# Patient Record
Sex: FEMALE | Race: WHITE | NOT HISPANIC OR LATINO | Employment: OTHER | URBAN - METROPOLITAN AREA
[De-identification: names, ages, dates, MRNs, and addresses within clinical notes are randomized per-mention and may not be internally consistent; named-entity substitution may affect disease eponyms.]

---

## 2021-05-25 ENCOUNTER — RECORDS - HEALTHEAST (OUTPATIENT)
Dept: ADMINISTRATIVE | Facility: CLINIC | Age: 56
End: 2021-05-25

## 2021-05-31 ENCOUNTER — RECORDS - HEALTHEAST (OUTPATIENT)
Dept: ADMINISTRATIVE | Facility: CLINIC | Age: 56
End: 2021-05-31

## 2023-01-01 ENCOUNTER — OFFICE VISIT (OUTPATIENT)
Dept: VASCULAR SURGERY | Facility: CLINIC | Age: 58
End: 2023-01-01
Payer: COMMERCIAL

## 2023-01-01 ENCOUNTER — OFFICE VISIT (OUTPATIENT)
Dept: VASCULAR SURGERY | Facility: CLINIC | Age: 58
End: 2023-01-01
Attending: STUDENT IN AN ORGANIZED HEALTH CARE EDUCATION/TRAINING PROGRAM
Payer: COMMERCIAL

## 2023-01-01 ENCOUNTER — ANCILLARY PROCEDURE (OUTPATIENT)
Dept: VASCULAR ULTRASOUND | Facility: CLINIC | Age: 58
End: 2023-01-01
Attending: NURSE PRACTITIONER
Payer: COMMERCIAL

## 2023-01-01 ENCOUNTER — DOCUMENTATION ONLY (OUTPATIENT)
Dept: VASCULAR SURGERY | Facility: CLINIC | Age: 58
End: 2023-01-01

## 2023-01-01 ENCOUNTER — OFFICE VISIT (OUTPATIENT)
Dept: VASCULAR SURGERY | Facility: CLINIC | Age: 58
End: 2023-01-01
Attending: NURSE PRACTITIONER
Payer: COMMERCIAL

## 2023-01-01 ENCOUNTER — TELEPHONE (OUTPATIENT)
Dept: VASCULAR SURGERY | Facility: CLINIC | Age: 58
End: 2023-01-01

## 2023-01-01 VITALS
SYSTOLIC BLOOD PRESSURE: 155 MMHG | HEART RATE: 93 BPM | DIASTOLIC BLOOD PRESSURE: 84 MMHG | RESPIRATION RATE: 12 BRPM | OXYGEN SATURATION: 95 % | TEMPERATURE: 88 F

## 2023-01-01 VITALS
TEMPERATURE: 97.6 F | SYSTOLIC BLOOD PRESSURE: 115 MMHG | OXYGEN SATURATION: 95 % | HEART RATE: 95 BPM | DIASTOLIC BLOOD PRESSURE: 75 MMHG | RESPIRATION RATE: 16 BRPM

## 2023-01-01 VITALS
RESPIRATION RATE: 16 BRPM | HEART RATE: 62 BPM | TEMPERATURE: 97.2 F | SYSTOLIC BLOOD PRESSURE: 128 MMHG | DIASTOLIC BLOOD PRESSURE: 80 MMHG

## 2023-01-01 VITALS
HEART RATE: 85 BPM | DIASTOLIC BLOOD PRESSURE: 69 MMHG | TEMPERATURE: 98.1 F | RESPIRATION RATE: 16 BRPM | OXYGEN SATURATION: 90 % | SYSTOLIC BLOOD PRESSURE: 133 MMHG

## 2023-01-01 VITALS
DIASTOLIC BLOOD PRESSURE: 84 MMHG | SYSTOLIC BLOOD PRESSURE: 140 MMHG | RESPIRATION RATE: 18 BRPM | TEMPERATURE: 97.6 F | WEIGHT: 285.7 LBS

## 2023-01-01 VITALS
HEART RATE: 97 BPM | DIASTOLIC BLOOD PRESSURE: 72 MMHG | BODY MASS INDEX: 51.71 KG/M2 | TEMPERATURE: 98 F | OXYGEN SATURATION: 100 % | WEIGHT: 281 LBS | RESPIRATION RATE: 12 BRPM | HEIGHT: 62 IN | SYSTOLIC BLOOD PRESSURE: 121 MMHG

## 2023-01-01 VITALS — HEART RATE: 89 BPM | DIASTOLIC BLOOD PRESSURE: 81 MMHG | SYSTOLIC BLOOD PRESSURE: 141 MMHG | OXYGEN SATURATION: 95 %

## 2023-01-01 DIAGNOSIS — S81.002A OPEN WOUND OF KNEE, LEG, AND ANKLE, LEFT, INITIAL ENCOUNTER: Primary | ICD-10-CM

## 2023-01-01 DIAGNOSIS — R60.0 BILATERAL LOWER EXTREMITY EDEMA: Primary | ICD-10-CM

## 2023-01-01 DIAGNOSIS — S81.802A OPEN WOUND OF KNEE, LEG, AND ANKLE, LEFT, INITIAL ENCOUNTER: ICD-10-CM

## 2023-01-01 DIAGNOSIS — I87.303 VENOUS HYPERTENSION OF BOTH LOWER EXTREMITIES: ICD-10-CM

## 2023-01-01 DIAGNOSIS — S81.002A OPEN WOUND OF KNEE, LEG, AND ANKLE, LEFT, INITIAL ENCOUNTER: ICD-10-CM

## 2023-01-01 DIAGNOSIS — I89.0 SECONDARY LYMPHEDEMA: ICD-10-CM

## 2023-01-01 DIAGNOSIS — S91.002A OPEN WOUND OF KNEE, LEG, AND ANKLE, LEFT, INITIAL ENCOUNTER: ICD-10-CM

## 2023-01-01 DIAGNOSIS — R60.0 BILATERAL LOWER EXTREMITY EDEMA: ICD-10-CM

## 2023-01-01 DIAGNOSIS — S91.002A OPEN WOUND OF KNEE, LEG, AND ANKLE, LEFT, INITIAL ENCOUNTER: Primary | ICD-10-CM

## 2023-01-01 DIAGNOSIS — I87.2 VENOUS STASIS DERMATITIS OF BOTH LOWER EXTREMITIES: ICD-10-CM

## 2023-01-01 DIAGNOSIS — T14.8XXA WOUND INFECTION: ICD-10-CM

## 2023-01-01 DIAGNOSIS — L08.9 WOUND INFECTION: ICD-10-CM

## 2023-01-01 DIAGNOSIS — Z87.2 HISTORY OF CELLULITIS OF SKIN WITH LYMPHANGITIS: ICD-10-CM

## 2023-01-01 DIAGNOSIS — S81.802A OPEN WOUND OF KNEE, LEG, AND ANKLE, LEFT, INITIAL ENCOUNTER: Primary | ICD-10-CM

## 2023-01-01 DIAGNOSIS — I89.0 SECONDARY LYMPHEDEMA: Primary | ICD-10-CM

## 2023-01-01 LAB
BACTERIA WND CULT: ABNORMAL
BACTERIA WND CULT: NORMAL
GRAM STAIN RESULT: ABNORMAL

## 2023-01-01 PROCEDURE — 29581 APPL MULTLAYER CMPRN SYS LEG: CPT

## 2023-01-01 PROCEDURE — 87077 CULTURE AEROBIC IDENTIFY: CPT | Performed by: NURSE PRACTITIONER

## 2023-01-01 PROCEDURE — 97602 WOUND(S) CARE NON-SELECTIVE: CPT

## 2023-01-01 PROCEDURE — 87205 SMEAR GRAM STAIN: CPT | Performed by: NURSE PRACTITIONER

## 2023-01-01 PROCEDURE — 99213 OFFICE O/P EST LOW 20 MIN: CPT | Mod: 25 | Performed by: NURSE PRACTITIONER

## 2023-01-01 PROCEDURE — 99213 OFFICE O/P EST LOW 20 MIN: CPT | Performed by: NURSE PRACTITIONER

## 2023-01-01 PROCEDURE — 99204 OFFICE O/P NEW MOD 45 MIN: CPT | Performed by: NURSE PRACTITIONER

## 2023-01-01 PROCEDURE — 93970 EXTREMITY STUDY: CPT

## 2023-01-01 PROCEDURE — 87075 CULTR BACTERIA EXCEPT BLOOD: CPT | Performed by: NURSE PRACTITIONER

## 2023-01-01 RX ORDER — ACETIC ACID 0.25 G/100ML
IRRIGANT IRRIGATION
Qty: 1000 ML | Refills: 3 | Status: SHIPPED | OUTPATIENT
Start: 2023-01-01 | End: 2024-01-01

## 2023-01-01 RX ORDER — MELOXICAM 15 MG/1
TABLET ORAL
COMMUNITY
Start: 2022-09-30 | End: 2023-01-01

## 2023-01-01 RX ORDER — FUROSEMIDE 40 MG
40 TABLET ORAL 2 TIMES DAILY
Status: ON HOLD | COMMUNITY
End: 2024-01-01

## 2023-01-01 RX ORDER — CHLORTHALIDONE 25 MG/1
TABLET ORAL
COMMUNITY
Start: 2022-11-14 | End: 2023-01-01

## 2023-01-01 RX ORDER — LOSARTAN POTASSIUM 100 MG/1
TABLET ORAL
COMMUNITY
Start: 2022-09-19

## 2023-01-01 RX ORDER — GENTAMICIN SULFATE 1 MG/G
OINTMENT TOPICAL
Qty: 30 G | Refills: 3 | Status: SHIPPED | OUTPATIENT
Start: 2023-01-01 | End: 2024-01-01

## 2023-01-01 RX ORDER — CLINDAMYCIN HCL 300 MG
CAPSULE ORAL
COMMUNITY
Start: 2023-09-14 | End: 2023-01-01

## 2023-01-01 RX ORDER — CEFDINIR 300 MG/1
300 CAPSULE ORAL 2 TIMES DAILY
Qty: 20 CAPSULE | Refills: 0 | Status: SHIPPED | OUTPATIENT
Start: 2023-01-01 | End: 2023-01-01

## 2023-01-01 ASSESSMENT — PAIN SCALES - GENERAL
PAINLEVEL: MILD PAIN (2)
PAINLEVEL: NO PAIN (0)
PAINLEVEL: MILD PAIN (2)
PAINLEVEL: NO PAIN (0)
PAINLEVEL: MILD PAIN (2)
PAINLEVEL: MILD PAIN (3)

## 2023-08-14 ENCOUNTER — HOSPITAL ENCOUNTER (EMERGENCY)
Facility: CLINIC | Age: 58
Discharge: HOME | End: 2023-08-14
Attending: EMERGENCY MEDICINE | Admitting: EMERGENCY MEDICINE
Payer: COMMERCIAL

## 2023-08-14 ENCOUNTER — APPOINTMENT (OUTPATIENT)
Dept: RADIOLOGY | Facility: CLINIC | Age: 58
End: 2023-08-14
Attending: EMERGENCY MEDICINE
Payer: COMMERCIAL

## 2023-08-14 VITALS
OXYGEN SATURATION: 90 % | TEMPERATURE: 97.1 F | RESPIRATION RATE: 20 BRPM | DIASTOLIC BLOOD PRESSURE: 80 MMHG | SYSTOLIC BLOOD PRESSURE: 169 MMHG | HEART RATE: 92 BPM

## 2023-08-14 DIAGNOSIS — I89.0 LYMPHEDEMA: ICD-10-CM

## 2023-08-14 DIAGNOSIS — L03.116 CELLULITIS OF LEFT FOOT: ICD-10-CM

## 2023-08-14 PROBLEM — R60.0 BILATERAL LOWER EXTREMITY EDEMA: Status: ACTIVE | Noted: 2021-07-14

## 2023-08-14 PROBLEM — Z87.891 FORMER SMOKER: Status: ACTIVE | Noted: 2021-06-23

## 2023-08-14 PROBLEM — E55.9 VITAMIN D DEFICIENCY: Status: ACTIVE | Noted: 2021-06-23

## 2023-08-14 PROBLEM — I10 HTN (HYPERTENSION): Status: ACTIVE | Noted: 2021-07-14

## 2023-08-14 PROBLEM — J45.909 ASTHMA: Status: ACTIVE | Noted: 2023-08-14

## 2023-08-14 PROBLEM — J30.2 SEASONAL ALLERGIES: Status: ACTIVE | Noted: 2021-07-14

## 2023-08-14 PROBLEM — R73.03 PREDIABETES: Status: ACTIVE | Noted: 2021-06-24

## 2023-08-14 PROBLEM — I87.2 VENOUS STASIS DERMATITIS OF BOTH LOWER EXTREMITIES: Status: ACTIVE | Noted: 2022-03-16

## 2023-08-14 LAB
ANION GAP SERPL CALCULATED.3IONS-SCNC: 11 MMOL/L (ref 5–18)
BUN SERPL-MCNC: 11 MG/DL (ref 8–22)
C REACTIVE PROTEIN LHE: 2.2 MG/DL (ref 0–?)
CALCIUM SERPL-MCNC: 9.3 MG/DL (ref 8.5–10.5)
CHLORIDE BLD-SCNC: 99 MMOL/L (ref 98–107)
CO2 SERPL-SCNC: 30 MMOL/L (ref 22–31)
CREAT SERPL-MCNC: 0.82 MG/DL (ref 0.6–1.1)
ERYTHROCYTE [DISTWIDTH] IN BLOOD BY AUTOMATED COUNT: 13.7 % (ref 10–15)
ERYTHROCYTE [SEDIMENTATION RATE] IN BLOOD BY WESTERGREN METHOD: 17 MM/HR (ref 0–30)
GFR SERPL CREATININE-BSD FRML MDRD: 82 ML/MIN/1.73M2
GLUCOSE BLD-MCNC: 104 MG/DL (ref 70–125)
HCT VFR BLD AUTO: 48.3 % (ref 35–47)
HGB BLD-MCNC: 16.1 G/DL (ref 11.7–15.7)
MCH RBC QN AUTO: 34.6 PG (ref 26.5–33)
MCHC RBC AUTO-ENTMCNC: 33.3 G/DL (ref 31.5–36.5)
MCV RBC AUTO: 104 FL (ref 78–100)
PLATELET # BLD AUTO: 208 10E3/UL (ref 150–450)
POTASSIUM BLD-SCNC: 4.7 MMOL/L (ref 3.5–5)
RBC # BLD AUTO: 4.65 10E6/UL (ref 3.8–5.2)
SODIUM SERPL-SCNC: 140 MMOL/L (ref 136–145)
WBC # BLD AUTO: 10.6 10E3/UL (ref 4–11)

## 2023-08-14 PROCEDURE — 86140 C-REACTIVE PROTEIN: CPT | Performed by: EMERGENCY MEDICINE

## 2023-08-14 PROCEDURE — 80048 BASIC METABOLIC PNL TOTAL CA: CPT | Performed by: EMERGENCY MEDICINE

## 2023-08-14 PROCEDURE — 73620 X-RAY EXAM OF FOOT: CPT | Mod: LT

## 2023-08-14 PROCEDURE — 36415 COLL VENOUS BLD VENIPUNCTURE: CPT | Performed by: EMERGENCY MEDICINE

## 2023-08-14 PROCEDURE — 99284 EMERGENCY DEPT VISIT MOD MDM: CPT

## 2023-08-14 PROCEDURE — 85652 RBC SED RATE AUTOMATED: CPT | Performed by: EMERGENCY MEDICINE

## 2023-08-14 PROCEDURE — 85027 COMPLETE CBC AUTOMATED: CPT | Performed by: EMERGENCY MEDICINE

## 2023-08-14 PROCEDURE — 87040 BLOOD CULTURE FOR BACTERIA: CPT | Mod: 59 | Performed by: EMERGENCY MEDICINE

## 2023-08-14 RX ORDER — GINSENG 100 MG
CAPSULE ORAL ONCE
Status: DISCONTINUED | OUTPATIENT
Start: 2023-08-14 | End: 2023-08-14 | Stop reason: HOSPADM

## 2023-08-14 RX ORDER — FUROSEMIDE 20 MG
20 TABLET ORAL DAILY
Qty: 14 TABLET | Refills: 0 | Status: SHIPPED | OUTPATIENT
Start: 2023-08-14 | End: 2023-01-01 | Stop reason: DRUGHIGH

## 2023-08-14 RX ORDER — CEPHALEXIN 500 MG/1
1000 CAPSULE ORAL 3 TIMES DAILY
Qty: 42 CAPSULE | Refills: 0 | Status: SHIPPED | OUTPATIENT
Start: 2023-08-14 | End: 2023-08-21

## 2023-08-14 ASSESSMENT — ACTIVITIES OF DAILY LIVING (ADL): ADLS_ACUITY_SCORE: 35

## 2023-08-14 NOTE — ED TRIAGE NOTES
"Pt arrives to ED with c/o ongoing wound to her left foot for over 6 months. Pt was without insurance and was not able to go to the doctor. Pt seen at urgent care today and sent here. Urgent care told her \"you might need your foot cut off\". Left leg is severely swollen compared to the right. CMS intact on left side. Hypertension hx.      Triage Assessment       Row Name 08/14/23 1351       Triage Assessment (Adult)    Airway WDL WDL       Respiratory WDL    Respiratory WDL WDL       Skin Circulation/Temperature WDL    Skin Circulation/Temperature WDL WDL       Cardiac WDL    Cardiac WDL WDL       Peripheral/Neurovascular WDL    Peripheral Neurovascular WDL WDL       Cognitive/Neuro/Behavioral WDL    Cognitive/Neuro/Behavioral WDL WDL                    "

## 2023-08-14 NOTE — DISCHARGE INSTRUCTIONS
Please  [facility] pharmacy, and for the next week apply this twice a day to the wounds that are open on the left foot.    Otherwise keep using compression dressings or Ace wraps on both legs.    You can take Tylenol, and or Aleve for pain.    Make sure that you keep your appointment with your primary care doctor in 2 weeks.     Antibiotics take about 24 hours to fully kick in.  That is when the inflammation and swelling slows down.  If you notice that your symptoms are getting worse after 24 hours, or if at any time you have nausea, vomiting or are unable to keep down antibiotics, I would like you to come back to the emergency department.

## 2023-08-14 NOTE — ED PROVIDER NOTES
EMERGENCY DEPARTMENT ENCOUNTER      NAME: Noelle Gomez  AGE: 58 year old female  YOB: 1965  MRN: [id number]  EVALUATION DATE & TIME: No admission date for patient encounter.    PCP: No Ref-Primary, Physician    ED PROVIDER: Chandra Duong M.D.      Chief Complaint   Patient presents with    Wound Check         FINAL IMPRESSION:  1. Cellulitis of left foot    2. Lymphedema          ED COURSE & MEDICAL DECISION MAKING:    Pertinent Labs & Imaging studies reviewed below.  All EKGs below represent my independent interpretation.   ED Course as of 08/14/23 2327   Mon Aug 14, 2023   1522 59 yo F here with acute on chronic infection to R foot. Recent lapse of insurance, so was not on a number of medications for almost 6 months. Sent here from  today after evaluation of left foot was alarming and they were concerned for serious infection.  On arrival here she is not septic appearing, notes her symptoms have been chronic and slowly progressive.  She has normal temperature, blood pressure.  Her left foot is more swollen and red than her right foot.  The area of inflammation is seems to be more on the dorsum than plantar aspect and there is no deep ulcer that would raise suspicion for a tracking wound down to the bone.  Plan to screen for osteomyelitis with inflammatory lab work, x-ray, I do not think MRI is needed at this time.  Blood cultures ordered.   1641 WBC: 10.6   1701 Basic metabolic panel  WNL   1701 Sed Rate: 17  WNL   1701 CRP(!): 2.2  Mildly elevated   1715 XR Foot Left 2 Views  No radiographic evidence for osteomyelitis. No evidence for fracture. Plantar calcaneal spurring. Severe soft tissue swelling over the foot and lower leg.   1733 I updated the patient.  The wound was covered bacitracin, rewrapped.  We discussed my diagnosis of cellulitis.  With her reassuring lab work here, renal function she was sent home with Keflex for cellulitis, 20 mg of Lasix daily for her lymphedema, and we  discussed wound care.  She has follow-up with primary care doctor in 10 days.  We discussed return precautions.   1734 Although DVT was in the differential, I think the swelling and pain can be completely explained by the inflammatory changes of the skin.       Additional ED Course Timestamps:  3:21 PM BOB Sanchez met with the patient and obtained initial history. Initial exam pending patient room placement.  3:52 PM I met with the patient, obtained history, performed an initial exam, and discussed options and plan for diagnostics and treatment here in the ED.  5:23 PM Patient reevaluated and updated on workup results.     Medical Decision Making    History:  Supplemental history from: Documented in chart, if applicable  External Record(s) reviewed: Documented in chart, if applicable.    Work Up:  Chart documentation includes differential considered and any EKGs or imaging independently interpreted by provider, where specified.  In additional to work up documented, I considered the following work up: Documented in chart, if applicable.    External consultation:  Discussion of management with another provider: Documented in chart, if applicable    Complicating factors:  Care impacted by chronic illness: Hypertension  Care affected by social determinants of health: Access to Affordable Health Care    Disposition considerations: Discharge. I prescribed additional prescription strength medication(s) as charted. N/A.        At the conclusion of the encounter I discussed the results of all of the tests and the disposition. The questions were answered. The patient or family acknowledged understanding and was agreeable with the care plan.       MEDICATIONS GIVEN IN THE EMERGENCY:  Medications - No data to display        NEW PRESCRIPTIONS STARTED AT TODAY'S ER VISIT  Discharge Medication List as of 8/14/2023  6:21 PM        START taking these medications    Details   cephALEXin (KEFLEX) 500 MG capsule Take 2 capsules  (1,000 mg) by mouth 3 times daily for 7 days, Disp-42 capsule, R-0, E-Prescribe      furosemide (LASIX) 20 MG tablet Take 1 tablet (20 mg) by mouth daily for 14 days, Disp-14 tablet, R-0, E-Prescribe                =================================================================    HPI    Patient information was obtained from: Patient    Use of : N/A       Noelle Gomez is a 58 year old female with a pertinent history of  hypertension, bilateral lower extremity edema, and ureteropelvic junction obstruction, and prediabetes who presents to this ED for evaluation of a wound check and leg swelling    Per chart review, patient was seen at [facility] on 3/16/2022 for a follow up on left leg swelling that has been persistent for several months. On exam, she was noted to have 2+ pitting edema and stasis dermatitis left lower extremity with no open sores. She also had 1+ pitting edema with stasis dermatitis in her right lower extremity RLE also had a lateral 3.5 cm round ulcer with some drainage into skin superficially without some surrounding erythema, and a healed inner right ulcer without drainage. She was prescribed lasix 200 mg bid x 7 days and keflex. Patient was advised to follow up closely in 1-2 weeks and obtain labs to monitor kidney functions. She was also advised to continue to use leg compressions.      Patient comes in from urgent care today (8/13) with increased left leg swelling that had initially started about 6 months ago. She states it started after she had stopped taking her diuretic and hypertension medications due to lapses in her insurance. She and her  just recently got new insurance cards and so she had sought evaluation today.She has edema on her right foot but the left leg is notably worse. She was told in urgent care she may have an infection in her left foot. Also endorses blisters on the top of her foot. She has been applying vaseline and wrapping her foot  with bandages twice a day.    She denied any fevers, chest pain, shortness of breath, or abdominal pain. No history of diabetes.No other complaints at this time.               VITALS:  BP (!) 169/80   Pulse 92   Temp 97.1  F (36.2  C) (Temporal)   Resp 20   SpO2 90%     PHYSICAL EXAM    Constitutional: Well developed, well nourished. Comfortable appearing.  HENT: Normocephalic, atraumatic, mucous membranes moist, nose normal. Neck- Supple, gross ROM intact.   Eyes: Pupils mid-range, conjunctiva without injection, no discharge.   Respiratory: Clear to auscultation bilaterally, no respiratory distress, no wheezing, speaks full sentences easily. No cough.  Cardiovascular: Tachycardic. Regular rhythm, no murmurs. Bilateral lymphedema in legs and feet, left>right.  GI: Obese abdomen. Soft, no tenderness to deep palpation in all quadrants, no masses.  Musculoskeletal: Moving all 4 extremities intentionally and without pain. No obvious deformity.  Skin: Warm, dry, no rash. Left leg has more redness and warmth. Some skin breakdown in left dorsal forefoot present.  Neurologic: Alert & oriented x 3, cranial nerves grossly intact.  Psychiatric: Affect normal, cooperative.            I, Genesis Plascencia  am serving as a scribe to document services personally performed by Dr. Chandra Duong based on my observation and the provider's statements to me. IChandra MD attest that Genesis Plascencia  is acting in a scribe capacity, has observed my performance of the services and has documented them in accordance with my direction.    Chandra Duong M.D.  Emergency Medicine  [facility] EMERGENCY ROOM  [address] [address]  [phone]  Dept: [phone]       Chandra Duong MD  08/14/23 1925

## 2023-08-19 LAB
BACTERIA BLD CULT: NO GROWTH
BACTERIA BLD CULT: NO GROWTH

## 2023-09-14 ENCOUNTER — TRANSFERRED RECORDS (OUTPATIENT)
Dept: HEALTH INFORMATION MANAGEMENT | Facility: CLINIC | Age: 58
End: 2023-09-14

## 2023-09-14 ENCOUNTER — MEDICAL CORRESPONDENCE (OUTPATIENT)
Dept: HEALTH INFORMATION MANAGEMENT | Facility: CLINIC | Age: 58
End: 2023-09-14

## 2023-09-14 ENCOUNTER — TRANSCRIBE ORDERS (OUTPATIENT)
Dept: VASCULAR SURGERY | Facility: CLINIC | Age: 58
End: 2023-09-14
Payer: COMMERCIAL

## 2023-09-14 ENCOUNTER — LAB REQUISITION (OUTPATIENT)
Dept: LAB | Facility: CLINIC | Age: 58
End: 2023-09-14
Payer: COMMERCIAL

## 2023-09-14 DIAGNOSIS — S81.802A OPEN WOUND OF KNEE, LEG, AND ANKLE, LEFT, INITIAL ENCOUNTER: Primary | ICD-10-CM

## 2023-09-14 DIAGNOSIS — S91.002A OPEN WOUND OF KNEE, LEG, AND ANKLE, LEFT, INITIAL ENCOUNTER: Primary | ICD-10-CM

## 2023-09-14 DIAGNOSIS — Z79.899 OTHER LONG TERM (CURRENT) DRUG THERAPY: ICD-10-CM

## 2023-09-14 DIAGNOSIS — S81.002A OPEN WOUND OF KNEE, LEG, AND ANKLE, LEFT, INITIAL ENCOUNTER: Primary | ICD-10-CM

## 2023-09-14 PROCEDURE — 80048 BASIC METABOLIC PNL TOTAL CA: CPT | Mod: ORL | Performed by: STUDENT IN AN ORGANIZED HEALTH CARE EDUCATION/TRAINING PROGRAM

## 2023-09-15 LAB
ANION GAP SERPL CALCULATED.3IONS-SCNC: 12 MMOL/L (ref 7–15)
BUN SERPL-MCNC: 18.4 MG/DL (ref 6–20)
CALCIUM SERPL-MCNC: 9.4 MG/DL (ref 8.6–10)
CHLORIDE SERPL-SCNC: 95 MMOL/L (ref 98–107)
CREAT SERPL-MCNC: 0.9 MG/DL (ref 0.51–0.95)
DEPRECATED HCO3 PLAS-SCNC: 32 MMOL/L (ref 22–29)
EGFRCR SERPLBLD CKD-EPI 2021: 74 ML/MIN/1.73M2
GLUCOSE SERPL-MCNC: 97 MG/DL (ref 70–99)
POTASSIUM SERPL-SCNC: 4.6 MMOL/L (ref 3.4–5.3)
SODIUM SERPL-SCNC: 139 MMOL/L (ref 136–145)

## 2023-10-02 NOTE — PROGRESS NOTES
[facility] Consult Note    Date of Service: October 2, 2023     Requesting Provider: Dr. Yasmani Aaron    Chief Complaint: BLE wounds and swelling    History of Present Illness: Noelle Gomez is being seen at [facility] today regarding BLE wounds and swelling They arrive to the clinic today alone; she is able to walk unassisted to the room. The patient reports that the wounds and swelling began around 6 months ago when she stopped taking her diuretics; she was seen in UC and then sent to ER this past August; was started on keflex and restarted on lasix. Left foot xray done also at that time and was negative for osteomyelitis. She reports that she lost her medical insurance and could not get her prescriptions. Was currently/previously using bacitracin; telfa; abd; coban; pt trying to change the dressings 2-3 times a day; this is very hard for her to reach her foot. Reports pain of 0/10; currently using nothing for pain. Has used nothing as compression in the past, is currently using tubular for compression. Denies any fevers, chills, or generalized ill feeling. Denies history of cancer. Sleeps in a bed or on the couch with legs elevated. Pt still has their uterus and ovaries, they deny any abnormal vaginal bleeding. Denies history of DVT, Joint Replacement, and Vein Procedures. Positive history of Cellulitis. I personally reviewed outside imaging, lab work, and progress noted through Care Everywhere and outside records. Quit smoking 4 years ago; she gained significant weight after this. She does have a stationary bike at home and want to start using this to lose the weight when she is feeling better.       Review of Systems:   Constitutional:  Negative   EENTM: negative for glasses;  negative Quechan  GI:  negative for nausea/vomiting;   negative for constipation   negative diarrhea;   negative for fecal incontinence  negative weight loss  :   negative dysuria,  negative  incontinence  MS: patient is ambulatory;  does not use assistive devices  Cardiac: negative   Respiratory:  negative SOB  Endocrine:  positive  pre-diabetes  Psych: negative  depression/anxiety    Past Medical History:   Past Medical History:   Diagnosis Date    Asthma 08/14/2023    Bilateral lower extremity edema 07/14/2021    Former smoker 06/23/2021    HTN (hypertension) 07/14/2021    Lymphedema 02/08/2022    Prediabetes 06/24/2021    Seasonal allergies 07/14/2021    UPJ (ureteropelvic junction) obstruction 03/04/2015    Vitamin D deficiency 06/23/2021        Surgical History: No past surgical history on file.     Medications:   Current Outpatient Medications   Medication Sig    losartan (COZAAR) 100 MG tablet TAKE 1 TABLET(100 MG) BY MOUTH EVERY DAY    furosemide (LASIX) 40 MG tablet Take 40 mg by mouth 2 times daily     No current facility-administered medications for this visit.       Allergies:   Allergies   Allergen Reactions    Sulfa Antibiotics Unknown, Itching and Rash       Family history: No family history on file.     Social History:   Social History     Socioeconomic History    Marital status:      Spouse name: Not on file    Number of children: Not on file    Years of education: Not on file    Highest education level: Not on file   Occupational History    Not on file   Tobacco Use    Smoking status: Not on file    Smokeless tobacco: Not on file   Substance and Sexual Activity    Alcohol use: Not on file    Drug use: Not on file    Sexual activity: Not on file   Other Topics Concern    Not on file   Social History Narrative    Not on file     Social Determinants of Health     Financial Resource Strain: Not on file   Food Insecurity: Not on file   Transportation Needs: Not on file   Physical Activity: Not on file   Stress: Not on file   Social Connections: Not on file   Interpersonal Safety: Not on file   Housing Stability: Not on file        Physical Exam  Vitals: BP (!) 140/84   Temp 97.6  F  (36.4  C)   Resp 18   Wt 285 lb 11.2 oz (129.6 kg)   Weight is 285 lbs 11.2 oz          There is no height or weight on file to calculate BMI.  General: This is a 58 year old female who appears their reported age, not in acute distress  Head: normocephalic, Atraumatic; not wearing glasses; non-icteric sclera; no exudate; mild hearing loss  Respiratory: Clear throughout all lung fields; unlabored breathing; no cough   Cardiac: Regular, Rate and Rhythm, no murmurs appreciated   Skin: Uniformly warm and dry  Psych: Alert and oriented x4; calm and cooperative throughout exam  Extremities: BLE with swelling left worse than right; full thickness ulcers on the left dorsum of the foot and left calf; copious heavy yellow green drainage with odor; maceration surrounding; strength testing revealed 4/4 to BLEs. Nails are elongated and thickened. Debris in the webbing space; hygiene neglect noted on the legs. Chronic swelling skin changes noted with hyperpigmentation, fibrosis; and thickening.    Sensation: Intact to pinprick and light touch throughout lower extremities bilaterally       Peripheral Vascular: normal dorsalis pedis, posterior tibial pulses to bilateral feet , using a handheld doppler these were strong and biphasic in nature.  Good capillary refill. No unusual venous distention. Positive for spider veins, telangiectasias, hemosiderin deposition or hyperpigmentation, and fibrosis or scarring          Circumferential volume measures:            10/2/2023     1:00 PM   Circumferential Measures   Right Ankle 25   Right Widest Calf 32   Right Thigh Up 10cm 45   Left - just above MTP 30.5   Left Ankle 36.5   Left Widest Calf 57       Ulceration(s)/Wound(s):     VASC Wound Left calf (Active)   Pre Size Length 6 10/02/23 1300   Pre Size Width 7.5 10/02/23 1300   Pre Size Depth 0.2 10/02/23 1300   Pre Total Sq cm 45 10/02/23 1300   Description weeping 10/02/23 1300       VASC Wound Left anterior foot (Active)   Pre Size  Length 8.3 10/02/23 1300   Pre Size Width 8 10/02/23 1300   Pre Size Depth 0.1 10/02/23 1300   Pre Total Sq cm 66.4 10/02/23 1300   Description weeping 10/02/23 1300       Laboratory studies:     I personally reviewed the following lab results today and those on care everywhere, if indicated     CRP   Date Value Ref Range Status   08/14/2023 2.2 (H) 0.0 - <0.8 mg/dL Final      Erythrocyte Sedimentation Rate   Date Value Ref Range Status   08/14/2023 17 0 - 30 mm/hr Final      Last Renal Panel:  Sodium   Date Value Ref Range Status   09/14/2023 139 136 - 145 mmol/L Final     Potassium   Date Value Ref Range Status   09/14/2023 4.6 3.4 - 5.3 mmol/L Final   08/14/2023 4.7 3.5 - 5.0 mmol/L Final     Chloride   Date Value Ref Range Status   09/14/2023 95 (L) 98 - 107 mmol/L Final   08/14/2023 99 98 - 107 mmol/L Final     Carbon Dioxide (CO2)   Date Value Ref Range Status   09/14/2023 32 (H) 22 - 29 mmol/L Final   08/14/2023 30 22 - 31 mmol/L Final     Anion Gap   Date Value Ref Range Status   09/14/2023 12 7 - 15 mmol/L Final   08/14/2023 11 5 - 18 mmol/L Final     Glucose   Date Value Ref Range Status   09/14/2023 97 70 - 99 mg/dL Final   08/14/2023 104 70 - 125 mg/dL Final     Urea Nitrogen   Date Value Ref Range Status   09/14/2023 18.4 6.0 - 20.0 mg/dL Final   08/14/2023 11 8 - 22 mg/dL Final     Creatinine   Date Value Ref Range Status   09/14/2023 0.90 0.51 - 0.95 mg/dL Final     GFR Estimate   Date Value Ref Range Status   09/14/2023 74 >60 mL/min/1.73m2 Final     Calcium   Date Value Ref Range Status   09/14/2023 9.4 8.6 - 10.0 mg/dL Final      Lab Results   Component Value Date    WBC 10.6 08/14/2023     Lab Results   Component Value Date    RBC 4.65 08/14/2023     Lab Results   Component Value Date    HGB 16.1 08/14/2023     Lab Results   Component Value Date    HCT 48.3 08/14/2023     No components found for: MCT  Lab Results   Component Value Date     08/14/2023     Lab Results   Component Value Date     MCH 34.6 08/14/2023     Lab Results   Component Value Date    MCHC 33.3 08/14/2023     Lab Results   Component Value Date    RDW 13.7 08/14/2023     Lab Results   Component Value Date     08/14/2023      No results found for: A1C   No results found for: TSH   No results found for: VITDT       Impression:    Encounter Diagnoses   Name Primary?    Secondary lymphedema Yes    Open wound of knee, leg, and ankle, left, initial encounter     Venous hypertension of both lower extremities     History of cellulitis of skin with lymphangitis     Bilateral lower extremity edema     Venous stasis dermatitis of both lower extremities            10/2/2023 BLE         8/14/23 BLE       Assessment/Plan:  1. Debridement: pt did not tolerate    2. Treatment: wound treatment will include irrigation and dressings to promote autolytic debridement which will include: appears to have pseudomonas will empirically treat with dilute hibiclens wash; then acetic acid soak; then gentmaycin ointment; then silvercel; then abd; rolled gauze. I spoke to the patient about the serious nature of her legs and the intense work it will take to get these better; she could not come to clinic twice per week; she could not go to lymphedema therapy; she states her and her  are not able to wrap; she did not want home care due to the state of her home; I was very blunt with her and told her that if she does not get serious about taking care of her legs and health she will end up in the hospital and may require amputation. She was eventually accepting of home care; but was not happy it had to be so frequently; she actually would benefit from daily cares. Explained to her that with the amount of drainage she needs frequent cares. She does not seem to grasp the serious nature of her health problems. I ordered home care. I also obtained a culture and will await sensitivities to treat her systemically if needed. Explained to her that she needs to  stop getting her legs wet in the shower and advised sponge baths only. Culture grew out serratia; klebsiella, and proteus; will treat with Cefdinir 300mg BID for 10 days      If for some reason the patient is not able to get your dressing(s) changed as outlined above (due to illness, lack of supplies, lack of help) please do the following: remove old, soiled dressings; wash the ulcers with saline; pat dry; apply ABD pad or other absorbant pad and secure with rolled gauze; avoid tape directly on your skin; Patient instructed to call the clinic as soon as possible to let us know what the current issues are in receiving ulcer care.    3. Edema. We spoke at length regarding their swelling, potential causes, and treatment options. Answered all questions. Their swelling is likely multifactorial including but not limited to the following: their weight, dependency of the limbs for most hours of the day, reduced activity with reduced calf pump mechanism, venous hypertension, valvular incompetence, and side effect of certain medications. I would like to first reduce their swelling down using 4 layer compression wraps with high density foam on the tops of the feet and then transition them into compression garments; such as compression stockings or velcro wraps. The patient should continue to elevate their legs periodically throughout the day. Continue to take their diuretics per their PMD recommendations. Will obtain venous studies at her next visit.       If a 2 layer or 4 layer compression wrap is being used; these are safe to have on for ONLY 7 days. If for some reason the patient is not able to get the wrap(s) changed (due to illness; lack of supplies, lack of help, lack of transportation) please do the following: unwrap the old 2 or 4 layer compression wrap; avoid using scissors as you could cut your skin and cause ulcers; use tubular compression when available. Call to reschedule your home care or clinic visit  appointment as soon as possible.    4. Offloading: cannot wear shoes at this time; gave her velcro post op shoes; she completed the paperwork; explained these were not covered by insurance    5. Nutrition: without significant weight loss this will be a continued and progressive issue    Patient to return to clinic in 3 week(s) for re-evaluation. They were instructed to call the clinic sooner with any further questions or concerns. Answered all questions.          Demetra Carrion NP   [facility]  [phone]      This note was electronically signed by Demetra Carrion NP

## 2023-10-02 NOTE — PATIENT INSTRUCTIONS
"Wound care supplies were not ordered or needed today.    We will order home care    Go to pharmacy and  gentamicin ointment and acetic acid solution    Culture was taken today today this will take 4 days to get results we will call you with this and if you need additional antibiotics    We will have you complete venous insufficiency ultrasounds at your next visit to evaluate the venous system    Wound Care Instructions    2 TIMES PER WEEK and as needed, Cleanse your bilateral legs/ wound(s) with Dilute hibiclens 30cc in 500cc NS.    Pat Dry with non-sterile gauze    Then pour acetic acid solution onto gauze and wash the wound; set on the wounds for 10 minutes to soak and do not rinse    Apply Lotion to the intact skin surrounding your wound and other dry skin locations. Some good lotions include: Remedy Skin Repair Cream, Sarna, Vanicream or Cetaphil    Primary Dressing: Apply gentamicin ointment onto silvercel into/onto the wounds    Secondary dressing: Cover with ABD    Secure with non-sterile roll gauze (4\" x 75\" roll) and tape (1\" roll tape) as needed; avoid adhesive directly on the skin    Compression: 4 layer compression wraps bilaterally with high density black foam to the tops of the feet    It is not ok to get your wound wet in the bath or shower      If for some reason you are not able to get your dressing(s) changed as outlined above (due to illness, lack of supplies, lack of help) please do the following: remove old, soiled dressings; wash the wounds with saline; pat dry; apply ABD pad or other absorbant pad and secure with rolled gauze; avoid tape directly on your skin; Call the clinic as soon as possible to let us know what the current issues are in receiving wound care [phone].      SEEK MEDICAL CARE IF:  You have an increase in swelling, pain, or redness around the wound.  You have an increase in the amount of pus coming from the wound.  There is a bad smell coming from the wound.  The " wound appears to be worsening/enlarging  You have a fever greater than 101.5 F      It is ok to continue current wound care treatment/products for the next 2-3 days until new wound care supplies are ordered and arrive. If longer than this please contact our office at [phone].    If you have a 2 layer or 4 layer compression wrap on these are safe to have on for ONLY 7 days. If for some reason you are not able to get the wrap(s) changed (due to illness; lack of supplies, lack of help, lack of transportation) please do the following: unwrap the old 2 or 4 layer compression wrap; avoid using scissors as you could cut your skin and cause wounds; use tubular compression when available. Call to reschedule your home care or clinic visit appointment as soon as possible.    Please NOTE: if you are 15 minutes late to your clinic appointment you will have to be rescheduled. Please call our clinic as soon as possible if you know you will not be able to get to your appointment at [phone].    If you fail to show up to 3 scheduled clinic appointments you will be dismissed from our clinic.              We want to hear from you!  In the next few weeks, you should receive a call or email to complete a survey about your visit at [facility]. Please help us improve your appointment experience by letting us know how we did today. We strive to make your experience good and value any ways in which we could do better.      We value your input and suggestions.    Thank you for choosing the [facility]!           It is recommended that you do not get your ulcer wet when showering.  Listed below are several ways of keeping it dry when you shower.     1. Wrap it with Press and Seal plastic wrap.  It can be found in the stores where the plastic wraps or tin foil is kept.               2.  Some people take a bath and hang their leg/foot out of the tub.                        3  Put your leg in a  plastic bag and tape it on.           4. You can purchase a shower cover for casts at some pharmacies and through the Internet.            5. Take a Bed Bath or wash up at the sink        FAMILY HISTORY:  Father  Still living? Yes, Estimated age: Age Unknown  No pertinent family history in first degree relatives, Age at diagnosis: Age Unknown    Mother  Still living? Yes, Estimated age: Age Unknown  No pertinent family history in first degree relatives, Age at diagnosis: Age Unknown

## 2023-10-02 NOTE — TELEPHONE ENCOUNTER
Home care referral sent to:  [facility] [phone]  [facility] [phone]  [facility] [phone] UNABLE TO TAKE THE PATIENT'S INSURANCE. CLARISSE

## 2023-10-02 NOTE — TELEPHONE ENCOUNTER
From: Paola Torres   Sent: 10/2/2023   3:40 PM CDT   To: Demetra Carrion NP; *   Subject: HOME HEALTH REFERRAL DENIAL- [facility]*     We received a home health referral for this patient ; however, we are unable to accept it due to capacity. This referral will need to be sent to another  agency. If you have any further questions, you can contact me at [phone] ext [phone]. Thank you.       Paola Torres Lvn   Clinical    [employer]

## 2023-10-02 NOTE — PROGRESS NOTES
Compression Applied to Bilateral  4-Layer: LAYER 1: ORTHOPAEDIC WOOL The bandage was applied without tension in aloose spiral from base of toes to knee joint with 50% overlap.LAYER 2: LIGHT SUPPORT BANDAGE Then the next layer bandage was applied in spiral toe to knee with 50% overlap with 50% overlap.LAYER 3: LIGHT COMPRESSION BANDAGE Then the elastic conformable. compression bandage was applied at mid stretch (50%) in a figure of eight from toe to knee, with a 50% overlap.  LAYER 4: COHESIVE EXTENSIBLE BANDAGE Finally the lightweight, elastic, cohesive bandage was applied at mid stretch (50%) in a spiral with a 50 % overlap from toe to knee.

## 2023-10-03 NOTE — TELEPHONE ENCOUNTER
Faxed referrals to:  [facility]- at capacity  [facility]- unable to take due to insurance concerns  [facility]-can't take due to insurance MARINA 10/3  [facility]   *called- [facility] for a follow up if they have reviewed pt. - not taking referrals at this time.    [facility] - is not able to take this pt due to capacity JS

## 2023-10-04 NOTE — TELEPHONE ENCOUNTER
Referrals faxed to:    [facility]-  10/5 unable to take due to pt living in [facility]    [facility]- At capacity with [facility]  [facility]   [facility]- Unable to accept d/t staffing

## 2023-10-05 NOTE — TELEPHONE ENCOUNTER
Referral for HC sent to:    Refaxed to [facility] 10/5 unable to take due to staffing    [facility]- out of network 10/5- [facility]    [facility]- SN is no longer offered 10/6/23 -NK        Spoke with pt and updated that we are still working on getting home care and she is scheduled for Monday 10/9/23. Pt is unable to come in on Friday 10/6/23. Pt is supposed to come in twice weekly and says she cannot come in 2 x week and states she knows the importance and knows Demetra would like her to but she does not have a drivers license. She is limited with getting rides. Pt unsure if insurance provides transportation and advised she could try and call them to see if this is an option.

## 2023-10-05 NOTE — TELEPHONE ENCOUNTER
Pt called back and stated that she can come for a NV tomorrow. She was added on 10/6 at 11 am. Visit added on 10/12 at 11am. She had no further questions.

## 2023-10-06 NOTE — PROGRESS NOTES
[facility]  -  Nurse Visit        Date of Service:  October 6, 2023     Requesting Provider: MADAY Carrion    Diagnosis:     ICD-10-CM    1. Open wound of knee, leg, and ankle, left, initial encounter  S81.002A     S81.802A     S91.002A       2. Bilateral lower extremity edema  R60.0           Chief Complaint: Noelle is being seen today at [facility] [place] for her wound(s) and swelling dressing change. Reports pain of 1/10.  Denies any fevers, chills, or generalized ill feeling.     Dressing on Arrival: 4 layer, rolled guaze, black foam, ABD, silvercel, Pt is currently using 4 layer for compression and did tolerate well. Upon removal of dressings copious Serous drainage is noted.    New Wounds noted: No    Vital Signs: /75   Pulse 95   Temp 97.6  F (36.4  C)   Resp 16   SpO2 95%     Assessment:      General:  Patient presents to clinic in no apparent distress.   Psychiatric:  Alert and oriented x3.   Lower extremity:  edema is present.    Integumentary:  Skin is macerated periwound. Dry skin. Otherwise intact    Circumferential volume measures:      10/2/2023     1:00 PM 10/6/2023    11:00 AM   Circumferential Measures   Right just above MTP  25   Right Ankle 25 29   Right Widest Calf 32 44   Right Thigh Up 10cm 45    Left - just above MTP 30.5 27   Left Ankle 36.5 32   Left Widest Calf 57 50       Wound info:  VASC Wound Left calf (Active)   Pre Size Length 11.5 10/06/23 1000   Pre Size Width 10.6 10/06/23 1000   Pre Size Depth 0.1 10/06/23 1000   Pre Total Sq cm 121.9 10/06/23 1000   Description weeping 10/02/23 1300       VASC Wound Left anterior foot (Active)   Pre Size Length 10 10/06/23 1000   Pre Size Width 11.3 10/06/23 1000   Pre Size Depth 0.1 10/06/23 1000   Pre Total Sq cm 113 10/06/23 1000   Description weeping 10/06/23 1000       Undermining is present.    The periwoundskin is maceration      Plan:         1. Patient will return in 3 days for  nurse visit.            2. As listed below, treatment provided irrigation, mechanical cleansing, and dressings to promote autolytic debridement and included application of multi-layer compression therapy.             Cleansed with: Dilute hibiclens 30cc in 500cc NS and water    Protected skin with: Remedy skin repair lotion    Dressings Applied to wound: Silver alginate, ABD, Secured with roll gauze and tape. , and gentamicin applied. Black foam to bilat dorsal foot.     Compression Applied to the right le-Layer Compression  Compression Applied to the left le-Layer Compression    4-Layer: LAYER 1: ORTHOPAEDIC WOOL The bandage was applied without tension in aloose spiral from base of toes to knee joint with 50% overlap.LAYER 2: LIGHT SUPPORT BANDAGE Then the next layer bandage was applied in spiral toe to knee with 50% overlap with 50% overlap.LAYER 3: LIGHT COMPRESSION BANDAGE Then the elastic conformable. compression bandage was applied at mid stretch (50%) in a figure of eight from toe to knee, with a 50% overlap.  LAYER 4: COHESIVE EXTENSIBLE BANDAGE Finally the lightweight, elastic, cohesive bandage was applied at mid stretch (50%) in a spiral with a 50 % overlap from toe to knee.      Offloading used: None    Trial Products: No    Provider notified regarding concerns: No    Treatment Changes: No    Tolerated Dressing Change:  Yes    Taught Regarding: follow up appointment(s), wound cares, compression, layered compression wraps - maximum wear time of 7 days, compliance, and antibiotics    Educational Barriers: No barriers      Mamie Paul RN

## 2023-10-06 NOTE — PATIENT INSTRUCTIONS
"Wound care supplies were not ordered or needed today.    We will order home care    Go to pharmacy and  gentamicin ointment and acetic acid solution    Culture was taken today today this will take 4 days to get results we will call you with this and if you need additional antibiotics    We will have you complete venous insufficiency ultrasounds at your next visit to evaluate the venous system    Wound Care Instructions    2 TIMES PER WEEK and as needed, Cleanse your bilateral legs/ wound(s) with Dilute hibiclens 30cc in 500cc NS.    Pat Dry with non-sterile gauze    Then pour acetic acid solution onto gauze and wash the wound; set on the wounds for 10 minutes to soak and do not rinse    Apply Lotion to the intact skin surrounding your wound and other dry skin locations. Some good lotions include: Remedy Skin Repair Cream, Sarna, Vanicream or Cetaphil    Primary Dressing: Apply gentamicin ointment onto silvercel into/onto the wounds    Secondary dressing: Cover with ABD    Secure with non-sterile roll gauze (4\" x 75\" roll) and tape (1\" roll tape) as needed; avoid adhesive directly on the skin    Compression: 4 layer compression wraps bilaterally with high density black foam to the tops of the feet    It is not ok to get your wound wet in the bath or shower      If for some reason you are not able to get your dressing(s) changed as outlined above (due to illness, lack of supplies, lack of help) please do the following: remove old, soiled dressings; wash the wounds with saline; pat dry; apply ABD pad or other absorbant pad and secure with rolled gauze; avoid tape directly on your skin; Call the clinic as soon as possible to let us know what the current issues are in receiving wound care [phone].      SEEK MEDICAL CARE IF:  You have an increase in swelling, pain, or redness around the wound.  You have an increase in the amount of pus coming from the wound.  There is a bad smell coming from the wound.  The " wound appears to be worsening/enlarging  You have a fever greater than 101.5 F      It is ok to continue current wound care treatment/products for the next 2-3 days until new wound care supplies are ordered and arrive. If longer than this please contact our office at [phone].    If you have a 2 layer or 4 layer compression wrap on these are safe to have on for ONLY 7 days. If for some reason you are not able to get the wrap(s) changed (due to illness; lack of supplies, lack of help, lack of transportation) please do the following: unwrap the old 2 or 4 layer compression wrap; avoid using scissors as you could cut your skin and cause wounds; use tubular compression when available. Call to reschedule your home care or clinic visit appointment as soon as possible.    Please NOTE: if you are 15 minutes late to your clinic appointment you will have to be rescheduled. Please call our clinic as soon as possible if you know you will not be able to get to your appointment at [phone].    If you fail to show up to 3 scheduled clinic appointments you will be dismissed from our clinic.              We want to hear from you!  In the next few weeks, you should receive a call or email to complete a survey about your visit at [facility]. Please help us improve your appointment experience by letting us know how we did today. We strive to make your experience good and value any ways in which we could do better.      We value your input and suggestions.    Thank you for choosing the [facility]!           It is recommended that you do not get your ulcer wet when showering.  Listed below are several ways of keeping it dry when you shower.     1. Wrap it with Press and Seal plastic wrap.  It can be found in the stores where the plastic wraps or tin foil is kept.               2.  Some people take a bath and hang their leg/foot out of the tub.                        3  Put your leg in a  plastic bag and tape it on.           4. You can purchase a shower cover for casts at some pharmacies and through the Internet.            5. Take a Bed Bath or wash up at the sink

## 2023-10-09 NOTE — PROGRESS NOTES
"      [facility]  -  Nurse Visit        Date of Service:  October 9, 2023     Requesting Provider: MADAY Carrion    Diagnosis:     ICD-10-CM    1. Open wound of knee, leg, and ankle, left, initial encounter  S81.002A     S81.802A     S91.002A       2. Bilateral lower extremity edema  R60.0       3. Secondary lymphedema  I89.0       4. Venous hypertension of both lower extremities  I87.303           Chief Complaint: Noelle is being seen today at [facility] [place] for her wound(s) and swelling dressing change. Reports pain of 2/10  Denies any fevers, chills, or generalized ill feeling.     Dressing on Arrival: silvercel, abd,black foam to feet,roll gauze left/bilateral 4 layer intact, Pt is currently using bilateral 4 layer compression wrap for compression and did tolerate well. Upon removal of dressings moderate Serosanguinous drainage is noted.    New Wounds noted: No    Vital Signs: /72   Pulse 97   Temp 98  F (36.7  C)   Resp 12   Ht 5' 2\" (1.575 m)   Wt 281 lb (127.5 kg)   SpO2 100%   BMI 51.40 kg/m      Assessment:      General:  Patient presents to clinic in no apparent distress.   Psychiatric:  Alert and oriented x3.   Lower extremity:  edema is present.    Integumentary:  Skin is hemosiderin/flaking     Circumferential volume measures:      10/2/2023     1:00 PM 10/6/2023    11:00 AM 10/9/2023    11:00 AM   Circumferential Measures   Right just above MTP  25 26   Right Ankle 25 29 23.5   Right Widest Calf 32 44 42.5   Right Thigh Up 10cm 45     Left - just above MTP 30.5 27 26   Left Ankle 36.5 32 30   Left Widest Calf 57 50 42       Wound info:  VASC Wound Left calf (Active)   Pre Size Length 6 10/09/23 1100   Pre Size Width 1.5 10/09/23 1100   Pre Size Depth 0.1 10/09/23 1100   Pre Total Sq cm 9 10/09/23 1100   Description weeping 10/02/23 1300   Product Used Gentmyacin 10/09/23 1100       VASC Wound Left anterior foot (Active)   Pre Size Length 8 " Detail Level: Detailed 10/09/23 1100   Pre Size Width 8 10/09/23 1100   Pre Size Depth 0.1 10/09/23 1100   Pre Total Sq cm 64 10/09/23 1100   Description weeping 10/06/23 1000   Product Used Gentmyacin 10/09/23 1100       Undermining is not present.    The periwoundskin is maceration      Plan:         1. Patient will return in 3 days for nurse visit.            2. As listed below, treatment provided irrigation, mechanical cleansing, and dressings to promote autolytic debridement.             Cleansed with: Dilute hibiclens 30cc in 500cc NS, soap and water, and acetic acid soak for 10 minutes.    Protected skin with: Remedy skin repair lotion    Dressings Applied to wound: Silver alginate, ABD, Secured with roll gauze and tape. , and gent/black foam to feet.    Compression Applied to the right le-Layer Compression  Compression Applied to the left le-Layer Compression    4-Layer: LAYER 1: ORTHOPAEDIC WOOL The bandage was applied without tension in aloose spiral from base of toes to knee joint with 50% overlap.LAYER 2: LIGHT SUPPORT BANDAGE Then the next layer bandage was applied in spiral toe to knee with 50% overlap with 50% overlap.LAYER 3: LIGHT COMPRESSION BANDAGE Then the elastic conformable. compression bandage was applied at mid stretch (50%) in a figure of eight from toe to knee, with a 50% overlap.  LAYER 4: COHESIVE EXTENSIBLE BANDAGE Finally the lightweight, elastic, cohesive bandage was applied at mid stretch (50%) in a spiral with a 50 % overlap from toe to knee.                                                                                  Offloading used: Surgical shoe    Trial Products: No    Provider notified regarding concerns: No    Treatment Changes: No    Tolerated Dressing Change:  Yes    Taught Regarding: follow up appointment(s), wound cares, wound care supplies, compression, layered compression wraps - maximum wear time of 7 days, elevation, compliance, signs of infection, and antibiotics    Educational  Quality 131: Pain Assessment And Follow-Up: Pain assessment using a standardized tool is documented as negative, no follow-up plan required Barriers: No barriers      [facility] RN    Quality 110: Preventive Care And Screening: Influenza Immunization: Influenza Immunization Administered during Influenza season

## 2023-10-09 NOTE — PATIENT INSTRUCTIONS
"Wound care supplies were not ordered or needed today.    We will order home care    Go to pharmacy and  gentamicin ointment and acetic acid solution bring with to each appointment    Culture was taken today today this will take 4 days to get results we will call you with this and if you need additional antibiotics    We will have you complete venous insufficiency ultrasounds at your next visit to evaluate the venous system    Wound Care Instructions    2 TIMES PER WEEK and as needed, Cleanse your bilateral legs/ wound(s) with Dilute hibiclens 30cc in 500cc NS.    Pat Dry with non-sterile gauze    Then pour acetic acid solution onto gauze and wash the wound; set on the wounds for 10 minutes to soak and do not rinse    Apply Lotion to the intact skin surrounding your wound and other dry skin locations. Some good lotions include: Remedy Skin Repair Cream, Sarna, Vanicream or Cetaphil    Primary Dressing: Apply gentamicin ointment onto silvercel into/onto the wounds    Secondary dressing: Cover with ABD    Secure with non-sterile roll gauze (4\" x 75\" roll) and tape (1\" roll tape) as needed; avoid adhesive directly on the skin    Compression: 4 layer compression wraps bilaterally with high density black foam to the tops of the feet    It is not ok to get your wound wet in the bath or shower      If for some reason you are not able to get your dressing(s) changed as outlined above (due to illness, lack of supplies, lack of help) please do the following: remove old, soiled dressings; wash the wounds with saline; pat dry; apply ABD pad or other absorbant pad and secure with rolled gauze; avoid tape directly on your skin; Call the clinic as soon as possible to let us know what the current issues are in receiving wound care [phone].      SEEK MEDICAL CARE IF:  You have an increase in swelling, pain, or redness around the wound.  You have an increase in the amount of pus coming from the wound.  There is a bad smell " coming from the wound.  The wound appears to be worsening/enlarging  You have a fever greater than 101.5 F      It is ok to continue current wound care treatment/products for the next 2-3 days until new wound care supplies are ordered and arrive. If longer than this please contact our office at [phone].    If you have a 2 layer or 4 layer compression wrap on these are safe to have on for ONLY 7 days. If for some reason you are not able to get the wrap(s) changed (due to illness; lack of supplies, lack of help, lack of transportation) please do the following: unwrap the old 2 or 4 layer compression wrap; avoid using scissors as you could cut your skin and cause wounds; use tubular compression when available. Call to reschedule your home care or clinic visit appointment as soon as possible.    Please NOTE: if you are 15 minutes late to your clinic appointment you will have to be rescheduled. Please call our clinic as soon as possible if you know you will not be able to get to your appointment at [phone].    If you fail to show up to 3 scheduled clinic appointments you will be dismissed from our clinic.              We want to hear from you!  In the next few weeks, you should receive a call or email to complete a survey about your visit at [facility]. Please help us improve your appointment experience by letting us know how we did today. We strive to make your experience good and value any ways in which we could do better.      We value your input and suggestions.    Thank you for choosing the [facility]!           It is recommended that you do not get your ulcer wet when showering.  Listed below are several ways of keeping it dry when you shower.     1. Wrap it with Press and Seal plastic wrap.  It can be found in the stores where the plastic wraps or tin foil is kept.               2.  Some people take a bath and hang their leg/foot out of the tub.                         3  Put your leg in a plastic bag and tape it on.           4. You can purchase a shower cover for casts at some pharmacies and through the Internet.            5. Take a Bed Bath or wash up at the sink

## 2023-10-12 NOTE — PROGRESS NOTES
[facility]  -  Nurse Visit        Date of Service:  October 12, 2023     Requesting Provider: MADAY Carrion    Diagnosis:     ICD-10-CM    1. Bilateral lower extremity edema  R60.0       2. Open wound of knee, leg, and ankle, left, initial encounter  S81.002A     S81.802A     S91.002A           Chief Complaint: Noelle is being seen today at [facility] [place] for her left posterior calf and left dorsal foot wound(s) and swelling dressing change. Reports pain of 1.  Denies any fevers, chills, or generalized ill feeling.     Dressing on Arrival: 4-layer bilaterally. Had fallen down from top approximately 3 inches from ideal position. Pt is currently using 4-layer bilaterally for compression and did tolerate well. Upon removal of dressings light to moderate serosanguinous drainage is noted.    New Wounds noted: No    Bilateral legs 10/12    Left foot 10/12    Left posterior calf 10/12    Vital Signs: /80   Pulse 62   Temp 97.2  F (36.2  C)   Resp 16     Assessment:      General:  Patient presents to clinic in no apparent distress.   Psychiatric:  Alert and oriented x3.   Lower extremity:  edema is present.    Integumentary:  Skin is hemosiderin/dry    Circumferential volume measures:      10/2/2023     1:00 PM 10/6/2023    11:00 AM 10/9/2023    11:00 AM 10/12/2023    11:00 AM   Circumferential Measures   Right just above MTP  25 26 24.4   Right Ankle 25 29 23.5 24   Right Widest Calf 32 44 42.5 37.5   Right Thigh Up 10cm 45      Left - just above MTP 30.5 27 26 25   Left Ankle 36.5 32 30 27.6   Left Widest Calf 57 50 42 39.6       Wound info:  VASC Wound Left calf (Active)   Pre Size Length 3 10/12/23 1100   Pre Size Width 1 10/12/23 1100   Pre Size Depth 0.1 10/12/23 1100   Pre Total Sq cm 3 10/12/23 1100   Description dark pink wound bed 10/12/23 1100   Product Used Gentmyacin 10/12/23 1100       VASC Wound Left anterior foot (Active)   Pre Size Length 6 10/12/23  1100   Pre Size Width 6 10/12/23 1100   Pre Size Depth 0.1 10/12/23 1100   Pre Total Sq cm 36 10/12/23 1100   Description weeping 10/06/23 1000   Product Used Gentmyacin 10/09/23 1100       Undermining is not present.    The periwoundskin is pink      Plan:         1. Patient will return in 4 days for nurse visit.            2. As listed below, treatment provided irrigation, mechanical cleansing, and dressings to promote autolytic debridement and included application of multi-layer compression therapy.             Cleansed with: soap and water to intact skin; acetic acid to wound areas    Protected skin with: Remedy skin repair lotion    Dressings Applied to wound: gent/silver alginate/ABD/roll gauze. High density black foam to the top of feet.    Compression Applied to the right le-Layer Compression  Compression Applied to the left le-Layer Compression    4-Layer: LAYER 1: ORTHOPAEDIC WOOL The bandage was applied without tension in aloose spiral from base of toes to knee joint with 50% overlap.LAYER 2: LIGHT SUPPORT BANDAGE Then the next layer bandage was applied in spiral toe to knee with 50% overlap with 50% overlap.LAYER 3: LIGHT COMPRESSION BANDAGE Then the elastic conformable. compression bandage was applied at mid stretch (50%) in a figure of eight from toe to knee, with a 50% overlap.  LAYER 4: COHESIVE EXTENSIBLE BANDAGE Finally the lightweight, elastic, cohesive bandage was applied at mid stretch (50%) in a spiral with a 50 % overlap from toe to knee.    Offloading used: Surgical shoe    Trial Products: No    Provider notified regarding concerns: No    Treatment Changes: No    Tolerated Dressing Change:  Yes    Taught Regarding: follow up appointment(s), wound cares, wound care supplies, compression, layered compression wraps - maximum wear time of 7 days, elevation, offloading, compliance, and signs of infection    Educational Barriers: No barriers      Dina Morales RN, WTA-C, CFCN

## 2023-10-12 NOTE — PATIENT INSTRUCTIONS
- Please call us if your compression wraps fall more than 1-2 inches below the bend of the knee. Call if they are too painful. Call if they get wet. If it is a weekend and the wraps fall down, are too painful, or get wet take the wraps off and put on another form of compression. Compression such as velcro wraps, compression stockings, short stretch bandages, or tubular compression. Apply one of these until you can be seen in clinic. Please call us if you have any questions [phone].    - Treatment:  Layered Compression Bandaging (4-layer)    What is it?  The layered compression bandaging has a layer of absorbent material that will soak up drainage.     Why we do it.   This is done to treat swelling, wounds, or both.  This will in turn help circulation and healing.    How to care for your bandages.  The wraps need to be kept dry. If  the wraps become wet, remove them and call the clinic to have another wrap applied.    What to expect.  It is common for the wraps to be uncomfortable at the beginning. The first two days are usually the hardest; then they will become more comfortable.       Elevating your legs will help the discomfort. Try to elevate your legs as much as possible.    If rest and elevation does not help your discomfort, call your provider.  If your provider is not available you can remove the wrap and leave a message for further instructions.    - Swelling and Compression Therapy    Swelling in the legs can be caused by many reasons. No matter what the reason, treatment usually includes some type of compression. This may be done with a support sock, dressing, ace wrap, or layered wraps.     It is important to treat the swelling for many reasons. If the swelling is not treated you may develop blisters that can lead to ulcerations. This is caused when extra fluid goes into tissue causing damage and blocking blood flow to the tissue.     It is important that you wear your compression every day,  including days that you will be seen in clinic.     Compression is often the most important part of treating leg wounds. Without controlling the swelling it is often not possible to heal wounds.     Going without compression for even brief periods of time can be damaging to your legs and your health.  Your compression should be put on first thing in the morning. Take the compression off at night only when instructed by your care provider to do so. Sometimes wearing compression 24 hours a day will be recommended.       If you are having difficulty wearing your compression it is important to notify your care provider so that other options may be reviewed.    Thank you for choosing  [facility]. Please call us if you have any questions [phone].

## 2023-10-16 NOTE — PROGRESS NOTES
[facility]  -  Nurse Visit        Date of Service:  October 16, 2023     Requesting Provider: MADAY Carrion    Diagnosis:     ICD-10-CM    1. Bilateral lower extremity edema  R60.0       2. Open wound of knee, leg, and ankle, left, initial encounter  S81.002A     S81.802A     S91.002A       3. Secondary lymphedema  I89.0           Chief Complaint: Noelle is being seen today at [facility] [place] for her wound(s) and swelling dressing change. Reports pain of 2.  Denies any fevers, chills, or generalized ill feeling. Left leg 4 layer fell down 4inches day after applied. Explained to call to get rewrapped and remove and use other compression.    Dressing on Arrival: silvercel,abd, 4 layer right intact, left bunched up around ankle, Pt is currently using bilateral 4 layer for compression and did tolerate well. Upon removal of dressings moderate Serosanguinous drainage is noted.    New Wounds noted: No    Vital Signs: BP (!) 155/84   Pulse 93   Temp (!) 88  F (31.1  C) (Oral)   Resp 12   SpO2 95%     Assessment:      General:  Patient presents to clinic in no apparent distress.   Psychiatric:  Alert and oriented x3.   Lower extremity:  edema is present.    Integumentary:  Skin is hemosiderin,dry and flaking    Circumferential volume measures:      10/2/2023     1:00 PM 10/6/2023    11:00 AM 10/9/2023    11:00 AM 10/12/2023    11:00 AM 10/16/2023    11:00 AM   Circumferential Measures   Right just above MTP  25 26 24.4 26   Right Ankle 25 29 23.5 24 24   Right Widest Calf 32 44 42.5 37.5 37   Right Thigh Up 10cm 45       Left - just above MTP 30.5 27 26 25 23.5   Left Ankle 36.5 32 30 27.6 27   Left Widest Calf 57 50 42 39.6 51.5       Wound info:  VASC Wound Left calf (Active)   Pre Size Length 1 10/16/23 1100   Pre Size Width 0.5 10/16/23 1100   Pre Size Depth 0.1 10/16/23 1100   Pre Total Sq cm 0.5 10/16/23 1100   Description dark pink wound bed 10/12/23  1100   Product Used Gentmyacin 10/16/23 1100       VASC Wound Left anterior foot (Active)   Pre Size Length 5.5 10/16/23 1100   Pre Size Width 7 10/16/23 1100   Pre Size Depth 0.1 10/16/23 1100   Pre Total Sq cm 38.5 10/16/23 1100   Description weeping 10/06/23 1000   Product Used Gentmyacin 10/16/23 1100       Undermining is not present.    The periwoundskin is  dry and flaking      Plan:         1. Patient will return in 3 days for nurse visit.            2. As listed below, treatment provided irrigation, mechanical cleansing, and dressings to promote autolytic debridement and included application of multi-layer compression therapy.             Cleansed with: Normal saline, Dilute hibiclens 30cc in 500cc NS, soap and water, and acetic acid    Protected skin with: Remedy skin repair lotion    Dressings Applied to wound: Silver alginate, ABD, Secured with roll gauze and tape. , and 4 layer    Compression Applied to the right le-Layer Compression  Compression Applied to the left le-Layer Compression    4-Layer: LAYER 1: ORTHOPAEDIC WOOL The bandage was applied without tension in aloose spiral from base of toes to knee joint with 50% overlap.LAYER 2: LIGHT SUPPORT BANDAGE Then the next layer bandage was applied in spiral toe to knee with 50% overlap with 50% overlap.LAYER 3: LIGHT COMPRESSION BANDAGE Then the elastic conformable. compression bandage was applied at mid stretch (50%) in a figure of eight from toe to knee, with a 50% overlap.  LAYER 4: COHESIVE EXTENSIBLE BANDAGE Finally the lightweight, elastic, cohesive bandage was applied at mid stretch (50%) in a spiral with a 50 % overlap from toe to knee.                                                                                  Offloading used: None    Trial Products: No    Provider notified regarding concerns: No    Treatment Changes: No    Tolerated Dressing Change:  Yes    Taught Regarding: follow up appointment(s), wound cares, compression,  layered compression wraps - maximum wear time of 7 days, elevation, and compliance    Educational Barriers: No barriers      Odilon Mix RN

## 2023-10-16 NOTE — PATIENT INSTRUCTIONS
- Please call us if your compression wraps fall more than 1-2 inches below the bend of the knee. Call if they are too painful. Call if they get wet. If it is a weekend and the wraps fall down, are too painful, or get wet take the wraps off and put on another form of compression. Compression such as velcro wraps, compression stockings, short stretch bandages, or tubular compression. Apply one of these until you can be seen in clinic. Please call us if you have any questions [phone].    - Treatment:  Layered Compression Bandaging (4-layer)    What is it?  The layered compression bandaging has a layer of absorbent material that will soak up drainage.     Why we do it.   This is done to treat swelling, wounds, or both.  This will in turn help circulation and healing.    How to care for your bandages.  The wraps need to be kept dry. If  the wraps become wet, remove them and call the clinic to have another wrap applied.    What to expect.  It is common for the wraps to be uncomfortable at the beginning. The first two days are usually the hardest; then they will become more comfortable.       Elevating your legs will help the discomfort. Try to elevate your legs as much as possible.    If rest and elevation does not help your discomfort, call your provider.  If your provider is not available you can remove the wrap and leave a message for further instructions.    - Swelling and Compression Therapy    Swelling in the legs can be caused by many reasons. No matter what the reason, treatment usually includes some type of compression. This may be done with a support sock, dressing, ace wrap, or layered wraps.     It is important to treat the swelling for many reasons. If the swelling is not treated you may develop blisters that can lead to ulcerations. This is caused when extra fluid goes into tissue causing damage and blocking blood flow to the tissue.     It is important that you wear your compression every day,  including days that you will be seen in clinic.     Compression is often the most important part of treating leg wounds. Without controlling the swelling it is often not possible to heal wounds.     Going without compression for even brief periods of time can be damaging to your legs and your health.  Your compression should be put on first thing in the morning. Take the compression off at night only when instructed by your care provider to do so. Sometimes wearing compression 24 hours a day will be recommended.       If you are having difficulty wearing your compression it is important to notify your care provider so that other options may be reviewed.    Thank you for choosing  [facility]. Please call us if you have any questions [phone].

## 2023-10-19 NOTE — PROGRESS NOTES
[facility]  -  Nurse Visit        Date of Service:  October 19, 2023     Requesting Provider: MADAY Carrion    Diagnosis: No diagnosis found.    Chief Complaint: Noelle is being seen today at [facility] [place] for her wound(s) and swelling dressing change. Reports pain of 3.  Denies any fevers, chills, or generalized ill feeling.     Dressing on Arrival: silvercel and ABD, Pt is currently using bilateral 4 layer for compression (wraps had fallen down on both legs) and did tolerate well. Upon removal of dressings moderate Serosanguinous drainage is noted.    New Wounds noted: No    Vital Signs: There were no vitals taken for this visit.    Assessment:      General:  Patient presents to clinic in no apparent distress.   Psychiatric:  Alert and oriented x3.   Lower extremity:  edema is present.    Integumentary:  Skin is WNL    Circumferential volume measures:      10/2/2023     1:00 PM 10/6/2023    11:00 AM 10/9/2023    11:00 AM 10/12/2023    11:00 AM 10/16/2023    11:00 AM   Circumferential Measures   Right just above MTP  25 26 24.4 26   Right Ankle 25 29 23.5 24 24   Right Widest Calf 32 44 42.5 37.5 37   Right Thigh Up 10cm 45       Left - just above MTP 30.5 27 26 25 23.5   Left Ankle 36.5 32 30 27.6 27   Left Widest Calf 57 50 42 39.6 51.5       Wound info:  VASC Wound Left calf (Active)   Pre Size Length 1 10/16/23 1100   Pre Size Width 0.5 10/16/23 1100   Pre Size Depth 0.1 10/16/23 1100   Pre Total Sq cm 0.5 10/16/23 1100   Description dark pink wound bed 10/12/23 1100   Product Used Gentmyacin 10/16/23 1100       VASC Wound Left anterior foot (Active)   Pre Size Length 5.5 10/16/23 1100   Pre Size Width 7 10/16/23 1100   Pre Size Depth 0.1 10/16/23 1100   Pre Total Sq cm 38.5 10/16/23 1100   Description weeping 10/06/23 1000   Product Used Gentmyacin 10/16/23 1100       Undermining is not present.    The periwoundskin is WNL      Plan:         1.  Patient will return in 1 weeks for evaluation patient was informed that because she can't make it 2 times next week that she needs to call if the wraps fall down again             2. As listed below, treatment provided included application of multi-layer compression therapy.             Cleansed with: Dilute hibiclens 30cc in 500cc NS    Protected skin with: Remedy skin repair lotion    Dressings Applied to wound: Silver alginate and ABD    Compression Applied to the right le-Layer Compression  Compression Applied to the left le-Layer Compression    4-Layer: LAYER 1: ORTHOPAEDIC WOOL The bandage was applied without tension in aloose spiral from base of toes to knee joint with 50% overlap.LAYER 2: LIGHT SUPPORT BANDAGE Then the next layer bandage was applied in spiral toe to knee with 50% overlap with 50% overlap.LAYER 3: LIGHT COMPRESSION BANDAGE Then the elastic conformable. compression bandage was applied at mid stretch (50%) in a figure of eight from toe to knee, with a 50% overlap.  LAYER 4: COHESIVE EXTENSIBLE BANDAGE Finally the lightweight, elastic, cohesive bandage was applied at mid stretch (50%) in a spiral with a 50 % overlap from toe to knee.                                                                                  Offloading used: Surgical shoe    Trial Products: No    Provider notified regarding concerns: No    Treatment Changes: No    Tolerated Dressing Change:  Yes    Taught Regarding: follow up appointment(s) and layered compression wraps - maximum wear time of 7 days    Educational Barriers: No barriers      Celeste Tarango

## 2023-10-27 NOTE — PROGRESS NOTES
Follow up Vascular Visit       Date of Service:10/27/23      Chief Complaint: left leg ulcers; BLE swelling      Pt returns to [facility] with regards to their left leg ulcers; BLE swelling.  They arrive today alone. They are currently using no dressings to the wounds. These have healed This is being done by staff at the clinic twice per week. They are using 4 layer for compression. Pt removed these 2 days ago because they slouched down and were painful. They are feeling well today. Denies fevers, chills. No shortness of breath. We could not find home care for her; she has been coming to clinic. She had venous insufficiency done today; noted to have superficial incompetence in the right leg would qualify for ablation; left leg mild incompetence.     Allergies:   Allergies   Allergen Reactions    Sulfa Antibiotics Unknown, Itching and Rash       Medications:   Current Outpatient Medications:     acetic acid 0.25 % irrigation, Irrigate with as directed every 72 hours Wash the BLE wounds with acetic acid every 72 hours, Disp: 1000 mL, Rfl: 3    furosemide (LASIX) 40 MG tablet, Take 40 mg by mouth 2 times daily, Disp: , Rfl:     gentamicin (GARAMYCIN) 0.1 % external ointment, Apply topically every 72 hours Apply 3 grams to BLE wounds every 72 hours, Disp: 30 g, Rfl: 3    losartan (COZAAR) 100 MG tablet, TAKE 1 TABLET(100 MG) BY MOUTH EVERY DAY, Disp: , Rfl:     History:   Past Medical History:   Diagnosis Date    Asthma 08/14/2023    Bilateral lower extremity edema 07/14/2021    Former smoker 06/23/2021    HTN (hypertension) 07/14/2021    Lymphedema 02/08/2022    Prediabetes 06/24/2021    Seasonal allergies 07/14/2021    UPJ (ureteropelvic junction) obstruction 03/04/2015    Vitamin D deficiency 06/23/2021       Physical Exam:    /69   Pulse 85   Temp 98.1  F (36.7  C) (Oral)   Resp 16   SpO2 90%     General:  Patient presents to clinic in no apparent distress.  Head: normocephalic  atraumatic  Psychiatric:  Alert and oriented x3.   Respiratory: unlabored breathing; no cough  Integumentary:  Skin is uniformly warm, dry and pink.    Extremities: BLE swelling down from original measures; skin is now intact; no erythema; no drainage; +hyperpigmentation      VASC Wound Left calf (Active)   Pre Size Length 0 10/27/23 1200   Pre Size Width 0 10/27/23 1200   Pre Size Depth 0 10/27/23 1200   Pre Total Sq cm 0 10/27/23 1200   Description dark pink wound bed 10/12/23 1100   Product Used Gentmyacin 10/16/23 1100   Number of days: 25       VASC Wound Left anterior foot (Active)   Pre Size Length 0 10/27/23 1200   Pre Size Width 0 10/27/23 1200   Pre Size Depth 0 10/27/23 1200   Pre Total Sq cm 0 10/27/23 1200   Description weeping 10/06/23 1000   Product Used Gentmyacin 10/16/23 1100   Number of days: 25            Circumferential volume measures:          10/6/2023    11:00 AM 10/9/2023    11:00 AM 10/12/2023    11:00 AM 10/16/2023    11:00 AM 10/27/2023    12:00 PM   Circumferential Measures   Right just above MTP 25 26 24.4 26 24.7   Right Ankle 29 23.5 24 24 28.8   Right Widest Calf 44 42.5 37.5 37 46.2   Left - just above MTP 27 26 25 23.5 24.7   Left Ankle 32 30 27.6 27 27   Left Widest Calf 50 42 39.6 51.5 51       Labs:    I personally reviewed the following lab results today and those on care everywhere    CRP   Date Value Ref Range Status   08/14/2023 2.2 (H) 0.0 - <0.8 mg/dL Final      Erythrocyte Sedimentation Rate   Date Value Ref Range Status   08/14/2023 17 0 - 30 mm/hr Final      Last Renal Panel:  Sodium   Date Value Ref Range Status   09/14/2023 139 136 - 145 mmol/L Final     Potassium   Date Value Ref Range Status   09/14/2023 4.6 3.4 - 5.3 mmol/L Final   08/14/2023 4.7 3.5 - 5.0 mmol/L Final     Chloride   Date Value Ref Range Status   09/14/2023 95 (L) 98 - 107 mmol/L Final   08/14/2023 99 98 - 107 mmol/L Final     Carbon Dioxide (CO2)   Date Value Ref Range Status   09/14/2023 32 (H)  "22 - 29 mmol/L Final   08/14/2023 30 22 - 31 mmol/L Final     Anion Gap   Date Value Ref Range Status   09/14/2023 12 7 - 15 mmol/L Final   08/14/2023 11 5 - 18 mmol/L Final     Glucose   Date Value Ref Range Status   09/14/2023 97 70 - 99 mg/dL Final   08/14/2023 104 70 - 125 mg/dL Final     Urea Nitrogen   Date Value Ref Range Status   09/14/2023 18.4 6.0 - 20.0 mg/dL Final   08/14/2023 11 8 - 22 mg/dL Final     Creatinine   Date Value Ref Range Status   09/14/2023 0.90 0.51 - 0.95 mg/dL Final     GFR Estimate   Date Value Ref Range Status   09/14/2023 74 >60 mL/min/1.73m2 Final     Calcium   Date Value Ref Range Status   09/14/2023 9.4 8.6 - 10.0 mg/dL Final      Lab Results   Component Value Date    WBC 10.6 08/14/2023     Lab Results   Component Value Date    RBC 4.65 08/14/2023     Lab Results   Component Value Date    HGB 16.1 08/14/2023     Lab Results   Component Value Date    HCT 48.3 08/14/2023     No components found for: \"MCT\"  Lab Results   Component Value Date     08/14/2023     Lab Results   Component Value Date    MCH 34.6 08/14/2023     Lab Results   Component Value Date    MCHC 33.3 08/14/2023     Lab Results   Component Value Date    RDW 13.7 08/14/2023     Lab Results   Component Value Date     08/14/2023      No results found for: \"A1C\"   No results found for: \"TSH\"   No results found for: \"VITDT\"                Impression:  Encounter Diagnoses   Name Primary?    Bilateral lower extremity edema Yes    Open wound of knee, leg, and ankle, left, initial encounter     Secondary lymphedema                          10/2/23 BLE      Are any of these ulcers new today: No; Location: na    Assessment/Plan:          1. Debridement: na     2.  Ulcer treatment: ulcer treatment will include irrigation and dressings to promote autolytic debridement which will include:healed; lotion daily. If for some reason the patient is not able to get their dressing(s) changed as outlined above (due to " illness, lack of supplies, lack of help) please do the following: remove old, soiled dressings; wash the ulcers with saline; pat dry; apply ABD pad or other absorbant pad and secure with rolled gauze; avoid tape directly on your skin; patient instructed to call the clinic as soon as possible to let us know what the current issues are in receiving ulcer care. Stable            3. Edema: will transition into velcro wraps; elevation; weight loss. The compression wraps were applied today in clinic.     If a 2 layer or 4 layer compression wrap is being used; these are safe to have on for ONLY 7 days. If for some reason the patient is not able to get the wrap(s) changed (due to illness; lack of supplies, lack of help, lack of transportation) please do the following: unwrap the old 2 or 4 layer compression wrap; avoid using scissors as you could cut your skin and cause ulcers; use tubular compression when available. Call to reschedule your home care or clinic visit appointment as soon as possible.  Stable            4. Nutrition: work on weight loss           5. Offloading: na     Patient will follow up with me PRN weeks for reevaluation. They were instructed to call the clinic sooner with any signs or symptoms of infection or any further questions/concerns. Answered all questions.          Demetra Carrion DNP, RN, CNP, CWOCN, CFCN, CLT  [facility]   [phone]        This note was electronically signed by Demetra Carrion NP

## 2023-10-27 NOTE — PROGRESS NOTES
"Compression Applied to Bilateral  Velcro\", Compression Stockings                                                                             "

## 2023-10-27 NOTE — PATIENT INSTRUCTIONS
Continue to wear your compression stockings or velcro wraps every day; put them on first thing in the morning and remove at bedtime    Replace your compression stockings every 3-4 months; these garments will lose their elasticity and become ineffective    Replace velcro wraps every 1-2 years    Elevate your legs periodically throughout the day, 30-60 minutes 1-3 times per day    Apply lotion to your legs 1-2 times per day; some good name brands are Cetaphil, Sarna, Aveeno, VaniCream, CeraVe    Continue to walk and exercise    If you are taking a diuretic continue to do so at the direction of your primary care provider    Make an appointment at the vascular clinic again if you have worsening swelling, need a prescription for new compression garments; and/or develop new wounds      Velcro Compression Wraps Instructions    1) Slide leg liner or Tubigrip onto the leg before applying the velcro wrap.     2) Apply the velcro wrap over the leg liner. Pull bands to tighten for compression.     3) The top of the velcro warp should start just below the knee crease and the bottom should reach just above the ankle bone.    4) Angle each band individually to achieve a snug and wrinkle-free fit. Do not tuck the bands and the velcro should never touch the skin.    5) Lastly apply the anklet sock over the velcro wrap if instructed to do so. This should be worn over the velcro wrap due to sensitivity from the ribbed edge.    6) To remove the velcro wrap, undo each band and fold it back on itself. If done properly the garment should be ready for easy application.    7) To extend the life of velcro wraps, hand wash and hang dry. You should replace your velcro wraps every 1-2 years.         DO NOT STRAP VELCRO TO LEG LINER OR TUBRIGRIP!!!  1.2.        3.4.    Compression wrap should  be smoothed down well and creasing or bulging avoided.   You should wear compression as much as you can. It is especially important to wear compression  "with long periods of sitting/standing, long car rides or if you will be flying.   Compression socks should get refilled every 4-6 months. Compression Velcro should be replaced every 1-2 years.   We can refill your compression prescription for 1 year otherwise your primary is able to refill them for you.  They do not need to be worn at night while in bed.   If you do a lot of standing it is good to do calf raises to help keep the blood pumping. If you sit a lot at work it is good to get up periodically to walk around. Elevation of the foot of your bed 4-6\" helps the blood return back to where it is needed.  Velcro compression should never hurt. If you experience any pain, immediatly remove and consult your physician. It should feel firm but comfortable. If pressure increases or decreases noticeably during wear, loosen or tighten any bands.           "

## 2024-01-01 ENCOUNTER — HOSPITAL ENCOUNTER (INPATIENT)
Facility: CLINIC | Age: 59
LOS: 9 days | DRG: 270 | End: 2024-10-02
Attending: INTERNAL MEDICINE | Admitting: INTERNAL MEDICINE
Payer: COMMERCIAL

## 2024-01-01 ENCOUNTER — APPOINTMENT (OUTPATIENT)
Dept: GENERAL RADIOLOGY | Facility: CLINIC | Age: 59
DRG: 270 | End: 2024-01-01
Attending: STUDENT IN AN ORGANIZED HEALTH CARE EDUCATION/TRAINING PROGRAM
Payer: COMMERCIAL

## 2024-01-01 ENCOUNTER — ANESTHESIA EVENT (OUTPATIENT)
Dept: SURGERY | Facility: CLINIC | Age: 59
DRG: 270 | End: 2024-01-01
Payer: COMMERCIAL

## 2024-01-01 ENCOUNTER — ANESTHESIA (OUTPATIENT)
Dept: SURGERY | Facility: CLINIC | Age: 59
DRG: 270 | End: 2024-01-01
Payer: COMMERCIAL

## 2024-01-01 ENCOUNTER — HOSPITAL ENCOUNTER (INPATIENT)
Facility: HOSPITAL | Age: 59
LOS: 4 days | Discharge: ANOTHER HOSPITAL | DRG: 163 | End: 2024-09-22
Attending: INTERNAL MEDICINE | Admitting: INTERNAL MEDICINE
Payer: COMMERCIAL

## 2024-01-01 ENCOUNTER — APPOINTMENT (OUTPATIENT)
Dept: INTERVENTIONAL RADIOLOGY/VASCULAR | Facility: HOSPITAL | Age: 59
DRG: 163 | End: 2024-01-01
Attending: RADIOLOGY
Payer: COMMERCIAL

## 2024-01-01 ENCOUNTER — APPOINTMENT (OUTPATIENT)
Dept: CT IMAGING | Facility: CLINIC | Age: 59
DRG: 270 | End: 2024-01-01
Attending: STUDENT IN AN ORGANIZED HEALTH CARE EDUCATION/TRAINING PROGRAM
Payer: COMMERCIAL

## 2024-01-01 ENCOUNTER — APPOINTMENT (OUTPATIENT)
Dept: CARDIOLOGY | Facility: CLINIC | Age: 59
DRG: 871 | End: 2024-01-01
Attending: ANESTHESIOLOGY
Payer: COMMERCIAL

## 2024-01-01 ENCOUNTER — APPOINTMENT (OUTPATIENT)
Dept: CT IMAGING | Facility: HOSPITAL | Age: 59
DRG: 163 | End: 2024-01-01
Attending: INTERNAL MEDICINE
Payer: COMMERCIAL

## 2024-01-01 ENCOUNTER — APPOINTMENT (OUTPATIENT)
Dept: INTERVENTIONAL RADIOLOGY/VASCULAR | Facility: CLINIC | Age: 59
DRG: 270 | End: 2024-01-01
Attending: INTERNAL MEDICINE
Payer: COMMERCIAL

## 2024-01-01 ENCOUNTER — APPOINTMENT (OUTPATIENT)
Dept: GENERAL RADIOLOGY | Facility: CLINIC | Age: 59
DRG: 270 | End: 2024-01-01
Attending: INTERNAL MEDICINE
Payer: COMMERCIAL

## 2024-01-01 ENCOUNTER — LAB REQUISITION (OUTPATIENT)
Dept: LAB | Facility: CLINIC | Age: 59
End: 2024-01-01
Payer: COMMERCIAL

## 2024-01-01 ENCOUNTER — APPOINTMENT (OUTPATIENT)
Dept: OCCUPATIONAL THERAPY | Facility: HOSPITAL | Age: 59
DRG: 163 | End: 2024-01-01
Attending: INTERNAL MEDICINE
Payer: COMMERCIAL

## 2024-01-01 ENCOUNTER — APPOINTMENT (OUTPATIENT)
Dept: ULTRASOUND IMAGING | Facility: CLINIC | Age: 59
DRG: 270 | End: 2024-01-01
Attending: INTERNAL MEDICINE
Payer: COMMERCIAL

## 2024-01-01 ENCOUNTER — APPOINTMENT (OUTPATIENT)
Dept: RADIOLOGY | Facility: CLINIC | Age: 59
DRG: 871 | End: 2024-01-01
Attending: HOSPITALIST
Payer: COMMERCIAL

## 2024-01-01 ENCOUNTER — ANESTHESIA (OUTPATIENT)
Dept: INTENSIVE CARE | Facility: CLINIC | Age: 59
DRG: 871 | End: 2024-01-01
Payer: COMMERCIAL

## 2024-01-01 ENCOUNTER — ANESTHESIA EVENT (OUTPATIENT)
Dept: INTENSIVE CARE | Facility: CLINIC | Age: 59
DRG: 871 | End: 2024-01-01
Payer: COMMERCIAL

## 2024-01-01 ENCOUNTER — APPOINTMENT (OUTPATIENT)
Dept: ULTRASOUND IMAGING | Facility: CLINIC | Age: 59
DRG: 871 | End: 2024-01-01
Attending: HOSPITALIST
Payer: COMMERCIAL

## 2024-01-01 ENCOUNTER — APPOINTMENT (OUTPATIENT)
Dept: RADIOLOGY | Facility: HOSPITAL | Age: 59
DRG: 163 | End: 2024-01-01
Attending: STUDENT IN AN ORGANIZED HEALTH CARE EDUCATION/TRAINING PROGRAM
Payer: COMMERCIAL

## 2024-01-01 ENCOUNTER — APPOINTMENT (OUTPATIENT)
Dept: RADIOLOGY | Facility: HOSPITAL | Age: 59
DRG: 163 | End: 2024-01-01
Attending: INTERNAL MEDICINE
Payer: COMMERCIAL

## 2024-01-01 ENCOUNTER — APPOINTMENT (OUTPATIENT)
Dept: CT IMAGING | Facility: CLINIC | Age: 59
DRG: 270 | End: 2024-01-01
Attending: INTERNAL MEDICINE
Payer: COMMERCIAL

## 2024-01-01 ENCOUNTER — HOSPITAL ENCOUNTER (INPATIENT)
Facility: CLINIC | Age: 59
LOS: 1 days | Discharge: OTHER INSTITUTION | DRG: 871 | End: 2024-09-17
Attending: EMERGENCY MEDICINE | Admitting: HOSPITALIST
Payer: COMMERCIAL

## 2024-01-01 ENCOUNTER — APPOINTMENT (OUTPATIENT)
Dept: NUCLEAR MEDICINE | Facility: CLINIC | Age: 59
DRG: 871 | End: 2024-01-01
Attending: INTERNAL MEDICINE
Payer: COMMERCIAL

## 2024-01-01 ENCOUNTER — APPOINTMENT (OUTPATIENT)
Dept: RADIOLOGY | Facility: CLINIC | Age: 59
DRG: 871 | End: 2024-01-01
Attending: EMERGENCY MEDICINE
Payer: COMMERCIAL

## 2024-01-01 VITALS
HEART RATE: 108 BPM | SYSTOLIC BLOOD PRESSURE: 107 MMHG | BODY MASS INDEX: 47.82 KG/M2 | OXYGEN SATURATION: 54 % | RESPIRATION RATE: 21 BRPM | WEIGHT: 261.47 LBS | DIASTOLIC BLOOD PRESSURE: 53 MMHG | TEMPERATURE: 98.9 F

## 2024-01-01 VITALS
RESPIRATION RATE: 26 BRPM | DIASTOLIC BLOOD PRESSURE: 88 MMHG | BODY MASS INDEX: 50.32 KG/M2 | OXYGEN SATURATION: 95 % | SYSTOLIC BLOOD PRESSURE: 131 MMHG | TEMPERATURE: 97.7 F | HEART RATE: 75 BPM | WEIGHT: 275.1 LBS

## 2024-01-01 VITALS
SYSTOLIC BLOOD PRESSURE: 116 MMHG | HEART RATE: 72 BPM | RESPIRATION RATE: 25 BRPM | HEIGHT: 62 IN | OXYGEN SATURATION: 94 % | BODY MASS INDEX: 53.92 KG/M2 | DIASTOLIC BLOOD PRESSURE: 58 MMHG | WEIGHT: 293 LBS | TEMPERATURE: 98.3 F

## 2024-01-01 DIAGNOSIS — R23.9 IMPAIRED SKIN INTEGRITY: Primary | ICD-10-CM

## 2024-01-01 DIAGNOSIS — I50.9 ACUTE DECOMPENSATED HEART FAILURE (H): ICD-10-CM

## 2024-01-01 DIAGNOSIS — E87.5 HYPERKALEMIA: ICD-10-CM

## 2024-01-01 DIAGNOSIS — J96.01 ACUTE RESPIRATORY FAILURE WITH HYPOXIA AND HYPERCAPNIA (H): ICD-10-CM

## 2024-01-01 DIAGNOSIS — Z13.6 ENCOUNTER FOR SCREENING FOR CARDIOVASCULAR DISORDERS: ICD-10-CM

## 2024-01-01 DIAGNOSIS — I27.24 CTEPH (CHRONIC THROMBOEMBOLIC PULMONARY HYPERTENSION) (H): Primary | ICD-10-CM

## 2024-01-01 DIAGNOSIS — I10 ESSENTIAL (PRIMARY) HYPERTENSION: ICD-10-CM

## 2024-01-01 DIAGNOSIS — J96.02 ACUTE RESPIRATORY FAILURE WITH HYPOXIA AND HYPERCAPNIA (H): ICD-10-CM

## 2024-01-01 DIAGNOSIS — N17.9 ACUTE RENAL FAILURE, UNSPECIFIED ACUTE RENAL FAILURE TYPE (H): ICD-10-CM

## 2024-01-01 DIAGNOSIS — I27.24 CTEPH (CHRONIC THROMBOEMBOLIC PULMONARY HYPERTENSION) (H): ICD-10-CM

## 2024-01-01 LAB
1,3 BETA GLUCAN SER-MCNC: 71 PG/ML
ABO/RH(D): NORMAL
ACID FAST STAIN (ARUP): NORMAL
ACT BLD: 174 SECONDS (ref 74–150)
ALBUMIN SERPL BCG-MCNC: 2.1 G/DL (ref 3.5–5.2)
ALBUMIN SERPL BCG-MCNC: 2.6 G/DL (ref 3.5–5.2)
ALBUMIN SERPL BCG-MCNC: 2.7 G/DL (ref 3.5–5.2)
ALBUMIN SERPL BCG-MCNC: 2.7 G/DL (ref 3.5–5.2)
ALBUMIN SERPL BCG-MCNC: 2.9 G/DL (ref 3.5–5.2)
ALBUMIN SERPL BCG-MCNC: 2.9 G/DL (ref 3.5–5.2)
ALBUMIN SERPL BCG-MCNC: 3 G/DL (ref 3.5–5.2)
ALBUMIN SERPL BCG-MCNC: 3.1 G/DL (ref 3.5–5.2)
ALBUMIN SERPL BCG-MCNC: 3.2 G/DL (ref 3.5–5.2)
ALBUMIN SERPL BCG-MCNC: 3.3 G/DL (ref 3.5–5.2)
ALBUMIN SERPL BCG-MCNC: 3.4 G/DL (ref 3.5–5.2)
ALBUMIN SERPL BCG-MCNC: 3.5 G/DL (ref 3.5–5.2)
ALBUMIN UR-MCNC: 50 MG/DL
ALLEN'S TEST: ABNORMAL
ALP SERPL-CCNC: 102 U/L (ref 40–150)
ALP SERPL-CCNC: 35 U/L (ref 40–150)
ALP SERPL-CCNC: 38 U/L (ref 40–150)
ALP SERPL-CCNC: 39 U/L (ref 40–150)
ALP SERPL-CCNC: 40 U/L (ref 40–150)
ALP SERPL-CCNC: 40 U/L (ref 40–150)
ALP SERPL-CCNC: 43 U/L (ref 40–150)
ALP SERPL-CCNC: 43 U/L (ref 40–150)
ALP SERPL-CCNC: 44 U/L (ref 40–150)
ALP SERPL-CCNC: 45 U/L (ref 40–150)
ALP SERPL-CCNC: 46 U/L (ref 40–150)
ALP SERPL-CCNC: 49 U/L (ref 40–150)
ALP SERPL-CCNC: 50 U/L (ref 40–150)
ALP SERPL-CCNC: 51 U/L (ref 40–150)
ALP SERPL-CCNC: 52 U/L (ref 40–150)
ALP SERPL-CCNC: 53 U/L (ref 40–150)
ALP SERPL-CCNC: 53 U/L (ref 40–150)
ALP SERPL-CCNC: 54 U/L (ref 40–150)
ALP SERPL-CCNC: 55 U/L (ref 40–150)
ALP SERPL-CCNC: 57 U/L (ref 40–150)
ALP SERPL-CCNC: 58 U/L (ref 40–150)
ALP SERPL-CCNC: 61 U/L (ref 40–150)
ALP SERPL-CCNC: 62 U/L (ref 40–150)
ALP SERPL-CCNC: 63 U/L (ref 40–150)
ALP SERPL-CCNC: 63 U/L (ref 40–150)
ALP SERPL-CCNC: 65 U/L (ref 40–150)
ALP SERPL-CCNC: 65 U/L (ref 40–150)
ALP SERPL-CCNC: 66 U/L (ref 40–150)
ALP SERPL-CCNC: 74 U/L (ref 40–150)
ALT SERPL W P-5'-P-CCNC: 11 U/L (ref 0–50)
ALT SERPL W P-5'-P-CCNC: 12 U/L (ref 0–50)
ALT SERPL W P-5'-P-CCNC: 13 U/L (ref 0–50)
ALT SERPL W P-5'-P-CCNC: 14 U/L (ref 0–50)
ALT SERPL W P-5'-P-CCNC: 15 U/L (ref 0–50)
ALT SERPL W P-5'-P-CCNC: 15 U/L (ref 0–50)
ALT SERPL W P-5'-P-CCNC: 16 U/L (ref 0–50)
ALT SERPL W P-5'-P-CCNC: 17 U/L (ref 0–50)
ALT SERPL W P-5'-P-CCNC: 18 U/L (ref 0–50)
ALT SERPL W P-5'-P-CCNC: 18 U/L (ref 0–50)
ALT SERPL W P-5'-P-CCNC: 19 U/L (ref 0–50)
ALT SERPL W P-5'-P-CCNC: 20 U/L (ref 0–50)
ALT SERPL W P-5'-P-CCNC: 21 U/L (ref 0–50)
ALT SERPL W P-5'-P-CCNC: 21 U/L (ref 0–50)
ALT SERPL W P-5'-P-CCNC: 22 U/L (ref 0–50)
ALT SERPL W P-5'-P-CCNC: 23 U/L (ref 0–50)
ALT SERPL W P-5'-P-CCNC: 23 U/L (ref 0–50)
ALT SERPL W P-5'-P-CCNC: 25 U/L (ref 0–50)
ALT SERPL W P-5'-P-CCNC: 28 U/L (ref 0–50)
ANA SER QL IF: NEGATIVE
ANCA AB PATTERN SER IF-IMP: NORMAL
ANCA AB PATTERN SER IF-IMP: NORMAL
ANION GAP SERPL CALCULATED.3IONS-SCNC: 10 MMOL/L (ref 7–15)
ANION GAP SERPL CALCULATED.3IONS-SCNC: 11 MMOL/L (ref 7–15)
ANION GAP SERPL CALCULATED.3IONS-SCNC: 12 MMOL/L (ref 7–15)
ANION GAP SERPL CALCULATED.3IONS-SCNC: 13 MMOL/L (ref 7–15)
ANION GAP SERPL CALCULATED.3IONS-SCNC: 14 MMOL/L (ref 7–15)
ANION GAP SERPL CALCULATED.3IONS-SCNC: 15 MMOL/L (ref 7–15)
ANION GAP SERPL CALCULATED.3IONS-SCNC: 17 MMOL/L (ref 7–15)
ANION GAP SERPL CALCULATED.3IONS-SCNC: 17 MMOL/L (ref 7–15)
ANION GAP SERPL CALCULATED.3IONS-SCNC: 7 MMOL/L (ref 7–15)
ANION GAP SERPL CALCULATED.3IONS-SCNC: 8 MMOL/L (ref 7–15)
ANION GAP SERPL CALCULATED.3IONS-SCNC: 9 MMOL/L (ref 7–15)
ANNOTATION COMMENT IMP: NOT DETECTED
ANTIBODY SCREEN: NEGATIVE
APPEARANCE FLD: ABNORMAL
APPEARANCE UR: ABNORMAL
APTT PPP: 24 SECONDS (ref 22–38)
APTT PPP: 25 SECONDS (ref 22–38)
APTT PPP: 26 SECONDS (ref 22–38)
ASPERGILLUS AB TITR SER CF: NORMAL {TITER}
AST SERPL W P-5'-P-CCNC: 20 U/L (ref 0–45)
AST SERPL W P-5'-P-CCNC: 21 U/L (ref 0–45)
AST SERPL W P-5'-P-CCNC: 24 U/L (ref 0–45)
AST SERPL W P-5'-P-CCNC: 25 U/L (ref 0–45)
AST SERPL W P-5'-P-CCNC: 26 U/L (ref 0–45)
AST SERPL W P-5'-P-CCNC: 26 U/L (ref 0–45)
AST SERPL W P-5'-P-CCNC: 27 U/L (ref 0–45)
AST SERPL W P-5'-P-CCNC: 28 U/L (ref 0–45)
AST SERPL W P-5'-P-CCNC: 29 U/L (ref 0–45)
AST SERPL W P-5'-P-CCNC: 30 U/L (ref 0–45)
AST SERPL W P-5'-P-CCNC: 31 U/L (ref 0–45)
AST SERPL W P-5'-P-CCNC: 31 U/L (ref 0–45)
AST SERPL W P-5'-P-CCNC: 32 U/L (ref 0–45)
AST SERPL W P-5'-P-CCNC: 32 U/L (ref 0–45)
AST SERPL W P-5'-P-CCNC: 33 U/L (ref 0–45)
AST SERPL W P-5'-P-CCNC: 33 U/L (ref 0–45)
AST SERPL W P-5'-P-CCNC: 34 U/L (ref 0–45)
AST SERPL W P-5'-P-CCNC: 37 U/L (ref 0–45)
AST SERPL W P-5'-P-CCNC: 38 U/L (ref 0–45)
AST SERPL W P-5'-P-CCNC: 39 U/L (ref 0–45)
AST SERPL W P-5'-P-CCNC: 40 U/L (ref 0–45)
AST SERPL W P-5'-P-CCNC: 44 U/L (ref 0–45)
ATRIAL RATE - MUSE: 102 BPM
ATRIAL RATE - MUSE: 116 BPM
ATRIAL RATE - MUSE: 117 BPM
B DERMAT AB SER-ACNC: 0.9 IV
B2 GLYCOPROT1 IGG SERPL IA-ACNC: 1 U/ML
B2 GLYCOPROT1 IGM SERPL IA-ACNC: <2.4 U/ML
BACTERIA #/AREA URNS HPF: ABNORMAL /HPF
BACTERIA BLD CULT: NO GROWTH
BACTERIA BLD CULT: NO GROWTH
BACTERIA BRONCH: NO GROWTH
BACTERIA BRONCH: NORMAL
BACTERIA SPEC CULT: ABNORMAL
BACTERIA SPEC CULT: NORMAL
BACTERIA SPT CULT: NO GROWTH
BASE EXCESS BLDA CALC-SCNC: -0.5 MMOL/L (ref -3–3)
BASE EXCESS BLDA CALC-SCNC: -2.5 MMOL/L (ref -3–3)
BASE EXCESS BLDA CALC-SCNC: 1.3 MMOL/L (ref -3–3)
BASE EXCESS BLDA CALC-SCNC: 10.6 MMOL/L (ref -3–3)
BASE EXCESS BLDA CALC-SCNC: 11 MMOL/L (ref -3–3)
BASE EXCESS BLDA CALC-SCNC: 11.2 MMOL/L (ref -3–3)
BASE EXCESS BLDA CALC-SCNC: 11.4 MMOL/L (ref -3–3)
BASE EXCESS BLDA CALC-SCNC: 11.6 MMOL/L (ref -3–3)
BASE EXCESS BLDA CALC-SCNC: 12.6 MMOL/L (ref -3–3)
BASE EXCESS BLDA CALC-SCNC: 13.2 MMOL/L (ref -3–3)
BASE EXCESS BLDA CALC-SCNC: 14.6 MMOL/L (ref -3–3)
BASE EXCESS BLDA CALC-SCNC: 15.8 MMOL/L (ref -3–3)
BASE EXCESS BLDA CALC-SCNC: 16.7 MMOL/L (ref -3–3)
BASE EXCESS BLDA CALC-SCNC: 16.7 MMOL/L (ref -3–3)
BASE EXCESS BLDA CALC-SCNC: 17.3 MMOL/L (ref -3–3)
BASE EXCESS BLDA CALC-SCNC: 18.5 MMOL/L (ref -3–3)
BASE EXCESS BLDA CALC-SCNC: 19.4 MMOL/L (ref -3–3)
BASE EXCESS BLDA CALC-SCNC: 19.9 MMOL/L (ref -3–3)
BASE EXCESS BLDA CALC-SCNC: 2.1 MMOL/L (ref -3–3)
BASE EXCESS BLDA CALC-SCNC: 20.3 MMOL/L (ref -3–3)
BASE EXCESS BLDA CALC-SCNC: 20.6 MMOL/L (ref -3–3)
BASE EXCESS BLDA CALC-SCNC: 21.4 MMOL/L (ref -3–3)
BASE EXCESS BLDA CALC-SCNC: 21.7 MMOL/L (ref -3–3)
BASE EXCESS BLDA CALC-SCNC: 22.4 MMOL/L (ref -3–3)
BASE EXCESS BLDA CALC-SCNC: 23.5 MMOL/L (ref -3–3)
BASE EXCESS BLDA CALC-SCNC: 23.6 MMOL/L (ref -3–3)
BASE EXCESS BLDA CALC-SCNC: 24 MMOL/L (ref -3–3)
BASE EXCESS BLDA CALC-SCNC: 24.5 MMOL/L (ref -3–3)
BASE EXCESS BLDA CALC-SCNC: 3 MMOL/L (ref -3–3)
BASE EXCESS BLDA CALC-SCNC: 3.9 MMOL/L (ref -3–3)
BASE EXCESS BLDA CALC-SCNC: 4.4 MMOL/L (ref -3–3)
BASE EXCESS BLDA CALC-SCNC: 4.5 MMOL/L (ref -3–3)
BASE EXCESS BLDA CALC-SCNC: 4.9 MMOL/L (ref -3–3)
BASE EXCESS BLDA CALC-SCNC: 5.1 MMOL/L (ref -3–3)
BASE EXCESS BLDA CALC-SCNC: 5.3 MMOL/L (ref -3–3)
BASE EXCESS BLDA CALC-SCNC: 5.5 MMOL/L (ref -3–3)
BASE EXCESS BLDA CALC-SCNC: 5.8 MMOL/L (ref -3–3)
BASE EXCESS BLDA CALC-SCNC: 6.4 MMOL/L (ref -3–3)
BASE EXCESS BLDA CALC-SCNC: 6.6 MMOL/L (ref -3–3)
BASE EXCESS BLDA CALC-SCNC: 7.2 MMOL/L (ref -3–3)
BASE EXCESS BLDA CALC-SCNC: 7.5 MMOL/L (ref -3–3)
BASE EXCESS BLDA CALC-SCNC: 7.9 MMOL/L (ref -3–3)
BASE EXCESS BLDA CALC-SCNC: 8.3 MMOL/L (ref -3–3)
BASE EXCESS BLDA CALC-SCNC: 8.3 MMOL/L (ref -3–3)
BASE EXCESS BLDA CALC-SCNC: 8.6 MMOL/L (ref -3–3)
BASE EXCESS BLDA CALC-SCNC: 8.7 MMOL/L (ref -3–3)
BASE EXCESS BLDA CALC-SCNC: 8.8 MMOL/L (ref -3–3)
BASE EXCESS BLDA CALC-SCNC: 8.8 MMOL/L (ref -3–3)
BASE EXCESS BLDA CALC-SCNC: 9 MMOL/L (ref -3–3)
BASE EXCESS BLDV CALC-SCNC: -0.3 MMOL/L (ref -3–3)
BASE EXCESS BLDV CALC-SCNC: -0.9 MMOL/L (ref -3–3)
BASE EXCESS BLDV CALC-SCNC: 10.3 MMOL/L (ref -3–3)
BASE EXCESS BLDV CALC-SCNC: 10.6 MMOL/L (ref -3–3)
BASE EXCESS BLDV CALC-SCNC: 11.5 MMOL/L (ref -3–3)
BASE EXCESS BLDV CALC-SCNC: 12.5 MMOL/L (ref -3–3)
BASE EXCESS BLDV CALC-SCNC: 12.6 MMOL/L (ref -3–3)
BASE EXCESS BLDV CALC-SCNC: 13 MMOL/L (ref -3–3)
BASE EXCESS BLDV CALC-SCNC: 15.6 MMOL/L (ref -3–3)
BASE EXCESS BLDV CALC-SCNC: 16.8 MMOL/L (ref -3–3)
BASE EXCESS BLDV CALC-SCNC: 17.4 MMOL/L (ref -3–3)
BASE EXCESS BLDV CALC-SCNC: 18.7 MMOL/L (ref -3–3)
BASE EXCESS BLDV CALC-SCNC: 18.7 MMOL/L (ref -3–3)
BASE EXCESS BLDV CALC-SCNC: 19.1 MMOL/L (ref -3–3)
BASE EXCESS BLDV CALC-SCNC: 19.4 MMOL/L (ref -3–3)
BASE EXCESS BLDV CALC-SCNC: 19.9 MMOL/L (ref -3–3)
BASE EXCESS BLDV CALC-SCNC: 2.6 MMOL/L (ref -3–3)
BASE EXCESS BLDV CALC-SCNC: 20.4 MMOL/L (ref -3–3)
BASE EXCESS BLDV CALC-SCNC: 20.4 MMOL/L (ref -3–3)
BASE EXCESS BLDV CALC-SCNC: 20.8 MMOL/L (ref -3–3)
BASE EXCESS BLDV CALC-SCNC: 21 MMOL/L (ref -3–3)
BASE EXCESS BLDV CALC-SCNC: 21 MMOL/L (ref -3–3)
BASE EXCESS BLDV CALC-SCNC: 22.7 MMOL/L (ref -3–3)
BASE EXCESS BLDV CALC-SCNC: 23.9 MMOL/L (ref -3–3)
BASE EXCESS BLDV CALC-SCNC: 24.3 MMOL/L (ref -3–3)
BASE EXCESS BLDV CALC-SCNC: 24.5 MMOL/L (ref -3–3)
BASE EXCESS BLDV CALC-SCNC: 25.2 MMOL/L (ref -3–3)
BASE EXCESS BLDV CALC-SCNC: 4.9 MMOL/L (ref -3–3)
BASE EXCESS BLDV CALC-SCNC: 5.4 MMOL/L (ref -3–3)
BASE EXCESS BLDV CALC-SCNC: 5.6 MMOL/L (ref -3–3)
BASE EXCESS BLDV CALC-SCNC: 6.2 MMOL/L (ref -3–3)
BASE EXCESS BLDV CALC-SCNC: 6.9 MMOL/L (ref -3–3)
BASE EXCESS BLDV CALC-SCNC: 7.7 MMOL/L (ref -3–3)
BASE EXCESS BLDV CALC-SCNC: 7.9 MMOL/L (ref -3–3)
BASE EXCESS BLDV CALC-SCNC: 8.1 MMOL/L (ref -3–3)
BASE EXCESS BLDV CALC-SCNC: 8.3 MMOL/L (ref -3–3)
BASE EXCESS BLDV CALC-SCNC: 9.1 MMOL/L (ref -3–3)
BASE EXCESS BLDV CALC-SCNC: 9.2 MMOL/L (ref -3–3)
BASE EXCESS BLDV CALC-SCNC: 9.7 MMOL/L (ref -3–3)
BASOPHILS # BLD AUTO: 0 10E3/UL (ref 0–0.2)
BASOPHILS # BLD AUTO: 0.1 10E3/UL (ref 0–0.2)
BASOPHILS NFR BLD AUTO: 0 %
BASOPHILS NFR BLD AUTO: 1 %
BILIRUB SERPL-MCNC: 0.7 MG/DL
BILIRUB SERPL-MCNC: 0.8 MG/DL
BILIRUB SERPL-MCNC: 0.9 MG/DL
BILIRUB SERPL-MCNC: 1 MG/DL
BILIRUB SERPL-MCNC: 1.1 MG/DL
BILIRUB SERPL-MCNC: 1.2 MG/DL
BILIRUB SERPL-MCNC: 1.4 MG/DL
BILIRUB SERPL-MCNC: 1.5 MG/DL
BILIRUB SERPL-MCNC: 1.6 MG/DL
BILIRUB SERPL-MCNC: 1.9 MG/DL
BILIRUB SERPL-MCNC: 1.9 MG/DL
BILIRUB SERPL-MCNC: 2 MG/DL
BILIRUB UR QL STRIP: NEGATIVE
BLD PROD TYP BPU: NORMAL
BLOOD COMPONENT TYPE: NORMAL
BUN SERPL-MCNC: 105 MG/DL (ref 8–23)
BUN SERPL-MCNC: 12.7 MG/DL (ref 8–23)
BUN SERPL-MCNC: 45.7 MG/DL (ref 8–23)
BUN SERPL-MCNC: 45.9 MG/DL (ref 8–23)
BUN SERPL-MCNC: 46.6 MG/DL (ref 8–23)
BUN SERPL-MCNC: 48.3 MG/DL (ref 8–23)
BUN SERPL-MCNC: 51.9 MG/DL (ref 8–23)
BUN SERPL-MCNC: 53 MG/DL (ref 8–23)
BUN SERPL-MCNC: 57.5 MG/DL (ref 8–23)
BUN SERPL-MCNC: 59.2 MG/DL (ref 8–23)
BUN SERPL-MCNC: 61.6 MG/DL (ref 8–23)
BUN SERPL-MCNC: 63.4 MG/DL (ref 8–23)
BUN SERPL-MCNC: 65.2 MG/DL (ref 8–23)
BUN SERPL-MCNC: 66.7 MG/DL (ref 8–23)
BUN SERPL-MCNC: 67.8 MG/DL (ref 8–23)
BUN SERPL-MCNC: 68 MG/DL (ref 8–23)
BUN SERPL-MCNC: 68.5 MG/DL (ref 8–23)
BUN SERPL-MCNC: 68.7 MG/DL (ref 8–23)
BUN SERPL-MCNC: 69.5 MG/DL (ref 8–23)
BUN SERPL-MCNC: 70 MG/DL (ref 8–23)
BUN SERPL-MCNC: 70.6 MG/DL (ref 8–23)
BUN SERPL-MCNC: 70.7 MG/DL (ref 8–23)
BUN SERPL-MCNC: 70.9 MG/DL (ref 8–23)
BUN SERPL-MCNC: 70.9 MG/DL (ref 8–23)
BUN SERPL-MCNC: 72.6 MG/DL (ref 8–23)
BUN SERPL-MCNC: 73.1 MG/DL (ref 8–23)
BUN SERPL-MCNC: 74.1 MG/DL (ref 8–23)
BUN SERPL-MCNC: 74.4 MG/DL (ref 8–23)
BUN SERPL-MCNC: 74.5 MG/DL (ref 8–23)
BUN SERPL-MCNC: 74.8 MG/DL (ref 8–23)
BUN SERPL-MCNC: 75.1 MG/DL (ref 8–23)
BUN SERPL-MCNC: 75.2 MG/DL (ref 8–23)
BUN SERPL-MCNC: 75.2 MG/DL (ref 8–23)
BUN SERPL-MCNC: 76.3 MG/DL (ref 8–23)
BUN SERPL-MCNC: 76.3 MG/DL (ref 8–23)
BUN SERPL-MCNC: 76.4 MG/DL (ref 8–23)
BUN SERPL-MCNC: 76.6 MG/DL (ref 8–23)
BUN SERPL-MCNC: 77.3 MG/DL (ref 8–23)
BUN SERPL-MCNC: 77.6 MG/DL (ref 8–23)
BUN SERPL-MCNC: 78.7 MG/DL (ref 8–23)
BUN SERPL-MCNC: 78.8 MG/DL (ref 8–23)
BUN SERPL-MCNC: 79.9 MG/DL (ref 8–23)
BUN SERPL-MCNC: 79.9 MG/DL (ref 8–23)
BUN SERPL-MCNC: 81.3 MG/DL (ref 8–23)
BUN SERPL-MCNC: 81.6 MG/DL (ref 8–23)
BUN SERPL-MCNC: 81.9 MG/DL (ref 8–23)
BUN SERPL-MCNC: 82.5 MG/DL (ref 8–23)
BUN SERPL-MCNC: 82.8 MG/DL (ref 8–23)
BUN SERPL-MCNC: 84 MG/DL (ref 8–23)
BUN SERPL-MCNC: 84.8 MG/DL (ref 8–23)
BUN SERPL-MCNC: 85.2 MG/DL (ref 8–23)
BUN SERPL-MCNC: 85.3 MG/DL (ref 8–23)
BUN SERPL-MCNC: 85.6 MG/DL (ref 8–23)
BUN SERPL-MCNC: 87.6 MG/DL (ref 8–23)
BUN SERPL-MCNC: 88.9 MG/DL (ref 8–23)
BUN SERPL-MCNC: 94.8 MG/DL (ref 8–23)
BUN SERPL-MCNC: 95.9 MG/DL (ref 8–23)
BUN SERPL-MCNC: 98.1 MG/DL (ref 8–23)
BUN SERPL-MCNC: 99.2 MG/DL (ref 8–23)
C PNEUM DNA SPEC QL NAA+PROBE: NOT DETECTED
C-ANCA TITR SER IF: NORMAL {TITER}
C-ANCA TITR SER IF: NORMAL {TITER}
CA-I BLD-MCNC: 4.4 MG/DL (ref 4.4–5.2)
CA-I BLD-MCNC: 4.5 MG/DL (ref 4.4–5.2)
CA-I BLD-MCNC: 4.5 MG/DL (ref 4.4–5.2)
CA-I BLD-MCNC: 4.6 MG/DL (ref 4.4–5.2)
CA-I BLD-MCNC: 4.7 MG/DL (ref 4.4–5.2)
CA-I BLD-MCNC: 4.8 MG/DL (ref 4.4–5.2)
CALCIUM SERPL-MCNC: 6.9 MG/DL (ref 8.8–10.4)
CALCIUM SERPL-MCNC: 7.6 MG/DL (ref 8.8–10.4)
CALCIUM SERPL-MCNC: 7.6 MG/DL (ref 8.8–10.4)
CALCIUM SERPL-MCNC: 7.9 MG/DL (ref 8.8–10.4)
CALCIUM SERPL-MCNC: 8 MG/DL (ref 8.8–10.4)
CALCIUM SERPL-MCNC: 8 MG/DL (ref 8.8–10.4)
CALCIUM SERPL-MCNC: 8.2 MG/DL (ref 8.8–10.4)
CALCIUM SERPL-MCNC: 8.2 MG/DL (ref 8.8–10.4)
CALCIUM SERPL-MCNC: 8.3 MG/DL (ref 8.8–10.4)
CALCIUM SERPL-MCNC: 8.4 MG/DL (ref 8.8–10.4)
CALCIUM SERPL-MCNC: 8.5 MG/DL (ref 8.8–10.4)
CALCIUM SERPL-MCNC: 8.5 MG/DL (ref 8.8–10.4)
CALCIUM SERPL-MCNC: 8.6 MG/DL (ref 8.8–10.4)
CALCIUM SERPL-MCNC: 8.7 MG/DL (ref 8.8–10.4)
CALCIUM SERPL-MCNC: 8.8 MG/DL (ref 8.8–10.4)
CALCIUM SERPL-MCNC: 8.9 MG/DL (ref 8.8–10.4)
CALCIUM SERPL-MCNC: 8.9 MG/DL (ref 8.8–10.4)
CALCIUM SERPL-MCNC: 9 MG/DL (ref 8.8–10.4)
CALCIUM SERPL-MCNC: 9.1 MG/DL (ref 8.8–10.4)
CALCIUM SERPL-MCNC: 9.2 MG/DL (ref 8.8–10.4)
CALCIUM SERPL-MCNC: 9.3 MG/DL (ref 8.6–10)
CALCIUM SERPL-MCNC: 9.3 MG/DL (ref 8.8–10.4)
CALCIUM SERPL-MCNC: 9.5 MG/DL (ref 8.8–10.4)
CALCIUM SERPL-MCNC: 9.5 MG/DL (ref 8.8–10.4)
CALCIUM SERPL-MCNC: 9.6 MG/DL (ref 8.8–10.4)
CALCIUM SERPL-MCNC: 9.6 MG/DL (ref 8.8–10.4)
CALCIUM SERPL-MCNC: 9.7 MG/DL (ref 8.8–10.4)
CARDIOLIPIN IGG SER IA-ACNC: <2 GPL-U/ML
CARDIOLIPIN IGG SER IA-ACNC: NEGATIVE
CARDIOLIPIN IGM SER IA-ACNC: <2 MPL-U/ML
CARDIOLIPIN IGM SER IA-ACNC: NEGATIVE
CELL COUNT BODY FLUID SOURCE: ABNORMAL
CHLORIDE SERPL-SCNC: 100 MMOL/L (ref 98–107)
CHLORIDE SERPL-SCNC: 100 MMOL/L (ref 98–107)
CHLORIDE SERPL-SCNC: 101 MMOL/L (ref 98–107)
CHLORIDE SERPL-SCNC: 102 MMOL/L (ref 98–107)
CHLORIDE SERPL-SCNC: 103 MMOL/L (ref 98–107)
CHLORIDE SERPL-SCNC: 104 MMOL/L (ref 98–107)
CHLORIDE SERPL-SCNC: 105 MMOL/L (ref 98–107)
CHLORIDE SERPL-SCNC: 105 MMOL/L (ref 98–107)
CHLORIDE SERPL-SCNC: 107 MMOL/L (ref 98–107)
CHLORIDE SERPL-SCNC: 107 MMOL/L (ref 98–107)
CHLORIDE SERPL-SCNC: 109 MMOL/L (ref 98–107)
CHLORIDE SERPL-SCNC: 111 MMOL/L (ref 98–107)
CHLORIDE SERPL-SCNC: 112 MMOL/L (ref 98–107)
CHLORIDE SERPL-SCNC: 114 MMOL/L (ref 98–107)
CHLORIDE SERPL-SCNC: 95 MMOL/L (ref 98–107)
CHLORIDE SERPL-SCNC: 96 MMOL/L (ref 98–107)
CHLORIDE SERPL-SCNC: 97 MMOL/L (ref 98–107)
CHLORIDE SERPL-SCNC: 98 MMOL/L (ref 98–107)
CHLORIDE SERPL-SCNC: 99 MMOL/L (ref 98–107)
CHOLEST SERPL-MCNC: 141 MG/DL
CHOLEST SERPL-MCNC: 81 MG/DL
COCCIDIOIDES AB TITR SER CF: NORMAL {TITER}
CODING SYSTEM: NORMAL
COHGB MFR BLD: 100 % (ref 96–97)
COHGB MFR BLD: 85.2 % (ref 96–97)
COHGB MFR BLD: 89.4 % (ref 96–97)
COHGB MFR BLD: 89.5 % (ref 96–97)
COHGB MFR BLD: 90.2 % (ref 96–97)
COHGB MFR BLD: 91.4 % (ref 96–97)
COHGB MFR BLD: 91.8 % (ref 96–97)
COHGB MFR BLD: 92.6 % (ref 96–97)
COHGB MFR BLD: 92.9 % (ref 96–97)
COHGB MFR BLD: 92.9 % (ref 96–97)
COHGB MFR BLD: 93.4 % (ref 96–97)
COHGB MFR BLD: 93.5 % (ref 96–97)
COHGB MFR BLD: 93.7 % (ref 96–97)
COHGB MFR BLD: 93.7 % (ref 96–97)
COHGB MFR BLD: 93.8 % (ref 96–97)
COHGB MFR BLD: 93.9 % (ref 96–97)
COHGB MFR BLD: 94 % (ref 96–97)
COHGB MFR BLD: 94.3 % (ref 96–97)
COHGB MFR BLD: 94.7 % (ref 96–97)
COHGB MFR BLD: 95.2 % (ref 96–97)
COHGB MFR BLD: 95.2 % (ref 96–97)
COHGB MFR BLD: 95.3 % (ref 96–97)
COHGB MFR BLD: 95.8 % (ref 96–97)
COHGB MFR BLD: 95.9 % (ref 96–97)
COHGB MFR BLD: 96.1 % (ref 96–97)
COHGB MFR BLD: 96.3 % (ref 96–97)
COHGB MFR BLD: 96.4 % (ref 96–97)
COHGB MFR BLD: 96.8 % (ref 96–97)
COHGB MFR BLD: 97 % (ref 96–97)
COHGB MFR BLD: 97.2 % (ref 96–97)
COHGB MFR BLD: 97.3 % (ref 96–97)
COHGB MFR BLD: 97.3 % (ref 96–97)
COHGB MFR BLD: 97.4 % (ref 96–97)
COHGB MFR BLD: 97.5 % (ref 96–97)
COHGB MFR BLD: 98 % (ref 96–97)
COHGB MFR BLD: 98.2 % (ref 96–97)
COHGB MFR BLD: 98.4 % (ref 96–97)
COHGB MFR BLD: 98.8 % (ref 96–97)
COHGB MFR BLD: 98.8 % (ref 96–97)
COHGB MFR BLD: 99 % (ref 96–97)
COHGB MFR BLD: 99.1 % (ref 96–97)
COHGB MFR BLD: 99.2 % (ref 96–97)
COHGB MFR BLD: 99.6 % (ref 96–97)
COHGB MFR BLD: 99.6 % (ref 96–97)
COHGB MFR BLD: 99.8 % (ref 96–97)
COHGB MFR BLD: >100 % (ref 96–97)
COHGB MFR BLD: >100 % (ref 96–97)
COLOR FLD: ABNORMAL
COLOR UR AUTO: YELLOW
CREAT SERPL-MCNC: 0.91 MG/DL (ref 0.51–0.95)
CREAT SERPL-MCNC: 1 MG/DL (ref 0.51–0.95)
CREAT SERPL-MCNC: 1.04 MG/DL (ref 0.51–0.95)
CREAT SERPL-MCNC: 1.05 MG/DL (ref 0.51–0.95)
CREAT SERPL-MCNC: 1.07 MG/DL (ref 0.51–0.95)
CREAT SERPL-MCNC: 1.11 MG/DL (ref 0.51–0.95)
CREAT SERPL-MCNC: 1.18 MG/DL (ref 0.51–0.95)
CREAT SERPL-MCNC: 1.18 MG/DL (ref 0.51–0.95)
CREAT SERPL-MCNC: 1.26 MG/DL (ref 0.51–0.95)
CREAT SERPL-MCNC: 1.29 MG/DL (ref 0.51–0.95)
CREAT SERPL-MCNC: 1.3 MG/DL (ref 0.51–0.95)
CREAT SERPL-MCNC: 1.33 MG/DL (ref 0.51–0.95)
CREAT SERPL-MCNC: 1.33 MG/DL (ref 0.51–0.95)
CREAT SERPL-MCNC: 1.34 MG/DL (ref 0.51–0.95)
CREAT SERPL-MCNC: 1.36 MG/DL (ref 0.51–0.95)
CREAT SERPL-MCNC: 1.37 MG/DL (ref 0.51–0.95)
CREAT SERPL-MCNC: 1.38 MG/DL (ref 0.51–0.95)
CREAT SERPL-MCNC: 1.45 MG/DL (ref 0.51–0.95)
CREAT SERPL-MCNC: 1.49 MG/DL (ref 0.51–0.95)
CREAT SERPL-MCNC: 1.5 MG/DL (ref 0.51–0.95)
CREAT SERPL-MCNC: 1.52 MG/DL (ref 0.51–0.95)
CREAT SERPL-MCNC: 1.53 MG/DL (ref 0.51–0.95)
CREAT SERPL-MCNC: 1.56 MG/DL (ref 0.51–0.95)
CREAT SERPL-MCNC: 1.57 MG/DL (ref 0.51–0.95)
CREAT SERPL-MCNC: 1.58 MG/DL (ref 0.51–0.95)
CREAT SERPL-MCNC: 1.6 MG/DL (ref 0.51–0.95)
CREAT SERPL-MCNC: 1.6 MG/DL (ref 0.51–0.95)
CREAT SERPL-MCNC: 1.65 MG/DL (ref 0.51–0.95)
CREAT SERPL-MCNC: 1.66 MG/DL (ref 0.51–0.95)
CREAT SERPL-MCNC: 1.67 MG/DL (ref 0.51–0.95)
CREAT SERPL-MCNC: 1.67 MG/DL (ref 0.51–0.95)
CREAT SERPL-MCNC: 1.68 MG/DL (ref 0.51–0.95)
CREAT SERPL-MCNC: 1.69 MG/DL (ref 0.51–0.95)
CREAT SERPL-MCNC: 1.7 MG/DL (ref 0.51–0.95)
CREAT SERPL-MCNC: 1.75 MG/DL (ref 0.51–0.95)
CREAT SERPL-MCNC: 1.76 MG/DL (ref 0.51–0.95)
CREAT SERPL-MCNC: 1.8 MG/DL (ref 0.51–0.95)
CREAT SERPL-MCNC: 1.8 MG/DL (ref 0.51–0.95)
CREAT SERPL-MCNC: 1.83 MG/DL (ref 0.51–0.95)
CREAT SERPL-MCNC: 1.83 MG/DL (ref 0.51–0.95)
CREAT SERPL-MCNC: 1.85 MG/DL (ref 0.51–0.95)
CREAT SERPL-MCNC: 1.88 MG/DL (ref 0.51–0.95)
CREAT SERPL-MCNC: 1.89 MG/DL (ref 0.51–0.95)
CREAT SERPL-MCNC: 1.9 MG/DL (ref 0.51–0.95)
CREAT SERPL-MCNC: 1.97 MG/DL (ref 0.51–0.95)
CREAT SERPL-MCNC: 2.02 MG/DL (ref 0.51–0.95)
CREAT SERPL-MCNC: 2.06 MG/DL (ref 0.51–0.95)
CREAT SERPL-MCNC: 2.15 MG/DL (ref 0.51–0.95)
CREAT SERPL-MCNC: 2.46 MG/DL (ref 0.51–0.95)
CREAT SERPL-MCNC: 2.54 MG/DL (ref 0.51–0.95)
CREAT SERPL-MCNC: 2.6 MG/DL (ref 0.51–0.95)
CREAT SERPL-MCNC: 2.68 MG/DL (ref 0.51–0.95)
CREAT SERPL-MCNC: 2.75 MG/DL (ref 0.51–0.95)
CROSSMATCH: NORMAL
CRP SERPL HS-MCNC: 237 MG/L
CRP SERPL-MCNC: 122 MG/L
CRP SERPL-MCNC: 154 MG/L
CRP SERPL-MCNC: 169 MG/L
CRP SERPL-MCNC: 177 MG/L
CRP SERPL-MCNC: 225 MG/L
CRP SERPL-MCNC: 24.3 MG/L
CRP SERPL-MCNC: 255 MG/L
CRP SERPL-MCNC: 296 MG/L
CRP SERPL-MCNC: 31.4 MG/L
CRP SERPL-MCNC: 38.7 MG/L
CRP SERPL-MCNC: 39 MG/L
CRP SERPL-MCNC: 43.6 MG/L
CRP SERPL-MCNC: 43.9 MG/L
CRP SERPL-MCNC: 48.8 MG/L
CRP SERPL-MCNC: 49.8 MG/L
CRP SERPL-MCNC: 50.5 MG/L
CRP SERPL-MCNC: 80.3 MG/L
CRP SERPL-MCNC: 93.7 MG/L
D DIMER PPP FEU-MCNC: 15.08 UG/ML FEU (ref 0–0.5)
D DIMER PPP FEU-MCNC: 5.06 UG/ML FEU (ref 0–0.5)
DAT POLY: NEGATIVE
DEPRECATED HCO3 PLAS-SCNC: 31 MMOL/L (ref 22–29)
DEPRECATED M TB RPOB XXX QL NAA+PROBE: NORMAL
DIASTOLIC BLOOD PRESSURE - MUSE: 88 MMHG
DIASTOLIC BLOOD PRESSURE - MUSE: NORMAL MMHG
DIASTOLIC BLOOD PRESSURE - MUSE: NORMAL MMHG
DIGOXIN SERPL-MCNC: <0.4 NG/ML (ref 0.6–2)
DRVVT CONFIRM NORMALIZED RATIO: 1.28
DRVVT SCREEN RATIO: 1.46
EGFRCR SERPLBLD CKD-EPI 2021: 19 ML/MIN/1.73M2
EGFRCR SERPLBLD CKD-EPI 2021: 20 ML/MIN/1.73M2
EGFRCR SERPLBLD CKD-EPI 2021: 21 ML/MIN/1.73M2
EGFRCR SERPLBLD CKD-EPI 2021: 21 ML/MIN/1.73M2
EGFRCR SERPLBLD CKD-EPI 2021: 22 ML/MIN/1.73M2
EGFRCR SERPLBLD CKD-EPI 2021: 26 ML/MIN/1.73M2
EGFRCR SERPLBLD CKD-EPI 2021: 27 ML/MIN/1.73M2
EGFRCR SERPLBLD CKD-EPI 2021: 28 ML/MIN/1.73M2
EGFRCR SERPLBLD CKD-EPI 2021: 29 ML/MIN/1.73M2
EGFRCR SERPLBLD CKD-EPI 2021: 30 ML/MIN/1.73M2
EGFRCR SERPLBLD CKD-EPI 2021: 31 ML/MIN/1.73M2
EGFRCR SERPLBLD CKD-EPI 2021: 32 ML/MIN/1.73M2
EGFRCR SERPLBLD CKD-EPI 2021: 32 ML/MIN/1.73M2
EGFRCR SERPLBLD CKD-EPI 2021: 33 ML/MIN/1.73M2
EGFRCR SERPLBLD CKD-EPI 2021: 33 ML/MIN/1.73M2
EGFRCR SERPLBLD CKD-EPI 2021: 34 ML/MIN/1.73M2
EGFRCR SERPLBLD CKD-EPI 2021: 35 ML/MIN/1.73M2
EGFRCR SERPLBLD CKD-EPI 2021: 37 ML/MIN/1.73M2
EGFRCR SERPLBLD CKD-EPI 2021: 38 ML/MIN/1.73M2
EGFRCR SERPLBLD CKD-EPI 2021: 39 ML/MIN/1.73M2
EGFRCR SERPLBLD CKD-EPI 2021: 39 ML/MIN/1.73M2
EGFRCR SERPLBLD CKD-EPI 2021: 40 ML/MIN/1.73M2
EGFRCR SERPLBLD CKD-EPI 2021: 41 ML/MIN/1.73M2
EGFRCR SERPLBLD CKD-EPI 2021: 44 ML/MIN/1.73M2
EGFRCR SERPLBLD CKD-EPI 2021: 44 ML/MIN/1.73M2
EGFRCR SERPLBLD CKD-EPI 2021: 45 ML/MIN/1.73M2
EGFRCR SERPLBLD CKD-EPI 2021: 45 ML/MIN/1.73M2
EGFRCR SERPLBLD CKD-EPI 2021: 46 ML/MIN/1.73M2
EGFRCR SERPLBLD CKD-EPI 2021: 46 ML/MIN/1.73M2
EGFRCR SERPLBLD CKD-EPI 2021: 47 ML/MIN/1.73M2
EGFRCR SERPLBLD CKD-EPI 2021: 48 ML/MIN/1.73M2
EGFRCR SERPLBLD CKD-EPI 2021: 49 ML/MIN/1.73M2
EGFRCR SERPLBLD CKD-EPI 2021: 53 ML/MIN/1.73M2
EGFRCR SERPLBLD CKD-EPI 2021: 53 ML/MIN/1.73M2
EGFRCR SERPLBLD CKD-EPI 2021: 57 ML/MIN/1.73M2
EGFRCR SERPLBLD CKD-EPI 2021: 60 ML/MIN/1.73M2
EGFRCR SERPLBLD CKD-EPI 2021: 61 ML/MIN/1.73M2
EGFRCR SERPLBLD CKD-EPI 2021: 62 ML/MIN/1.73M2
EGFRCR SERPLBLD CKD-EPI 2021: 65 ML/MIN/1.73M2
EGFRCR SERPLBLD CKD-EPI 2021: 72 ML/MIN/1.73M2
EOSINOPHIL # BLD AUTO: 0 10E3/UL (ref 0–0.7)
EOSINOPHIL # BLD AUTO: 0.1 10E3/UL (ref 0–0.7)
EOSINOPHIL # BLD AUTO: 0.2 10E3/UL (ref 0–0.7)
EOSINOPHIL # BLD AUTO: 0.2 10E3/UL (ref 0–0.7)
EOSINOPHIL # BLD AUTO: 0.3 10E3/UL (ref 0–0.7)
EOSINOPHIL # BLD AUTO: 0.4 10E3/UL (ref 0–0.7)
EOSINOPHIL # BLD AUTO: 0.5 10E3/UL (ref 0–0.7)
EOSINOPHIL # BLD AUTO: 0.6 10E3/UL (ref 0–0.7)
EOSINOPHIL # BLD AUTO: 0.7 10E3/UL (ref 0–0.7)
EOSINOPHIL # BLD AUTO: 0.7 10E3/UL (ref 0–0.7)
EOSINOPHIL # BLD AUTO: 0.8 10E3/UL (ref 0–0.7)
EOSINOPHIL NFR BLD AUTO: 0 %
EOSINOPHIL NFR BLD AUTO: 1 %
EOSINOPHIL NFR BLD AUTO: 2 %
EOSINOPHIL NFR BLD AUTO: 3 %
EOSINOPHIL NFR BLD AUTO: 4 %
EOSINOPHIL NFR BLD AUTO: 4 %
EOSINOPHIL NFR BLD AUTO: 5 %
EOSINOPHIL NFR BLD AUTO: 6 %
ERYTHROCYTE [DISTWIDTH] IN BLOOD BY AUTOMATED COUNT: 16 % (ref 10–15)
ERYTHROCYTE [DISTWIDTH] IN BLOOD BY AUTOMATED COUNT: 16.1 % (ref 10–15)
ERYTHROCYTE [DISTWIDTH] IN BLOOD BY AUTOMATED COUNT: 16.5 % (ref 10–15)
ERYTHROCYTE [DISTWIDTH] IN BLOOD BY AUTOMATED COUNT: 16.6 % (ref 10–15)
ERYTHROCYTE [DISTWIDTH] IN BLOOD BY AUTOMATED COUNT: 16.8 % (ref 10–15)
ERYTHROCYTE [DISTWIDTH] IN BLOOD BY AUTOMATED COUNT: 16.9 % (ref 10–15)
ERYTHROCYTE [DISTWIDTH] IN BLOOD BY AUTOMATED COUNT: 17.2 % (ref 10–15)
ERYTHROCYTE [DISTWIDTH] IN BLOOD BY AUTOMATED COUNT: 18.2 % (ref 10–15)
ERYTHROCYTE [DISTWIDTH] IN BLOOD BY AUTOMATED COUNT: 18.9 % (ref 10–15)
ERYTHROCYTE [DISTWIDTH] IN BLOOD BY AUTOMATED COUNT: 19.2 % (ref 10–15)
ERYTHROCYTE [DISTWIDTH] IN BLOOD BY AUTOMATED COUNT: 19.3 % (ref 10–15)
ERYTHROCYTE [DISTWIDTH] IN BLOOD BY AUTOMATED COUNT: 19.3 % (ref 10–15)
ERYTHROCYTE [DISTWIDTH] IN BLOOD BY AUTOMATED COUNT: 19.6 % (ref 10–15)
ERYTHROCYTE [DISTWIDTH] IN BLOOD BY AUTOMATED COUNT: 19.8 % (ref 10–15)
ERYTHROCYTE [DISTWIDTH] IN BLOOD BY AUTOMATED COUNT: 19.9 % (ref 10–15)
ERYTHROCYTE [DISTWIDTH] IN BLOOD BY AUTOMATED COUNT: 20 % (ref 10–15)
ERYTHROCYTE [DISTWIDTH] IN BLOOD BY AUTOMATED COUNT: 20 % (ref 10–15)
ERYTHROCYTE [DISTWIDTH] IN BLOOD BY AUTOMATED COUNT: 20.1 % (ref 10–15)
ERYTHROCYTE [DISTWIDTH] IN BLOOD BY AUTOMATED COUNT: 20.3 % (ref 10–15)
ERYTHROCYTE [DISTWIDTH] IN BLOOD BY AUTOMATED COUNT: 20.4 % (ref 10–15)
ERYTHROCYTE [DISTWIDTH] IN BLOOD BY AUTOMATED COUNT: 20.6 % (ref 10–15)
ERYTHROCYTE [DISTWIDTH] IN BLOOD BY AUTOMATED COUNT: 20.8 % (ref 10–15)
ERYTHROCYTE [DISTWIDTH] IN BLOOD BY AUTOMATED COUNT: 21.2 % (ref 10–15)
ERYTHROCYTE [DISTWIDTH] IN BLOOD BY AUTOMATED COUNT: 21.7 % (ref 10–15)
ERYTHROCYTE [DISTWIDTH] IN BLOOD BY AUTOMATED COUNT: 21.8 % (ref 10–15)
ERYTHROCYTE [DISTWIDTH] IN BLOOD BY AUTOMATED COUNT: 21.9 % (ref 10–15)
ERYTHROCYTE [DISTWIDTH] IN BLOOD BY AUTOMATED COUNT: 21.9 % (ref 10–15)
ERYTHROCYTE [DISTWIDTH] IN BLOOD BY AUTOMATED COUNT: 22 % (ref 10–15)
ERYTHROCYTE [DISTWIDTH] IN BLOOD BY AUTOMATED COUNT: 22.3 % (ref 10–15)
ERYTHROCYTE [DISTWIDTH] IN BLOOD BY AUTOMATED COUNT: 26.7 % (ref 10–15)
ERYTHROCYTE [DISTWIDTH] IN BLOOD BY AUTOMATED COUNT: 26.9 % (ref 10–15)
ERYTHROCYTE [SEDIMENTATION RATE] IN BLOOD BY WESTERGREN METHOD: 116 MM/HR (ref 0–30)
FACT V ACT/NOR PPP: 66 % (ref 60–140)
FASTING STATUS PATIENT QL REPORTED: ABNORMAL
FASTING STATUS PATIENT QL REPORTED: NORMAL
FERRITIN SERPL-MCNC: 427 NG/ML (ref 11–328)
FIBRINOGEN PPP-MCNC: 218 MG/DL (ref 170–510)
FIBRINOGEN PPP-MCNC: 238 MG/DL (ref 170–510)
FIBRINOGEN PPP-MCNC: 269 MG/DL (ref 170–510)
FIBRINOGEN PPP-MCNC: 336 MG/DL (ref 170–510)
FIBRINOGEN PPP-MCNC: 395 MG/DL (ref 170–510)
FLUAV RNA SPEC QL NAA+PROBE: NEGATIVE
FLUAV RNA SPEC QL NAA+PROBE: NEGATIVE
FLUBV RNA RESP QL NAA+PROBE: NEGATIVE
FLUBV RNA RESP QL NAA+PROBE: NEGATIVE
FOLATE SERPL-MCNC: 9.3 NG/ML (ref 4.6–34.8)
GALACTOMANNAN AG BAL QL: POSITIVE
GALACTOMANNAN AG SERPL QL IA: NEGATIVE
GALACTOMANNAN AG SPEC IA-ACNC: 0.05
GALACTOMANNAN AG SPEC IA-ACNC: 2.2
GAMMA INTERFERON BACKGROUND BLD IA-ACNC: 0.03 IU/ML
GLUCOSE BLD-MCNC: 146 MG/DL (ref 70–99)
GLUCOSE BLD-MCNC: 190 MG/DL (ref 70–99)
GLUCOSE BLD-MCNC: 190 MG/DL (ref 70–99)
GLUCOSE BLDC GLUCOMTR-MCNC: 102 MG/DL (ref 70–99)
GLUCOSE BLDC GLUCOMTR-MCNC: 102 MG/DL (ref 70–99)
GLUCOSE BLDC GLUCOMTR-MCNC: 104 MG/DL (ref 70–99)
GLUCOSE BLDC GLUCOMTR-MCNC: 106 MG/DL (ref 70–99)
GLUCOSE BLDC GLUCOMTR-MCNC: 107 MG/DL (ref 70–99)
GLUCOSE BLDC GLUCOMTR-MCNC: 113 MG/DL (ref 70–99)
GLUCOSE BLDC GLUCOMTR-MCNC: 114 MG/DL (ref 70–99)
GLUCOSE BLDC GLUCOMTR-MCNC: 114 MG/DL (ref 70–99)
GLUCOSE BLDC GLUCOMTR-MCNC: 119 MG/DL (ref 70–99)
GLUCOSE BLDC GLUCOMTR-MCNC: 120 MG/DL (ref 70–99)
GLUCOSE BLDC GLUCOMTR-MCNC: 122 MG/DL (ref 70–99)
GLUCOSE BLDC GLUCOMTR-MCNC: 123 MG/DL (ref 70–99)
GLUCOSE BLDC GLUCOMTR-MCNC: 124 MG/DL (ref 70–99)
GLUCOSE BLDC GLUCOMTR-MCNC: 124 MG/DL (ref 70–99)
GLUCOSE BLDC GLUCOMTR-MCNC: 125 MG/DL (ref 70–99)
GLUCOSE BLDC GLUCOMTR-MCNC: 125 MG/DL (ref 70–99)
GLUCOSE BLDC GLUCOMTR-MCNC: 127 MG/DL (ref 70–99)
GLUCOSE BLDC GLUCOMTR-MCNC: 127 MG/DL (ref 70–99)
GLUCOSE BLDC GLUCOMTR-MCNC: 128 MG/DL (ref 70–99)
GLUCOSE BLDC GLUCOMTR-MCNC: 128 MG/DL (ref 70–99)
GLUCOSE BLDC GLUCOMTR-MCNC: 129 MG/DL (ref 70–99)
GLUCOSE BLDC GLUCOMTR-MCNC: 130 MG/DL (ref 70–99)
GLUCOSE BLDC GLUCOMTR-MCNC: 131 MG/DL (ref 70–99)
GLUCOSE BLDC GLUCOMTR-MCNC: 131 MG/DL (ref 70–99)
GLUCOSE BLDC GLUCOMTR-MCNC: 132 MG/DL (ref 70–99)
GLUCOSE BLDC GLUCOMTR-MCNC: 133 MG/DL (ref 70–99)
GLUCOSE BLDC GLUCOMTR-MCNC: 134 MG/DL (ref 70–99)
GLUCOSE BLDC GLUCOMTR-MCNC: 135 MG/DL (ref 70–99)
GLUCOSE BLDC GLUCOMTR-MCNC: 136 MG/DL (ref 70–99)
GLUCOSE BLDC GLUCOMTR-MCNC: 138 MG/DL (ref 70–99)
GLUCOSE BLDC GLUCOMTR-MCNC: 139 MG/DL (ref 70–99)
GLUCOSE BLDC GLUCOMTR-MCNC: 139 MG/DL (ref 70–99)
GLUCOSE BLDC GLUCOMTR-MCNC: 140 MG/DL (ref 70–99)
GLUCOSE BLDC GLUCOMTR-MCNC: 141 MG/DL (ref 70–99)
GLUCOSE BLDC GLUCOMTR-MCNC: 142 MG/DL (ref 70–99)
GLUCOSE BLDC GLUCOMTR-MCNC: 142 MG/DL (ref 70–99)
GLUCOSE BLDC GLUCOMTR-MCNC: 143 MG/DL (ref 70–99)
GLUCOSE BLDC GLUCOMTR-MCNC: 143 MG/DL (ref 70–99)
GLUCOSE BLDC GLUCOMTR-MCNC: 144 MG/DL (ref 70–99)
GLUCOSE BLDC GLUCOMTR-MCNC: 145 MG/DL (ref 70–99)
GLUCOSE BLDC GLUCOMTR-MCNC: 145 MG/DL (ref 70–99)
GLUCOSE BLDC GLUCOMTR-MCNC: 146 MG/DL (ref 70–99)
GLUCOSE BLDC GLUCOMTR-MCNC: 146 MG/DL (ref 70–99)
GLUCOSE BLDC GLUCOMTR-MCNC: 147 MG/DL (ref 70–99)
GLUCOSE BLDC GLUCOMTR-MCNC: 149 MG/DL (ref 70–99)
GLUCOSE BLDC GLUCOMTR-MCNC: 149 MG/DL (ref 70–99)
GLUCOSE BLDC GLUCOMTR-MCNC: 151 MG/DL (ref 70–99)
GLUCOSE BLDC GLUCOMTR-MCNC: 153 MG/DL (ref 70–99)
GLUCOSE BLDC GLUCOMTR-MCNC: 154 MG/DL (ref 70–99)
GLUCOSE BLDC GLUCOMTR-MCNC: 154 MG/DL (ref 70–99)
GLUCOSE BLDC GLUCOMTR-MCNC: 155 MG/DL (ref 70–99)
GLUCOSE BLDC GLUCOMTR-MCNC: 156 MG/DL (ref 70–99)
GLUCOSE BLDC GLUCOMTR-MCNC: 156 MG/DL (ref 70–99)
GLUCOSE BLDC GLUCOMTR-MCNC: 157 MG/DL (ref 70–99)
GLUCOSE BLDC GLUCOMTR-MCNC: 159 MG/DL (ref 70–99)
GLUCOSE BLDC GLUCOMTR-MCNC: 159 MG/DL (ref 70–99)
GLUCOSE BLDC GLUCOMTR-MCNC: 160 MG/DL (ref 70–99)
GLUCOSE BLDC GLUCOMTR-MCNC: 161 MG/DL (ref 70–99)
GLUCOSE BLDC GLUCOMTR-MCNC: 161 MG/DL (ref 70–99)
GLUCOSE BLDC GLUCOMTR-MCNC: 162 MG/DL (ref 70–99)
GLUCOSE BLDC GLUCOMTR-MCNC: 165 MG/DL (ref 70–99)
GLUCOSE BLDC GLUCOMTR-MCNC: 166 MG/DL (ref 70–99)
GLUCOSE BLDC GLUCOMTR-MCNC: 166 MG/DL (ref 70–99)
GLUCOSE BLDC GLUCOMTR-MCNC: 167 MG/DL (ref 70–99)
GLUCOSE BLDC GLUCOMTR-MCNC: 168 MG/DL (ref 70–99)
GLUCOSE BLDC GLUCOMTR-MCNC: 168 MG/DL (ref 70–99)
GLUCOSE BLDC GLUCOMTR-MCNC: 169 MG/DL (ref 70–99)
GLUCOSE BLDC GLUCOMTR-MCNC: 171 MG/DL (ref 70–99)
GLUCOSE BLDC GLUCOMTR-MCNC: 172 MG/DL (ref 70–99)
GLUCOSE BLDC GLUCOMTR-MCNC: 174 MG/DL (ref 70–99)
GLUCOSE BLDC GLUCOMTR-MCNC: 177 MG/DL (ref 70–99)
GLUCOSE BLDC GLUCOMTR-MCNC: 181 MG/DL (ref 70–99)
GLUCOSE BLDC GLUCOMTR-MCNC: 184 MG/DL (ref 70–99)
GLUCOSE BLDC GLUCOMTR-MCNC: 184 MG/DL (ref 70–99)
GLUCOSE BLDC GLUCOMTR-MCNC: 188 MG/DL (ref 70–99)
GLUCOSE BLDC GLUCOMTR-MCNC: 189 MG/DL (ref 70–99)
GLUCOSE BLDC GLUCOMTR-MCNC: 192 MG/DL (ref 70–99)
GLUCOSE BLDC GLUCOMTR-MCNC: 210 MG/DL (ref 70–99)
GLUCOSE BLDC GLUCOMTR-MCNC: 214 MG/DL (ref 70–99)
GLUCOSE BLDC GLUCOMTR-MCNC: 94 MG/DL (ref 70–99)
GLUCOSE BLDC GLUCOMTR-MCNC: 95 MG/DL (ref 70–99)
GLUCOSE SERPL-MCNC: 102 MG/DL (ref 70–99)
GLUCOSE SERPL-MCNC: 117 MG/DL (ref 70–99)
GLUCOSE SERPL-MCNC: 117 MG/DL (ref 70–99)
GLUCOSE SERPL-MCNC: 118 MG/DL (ref 70–99)
GLUCOSE SERPL-MCNC: 119 MG/DL (ref 70–99)
GLUCOSE SERPL-MCNC: 120 MG/DL (ref 70–99)
GLUCOSE SERPL-MCNC: 121 MG/DL (ref 70–99)
GLUCOSE SERPL-MCNC: 124 MG/DL (ref 70–99)
GLUCOSE SERPL-MCNC: 129 MG/DL (ref 70–99)
GLUCOSE SERPL-MCNC: 130 MG/DL (ref 70–99)
GLUCOSE SERPL-MCNC: 132 MG/DL (ref 70–99)
GLUCOSE SERPL-MCNC: 133 MG/DL (ref 70–99)
GLUCOSE SERPL-MCNC: 136 MG/DL (ref 70–99)
GLUCOSE SERPL-MCNC: 136 MG/DL (ref 70–99)
GLUCOSE SERPL-MCNC: 138 MG/DL (ref 70–99)
GLUCOSE SERPL-MCNC: 140 MG/DL (ref 70–99)
GLUCOSE SERPL-MCNC: 141 MG/DL (ref 70–99)
GLUCOSE SERPL-MCNC: 141 MG/DL (ref 70–99)
GLUCOSE SERPL-MCNC: 143 MG/DL (ref 70–99)
GLUCOSE SERPL-MCNC: 145 MG/DL (ref 70–99)
GLUCOSE SERPL-MCNC: 147 MG/DL (ref 70–99)
GLUCOSE SERPL-MCNC: 147 MG/DL (ref 70–99)
GLUCOSE SERPL-MCNC: 148 MG/DL (ref 70–99)
GLUCOSE SERPL-MCNC: 148 MG/DL (ref 70–99)
GLUCOSE SERPL-MCNC: 149 MG/DL (ref 70–99)
GLUCOSE SERPL-MCNC: 149 MG/DL (ref 70–99)
GLUCOSE SERPL-MCNC: 150 MG/DL (ref 70–99)
GLUCOSE SERPL-MCNC: 152 MG/DL (ref 70–99)
GLUCOSE SERPL-MCNC: 154 MG/DL (ref 70–99)
GLUCOSE SERPL-MCNC: 155 MG/DL (ref 70–99)
GLUCOSE SERPL-MCNC: 159 MG/DL (ref 70–99)
GLUCOSE SERPL-MCNC: 160 MG/DL (ref 70–99)
GLUCOSE SERPL-MCNC: 163 MG/DL (ref 70–99)
GLUCOSE SERPL-MCNC: 164 MG/DL (ref 70–99)
GLUCOSE SERPL-MCNC: 165 MG/DL (ref 70–99)
GLUCOSE SERPL-MCNC: 167 MG/DL (ref 70–99)
GLUCOSE SERPL-MCNC: 169 MG/DL (ref 70–99)
GLUCOSE SERPL-MCNC: 173 MG/DL (ref 70–99)
GLUCOSE SERPL-MCNC: 175 MG/DL (ref 70–99)
GLUCOSE SERPL-MCNC: 175 MG/DL (ref 70–99)
GLUCOSE SERPL-MCNC: 179 MG/DL (ref 70–99)
GLUCOSE SERPL-MCNC: 180 MG/DL (ref 70–99)
GLUCOSE SERPL-MCNC: 180 MG/DL (ref 70–99)
GLUCOSE SERPL-MCNC: 183 MG/DL (ref 70–99)
GLUCOSE SERPL-MCNC: 193 MG/DL (ref 70–99)
GLUCOSE SERPL-MCNC: 198 MG/DL (ref 70–99)
GLUCOSE SERPL-MCNC: 200 MG/DL (ref 70–99)
GLUCOSE SERPL-MCNC: 203 MG/DL (ref 70–99)
GLUCOSE SERPL-MCNC: 237 MG/DL (ref 70–99)
GLUCOSE UR STRIP-MCNC: NEGATIVE MG/DL
GRAM STAIN RESULT: ABNORMAL
GRAM STAIN RESULT: ABNORMAL
GRAM STAIN RESULT: NORMAL
H CAPSUL AG UR QL IA: NOT DETECTED
H CAPSUL AG UR-MCNC: NOT DETECTED NG/ML
H CAPSUL MYC AB TITR SER CF: NORMAL {TITER}
H CAPSUL YST AB TITR SER CF: NORMAL {TITER}
HAPTOGLOB SERPL-MCNC: 117 MG/DL (ref 30–200)
HAPTOGLOB SERPL-MCNC: 92 MG/DL (ref 30–200)
HBA1C MFR BLD: 5.8 %
HBV CORE AB SERPL QL IA: NONREACTIVE
HBV E AG SERPL QL IA: NEGATIVE
HBV SURFACE AB SERPL IA-ACNC: <3.5 M[IU]/ML
HBV SURFACE AB SERPL IA-ACNC: NONREACTIVE M[IU]/ML
HCO3 BLD-SCNC: 28 MMOL/L (ref 21–28)
HCO3 BLD-SCNC: 29 MMOL/L (ref 21–28)
HCO3 BLD-SCNC: 29 MMOL/L (ref 21–28)
HCO3 BLD-SCNC: 30 MMOL/L (ref 21–28)
HCO3 BLD-SCNC: 31 MMOL/L (ref 21–28)
HCO3 BLD-SCNC: 32 MMOL/L (ref 21–28)
HCO3 BLD-SCNC: 33 MMOL/L (ref 21–28)
HCO3 BLD-SCNC: 34 MMOL/L (ref 21–28)
HCO3 BLD-SCNC: 34 MMOL/L (ref 21–28)
HCO3 BLD-SCNC: 35 MMOL/L (ref 21–28)
HCO3 BLD-SCNC: 38 MMOL/L (ref 21–28)
HCO3 BLD-SCNC: 38 MMOL/L (ref 21–28)
HCO3 BLD-SCNC: 39 MMOL/L (ref 21–28)
HCO3 BLD-SCNC: 40 MMOL/L (ref 21–28)
HCO3 BLD-SCNC: 41 MMOL/L (ref 21–28)
HCO3 BLD-SCNC: 43 MMOL/L (ref 21–28)
HCO3 BLD-SCNC: 44 MMOL/L (ref 21–28)
HCO3 BLD-SCNC: 45 MMOL/L (ref 21–28)
HCO3 BLD-SCNC: 46 MMOL/L (ref 21–28)
HCO3 BLD-SCNC: 46 MMOL/L (ref 21–28)
HCO3 BLD-SCNC: 47 MMOL/L (ref 21–28)
HCO3 BLD-SCNC: 47 MMOL/L (ref 21–28)
HCO3 BLD-SCNC: 48 MMOL/L (ref 21–28)
HCO3 BLD-SCNC: 49 MMOL/L (ref 21–28)
HCO3 BLD-SCNC: 50 MMOL/L (ref 21–28)
HCO3 BLD-SCNC: 51 MMOL/L (ref 21–28)
HCO3 BLDA-SCNC: 31 MMOL/L (ref 21–28)
HCO3 BLDA-SCNC: 31 MMOL/L (ref 21–28)
HCO3 BLDV-SCNC: 30 MMOL/L (ref 21–28)
HCO3 BLDV-SCNC: 31 MMOL/L (ref 21–28)
HCO3 BLDV-SCNC: 32 MMOL/L (ref 21–28)
HCO3 BLDV-SCNC: 32 MMOL/L (ref 21–28)
HCO3 BLDV-SCNC: 33 MMOL/L (ref 21–28)
HCO3 BLDV-SCNC: 33 MMOL/L (ref 21–28)
HCO3 BLDV-SCNC: 34 MMOL/L (ref 21–28)
HCO3 BLDV-SCNC: 35 MMOL/L (ref 21–28)
HCO3 BLDV-SCNC: 36 MMOL/L (ref 21–28)
HCO3 BLDV-SCNC: 37 MMOL/L (ref 21–28)
HCO3 BLDV-SCNC: 40 MMOL/L (ref 21–28)
HCO3 BLDV-SCNC: 40 MMOL/L (ref 21–28)
HCO3 BLDV-SCNC: 41 MMOL/L (ref 21–28)
HCO3 BLDV-SCNC: 42 MMOL/L (ref 21–28)
HCO3 BLDV-SCNC: 43 MMOL/L (ref 21–28)
HCO3 BLDV-SCNC: 43 MMOL/L (ref 21–28)
HCO3 BLDV-SCNC: 45 MMOL/L (ref 21–28)
HCO3 BLDV-SCNC: 46 MMOL/L (ref 21–28)
HCO3 BLDV-SCNC: 46 MMOL/L (ref 21–28)
HCO3 BLDV-SCNC: 47 MMOL/L (ref 21–28)
HCO3 BLDV-SCNC: 49 MMOL/L (ref 21–28)
HCO3 BLDV-SCNC: 49 MMOL/L (ref 21–28)
HCO3 BLDV-SCNC: 50 MMOL/L (ref 21–28)
HCO3 BLDV-SCNC: 51 MMOL/L (ref 21–28)
HCO3 BLDV-SCNC: 52 MMOL/L (ref 21–28)
HCO3 BLDV-SCNC: 52 MMOL/L (ref 21–28)
HCO3 BLDV-SCNC: 53 MMOL/L (ref 21–28)
HCO3 SERPL-SCNC: 24 MMOL/L (ref 22–29)
HCO3 SERPL-SCNC: 28 MMOL/L (ref 22–29)
HCO3 SERPL-SCNC: 29 MMOL/L (ref 22–29)
HCO3 SERPL-SCNC: 30 MMOL/L (ref 22–29)
HCO3 SERPL-SCNC: 31 MMOL/L (ref 22–29)
HCO3 SERPL-SCNC: 32 MMOL/L (ref 22–29)
HCO3 SERPL-SCNC: 33 MMOL/L (ref 22–29)
HCO3 SERPL-SCNC: 34 MMOL/L (ref 22–29)
HCO3 SERPL-SCNC: 34 MMOL/L (ref 22–29)
HCO3 SERPL-SCNC: 35 MMOL/L (ref 22–29)
HCO3 SERPL-SCNC: 38 MMOL/L (ref 22–29)
HCO3 SERPL-SCNC: 39 MMOL/L (ref 22–29)
HCO3 SERPL-SCNC: 42 MMOL/L (ref 22–29)
HCO3 SERPL-SCNC: 43 MMOL/L (ref 22–29)
HCO3 SERPL-SCNC: 44 MMOL/L (ref 22–29)
HCO3 SERPL-SCNC: 46 MMOL/L (ref 22–29)
HCO3 SERPL-SCNC: 47 MMOL/L (ref 22–29)
HCO3 SERPL-SCNC: 47 MMOL/L (ref 22–29)
HCT VFR BLD AUTO: 18.9 % (ref 35–47)
HCT VFR BLD AUTO: 22.2 % (ref 35–47)
HCT VFR BLD AUTO: 23.1 % (ref 35–47)
HCT VFR BLD AUTO: 23.3 % (ref 35–47)
HCT VFR BLD AUTO: 23.6 % (ref 35–47)
HCT VFR BLD AUTO: 23.8 % (ref 35–47)
HCT VFR BLD AUTO: 24.3 % (ref 35–47)
HCT VFR BLD AUTO: 24.5 % (ref 35–47)
HCT VFR BLD AUTO: 25 % (ref 35–47)
HCT VFR BLD AUTO: 25.1 % (ref 35–47)
HCT VFR BLD AUTO: 25.3 % (ref 35–47)
HCT VFR BLD AUTO: 25.4 % (ref 35–47)
HCT VFR BLD AUTO: 25.4 % (ref 35–47)
HCT VFR BLD AUTO: 25.5 % (ref 35–47)
HCT VFR BLD AUTO: 25.6 % (ref 35–47)
HCT VFR BLD AUTO: 25.8 % (ref 35–47)
HCT VFR BLD AUTO: 25.8 % (ref 35–47)
HCT VFR BLD AUTO: 25.9 % (ref 35–47)
HCT VFR BLD AUTO: 26 % (ref 35–47)
HCT VFR BLD AUTO: 26.2 % (ref 35–47)
HCT VFR BLD AUTO: 26.6 % (ref 35–47)
HCT VFR BLD AUTO: 26.7 % (ref 35–47)
HCT VFR BLD AUTO: 26.7 % (ref 35–47)
HCT VFR BLD AUTO: 27.3 % (ref 35–47)
HCT VFR BLD AUTO: 27.3 % (ref 35–47)
HCT VFR BLD AUTO: 28.5 % (ref 35–47)
HCT VFR BLD AUTO: 29.3 % (ref 35–47)
HCT VFR BLD AUTO: 34.8 % (ref 35–47)
HCT VFR BLD AUTO: 38.1 % (ref 35–47)
HCT VFR BLD AUTO: 40.4 % (ref 35–47)
HCT VFR BLD AUTO: 42.9 % (ref 35–47)
HCT VFR BLD AUTO: 45.1 % (ref 35–47)
HCT VFR BLD AUTO: 48.9 % (ref 35–47)
HCV AB SERPL QL IA: NONREACTIVE
HDLC SERPL-MCNC: 34 MG/DL
HDLC SERPL-MCNC: 61 MG/DL
HEPZYMED PTT RATIO: 1.29
HEPZYMED PTT-LA: 45 SECONDS (ref 31–45)
HEPZYMED THROMBIN TIME: 19.6 SECONDS (ref 15.7–21.7)
HGB BLD-MCNC: 10.3 G/DL (ref 11.7–15.7)
HGB BLD-MCNC: 11 G/DL (ref 11.7–15.7)
HGB BLD-MCNC: 12.1 G/DL (ref 11.7–15.7)
HGB BLD-MCNC: 12.9 G/DL (ref 11.7–15.7)
HGB BLD-MCNC: 13.7 G/DL (ref 11.7–15.7)
HGB BLD-MCNC: 14.1 G/DL (ref 11.7–15.7)
HGB BLD-MCNC: 15.4 G/DL (ref 11.7–15.7)
HGB BLD-MCNC: 5.6 G/DL (ref 11.7–15.7)
HGB BLD-MCNC: 6.6 G/DL (ref 11.7–15.7)
HGB BLD-MCNC: 6.7 G/DL (ref 11.7–15.7)
HGB BLD-MCNC: 6.8 G/DL (ref 11.7–15.7)
HGB BLD-MCNC: 7 G/DL (ref 11.7–15.7)
HGB BLD-MCNC: 7.1 G/DL (ref 11.7–15.7)
HGB BLD-MCNC: 7.2 G/DL (ref 11.7–15.7)
HGB BLD-MCNC: 7.3 G/DL (ref 11.7–15.7)
HGB BLD-MCNC: 7.3 G/DL (ref 11.7–15.7)
HGB BLD-MCNC: 7.5 G/DL (ref 11.7–15.7)
HGB BLD-MCNC: 7.6 G/DL (ref 11.7–15.7)
HGB BLD-MCNC: 7.7 G/DL (ref 11.7–15.7)
HGB BLD-MCNC: 7.8 G/DL (ref 11.7–15.7)
HGB BLD-MCNC: 7.9 G/DL (ref 11.7–15.7)
HGB BLD-MCNC: 8.1 G/DL (ref 11.7–15.7)
HGB BLD-MCNC: 8.2 G/DL (ref 11.7–15.7)
HGB BLD-MCNC: 8.3 G/DL (ref 11.7–15.7)
HGB BLD-MCNC: 8.4 G/DL (ref 11.7–15.7)
HGB BLD-MCNC: 8.5 G/DL (ref 11.7–15.7)
HGB BLD-MCNC: 8.6 G/DL (ref 11.7–15.7)
HGB BLD-MCNC: 8.8 G/DL (ref 11.7–15.7)
HGB BLD-MCNC: 8.8 G/DL (ref 11.7–15.7)
HGB BLD-MCNC: 8.9 G/DL (ref 11.7–15.7)
HGB BLD-MCNC: 8.9 G/DL (ref 11.7–15.7)
HGB BLD-MCNC: 9 G/DL (ref 11.7–15.7)
HGB BLD-MCNC: 9 G/DL (ref 11.7–15.7)
HGB BLD-MCNC: 9.1 G/DL (ref 11.7–15.7)
HGB BLD-MCNC: 9.6 G/DL (ref 11.7–15.7)
HGB BLD-MCNC: 9.6 G/DL (ref 11.7–15.7)
HGB BLD-MCNC: 9.7 G/DL (ref 11.7–15.7)
HGB UR QL STRIP: ABNORMAL
HIV 1+2 AB+HIV1 P24 AG SERPL QL IA: NONREACTIVE
HOLD SPECIMEN: NORMAL
HYALINE CASTS: 13 /LPF
IMM GRANULOCYTES # BLD: 0 10E3/UL
IMM GRANULOCYTES # BLD: 0.1 10E3/UL
IMM GRANULOCYTES # BLD: 0.2 10E3/UL
IMM GRANULOCYTES # BLD: 0.3 10E3/UL
IMM GRANULOCYTES # BLD: 0.4 10E3/UL
IMM GRANULOCYTES # BLD: 0.4 10E3/UL
IMM GRANULOCYTES # BLD: 0.5 10E3/UL
IMM GRANULOCYTES # BLD: 0.6 10E3/UL
IMM GRANULOCYTES # BLD: 0.7 10E3/UL
IMM GRANULOCYTES # BLD: 0.8 10E3/UL
IMM GRANULOCYTES NFR BLD: 1 %
IMM GRANULOCYTES NFR BLD: 2 %
IMM GRANULOCYTES NFR BLD: 3 %
IMM GRANULOCYTES NFR BLD: 4 %
IMM GRANULOCYTES NFR BLD: 4 %
IMM GRANULOCYTES NFR BLD: 5 %
IMM GRANULOCYTES NFR BLD: 5 %
INR PPP: 1.17 (ref 0.85–1.15)
INR PPP: 1.23 (ref 0.85–1.15)
INR PPP: 1.35 (ref 0.85–1.15)
INR PPP: 1.4 (ref 0.85–1.15)
INR PPP: 1.42 (ref 0.85–1.15)
INR PPP: 1.46 (ref 0.85–1.15)
INTERPRETATION ECG - MUSE: NORMAL
IRON BINDING CAPACITY (ROCHE): 202 UG/DL (ref 240–430)
IRON SATN MFR SERPL: 45 % (ref 15–46)
IRON SERPL-MCNC: 91 UG/DL (ref 37–145)
ISSUE DATE AND TIME: NORMAL
KETONES UR STRIP-MCNC: NEGATIVE MG/DL
KOH PREPARATION: NORMAL
LA PPP-IMP: POSITIVE
LACTATE BLD-SCNC: 1 MMOL/L (ref 0.7–2)
LACTATE BLD-SCNC: 1.4 MMOL/L (ref 0.7–2)
LACTATE SERPL-SCNC: 0.6 MMOL/L (ref 0.7–2)
LACTATE SERPL-SCNC: 0.8 MMOL/L (ref 0.7–2)
LACTATE SERPL-SCNC: 0.9 MMOL/L (ref 0.7–2)
LACTATE SERPL-SCNC: 0.9 MMOL/L (ref 0.7–2)
LACTATE SERPL-SCNC: 1 MMOL/L (ref 0.7–2)
LACTATE SERPL-SCNC: 1.1 MMOL/L (ref 0.7–2)
LACTATE SERPL-SCNC: 1.1 MMOL/L (ref 0.7–2)
LACTATE SERPL-SCNC: 1.2 MMOL/L (ref 0.7–2)
LACTATE SERPL-SCNC: 1.3 MMOL/L (ref 0.7–2)
LACTATE SERPL-SCNC: 1.4 MMOL/L (ref 0.7–2)
LACTATE SERPL-SCNC: 1.4 MMOL/L (ref 0.7–2)
LACTATE SERPL-SCNC: 1.5 MMOL/L (ref 0.7–2)
LACTATE SERPL-SCNC: 1.9 MMOL/L (ref 0.7–2)
LACTATE SERPL-SCNC: 2.7 MMOL/L (ref 0.7–2)
LDH SERPL L TO P-CCNC: 277 U/L (ref 0–250)
LDH SERPL L TO P-CCNC: 329 U/L (ref 0–250)
LDLC SERPL CALC-MCNC: 26 MG/DL
LDLC SERPL CALC-MCNC: 56 MG/DL
LEUKOCYTE ESTERASE UR QL STRIP: ABNORMAL
LUPUS INTERPRETATION: ABNORMAL
LVEF ECHO: NORMAL
LYMPHOCYTES # BLD AUTO: 0.6 10E3/UL (ref 0.8–5.3)
LYMPHOCYTES # BLD AUTO: 0.6 10E3/UL (ref 0.8–5.3)
LYMPHOCYTES # BLD AUTO: 0.7 10E3/UL (ref 0.8–5.3)
LYMPHOCYTES # BLD AUTO: 0.7 10E3/UL (ref 0.8–5.3)
LYMPHOCYTES # BLD AUTO: 0.9 10E3/UL (ref 0.8–5.3)
LYMPHOCYTES # BLD AUTO: 1.1 10E3/UL (ref 0.8–5.3)
LYMPHOCYTES # BLD AUTO: 1.3 10E3/UL (ref 0.8–5.3)
LYMPHOCYTES # BLD AUTO: 1.3 10E3/UL (ref 0.8–5.3)
LYMPHOCYTES # BLD AUTO: 1.4 10E3/UL (ref 0.8–5.3)
LYMPHOCYTES # BLD AUTO: 1.4 10E3/UL (ref 0.8–5.3)
LYMPHOCYTES # BLD AUTO: 1.5 10E3/UL (ref 0.8–5.3)
LYMPHOCYTES # BLD AUTO: 1.6 10E3/UL (ref 0.8–5.3)
LYMPHOCYTES # BLD AUTO: 1.6 10E3/UL (ref 0.8–5.3)
LYMPHOCYTES # BLD AUTO: 1.7 10E3/UL (ref 0.8–5.3)
LYMPHOCYTES # BLD AUTO: 1.8 10E3/UL (ref 0.8–5.3)
LYMPHOCYTES # BLD AUTO: 1.8 10E3/UL (ref 0.8–5.3)
LYMPHOCYTES # BLD AUTO: 1.9 10E3/UL (ref 0.8–5.3)
LYMPHOCYTES # BLD AUTO: 1.9 10E3/UL (ref 0.8–5.3)
LYMPHOCYTES # BLD AUTO: 2 10E3/UL (ref 0.8–5.3)
LYMPHOCYTES NFR BLD AUTO: 10 %
LYMPHOCYTES NFR BLD AUTO: 10 %
LYMPHOCYTES NFR BLD AUTO: 11 %
LYMPHOCYTES NFR BLD AUTO: 12 %
LYMPHOCYTES NFR BLD AUTO: 13 %
LYMPHOCYTES NFR BLD AUTO: 13 %
LYMPHOCYTES NFR BLD AUTO: 14 %
LYMPHOCYTES NFR BLD AUTO: 15 %
LYMPHOCYTES NFR BLD AUTO: 16 %
LYMPHOCYTES NFR BLD AUTO: 17 %
LYMPHOCYTES NFR BLD AUTO: 6 %
LYMPHOCYTES NFR BLD AUTO: 7 %
LYMPHOCYTES NFR BLD AUTO: 8 %
LYMPHOCYTES NFR BLD AUTO: 8 %
LYMPHOCYTES NFR BLD AUTO: 9 %
LYMPHOCYTES NFR FLD MANUAL: 2 %
M PNEUMO DNA SPEC QL NAA+PROBE: NOT DETECTED
M TB DNA SPEC QL NAA+PROBE: NOT DETECTED
M TB IFN-G BLD-IMP: ABNORMAL
M TB IFN-G CD4+ BCKGRND COR BLD-ACNC: 0.45 IU/ML
MAGNESIUM SERPL-MCNC: 1.6 MG/DL (ref 1.7–2.3)
MAGNESIUM SERPL-MCNC: 1.8 MG/DL (ref 1.7–2.3)
MAGNESIUM SERPL-MCNC: 1.8 MG/DL (ref 1.7–2.3)
MAGNESIUM SERPL-MCNC: 1.9 MG/DL (ref 1.7–2.3)
MAGNESIUM SERPL-MCNC: 2 MG/DL (ref 1.7–2.3)
MAGNESIUM SERPL-MCNC: 2.1 MG/DL (ref 1.7–2.3)
MAGNESIUM SERPL-MCNC: 2.2 MG/DL (ref 1.7–2.3)
MAGNESIUM SERPL-MCNC: 2.3 MG/DL (ref 1.7–2.3)
MAGNESIUM SERPL-MCNC: 2.4 MG/DL (ref 1.7–2.3)
MAGNESIUM SERPL-MCNC: 2.5 MG/DL (ref 1.7–2.3)
MAGNESIUM SERPL-MCNC: 2.6 MG/DL (ref 1.7–2.3)
MAGNESIUM SERPL-MCNC: 2.7 MG/DL (ref 1.7–2.3)
MAGNESIUM SERPL-MCNC: 2.7 MG/DL (ref 1.7–2.3)
MAGNESIUM SERPL-MCNC: 2.9 MG/DL (ref 1.7–2.3)
MCH RBC QN AUTO: 30.4 PG (ref 26.5–33)
MCH RBC QN AUTO: 30.8 PG (ref 26.5–33)
MCH RBC QN AUTO: 31 PG (ref 26.5–33)
MCH RBC QN AUTO: 31.3 PG (ref 26.5–33)
MCH RBC QN AUTO: 31.5 PG (ref 26.5–33)
MCH RBC QN AUTO: 31.7 PG (ref 26.5–33)
MCH RBC QN AUTO: 31.7 PG (ref 26.5–33)
MCH RBC QN AUTO: 31.9 PG (ref 26.5–33)
MCH RBC QN AUTO: 31.9 PG (ref 26.5–33)
MCH RBC QN AUTO: 32.1 PG (ref 26.5–33)
MCH RBC QN AUTO: 32.5 PG (ref 26.5–33)
MCH RBC QN AUTO: 33.6 PG (ref 26.5–33)
MCH RBC QN AUTO: 33.8 PG (ref 26.5–33)
MCH RBC QN AUTO: 34.1 PG (ref 26.5–33)
MCH RBC QN AUTO: 34.2 PG (ref 26.5–33)
MCH RBC QN AUTO: 34.3 PG (ref 26.5–33)
MCH RBC QN AUTO: 34.4 PG (ref 26.5–33)
MCH RBC QN AUTO: 34.5 PG (ref 26.5–33)
MCH RBC QN AUTO: 34.6 PG (ref 26.5–33)
MCH RBC QN AUTO: 34.6 PG (ref 26.5–33)
MCH RBC QN AUTO: 34.7 PG (ref 26.5–33)
MCH RBC QN AUTO: 36.2 PG (ref 26.5–33)
MCH RBC QN AUTO: 36.8 PG (ref 26.5–33)
MCH RBC QN AUTO: 37 PG (ref 26.5–33)
MCH RBC QN AUTO: 37.4 PG (ref 26.5–33)
MCH RBC QN AUTO: 37.4 PG (ref 26.5–33)
MCH RBC QN AUTO: 37.5 PG (ref 26.5–33)
MCH RBC QN AUTO: 37.8 PG (ref 26.5–33)
MCH RBC QN AUTO: 37.9 PG (ref 26.5–33)
MCHC RBC AUTO-ENTMCNC: 27.9 G/DL (ref 31.5–36.5)
MCHC RBC AUTO-ENTMCNC: 28.1 G/DL (ref 31.5–36.5)
MCHC RBC AUTO-ENTMCNC: 28.6 G/DL (ref 31.5–36.5)
MCHC RBC AUTO-ENTMCNC: 28.6 G/DL (ref 31.5–36.5)
MCHC RBC AUTO-ENTMCNC: 28.7 G/DL (ref 31.5–36.5)
MCHC RBC AUTO-ENTMCNC: 29.1 G/DL (ref 31.5–36.5)
MCHC RBC AUTO-ENTMCNC: 29.6 G/DL (ref 31.5–36.5)
MCHC RBC AUTO-ENTMCNC: 29.6 G/DL (ref 31.5–36.5)
MCHC RBC AUTO-ENTMCNC: 29.7 G/DL (ref 31.5–36.5)
MCHC RBC AUTO-ENTMCNC: 29.9 G/DL (ref 31.5–36.5)
MCHC RBC AUTO-ENTMCNC: 30 G/DL (ref 31.5–36.5)
MCHC RBC AUTO-ENTMCNC: 30.1 G/DL (ref 31.5–36.5)
MCHC RBC AUTO-ENTMCNC: 30.2 G/DL (ref 31.5–36.5)
MCHC RBC AUTO-ENTMCNC: 30.2 G/DL (ref 31.5–36.5)
MCHC RBC AUTO-ENTMCNC: 30.6 G/DL (ref 31.5–36.5)
MCHC RBC AUTO-ENTMCNC: 30.8 G/DL (ref 31.5–36.5)
MCHC RBC AUTO-ENTMCNC: 31.1 G/DL (ref 31.5–36.5)
MCHC RBC AUTO-ENTMCNC: 31.1 G/DL (ref 31.5–36.5)
MCHC RBC AUTO-ENTMCNC: 31.2 G/DL (ref 31.5–36.5)
MCHC RBC AUTO-ENTMCNC: 31.3 G/DL (ref 31.5–36.5)
MCHC RBC AUTO-ENTMCNC: 31.5 G/DL (ref 31.5–36.5)
MCHC RBC AUTO-ENTMCNC: 31.5 G/DL (ref 31.5–36.5)
MCHC RBC AUTO-ENTMCNC: 31.6 G/DL (ref 31.5–36.5)
MCHC RBC AUTO-ENTMCNC: 31.8 G/DL (ref 31.5–36.5)
MCHC RBC AUTO-ENTMCNC: 31.9 G/DL (ref 31.5–36.5)
MCHC RBC AUTO-ENTMCNC: 32 G/DL (ref 31.5–36.5)
MCHC RBC AUTO-ENTMCNC: 32.2 G/DL (ref 31.5–36.5)
MCHC RBC AUTO-ENTMCNC: 32.7 G/DL (ref 31.5–36.5)
MCHC RBC AUTO-ENTMCNC: 32.8 G/DL (ref 31.5–36.5)
MCHC RBC AUTO-ENTMCNC: 33.3 G/DL (ref 31.5–36.5)
MCHC RBC AUTO-ENTMCNC: 34 G/DL (ref 31.5–36.5)
MCV RBC AUTO: 100 FL (ref 78–100)
MCV RBC AUTO: 101 FL (ref 78–100)
MCV RBC AUTO: 102 FL (ref 78–100)
MCV RBC AUTO: 106 FL (ref 78–100)
MCV RBC AUTO: 108 FL (ref 78–100)
MCV RBC AUTO: 112 FL (ref 78–100)
MCV RBC AUTO: 115 FL (ref 78–100)
MCV RBC AUTO: 115 FL (ref 78–100)
MCV RBC AUTO: 116 FL (ref 78–100)
MCV RBC AUTO: 117 FL (ref 78–100)
MCV RBC AUTO: 119 FL (ref 78–100)
MCV RBC AUTO: 120 FL (ref 78–100)
MCV RBC AUTO: 121 FL (ref 78–100)
MCV RBC AUTO: 95 FL (ref 78–100)
MCV RBC AUTO: 97 FL (ref 78–100)
MCV RBC AUTO: 97 FL (ref 78–100)
MCV RBC AUTO: 98 FL (ref 78–100)
MCV RBC AUTO: 99 FL (ref 78–100)
MCV RBC AUTO: 99 FL (ref 78–100)
MITOGEN IGNF BCKGRD COR BLD-ACNC: 0 IU/ML
MITOGEN IGNF BCKGRD COR BLD-ACNC: 0 IU/ML
MONOCYTES # BLD AUTO: 0.4 10E3/UL (ref 0–1.3)
MONOCYTES # BLD AUTO: 0.5 10E3/UL (ref 0–1.3)
MONOCYTES # BLD AUTO: 0.6 10E3/UL (ref 0–1.3)
MONOCYTES # BLD AUTO: 0.7 10E3/UL (ref 0–1.3)
MONOCYTES # BLD AUTO: 0.8 10E3/UL (ref 0–1.3)
MONOCYTES # BLD AUTO: 0.9 10E3/UL (ref 0–1.3)
MONOCYTES # BLD AUTO: 1 10E3/UL (ref 0–1.3)
MONOCYTES # BLD AUTO: 1.2 10E3/UL (ref 0–1.3)
MONOCYTES NFR BLD AUTO: 5 %
MONOCYTES NFR BLD AUTO: 6 %
MONOCYTES NFR BLD AUTO: 7 %
MONOS+MACROS NFR FLD MANUAL: 1 %
MRSA DNA SPEC QL NAA+PROBE: NEGATIVE
MRSA DNA SPEC QL NAA+PROBE: NEGATIVE
MTP TRACKING ORDER: NORMAL
MUCOUS THREADS #/AREA URNS LPF: PRESENT /LPF
MYELOPEROXIDASE AB SER IA-ACNC: <0.3 U/ML
MYELOPEROXIDASE AB SER IA-ACNC: NEGATIVE
NEUTROPHILS # BLD AUTO: 10.1 10E3/UL (ref 1.6–8.3)
NEUTROPHILS # BLD AUTO: 10.6 10E3/UL (ref 1.6–8.3)
NEUTROPHILS # BLD AUTO: 10.8 10E3/UL (ref 1.6–8.3)
NEUTROPHILS # BLD AUTO: 10.9 10E3/UL (ref 1.6–8.3)
NEUTROPHILS # BLD AUTO: 11.9 10E3/UL (ref 1.6–8.3)
NEUTROPHILS # BLD AUTO: 12.4 10E3/UL (ref 1.6–8.3)
NEUTROPHILS # BLD AUTO: 12.4 10E3/UL (ref 1.6–8.3)
NEUTROPHILS # BLD AUTO: 14.5 10E3/UL (ref 1.6–8.3)
NEUTROPHILS # BLD AUTO: 6.4 10E3/UL (ref 1.6–8.3)
NEUTROPHILS # BLD AUTO: 6.4 10E3/UL (ref 1.6–8.3)
NEUTROPHILS # BLD AUTO: 6.5 10E3/UL (ref 1.6–8.3)
NEUTROPHILS # BLD AUTO: 6.9 10E3/UL (ref 1.6–8.3)
NEUTROPHILS # BLD AUTO: 7.1 10E3/UL (ref 1.6–8.3)
NEUTROPHILS # BLD AUTO: 7.6 10E3/UL (ref 1.6–8.3)
NEUTROPHILS # BLD AUTO: 7.6 10E3/UL (ref 1.6–8.3)
NEUTROPHILS # BLD AUTO: 7.7 10E3/UL (ref 1.6–8.3)
NEUTROPHILS # BLD AUTO: 7.9 10E3/UL (ref 1.6–8.3)
NEUTROPHILS # BLD AUTO: 7.9 10E3/UL (ref 1.6–8.3)
NEUTROPHILS # BLD AUTO: 8.2 10E3/UL (ref 1.6–8.3)
NEUTROPHILS # BLD AUTO: 8.2 10E3/UL (ref 1.6–8.3)
NEUTROPHILS # BLD AUTO: 8.3 10E3/UL (ref 1.6–8.3)
NEUTROPHILS # BLD AUTO: 8.4 10E3/UL (ref 1.6–8.3)
NEUTROPHILS # BLD AUTO: 9.6 10E3/UL (ref 1.6–8.3)
NEUTROPHILS NFR BLD AUTO: 69 %
NEUTROPHILS NFR BLD AUTO: 70 %
NEUTROPHILS NFR BLD AUTO: 71 %
NEUTROPHILS NFR BLD AUTO: 71 %
NEUTROPHILS NFR BLD AUTO: 72 %
NEUTROPHILS NFR BLD AUTO: 74 %
NEUTROPHILS NFR BLD AUTO: 75 %
NEUTROPHILS NFR BLD AUTO: 75 %
NEUTROPHILS NFR BLD AUTO: 76 %
NEUTROPHILS NFR BLD AUTO: 77 %
NEUTROPHILS NFR BLD AUTO: 77 %
NEUTROPHILS NFR BLD AUTO: 78 %
NEUTROPHILS NFR BLD AUTO: 79 %
NEUTROPHILS NFR BLD AUTO: 80 %
NEUTROPHILS NFR BLD AUTO: 81 %
NEUTROPHILS NFR BLD AUTO: 82 %
NEUTROPHILS NFR BLD AUTO: 84 %
NEUTROPHILS NFR BLD AUTO: 86 %
NEUTS BAND NFR FLD MANUAL: 97 %
NITRATE UR QL: NEGATIVE
NONHDLC SERPL-MCNC: 47 MG/DL
NONHDLC SERPL-MCNC: 80 MG/DL
NRBC # BLD AUTO: 0 10E3/UL
NRBC # BLD AUTO: 0.1 10E3/UL
NRBC # BLD AUTO: 0.2 10E3/UL
NRBC # BLD AUTO: 0.2 10E3/UL
NRBC # BLD AUTO: 0.3 10E3/UL
NRBC # BLD AUTO: 0.3 10E3/UL
NRBC # BLD AUTO: 0.4 10E3/UL
NRBC # BLD AUTO: 0.5 10E3/UL
NRBC BLD AUTO-RTO: 0 /100
NRBC BLD AUTO-RTO: 1 /100
NRBC BLD AUTO-RTO: 2 /100
NRBC BLD AUTO-RTO: 2 /100
NRBC BLD AUTO-RTO: 3 /100
NRBC BLD AUTO-RTO: 4 /100
NT-PROBNP SERPL-MCNC: 5664 PG/ML (ref 0–900)
NT-PROBNP SERPL-MCNC: ABNORMAL PG/ML (ref 0–900)
O2/TOTAL GAS SETTING VFR VENT: 100 %
O2/TOTAL GAS SETTING VFR VENT: 30 %
O2/TOTAL GAS SETTING VFR VENT: 35 %
O2/TOTAL GAS SETTING VFR VENT: 40 %
O2/TOTAL GAS SETTING VFR VENT: 45 %
O2/TOTAL GAS SETTING VFR VENT: 50 %
O2/TOTAL GAS SETTING VFR VENT: 55 %
O2/TOTAL GAS SETTING VFR VENT: 60 %
O2/TOTAL GAS SETTING VFR VENT: 65 %
O2/TOTAL GAS SETTING VFR VENT: 70 %
O2/TOTAL GAS SETTING VFR VENT: 80 %
OBSERVATION IMP: ABNORMAL
OXYHGB MFR BLDA: 94 % (ref 92–100)
OXYHGB MFR BLDA: 97 % (ref 92–100)
OXYHGB MFR BLDV: 55 % (ref 70–75)
OXYHGB MFR BLDV: 56 % (ref 70–75)
OXYHGB MFR BLDV: 57 % (ref 70–75)
OXYHGB MFR BLDV: 58 % (ref 70–75)
OXYHGB MFR BLDV: 59 % (ref 70–75)
OXYHGB MFR BLDV: 60 % (ref 70–75)
OXYHGB MFR BLDV: 61 % (ref 70–75)
OXYHGB MFR BLDV: 62 % (ref 70–75)
OXYHGB MFR BLDV: 63 % (ref 70–75)
OXYHGB MFR BLDV: 63 % (ref 70–75)
OXYHGB MFR BLDV: 64 % (ref 70–75)
OXYHGB MFR BLDV: 65 % (ref 70–75)
OXYHGB MFR BLDV: 65 % (ref 70–75)
OXYHGB MFR BLDV: 66 % (ref 70–75)
OXYHGB MFR BLDV: 67 % (ref 70–75)
OXYHGB MFR BLDV: 67 % (ref 70–75)
OXYHGB MFR BLDV: 68 % (ref 70–75)
OXYHGB MFR BLDV: 68 % (ref 70–75)
OXYHGB MFR BLDV: 69 % (ref 70–75)
OXYHGB MFR BLDV: 71 % (ref 70–75)
OXYHGB MFR BLDV: 73 % (ref 70–75)
OXYHGB MFR BLDV: 76 % (ref 70–75)
OXYHGB MFR BLDV: 76 % (ref 70–75)
OXYHGB MFR BLDV: 97 % (ref 70–75)
P AXIS - MUSE: 76 DEGREES
P AXIS - MUSE: 77 DEGREES
P AXIS - MUSE: NORMAL DEGREES
PATH REPORT.COMMENTS IMP SPEC: NORMAL
PATH REPORT.COMMENTS IMP SPEC: NORMAL
PATH REPORT.FINAL DX SPEC: NORMAL
PATH REPORT.MICROSCOPIC SPEC OTHER STN: NORMAL
PATH REPORT.RELEVANT HX SPEC: NORMAL
PATH REV: 1165
PATIENT PTT-LA: 67 SECONDS (ref 31–45)
PCO2 BLD: 42 MM HG (ref 35–45)
PCO2 BLD: 44 MM HG (ref 35–45)
PCO2 BLD: 46 MM HG (ref 35–45)
PCO2 BLD: 47 MM HG (ref 35–45)
PCO2 BLD: 49 MM HG (ref 35–45)
PCO2 BLD: 51 MM HG (ref 35–45)
PCO2 BLD: 53 MM HG (ref 35–45)
PCO2 BLD: 54 MM HG (ref 35–45)
PCO2 BLD: 54 MM HG (ref 35–45)
PCO2 BLD: 57 MM HG (ref 35–45)
PCO2 BLD: 58 MM HG (ref 35–45)
PCO2 BLD: 59 MM HG (ref 35–45)
PCO2 BLD: 60 MM HG (ref 35–45)
PCO2 BLD: 61 MM HG (ref 35–45)
PCO2 BLD: 61 MM HG (ref 35–45)
PCO2 BLD: 62 MM HG (ref 35–45)
PCO2 BLD: 62 MM HG (ref 35–45)
PCO2 BLD: 63 MM HG (ref 35–45)
PCO2 BLD: 64 MM HG (ref 35–45)
PCO2 BLD: 65 MM HG (ref 35–45)
PCO2 BLD: 66 MM HG (ref 35–45)
PCO2 BLD: 66 MM HG (ref 35–45)
PCO2 BLD: 70 MM HG (ref 35–45)
PCO2 BLD: 71 MM HG (ref 35–45)
PCO2 BLD: 73 MM HG (ref 35–45)
PCO2 BLD: 75 MM HG (ref 35–45)
PCO2 BLD: 76 MM HG (ref 35–45)
PCO2 BLD: 76 MM HG (ref 35–45)
PCO2 BLD: 77 MM HG (ref 35–45)
PCO2 BLD: 78 MM HG (ref 35–45)
PCO2 BLD: 79 MM HG (ref 35–45)
PCO2 BLD: 80 MM HG (ref 35–45)
PCO2 BLD: 81 MM HG (ref 35–45)
PCO2 BLD: 83 MM HG (ref 35–45)
PCO2 BLDA: 56 MM HG (ref 35–45)
PCO2 BLDA: 59 MM HG (ref 35–45)
PCO2 BLDV: 52 MM HG (ref 40–50)
PCO2 BLDV: 52 MM HG (ref 40–50)
PCO2 BLDV: 56 MM HG (ref 40–50)
PCO2 BLDV: 57 MM HG (ref 40–50)
PCO2 BLDV: 58 MM HG (ref 40–50)
PCO2 BLDV: 58 MM HG (ref 40–50)
PCO2 BLDV: 60 MM HG (ref 40–50)
PCO2 BLDV: 60 MM HG (ref 40–50)
PCO2 BLDV: 61 MM HG (ref 40–50)
PCO2 BLDV: 61 MM HG (ref 40–50)
PCO2 BLDV: 62 MM HG (ref 40–50)
PCO2 BLDV: 63 MM HG (ref 40–50)
PCO2 BLDV: 63 MM HG (ref 40–50)
PCO2 BLDV: 64 MM HG (ref 40–50)
PCO2 BLDV: 65 MM HG (ref 40–50)
PCO2 BLDV: 65 MM HG (ref 40–50)
PCO2 BLDV: 66 MM HG (ref 40–50)
PCO2 BLDV: 66 MM HG (ref 40–50)
PCO2 BLDV: 67 MM HG (ref 40–50)
PCO2 BLDV: 67 MM HG (ref 40–50)
PCO2 BLDV: 68 MM HG (ref 40–50)
PCO2 BLDV: 69 MM HG (ref 40–50)
PCO2 BLDV: 70 MM HG (ref 40–50)
PCO2 BLDV: 72 MM HG (ref 40–50)
PCO2 BLDV: 72 MM HG (ref 40–50)
PCO2 BLDV: 77 MM HG (ref 40–50)
PCO2 BLDV: 77 MM HG (ref 40–50)
PCO2 BLDV: 80 MM HG (ref 40–50)
PCO2 BLDV: 81 MM HG (ref 40–50)
PCO2 BLDV: 84 MM HG (ref 40–50)
PCO2 BLDV: 85 MM HG (ref 40–50)
PCO2 BLDV: 85 MM HG (ref 40–50)
PCO2 BLDV: 86 MM HG (ref 40–50)
PCO2 BLDV: 86 MM HG (ref 40–50)
PCO2 BLDV: 88 MM HG (ref 40–50)
PEEP: 10 CM H2O
PEEP: 10 CM H2O
PEEP: 5 CM H2O
PEEP: 7 CM H2O
PEEP: 8 CM H2O
PH BLD: 7.16 [PH] (ref 7.35–7.45)
PH BLD: 7.26 [PH] (ref 7.35–7.45)
PH BLD: 7.29 [PH] (ref 7.35–7.45)
PH BLD: 7.3 [PH] (ref 7.35–7.45)
PH BLD: 7.31 [PH] (ref 7.35–7.45)
PH BLD: 7.32 [PH] (ref 7.35–7.45)
PH BLD: 7.32 [PH] (ref 7.35–7.45)
PH BLD: 7.33 [PH] (ref 7.35–7.45)
PH BLD: 7.33 [PH] (ref 7.35–7.45)
PH BLD: 7.35 [PH] (ref 7.35–7.45)
PH BLD: 7.36 [PH] (ref 7.35–7.45)
PH BLD: 7.37 [PH] (ref 7.35–7.45)
PH BLD: 7.38 [PH] (ref 7.35–7.45)
PH BLD: 7.39 [PH] (ref 7.35–7.45)
PH BLD: 7.4 [PH] (ref 7.35–7.45)
PH BLD: 7.42 [PH] (ref 7.35–7.45)
PH BLD: 7.42 [PH] (ref 7.35–7.45)
PH BLD: 7.43 [PH] (ref 7.35–7.45)
PH BLD: 7.44 [PH] (ref 7.35–7.45)
PH BLD: 7.46 [PH] (ref 7.35–7.45)
PH BLD: 7.46 [PH] (ref 7.35–7.45)
PH BLD: 7.47 [PH] (ref 7.35–7.45)
PH BLD: 7.47 [PH] (ref 7.35–7.45)
PH BLD: 7.48 [PH] (ref 7.35–7.45)
PH BLD: 7.49 [PH] (ref 7.35–7.45)
PH BLD: 7.51 [PH] (ref 7.35–7.45)
PH BLDA: 7.34 [PH] (ref 7.35–7.45)
PH BLDA: 7.36 [PH] (ref 7.35–7.45)
PH BLDV: 7.15 [PH] (ref 7.32–7.43)
PH BLDV: 7.19 [PH] (ref 7.32–7.43)
PH BLDV: 7.29 [PH] (ref 7.32–7.43)
PH BLDV: 7.3 [PH] (ref 7.32–7.43)
PH BLDV: 7.31 [PH] (ref 7.32–7.43)
PH BLDV: 7.31 [PH] (ref 7.32–7.43)
PH BLDV: 7.32 [PH] (ref 7.32–7.43)
PH BLDV: 7.33 [PH] (ref 7.32–7.43)
PH BLDV: 7.33 [PH] (ref 7.32–7.43)
PH BLDV: 7.34 [PH] (ref 7.32–7.43)
PH BLDV: 7.35 [PH] (ref 7.32–7.43)
PH BLDV: 7.36 [PH] (ref 7.32–7.43)
PH BLDV: 7.37 [PH] (ref 7.32–7.43)
PH BLDV: 7.38 [PH] (ref 7.32–7.43)
PH BLDV: 7.39 [PH] (ref 7.32–7.43)
PH BLDV: 7.4 [PH] (ref 7.32–7.43)
PH BLDV: 7.42 [PH] (ref 7.32–7.43)
PH BLDV: 7.43 [PH] (ref 7.32–7.43)
PH BLDV: 7.44 [PH] (ref 7.32–7.43)
PH BLDV: 7.45 [PH] (ref 7.32–7.43)
PH BLDV: 7.46 [PH] (ref 7.32–7.43)
PH BLDV: 7.48 [PH] (ref 7.32–7.43)
PH UR STRIP: 5 [PH] (ref 5–7)
PHOSPHATE SERPL-MCNC: 2.5 MG/DL (ref 2.5–4.5)
PHOSPHATE SERPL-MCNC: 2.6 MG/DL (ref 2.5–4.5)
PHOSPHATE SERPL-MCNC: 2.9 MG/DL (ref 2.5–4.5)
PHOSPHATE SERPL-MCNC: 3 MG/DL (ref 2.5–4.5)
PHOSPHATE SERPL-MCNC: 3 MG/DL (ref 2.5–4.5)
PHOSPHATE SERPL-MCNC: 3.1 MG/DL (ref 2.5–4.5)
PHOSPHATE SERPL-MCNC: 3.2 MG/DL (ref 2.5–4.5)
PHOSPHATE SERPL-MCNC: 3.2 MG/DL (ref 2.5–4.5)
PHOSPHATE SERPL-MCNC: 3.3 MG/DL (ref 2.5–4.5)
PHOSPHATE SERPL-MCNC: 3.3 MG/DL (ref 2.5–4.5)
PHOSPHATE SERPL-MCNC: 3.4 MG/DL (ref 2.5–4.5)
PHOSPHATE SERPL-MCNC: 3.5 MG/DL (ref 2.5–4.5)
PHOSPHATE SERPL-MCNC: 3.6 MG/DL (ref 2.5–4.5)
PHOSPHATE SERPL-MCNC: 3.7 MG/DL (ref 2.5–4.5)
PHOSPHATE SERPL-MCNC: 3.7 MG/DL (ref 2.5–4.5)
PHOSPHATE SERPL-MCNC: 3.8 MG/DL (ref 2.5–4.5)
PHOSPHATE SERPL-MCNC: 3.8 MG/DL (ref 2.5–4.5)
PHOSPHATE SERPL-MCNC: 3.9 MG/DL (ref 2.5–4.5)
PHOSPHATE SERPL-MCNC: 4 MG/DL (ref 2.5–4.5)
PHOSPHATE SERPL-MCNC: 4.2 MG/DL (ref 2.5–4.5)
PHOSPHATE SERPL-MCNC: 4.5 MG/DL (ref 2.5–4.5)
PHOSPHATE SERPL-MCNC: 4.9 MG/DL (ref 2.5–4.5)
PLATELET # BLD AUTO: 103 10E3/UL (ref 150–450)
PLATELET # BLD AUTO: 104 10E3/UL (ref 150–450)
PLATELET # BLD AUTO: 105 10E3/UL (ref 150–450)
PLATELET # BLD AUTO: 107 10E3/UL (ref 150–450)
PLATELET # BLD AUTO: 108 10E3/UL (ref 150–450)
PLATELET # BLD AUTO: 110 10E3/UL (ref 150–450)
PLATELET # BLD AUTO: 110 10E3/UL (ref 150–450)
PLATELET # BLD AUTO: 111 10E3/UL (ref 150–450)
PLATELET # BLD AUTO: 114 10E3/UL (ref 150–450)
PLATELET # BLD AUTO: 121 10E3/UL (ref 150–450)
PLATELET # BLD AUTO: 123 10E3/UL (ref 150–450)
PLATELET # BLD AUTO: 124 10E3/UL (ref 150–450)
PLATELET # BLD AUTO: 133 10E3/UL (ref 150–450)
PLATELET # BLD AUTO: 138 10E3/UL (ref 150–450)
PLATELET # BLD AUTO: 139 10E3/UL (ref 150–450)
PLATELET # BLD AUTO: 147 10E3/UL (ref 150–450)
PLATELET # BLD AUTO: 150 10E3/UL (ref 150–450)
PLATELET # BLD AUTO: 160 10E3/UL (ref 150–450)
PLATELET # BLD AUTO: 170 10E3/UL (ref 150–450)
PLATELET # BLD AUTO: 175 10E3/UL (ref 150–450)
PLATELET # BLD AUTO: 183 10E3/UL (ref 150–450)
PLATELET # BLD AUTO: 191 10E3/UL (ref 150–450)
PLATELET # BLD AUTO: 194 10E3/UL (ref 150–450)
PLATELET # BLD AUTO: 195 10E3/UL (ref 150–450)
PLATELET # BLD AUTO: 204 10E3/UL (ref 150–450)
PLATELET # BLD AUTO: 79 10E3/UL (ref 150–450)
PLATELET # BLD AUTO: 86 10E3/UL (ref 150–450)
PLATELET # BLD AUTO: 89 10E3/UL (ref 150–450)
PLATELET # BLD AUTO: 90 10E3/UL (ref 150–450)
PLATELET # BLD AUTO: 95 10E3/UL (ref 150–450)
PLATELET # BLD AUTO: 95 10E3/UL (ref 150–450)
PO2 BLD: 104 MM HG (ref 80–105)
PO2 BLD: 113 MM HG (ref 80–105)
PO2 BLD: 117 MM HG (ref 80–105)
PO2 BLD: 121 MM HG (ref 80–105)
PO2 BLD: 139 MM HG (ref 80–105)
PO2 BLD: 147 MM HG (ref 80–105)
PO2 BLD: 162 MM HG (ref 80–105)
PO2 BLD: 214 MM HG (ref 80–105)
PO2 BLD: 56 MM HG (ref 80–105)
PO2 BLD: 56 MM HG (ref 80–105)
PO2 BLD: 57 MM HG (ref 80–105)
PO2 BLD: 59 MM HG (ref 80–105)
PO2 BLD: 59 MM HG (ref 80–105)
PO2 BLD: 60 MM HG (ref 80–105)
PO2 BLD: 61 MM HG (ref 80–105)
PO2 BLD: 62 MM HG (ref 80–105)
PO2 BLD: 63 MM HG (ref 80–105)
PO2 BLD: 64 MM HG (ref 80–105)
PO2 BLD: 66 MM HG (ref 80–105)
PO2 BLD: 66 MM HG (ref 80–105)
PO2 BLD: 67 MM HG (ref 80–105)
PO2 BLD: 67 MM HG (ref 80–105)
PO2 BLD: 70 MM HG (ref 80–105)
PO2 BLD: 72 MM HG (ref 80–105)
PO2 BLD: 73 MM HG (ref 80–105)
PO2 BLD: 73 MM HG (ref 80–105)
PO2 BLD: 75 MM HG (ref 80–105)
PO2 BLD: 76 MM HG (ref 80–105)
PO2 BLD: 77 MM HG (ref 80–105)
PO2 BLD: 77 MM HG (ref 80–105)
PO2 BLD: 79 MM HG (ref 80–105)
PO2 BLD: 81 MM HG (ref 80–105)
PO2 BLD: 81 MM HG (ref 80–105)
PO2 BLD: 85 MM HG (ref 80–105)
PO2 BLD: 87 MM HG (ref 80–105)
PO2 BLD: 89 MM HG (ref 80–105)
PO2 BLD: 91 MM HG (ref 80–105)
PO2 BLD: 92 MM HG (ref 80–105)
PO2 BLD: 94 MM HG (ref 80–105)
PO2 BLD: 96 MM HG (ref 80–105)
PO2 BLDA: 100 MM HG (ref 80–105)
PO2 BLDA: 76 MM HG (ref 80–105)
PO2 BLDV: 153 MM HG (ref 25–47)
PO2 BLDV: 30 MM HG (ref 25–47)
PO2 BLDV: 31 MM HG (ref 25–47)
PO2 BLDV: 33 MM HG (ref 25–47)
PO2 BLDV: 33 MM HG (ref 25–47)
PO2 BLDV: 34 MM HG (ref 25–47)
PO2 BLDV: 35 MM HG (ref 25–47)
PO2 BLDV: 36 MM HG (ref 25–47)
PO2 BLDV: 37 MM HG (ref 25–47)
PO2 BLDV: 38 MM HG (ref 25–47)
PO2 BLDV: 38 MM HG (ref 25–47)
PO2 BLDV: 39 MM HG (ref 25–47)
PO2 BLDV: 39 MM HG (ref 25–47)
PO2 BLDV: 40 MM HG (ref 25–47)
PO2 BLDV: 41 MM HG (ref 25–47)
PO2 BLDV: 44 MM HG (ref 25–47)
PO2 BLDV: 44 MM HG (ref 25–47)
PO2 BLDV: 52 MM HG (ref 25–47)
POTASSIUM BLD-SCNC: 2 MMOL/L (ref 3.4–5.3)
POTASSIUM BLD-SCNC: 2.3 MMOL/L (ref 3.4–5.3)
POTASSIUM BLD-SCNC: 3.8 MMOL/L (ref 3.4–5.3)
POTASSIUM BLD-SCNC: 4 MMOL/L (ref 3.4–5.3)
POTASSIUM BLD-SCNC: 6.5 MMOL/L (ref 3.4–5.3)
POTASSIUM SERPL-SCNC: 2.9 MMOL/L (ref 3.4–5.3)
POTASSIUM SERPL-SCNC: 3 MMOL/L (ref 3.4–5.3)
POTASSIUM SERPL-SCNC: 3.1 MMOL/L (ref 3.4–5.3)
POTASSIUM SERPL-SCNC: 3.1 MMOL/L (ref 3.4–5.3)
POTASSIUM SERPL-SCNC: 3.2 MMOL/L (ref 3.4–5.3)
POTASSIUM SERPL-SCNC: 3.3 MMOL/L (ref 3.4–5.3)
POTASSIUM SERPL-SCNC: 3.4 MMOL/L (ref 3.4–5.3)
POTASSIUM SERPL-SCNC: 3.5 MMOL/L (ref 3.4–5.3)
POTASSIUM SERPL-SCNC: 3.6 MMOL/L (ref 3.4–5.3)
POTASSIUM SERPL-SCNC: 3.7 MMOL/L (ref 3.4–5.3)
POTASSIUM SERPL-SCNC: 3.8 MMOL/L (ref 3.4–5.3)
POTASSIUM SERPL-SCNC: 3.9 MMOL/L (ref 3.4–5.3)
POTASSIUM SERPL-SCNC: 4 MMOL/L (ref 3.4–5.3)
POTASSIUM SERPL-SCNC: 4.1 MMOL/L (ref 3.4–5.3)
POTASSIUM SERPL-SCNC: 4.1 MMOL/L (ref 3.4–5.3)
POTASSIUM SERPL-SCNC: 5 MMOL/L (ref 3.4–5.3)
POTASSIUM SERPL-SCNC: 5.3 MMOL/L (ref 3.4–5.3)
POTASSIUM SERPL-SCNC: 5.4 MMOL/L (ref 3.4–5.3)
POTASSIUM SERPL-SCNC: 5.4 MMOL/L (ref 3.4–5.3)
POTASSIUM SERPL-SCNC: 5.5 MMOL/L (ref 3.4–5.3)
POTASSIUM SERPL-SCNC: 5.6 MMOL/L (ref 3.4–5.3)
POTASSIUM SERPL-SCNC: 5.7 MMOL/L (ref 3.4–5.3)
POTASSIUM SERPL-SCNC: 6 MMOL/L (ref 3.4–5.3)
POTASSIUM SERPL-SCNC: 6 MMOL/L (ref 3.4–5.3)
POTASSIUM SERPL-SCNC: 6.2 MMOL/L (ref 3.4–5.3)
POTASSIUM SERPL-SCNC: 6.2 MMOL/L (ref 3.4–5.3)
POTASSIUM SERPL-SCNC: 6.3 MMOL/L (ref 3.4–5.3)
POTASSIUM SERPL-SCNC: 6.9 MMOL/L (ref 3.4–5.3)
POTASSIUM SERPL-SCNC: 6.9 MMOL/L (ref 3.4–5.3)
POTASSIUM SERPL-SCNC: 7.2 MMOL/L (ref 3.4–5.3)
POTASSIUM SERPL-SCNC: 7.3 MMOL/L (ref 3.4–5.3)
POTASSIUM SERPL-SCNC: 7.3 MMOL/L (ref 3.4–5.3)
PR INTERVAL - MUSE: 120 MS
PR INTERVAL - MUSE: 152 MS
PR INTERVAL - MUSE: 180 MS
PREALB SERPL-MCNC: 6.8 MG/DL (ref 20–40)
PROCALCITONIN SERPL IA-MCNC: 0.5 NG/ML
PROT SERPL-MCNC: 4.3 G/DL (ref 6.4–8.3)
PROT SERPL-MCNC: 4.6 G/DL (ref 6.4–8.3)
PROT SERPL-MCNC: 4.8 G/DL (ref 6.4–8.3)
PROT SERPL-MCNC: 5 G/DL (ref 6.4–8.3)
PROT SERPL-MCNC: 5.2 G/DL (ref 6.4–8.3)
PROT SERPL-MCNC: 5.2 G/DL (ref 6.4–8.3)
PROT SERPL-MCNC: 5.5 G/DL (ref 6.4–8.3)
PROT SERPL-MCNC: 5.7 G/DL (ref 6.4–8.3)
PROT SERPL-MCNC: 5.9 G/DL (ref 6.4–8.3)
PROT SERPL-MCNC: 5.9 G/DL (ref 6.4–8.3)
PROT SERPL-MCNC: 6 G/DL (ref 6.4–8.3)
PROT SERPL-MCNC: 6.1 G/DL (ref 6.4–8.3)
PROT SERPL-MCNC: 6.2 G/DL (ref 6.4–8.3)
PROT SERPL-MCNC: 6.3 G/DL (ref 6.4–8.3)
PROT SERPL-MCNC: 6.3 G/DL (ref 6.4–8.3)
PROT SERPL-MCNC: 6.4 G/DL (ref 6.4–8.3)
PROT SERPL-MCNC: 6.5 G/DL (ref 6.4–8.3)
PROT SERPL-MCNC: 6.5 G/DL (ref 6.4–8.3)
PROT SERPL-MCNC: 6.7 G/DL (ref 6.4–8.3)
PROT SERPL-MCNC: 6.8 G/DL (ref 6.4–8.3)
PROT SERPL-MCNC: 6.9 G/DL (ref 6.4–8.3)
PROT SERPL-MCNC: 7 G/DL (ref 6.4–8.3)
PROT SERPL-MCNC: 7.2 G/DL (ref 6.4–8.3)
PROT SERPL-MCNC: 7.6 G/DL (ref 6.4–8.3)
PROTEINASE3 AB SER IA-ACNC: <1 U/ML
PROTEINASE3 AB SER IA-ACNC: NEGATIVE
QRS DURATION - MUSE: 78 MS
QRS DURATION - MUSE: 82 MS
QRS DURATION - MUSE: 88 MS
QT - MUSE: 318 MS
QT - MUSE: 340 MS
QT - MUSE: 436 MS
QTC - MUSE: 442 MS
QTC - MUSE: 443 MS
QTC - MUSE: 608 MS
QUANTIFERON MITOGEN: 0.48 IU/ML
QUANTIFERON NIL TUBE: 0.03 IU/ML
QUANTIFERON TB1 TUBE: 0.03 IU/ML
QUANTIFERON TB2 TUBE: 0.03
R AXIS - MUSE: 77 DEGREES
R AXIS - MUSE: 79 DEGREES
R AXIS - MUSE: 96 DEGREES
RADIOLOGIST FLAGS: ABNORMAL
RBC # BLD AUTO: 1.78 10E6/UL (ref 3.8–5.2)
RBC # BLD AUTO: 1.85 10E6/UL (ref 3.8–5.2)
RBC # BLD AUTO: 1.96 10E6/UL (ref 3.8–5.2)
RBC # BLD AUTO: 2.03 10E6/UL (ref 3.8–5.2)
RBC # BLD AUTO: 2.05 10E6/UL (ref 3.8–5.2)
RBC # BLD AUTO: 2.13 10E6/UL (ref 3.8–5.2)
RBC # BLD AUTO: 2.13 10E6/UL (ref 3.8–5.2)
RBC # BLD AUTO: 2.16 10E6/UL (ref 3.8–5.2)
RBC # BLD AUTO: 2.17 10E6/UL (ref 3.8–5.2)
RBC # BLD AUTO: 2.19 10E6/UL (ref 3.8–5.2)
RBC # BLD AUTO: 2.19 10E6/UL (ref 3.8–5.2)
RBC # BLD AUTO: 2.2 10E6/UL (ref 3.8–5.2)
RBC # BLD AUTO: 2.23 10E6/UL (ref 3.8–5.2)
RBC # BLD AUTO: 2.27 10E6/UL (ref 3.8–5.2)
RBC # BLD AUTO: 2.27 10E6/UL (ref 3.8–5.2)
RBC # BLD AUTO: 2.28 10E6/UL (ref 3.8–5.2)
RBC # BLD AUTO: 2.35 10E6/UL (ref 3.8–5.2)
RBC # BLD AUTO: 2.49 10E6/UL (ref 3.8–5.2)
RBC # BLD AUTO: 2.54 10E6/UL (ref 3.8–5.2)
RBC # BLD AUTO: 2.59 10E6/UL (ref 3.8–5.2)
RBC # BLD AUTO: 2.65 10E6/UL (ref 3.8–5.2)
RBC # BLD AUTO: 2.7 10E6/UL (ref 3.8–5.2)
RBC # BLD AUTO: 2.71 10E6/UL (ref 3.8–5.2)
RBC # BLD AUTO: 2.74 10E6/UL (ref 3.8–5.2)
RBC # BLD AUTO: 2.74 10E6/UL (ref 3.8–5.2)
RBC # BLD AUTO: 2.93 10E6/UL (ref 3.8–5.2)
RBC # BLD AUTO: 2.95 10E6/UL (ref 3.8–5.2)
RBC # BLD AUTO: 3.1 10E6/UL (ref 3.8–5.2)
RBC # BLD AUTO: 3.27 10E6/UL (ref 3.8–5.2)
RBC # BLD AUTO: 3.45 10E6/UL (ref 3.8–5.2)
RBC # BLD AUTO: 3.66 10E6/UL (ref 3.8–5.2)
RBC # BLD AUTO: 3.72 10E6/UL (ref 3.8–5.2)
RBC # BLD AUTO: 4.07 10E6/UL (ref 3.8–5.2)
RBC URINE: 8 /HPF
RETICS # AUTO: 0.1 10E6/UL (ref 0.03–0.1)
RETICS # AUTO: 0.21 10E6/UL (ref 0.03–0.1)
RETICS/RBC NFR AUTO: 4.9 % (ref 0.5–2)
RETICS/RBC NFR AUTO: 6 % (ref 0.5–2)
RHEUMATOID FACT SERPL-ACNC: 13 IU/ML
RSV RNA SPEC NAA+PROBE: NEGATIVE
RSV RNA SPEC NAA+PROBE: NEGATIVE
SA TARGET DNA: NEGATIVE
SA TARGET DNA: NEGATIVE
SAO2 % BLDA: 83 % (ref 92–100)
SAO2 % BLDA: 87 % (ref 92–100)
SAO2 % BLDA: 87 % (ref 92–100)
SAO2 % BLDA: 88 % (ref 92–100)
SAO2 % BLDA: 89 % (ref 92–100)
SAO2 % BLDA: 89 % (ref 92–100)
SAO2 % BLDA: 90 % (ref 92–100)
SAO2 % BLDA: 90 % (ref 92–100)
SAO2 % BLDA: 91 % (ref 92–100)
SAO2 % BLDA: 92 % (ref 92–100)
SAO2 % BLDA: 93 % (ref 92–100)
SAO2 % BLDA: 94 % (ref 92–100)
SAO2 % BLDA: 95 % (ref 92–100)
SAO2 % BLDA: 96 % (ref 92–100)
SAO2 % BLDA: 97 % (ref 92–100)
SAO2 % BLDA: 97 % (ref 96–97)
SAO2 % BLDA: 98 % (ref 92–100)
SAO2 % BLDA: 98 % (ref 92–100)
SAO2 % BLDA: 98 % (ref 96–97)
SAO2 % BLDA: 99 % (ref 92–100)
SAO2 % BLDV: 100 % (ref 70–75)
SAO2 % BLDV: 56.9 % (ref 70–75)
SAO2 % BLDV: 57.7 % (ref 70–75)
SAO2 % BLDV: 58.6 % (ref 70–75)
SAO2 % BLDV: 59.4 % (ref 70–75)
SAO2 % BLDV: 60.5 % (ref 70–75)
SAO2 % BLDV: 61.6 % (ref 70–75)
SAO2 % BLDV: 61.7 % (ref 70–75)
SAO2 % BLDV: 61.9 % (ref 70–75)
SAO2 % BLDV: 62.1 % (ref 70–75)
SAO2 % BLDV: 62.8 % (ref 70–75)
SAO2 % BLDV: 63.3 % (ref 70–75)
SAO2 % BLDV: 63.8 % (ref 70–75)
SAO2 % BLDV: 63.9 % (ref 70–75)
SAO2 % BLDV: 63.9 % (ref 70–75)
SAO2 % BLDV: 64.1 % (ref 70–75)
SAO2 % BLDV: 64.4 % (ref 70–75)
SAO2 % BLDV: 64.5 % (ref 70–75)
SAO2 % BLDV: 65.2 % (ref 70–75)
SAO2 % BLDV: 65.7 % (ref 70–75)
SAO2 % BLDV: 65.9 % (ref 70–75)
SAO2 % BLDV: 66 % (ref 70–75)
SAO2 % BLDV: 67.2 % (ref 70–75)
SAO2 % BLDV: 67.7 % (ref 70–75)
SAO2 % BLDV: 67.7 % (ref 70–75)
SAO2 % BLDV: 68 % (ref 70–75)
SAO2 % BLDV: 68.6 % (ref 70–75)
SAO2 % BLDV: 68.7 % (ref 70–75)
SAO2 % BLDV: 69.8 % (ref 70–75)
SAO2 % BLDV: 69.9 % (ref 70–75)
SAO2 % BLDV: 70.8 % (ref 70–75)
SAO2 % BLDV: 71 % (ref 70–75)
SAO2 % BLDV: 71.2 % (ref 70–75)
SAO2 % BLDV: 71.3 % (ref 70–75)
SAO2 % BLDV: 71.5 % (ref 70–75)
SAO2 % BLDV: 72.5 % (ref 70–75)
SAO2 % BLDV: 75.3 % (ref 70–75)
SAO2 % BLDV: 77.5 % (ref 70–75)
SAO2 % BLDV: 78.2 % (ref 70–75)
SARS-COV-2 RNA RESP QL NAA+PROBE: NEGATIVE
SARS-COV-2 RNA RESP QL NAA+PROBE: NEGATIVE
SCANNED LAB RESULT: NORMAL
SODIUM BLD-SCNC: 151 MMOL/L (ref 135–145)
SODIUM BLD-SCNC: 151 MMOL/L (ref 135–145)
SODIUM SERPL-SCNC: 135 MMOL/L (ref 135–145)
SODIUM SERPL-SCNC: 137 MMOL/L (ref 135–145)
SODIUM SERPL-SCNC: 138 MMOL/L (ref 135–145)
SODIUM SERPL-SCNC: 139 MMOL/L (ref 135–145)
SODIUM SERPL-SCNC: 140 MMOL/L (ref 135–145)
SODIUM SERPL-SCNC: 141 MMOL/L (ref 135–145)
SODIUM SERPL-SCNC: 141 MMOL/L (ref 135–145)
SODIUM SERPL-SCNC: 143 MMOL/L (ref 135–145)
SODIUM SERPL-SCNC: 144 MMOL/L (ref 135–145)
SODIUM SERPL-SCNC: 145 MMOL/L (ref 135–145)
SODIUM SERPL-SCNC: 145 MMOL/L (ref 135–145)
SODIUM SERPL-SCNC: 148 MMOL/L (ref 135–145)
SODIUM SERPL-SCNC: 148 MMOL/L (ref 135–145)
SODIUM SERPL-SCNC: 149 MMOL/L (ref 135–145)
SODIUM SERPL-SCNC: 150 MMOL/L (ref 135–145)
SODIUM SERPL-SCNC: 151 MMOL/L (ref 135–145)
SODIUM SERPL-SCNC: 151 MMOL/L (ref 135–145)
SODIUM SERPL-SCNC: 152 MMOL/L (ref 135–145)
SODIUM SERPL-SCNC: 153 MMOL/L (ref 135–145)
SODIUM SERPL-SCNC: 154 MMOL/L (ref 135–145)
SODIUM SERPL-SCNC: 155 MMOL/L (ref 135–145)
SODIUM SERPL-SCNC: 156 MMOL/L (ref 135–145)
SODIUM UR-SCNC: <20 MMOL/L
SP GR UR STRIP: 1.01 (ref 1–1.03)
SPECIMEN EXPIRATION DATE: NORMAL
SQUAMOUS EPITHELIAL: 8 /HPF
SYSTOLIC BLOOD PRESSURE - MUSE: 119 MMHG
SYSTOLIC BLOOD PRESSURE - MUSE: NORMAL MMHG
SYSTOLIC BLOOD PRESSURE - MUSE: NORMAL MMHG
T AXIS - MUSE: 110 DEGREES
T AXIS - MUSE: 53 DEGREES
T AXIS - MUSE: 75 DEGREES
THROMBIN TIME: 29.3 SECONDS (ref 13–19)
TRANSFERRIN SERPL-MCNC: 168 MG/DL (ref 200–360)
TRIGL SERPL-MCNC: 104 MG/DL
TRIGL SERPL-MCNC: 120 MG/DL
TRIGL SERPL-MCNC: 191 MG/DL
TROPONIN T SERPL HS-MCNC: 51 NG/L
TROPONIN T SERPL HS-MCNC: 52 NG/L
TROPONIN T SERPL HS-MCNC: 76 NG/L
TROPONIN T SERPL HS-MCNC: 83 NG/L
UFH PPP CHRO-ACNC: 0.14 IU/ML
UFH PPP CHRO-ACNC: 0.19 IU/ML
UFH PPP CHRO-ACNC: 0.28 IU/ML
UFH PPP CHRO-ACNC: 0.29 IU/ML
UFH PPP CHRO-ACNC: 0.32 IU/ML
UFH PPP CHRO-ACNC: 0.38 IU/ML
UFH PPP CHRO-ACNC: 0.39 IU/ML
UFH PPP CHRO-ACNC: 0.43 IU/ML
UFH PPP CHRO-ACNC: 0.46 IU/ML
UFH PPP CHRO-ACNC: 0.49 IU/ML
UFH PPP CHRO-ACNC: 0.58 IU/ML
UFH PPP CHRO-ACNC: 0.61 IU/ML
UFH PPP CHRO-ACNC: 0.66 IU/ML
UFH PPP CHRO-ACNC: 0.67 IU/ML
UFH PPP CHRO-ACNC: 0.7 IU/ML
UFH PPP CHRO-ACNC: 0.76 IU/ML
UFH PPP CHRO-ACNC: 0.77 IU/ML
UFH PPP CHRO-ACNC: <0.1 IU/ML
UNIT ABO/RH: NORMAL
UNIT NUMBER: NORMAL
UNIT STATUS: NORMAL
UNIT TYPE ISBT: 5100
UNIT TYPE ISBT: 5100
UNIT TYPE ISBT: 600
UNIT TYPE ISBT: 600
UNIT TYPE ISBT: 6200
UPPER GI ENDOSCOPY: NORMAL
UPPER GI ENDOSCOPY: NORMAL
UROBILINOGEN UR STRIP-MCNC: <2 MG/DL
VENTRICULAR RATE- MUSE: 102 BPM
VENTRICULAR RATE- MUSE: 116 BPM
VENTRICULAR RATE- MUSE: 117 BPM
VIT B12 SERPL-MCNC: 332 PG/ML (ref 232–1245)
WBC # BLD AUTO: 10.5 10E3/UL (ref 4–11)
WBC # BLD AUTO: 10.7 10E3/UL (ref 4–11)
WBC # BLD AUTO: 10.9 10E3/UL (ref 4–11)
WBC # BLD AUTO: 10.9 10E3/UL (ref 4–11)
WBC # BLD AUTO: 11 10E3/UL (ref 4–11)
WBC # BLD AUTO: 11.1 10E3/UL (ref 4–11)
WBC # BLD AUTO: 11.1 10E3/UL (ref 4–11)
WBC # BLD AUTO: 11.5 10E3/UL (ref 4–11)
WBC # BLD AUTO: 11.7 10E3/UL (ref 4–11)
WBC # BLD AUTO: 12.1 10E3/UL (ref 4–11)
WBC # BLD AUTO: 12.7 10E3/UL (ref 4–11)
WBC # BLD AUTO: 12.7 10E3/UL (ref 4–11)
WBC # BLD AUTO: 12.9 10E3/UL (ref 4–11)
WBC # BLD AUTO: 13.3 10E3/UL (ref 4–11)
WBC # BLD AUTO: 14 10E3/UL (ref 4–11)
WBC # BLD AUTO: 14 10E3/UL (ref 4–11)
WBC # BLD AUTO: 14.2 10E3/UL (ref 4–11)
WBC # BLD AUTO: 14.6 10E3/UL (ref 4–11)
WBC # BLD AUTO: 14.7 10E3/UL (ref 4–11)
WBC # BLD AUTO: 16 10E3/UL (ref 4–11)
WBC # BLD AUTO: 16.3 10E3/UL (ref 4–11)
WBC # BLD AUTO: 19.4 10E3/UL (ref 4–11)
WBC # BLD AUTO: 6.6 10E3/UL (ref 4–11)
WBC # BLD AUTO: 7.1 10E3/UL (ref 4–11)
WBC # BLD AUTO: 7.9 10E3/UL (ref 4–11)
WBC # BLD AUTO: 8 10E3/UL (ref 4–11)
WBC # BLD AUTO: 8.1 10E3/UL (ref 4–11)
WBC # BLD AUTO: 8.4 10E3/UL (ref 4–11)
WBC # BLD AUTO: 9 10E3/UL (ref 4–11)
WBC # BLD AUTO: 9 10E3/UL (ref 4–11)
WBC # BLD AUTO: 9.2 10E3/UL (ref 4–11)
WBC # BLD AUTO: 9.6 10E3/UL (ref 4–11)
WBC # BLD AUTO: 9.8 10E3/UL (ref 4–11)
WBC # FLD AUTO: 1165 /UL
WBC URINE: 7 /HPF

## 2024-01-01 PROCEDURE — C1769 GUIDE WIRE: HCPCS

## 2024-01-01 PROCEDURE — 250N000009 HC RX 250: Performed by: INTERNAL MEDICINE

## 2024-01-01 PROCEDURE — P9016 RBC LEUKOCYTES REDUCED: HCPCS | Performed by: INTERNAL MEDICINE

## 2024-01-01 PROCEDURE — 250N000011 HC RX IP 250 OP 636: Performed by: INTERNAL MEDICINE

## 2024-01-01 PROCEDURE — C1887 CATHETER, GUIDING: HCPCS

## 2024-01-01 PROCEDURE — 85025 COMPLETE CBC W/AUTO DIFF WBC: CPT | Performed by: STUDENT IN AN ORGANIZED HEALTH CARE EDUCATION/TRAINING PROGRAM

## 2024-01-01 PROCEDURE — 36247 INS CATH ABD/L-EXT ART 3RD: CPT | Mod: GC | Performed by: STUDENT IN AN ORGANIZED HEALTH CARE EDUCATION/TRAINING PROGRAM

## 2024-01-01 PROCEDURE — 250N000012 HC RX MED GY IP 250 OP 636 PS 637: Performed by: INTERNAL MEDICINE

## 2024-01-01 PROCEDURE — 85520 HEPARIN ASSAY: CPT | Performed by: INTERNAL MEDICINE

## 2024-01-01 PROCEDURE — 82805 BLOOD GASES W/O2 SATURATION: CPT | Performed by: INTERNAL MEDICINE

## 2024-01-01 PROCEDURE — 999N000009 HC STATISTIC AIRWAY CARE

## 2024-01-01 PROCEDURE — 86038 ANTINUCLEAR ANTIBODIES: CPT | Performed by: INTERNAL MEDICINE

## 2024-01-01 PROCEDURE — 84100 ASSAY OF PHOSPHORUS: CPT | Performed by: STUDENT IN AN ORGANIZED HEALTH CARE EDUCATION/TRAINING PROGRAM

## 2024-01-01 PROCEDURE — 87205 SMEAR GRAM STAIN: CPT | Performed by: INTERNAL MEDICINE

## 2024-01-01 PROCEDURE — 250N000013 HC RX MED GY IP 250 OP 250 PS 637: Performed by: INTERNAL MEDICINE

## 2024-01-01 PROCEDURE — 99291 CRITICAL CARE FIRST HOUR: CPT | Mod: GC | Performed by: INTERNAL MEDICINE

## 2024-01-01 PROCEDURE — 85520 HEPARIN ASSAY: CPT | Performed by: HOSPITALIST

## 2024-01-01 PROCEDURE — 36600 WITHDRAWAL OF ARTERIAL BLOOD: CPT

## 2024-01-01 PROCEDURE — 76705 ECHO EXAM OF ABDOMEN: CPT | Mod: 26 | Performed by: STUDENT IN AN ORGANIZED HEALTH CARE EDUCATION/TRAINING PROGRAM

## 2024-01-01 PROCEDURE — 82040 ASSAY OF SERUM ALBUMIN: CPT | Performed by: STUDENT IN AN ORGANIZED HEALTH CARE EDUCATION/TRAINING PROGRAM

## 2024-01-01 PROCEDURE — 83605 ASSAY OF LACTIC ACID: CPT | Performed by: STUDENT IN AN ORGANIZED HEALTH CARE EDUCATION/TRAINING PROGRAM

## 2024-01-01 PROCEDURE — 36415 COLL VENOUS BLD VENIPUNCTURE: CPT | Performed by: INTERNAL MEDICINE

## 2024-01-01 PROCEDURE — 84100 ASSAY OF PHOSPHORUS: CPT | Performed by: INTERNAL MEDICINE

## 2024-01-01 PROCEDURE — 200N000002 HC R&B ICU UMMC

## 2024-01-01 PROCEDURE — 99291 CRITICAL CARE FIRST HOUR: CPT | Performed by: INTERNAL MEDICINE

## 2024-01-01 PROCEDURE — 999N000287 HC ICU ADULT ROUNDING, EACH 10 MINS

## 2024-01-01 PROCEDURE — 82805 BLOOD GASES W/O2 SATURATION: CPT | Performed by: STUDENT IN AN ORGANIZED HEALTH CARE EDUCATION/TRAINING PROGRAM

## 2024-01-01 PROCEDURE — 71045 X-RAY EXAM CHEST 1 VIEW: CPT | Mod: 26 | Performed by: RADIOLOGY

## 2024-01-01 PROCEDURE — 94003 VENT MGMT INPAT SUBQ DAY: CPT

## 2024-01-01 PROCEDURE — C8929 TTE W OR WO FOL WCON,DOPPLER: HCPCS

## 2024-01-01 PROCEDURE — 85384 FIBRINOGEN ACTIVITY: CPT | Performed by: INTERNAL MEDICINE

## 2024-01-01 PROCEDURE — 71045 X-RAY EXAM CHEST 1 VIEW: CPT | Mod: 26 | Performed by: STUDENT IN AN ORGANIZED HEALTH CARE EDUCATION/TRAINING PROGRAM

## 2024-01-01 PROCEDURE — 82040 ASSAY OF SERUM ALBUMIN: CPT | Performed by: INTERNAL MEDICINE

## 2024-01-01 PROCEDURE — 272N000506 HC NEEDLE CR6

## 2024-01-01 PROCEDURE — 71045 X-RAY EXAM CHEST 1 VIEW: CPT | Mod: 77

## 2024-01-01 PROCEDURE — 87637 SARSCOV2&INF A&B&RSV AMP PRB: CPT | Performed by: EMERGENCY MEDICINE

## 2024-01-01 PROCEDURE — 87081 CULTURE SCREEN ONLY: CPT | Performed by: INTERNAL MEDICINE

## 2024-01-01 PROCEDURE — 94660 CPAP INITIATION&MGMT: CPT

## 2024-01-01 PROCEDURE — 97535 SELF CARE MNGMENT TRAINING: CPT | Mod: GO

## 2024-01-01 PROCEDURE — 250N000011 HC RX IP 250 OP 636: Performed by: STUDENT IN AN ORGANIZED HEALTH CARE EDUCATION/TRAINING PROGRAM

## 2024-01-01 PROCEDURE — 74018 RADEX ABDOMEN 1 VIEW: CPT | Mod: 26 | Performed by: RADIOLOGY

## 2024-01-01 PROCEDURE — 99233 SBSQ HOSP IP/OBS HIGH 50: CPT | Performed by: INTERNAL MEDICINE

## 2024-01-01 PROCEDURE — 85027 COMPLETE CBC AUTOMATED: CPT | Performed by: STUDENT IN AN ORGANIZED HEALTH CARE EDUCATION/TRAINING PROGRAM

## 2024-01-01 PROCEDURE — 71045 X-RAY EXAM CHEST 1 VIEW: CPT

## 2024-01-01 PROCEDURE — G0463 HOSPITAL OUTPT CLINIC VISIT: HCPCS | Mod: 25

## 2024-01-01 PROCEDURE — 255N000002 HC RX 255 OP 636: Performed by: RADIOLOGY

## 2024-01-01 PROCEDURE — 83735 ASSAY OF MAGNESIUM: CPT | Performed by: STUDENT IN AN ORGANIZED HEALTH CARE EDUCATION/TRAINING PROGRAM

## 2024-01-01 PROCEDURE — A9540 TC99M MAA: HCPCS | Performed by: INTERNAL MEDICINE

## 2024-01-01 PROCEDURE — 258N000003 HC RX IP 258 OP 636: Performed by: INTERNAL MEDICINE

## 2024-01-01 PROCEDURE — 258N000003 HC RX IP 258 OP 636: Performed by: EMERGENCY MEDICINE

## 2024-01-01 PROCEDURE — 93005 ELECTROCARDIOGRAM TRACING: CPT

## 2024-01-01 PROCEDURE — 250N000011 HC RX IP 250 OP 636: Performed by: ANESTHESIOLOGY

## 2024-01-01 PROCEDURE — 83735 ASSAY OF MAGNESIUM: CPT | Performed by: INTERNAL MEDICINE

## 2024-01-01 PROCEDURE — 86140 C-REACTIVE PROTEIN: CPT | Performed by: HOSPITALIST

## 2024-01-01 PROCEDURE — 96374 THER/PROPH/DIAG INJ IV PUSH: CPT | Mod: 59

## 2024-01-01 PROCEDURE — 85730 THROMBOPLASTIN TIME PARTIAL: CPT | Performed by: EMERGENCY MEDICINE

## 2024-01-01 PROCEDURE — 250N000009 HC RX 250: Performed by: STUDENT IN AN ORGANIZED HEALTH CARE EDUCATION/TRAINING PROGRAM

## 2024-01-01 PROCEDURE — 94640 AIRWAY INHALATION TREATMENT: CPT

## 2024-01-01 PROCEDURE — 86140 C-REACTIVE PROTEIN: CPT | Performed by: INTERNAL MEDICINE

## 2024-01-01 PROCEDURE — 999N000157 HC STATISTIC RCP TIME EA 10 MIN

## 2024-01-01 PROCEDURE — 84132 ASSAY OF SERUM POTASSIUM: CPT | Performed by: INTERNAL MEDICINE

## 2024-01-01 PROCEDURE — 99231 SBSQ HOSP IP/OBS SF/LOW 25: CPT | Mod: GC | Performed by: STUDENT IN AN ORGANIZED HEALTH CARE EDUCATION/TRAINING PROGRAM

## 2024-01-01 PROCEDURE — 85018 HEMOGLOBIN: CPT | Performed by: INTERNAL MEDICINE

## 2024-01-01 PROCEDURE — 94640 AIRWAY INHALATION TREATMENT: CPT | Mod: 76

## 2024-01-01 PROCEDURE — 84134 ASSAY OF PREALBUMIN: CPT | Performed by: INTERNAL MEDICINE

## 2024-01-01 PROCEDURE — 82310 ASSAY OF CALCIUM: CPT | Performed by: PHYSICIAN ASSISTANT

## 2024-01-01 PROCEDURE — 250N000012 HC RX MED GY IP 250 OP 636 PS 637: Performed by: ANESTHESIOLOGY

## 2024-01-01 PROCEDURE — C1757 CATH, THROMBECTOMY/EMBOLECT: HCPCS

## 2024-01-01 PROCEDURE — 84484 ASSAY OF TROPONIN QUANT: CPT | Performed by: EMERGENCY MEDICINE

## 2024-01-01 PROCEDURE — 80048 BASIC METABOLIC PNL TOTAL CA: CPT | Performed by: HOSPITALIST

## 2024-01-01 PROCEDURE — 94799 UNLISTED PULMONARY SVC/PX: CPT

## 2024-01-01 PROCEDURE — 3E043XZ INTRODUCTION OF VASOPRESSOR INTO CENTRAL VEIN, PERCUTANEOUS APPROACH: ICD-10-PCS | Performed by: INTERNAL MEDICINE

## 2024-01-01 PROCEDURE — 85730 THROMBOPLASTIN TIME PARTIAL: CPT | Performed by: INTERNAL MEDICINE

## 2024-01-01 PROCEDURE — 85025 COMPLETE CBC W/AUTO DIFF WBC: CPT | Performed by: HOSPITALIST

## 2024-01-01 PROCEDURE — 999N000185 HC STATISTIC TRANSPORT TIME EA 15 MIN

## 2024-01-01 PROCEDURE — 99292 CRITICAL CARE ADDL 30 MIN: CPT | Mod: 25 | Performed by: INTERNAL MEDICINE

## 2024-01-01 PROCEDURE — 200N000001 HC R&B ICU

## 2024-01-01 PROCEDURE — 99232 SBSQ HOSP IP/OBS MODERATE 35: CPT | Mod: GC | Performed by: INTERNAL MEDICINE

## 2024-01-01 PROCEDURE — 99291 CRITICAL CARE FIRST HOUR: CPT | Mod: 25

## 2024-01-01 PROCEDURE — 36014 PLACE CATHETER IN ARTERY: CPT | Mod: 50

## 2024-01-01 PROCEDURE — 82330 ASSAY OF CALCIUM: CPT | Performed by: INTERNAL MEDICINE

## 2024-01-01 PROCEDURE — 87040 BLOOD CULTURE FOR BACTERIA: CPT | Performed by: INTERNAL MEDICINE

## 2024-01-01 PROCEDURE — 5A1955Z RESPIRATORY VENTILATION, GREATER THAN 96 CONSECUTIVE HOURS: ICD-10-PCS | Performed by: INTERNAL MEDICINE

## 2024-01-01 PROCEDURE — 84478 ASSAY OF TRIGLYCERIDES: CPT | Performed by: INTERNAL MEDICINE

## 2024-01-01 PROCEDURE — 93975 VASCULAR STUDY: CPT | Mod: 26 | Performed by: STUDENT IN AN ORGANIZED HEALTH CARE EDUCATION/TRAINING PROGRAM

## 2024-01-01 PROCEDURE — 99222 1ST HOSP IP/OBS MODERATE 55: CPT | Mod: 25 | Performed by: INTERNAL MEDICINE

## 2024-01-01 PROCEDURE — C1889 IMPLANT/INSERT DEVICE, NOC: HCPCS

## 2024-01-01 PROCEDURE — 82565 ASSAY OF CREATININE: CPT | Performed by: INTERNAL MEDICINE

## 2024-01-01 PROCEDURE — 87206 SMEAR FLUORESCENT/ACID STAI: CPT | Performed by: INTERNAL MEDICINE

## 2024-01-01 PROCEDURE — P9047 ALBUMIN (HUMAN), 25%, 50ML: HCPCS | Performed by: INTERNAL MEDICINE

## 2024-01-01 PROCEDURE — 87385 HISTOPLASMA CAPSUL AG IA: CPT | Performed by: INTERNAL MEDICINE

## 2024-01-01 PROCEDURE — 80162 ASSAY OF DIGOXIN TOTAL: CPT | Performed by: INTERNAL MEDICINE

## 2024-01-01 PROCEDURE — 93451 RIGHT HEART CATH: CPT | Performed by: INTERNAL MEDICINE

## 2024-01-01 PROCEDURE — 36569 INSJ PICC 5 YR+ W/O IMAGING: CPT

## 2024-01-01 PROCEDURE — 99223 1ST HOSP IP/OBS HIGH 75: CPT | Mod: 25 | Performed by: INTERNAL MEDICINE

## 2024-01-01 PROCEDURE — 87210 SMEAR WET MOUNT SALINE/INK: CPT | Performed by: INTERNAL MEDICINE

## 2024-01-01 PROCEDURE — 87449 NOS EACH ORGANISM AG IA: CPT | Performed by: INTERNAL MEDICINE

## 2024-01-01 PROCEDURE — 83550 IRON BINDING TEST: CPT | Performed by: INTERNAL MEDICINE

## 2024-01-01 PROCEDURE — 87637 SARSCOV2&INF A&B&RSV AMP PRB: CPT | Performed by: STUDENT IN AN ORGANIZED HEALTH CARE EDUCATION/TRAINING PROGRAM

## 2024-01-01 PROCEDURE — 84155 ASSAY OF PROTEIN SERUM: CPT | Performed by: INTERNAL MEDICINE

## 2024-01-01 PROCEDURE — 82805 BLOOD GASES W/O2 SATURATION: CPT | Performed by: NURSE PRACTITIONER

## 2024-01-01 PROCEDURE — 86147 CARDIOLIPIN ANTIBODY EA IG: CPT | Performed by: INTERNAL MEDICINE

## 2024-01-01 PROCEDURE — 87389 HIV-1 AG W/HIV-1&-2 AB AG IA: CPT | Performed by: INTERNAL MEDICINE

## 2024-01-01 PROCEDURE — 5A1935Z RESPIRATORY VENTILATION, LESS THAN 24 CONSECUTIVE HOURS: ICD-10-PCS | Performed by: INTERNAL MEDICINE

## 2024-01-01 PROCEDURE — 94645 CONT INHLJ TX EACH ADDL HOUR: CPT

## 2024-01-01 PROCEDURE — 37191 INS ENDOVAS VENA CAVA FILTR: CPT

## 2024-01-01 PROCEDURE — 89051 BODY FLUID CELL COUNT: CPT | Performed by: INTERNAL MEDICINE

## 2024-01-01 PROCEDURE — 36415 COLL VENOUS BLD VENIPUNCTURE: CPT | Performed by: EMERGENCY MEDICINE

## 2024-01-01 PROCEDURE — 78580 LUNG PERFUSION IMAGING: CPT

## 2024-01-01 PROCEDURE — P9047 ALBUMIN (HUMAN), 25%, 50ML: HCPCS | Mod: JZ | Performed by: INTERNAL MEDICINE

## 2024-01-01 PROCEDURE — 86900 BLOOD TYPING SEROLOGIC ABO: CPT | Performed by: STUDENT IN AN ORGANIZED HEALTH CARE EDUCATION/TRAINING PROGRAM

## 2024-01-01 PROCEDURE — 76705 ECHO EXAM OF ABDOMEN: CPT

## 2024-01-01 PROCEDURE — 250N000011 HC RX IP 250 OP 636: Performed by: HOSPITALIST

## 2024-01-01 PROCEDURE — 272N000727 HC KIT, CATH 5FR  DUAL LUMEN POWERMIDLINE

## 2024-01-01 PROCEDURE — 99291 CRITICAL CARE FIRST HOUR: CPT | Mod: 25 | Performed by: INTERNAL MEDICINE

## 2024-01-01 PROCEDURE — 76770 US EXAM ABDO BACK WALL COMP: CPT

## 2024-01-01 PROCEDURE — 87305 ASPERGILLUS AG IA: CPT | Performed by: INTERNAL MEDICINE

## 2024-01-01 PROCEDURE — 250N000011 HC RX IP 250 OP 636: Performed by: NURSE ANESTHETIST, CERTIFIED REGISTERED

## 2024-01-01 PROCEDURE — 84300 ASSAY OF URINE SODIUM: CPT | Performed by: HOSPITALIST

## 2024-01-01 PROCEDURE — G0463 HOSPITAL OUTPT CLINIC VISIT: HCPCS

## 2024-01-01 PROCEDURE — 84145 PROCALCITONIN (PCT): CPT | Performed by: EMERGENCY MEDICINE

## 2024-01-01 PROCEDURE — 999N000159 HC STATISTIC RCP TIME PACU VENT EA 10 MIN

## 2024-01-01 PROCEDURE — 80048 BASIC METABOLIC PNL TOTAL CA: CPT | Mod: ORL | Performed by: NURSE PRACTITIONER

## 2024-01-01 PROCEDURE — 86880 COOMBS TEST DIRECT: CPT | Performed by: INTERNAL MEDICINE

## 2024-01-01 PROCEDURE — 82746 ASSAY OF FOLIC ACID SERUM: CPT | Performed by: INTERNAL MEDICINE

## 2024-01-01 PROCEDURE — 258N000001 HC RX 258: Performed by: EMERGENCY MEDICINE

## 2024-01-01 PROCEDURE — 80048 BASIC METABOLIC PNL TOTAL CA: CPT | Performed by: EMERGENCY MEDICINE

## 2024-01-01 PROCEDURE — 250N000011 HC RX IP 250 OP 636: Performed by: EMERGENCY MEDICINE

## 2024-01-01 PROCEDURE — B4121ZZ FLUOROSCOPY OF HEPATIC ARTERY USING LOW OSMOLAR CONTRAST: ICD-10-PCS | Performed by: STUDENT IN AN ORGANIZED HEALTH CARE EDUCATION/TRAINING PROGRAM

## 2024-01-01 PROCEDURE — 84460 ALANINE AMINO (ALT) (SGPT): CPT | Performed by: INTERNAL MEDICINE

## 2024-01-01 PROCEDURE — 87040 BLOOD CULTURE FOR BACTERIA: CPT | Performed by: HOSPITALIST

## 2024-01-01 PROCEDURE — 99223 1ST HOSP IP/OBS HIGH 75: CPT | Performed by: INTERNAL MEDICINE

## 2024-01-01 PROCEDURE — 85610 PROTHROMBIN TIME: CPT | Performed by: INTERNAL MEDICINE

## 2024-01-01 PROCEDURE — 999N000055 HC STATISTIC END TITIAL CO2 MONITORING

## 2024-01-01 PROCEDURE — 85018 HEMOGLOBIN: CPT | Performed by: STUDENT IN AN ORGANIZED HEALTH CARE EDUCATION/TRAINING PROGRAM

## 2024-01-01 PROCEDURE — 999N000065 XR ABDOMEN PORT 1 VIEW

## 2024-01-01 PROCEDURE — 74174 CTA ABD&PLVS W/CONTRAST: CPT | Mod: 26 | Performed by: STUDENT IN AN ORGANIZED HEALTH CARE EDUCATION/TRAINING PROGRAM

## 2024-01-01 PROCEDURE — 86146 BETA-2 GLYCOPROTEIN ANTIBODY: CPT | Performed by: INTERNAL MEDICINE

## 2024-01-01 PROCEDURE — 81001 URINALYSIS AUTO W/SCOPE: CPT | Performed by: EMERGENCY MEDICINE

## 2024-01-01 PROCEDURE — 93306 TTE W/DOPPLER COMPLETE: CPT | Mod: 26 | Performed by: INTERNAL MEDICINE

## 2024-01-01 PROCEDURE — 250N000009 HC RX 250: Performed by: ANESTHESIOLOGY

## 2024-01-01 PROCEDURE — 250N000011 HC RX IP 250 OP 636: Mod: JZ | Performed by: INTERNAL MEDICINE

## 2024-01-01 PROCEDURE — 250N000011 HC RX IP 250 OP 636

## 2024-01-01 PROCEDURE — 85027 COMPLETE CBC AUTOMATED: CPT | Performed by: INTERNAL MEDICINE

## 2024-01-01 PROCEDURE — 99152 MOD SED SAME PHYS/QHP 5/>YRS: CPT | Performed by: INTERNAL MEDICINE

## 2024-01-01 PROCEDURE — 83876 ASSAY MYELOPEROXIDASE: CPT | Performed by: INTERNAL MEDICINE

## 2024-01-01 PROCEDURE — 272N000272 HC CONTINUOUS NEBULIZER MICRO PUMP

## 2024-01-01 PROCEDURE — C1894 INTRO/SHEATH, NON-LASER: HCPCS | Performed by: INTERNAL MEDICINE

## 2024-01-01 PROCEDURE — 258N000003 HC RX IP 258 OP 636: Performed by: STUDENT IN AN ORGANIZED HEALTH CARE EDUCATION/TRAINING PROGRAM

## 2024-01-01 PROCEDURE — 83605 ASSAY OF LACTIC ACID: CPT | Performed by: HOSPITALIST

## 2024-01-01 PROCEDURE — 86036 ANCA SCREEN EACH ANTIBODY: CPT | Performed by: INTERNAL MEDICINE

## 2024-01-01 PROCEDURE — 85060 BLOOD SMEAR INTERPRETATION: CPT | Performed by: PATHOLOGY

## 2024-01-01 PROCEDURE — 370N000017 HC ANESTHESIA TECHNICAL FEE, PER MIN

## 2024-01-01 PROCEDURE — 250N000009 HC RX 250: Performed by: NURSE ANESTHETIST, CERTIFIED REGISTERED

## 2024-01-01 PROCEDURE — 02CQ3ZZ EXTIRPATION OF MATTER FROM RIGHT PULMONARY ARTERY, PERCUTANEOUS APPROACH: ICD-10-PCS | Performed by: RADIOLOGY

## 2024-01-01 PROCEDURE — 99291 CRITICAL CARE FIRST HOUR: CPT | Performed by: ANESTHESIOLOGY

## 2024-01-01 PROCEDURE — 04L33DZ OCCLUSION OF HEPATIC ARTERY WITH INTRALUMINAL DEVICE, PERCUTANEOUS APPROACH: ICD-10-PCS | Performed by: STUDENT IN AN ORGANIZED HEALTH CARE EDUCATION/TRAINING PROGRAM

## 2024-01-01 PROCEDURE — 999N000248 HC STATISTIC IV INSERT WITH US BY RN

## 2024-01-01 PROCEDURE — 80048 BASIC METABOLIC PNL TOTAL CA: CPT | Performed by: ANESTHESIOLOGY

## 2024-01-01 PROCEDURE — 87581 M.PNEUMON DNA AMP PROBE: CPT | Performed by: INTERNAL MEDICINE

## 2024-01-01 PROCEDURE — 258N000003 HC RX IP 258 OP 636: Performed by: PHYSICIAN ASSISTANT

## 2024-01-01 PROCEDURE — 0B9G8ZX DRAINAGE OF LEFT UPPER LUNG LOBE, VIA NATURAL OR ARTIFICIAL OPENING ENDOSCOPIC, DIAGNOSTIC: ICD-10-PCS | Performed by: INTERNAL MEDICINE

## 2024-01-01 PROCEDURE — 250N000013 HC RX MED GY IP 250 OP 250 PS 637: Performed by: STUDENT IN AN ORGANIZED HEALTH CARE EDUCATION/TRAINING PROGRAM

## 2024-01-01 PROCEDURE — 94644 CONT INHLJ TX 1ST HOUR: CPT

## 2024-01-01 PROCEDURE — 999N000065 XR CHEST PORT 1 VIEW

## 2024-01-01 PROCEDURE — 36620 INSERTION CATHETER ARTERY: CPT | Performed by: INTERNAL MEDICINE

## 2024-01-01 PROCEDURE — 83615 LACTATE (LD) (LDH) ENZYME: CPT | Performed by: INTERNAL MEDICINE

## 2024-01-01 PROCEDURE — 85520 HEPARIN ASSAY: CPT | Performed by: STUDENT IN AN ORGANIZED HEALTH CARE EDUCATION/TRAINING PROGRAM

## 2024-01-01 PROCEDURE — 99292 CRITICAL CARE ADDL 30 MIN: CPT | Performed by: INTERNAL MEDICINE

## 2024-01-01 PROCEDURE — 343N000001 HC RX 343: Performed by: INTERNAL MEDICINE

## 2024-01-01 PROCEDURE — 86704 HEP B CORE ANTIBODY TOTAL: CPT | Performed by: INTERNAL MEDICINE

## 2024-01-01 PROCEDURE — 37184 PRIM ART M-THRMBC 1ST VSL: CPT | Mod: 50

## 2024-01-01 PROCEDURE — 36415 COLL VENOUS BLD VENIPUNCTURE: CPT | Performed by: HOSPITALIST

## 2024-01-01 PROCEDURE — 86706 HEP B SURFACE ANTIBODY: CPT | Performed by: INTERNAL MEDICINE

## 2024-01-01 PROCEDURE — 250N000013 HC RX MED GY IP 250 OP 250 PS 637: Performed by: HOSPITALIST

## 2024-01-01 PROCEDURE — 84484 ASSAY OF TROPONIN QUANT: CPT | Performed by: INTERNAL MEDICINE

## 2024-01-01 PROCEDURE — P9016 RBC LEUKOCYTES REDUCED: HCPCS | Performed by: STUDENT IN AN ORGANIZED HEALTH CARE EDUCATION/TRAINING PROGRAM

## 2024-01-01 PROCEDURE — 82330 ASSAY OF CALCIUM: CPT

## 2024-01-01 PROCEDURE — 99152 MOD SED SAME PHYS/QHP 5/>YRS: CPT

## 2024-01-01 PROCEDURE — 86923 COMPATIBILITY TEST ELECTRIC: CPT | Performed by: STUDENT IN AN ORGANIZED HEALTH CARE EDUCATION/TRAINING PROGRAM

## 2024-01-01 PROCEDURE — 250N000011 HC RX IP 250 OP 636: Performed by: PHYSICIAN ASSISTANT

## 2024-01-01 PROCEDURE — 71250 CT THORAX DX C-: CPT

## 2024-01-01 PROCEDURE — 999N000253 HC STATISTIC WEANING TRIALS

## 2024-01-01 PROCEDURE — 272N000143 HC KIT CR3

## 2024-01-01 PROCEDURE — 82962 GLUCOSE BLOOD TEST: CPT

## 2024-01-01 PROCEDURE — 86923 COMPATIBILITY TEST ELECTRIC: CPT | Performed by: INTERNAL MEDICINE

## 2024-01-01 PROCEDURE — 82040 ASSAY OF SERUM ALBUMIN: CPT | Performed by: EMERGENCY MEDICINE

## 2024-01-01 PROCEDURE — 93451 RIGHT HEART CATH: CPT | Mod: 26 | Performed by: INTERNAL MEDICINE

## 2024-01-01 PROCEDURE — 82728 ASSAY OF FERRITIN: CPT | Performed by: INTERNAL MEDICINE

## 2024-01-01 PROCEDURE — 93005 ELECTROCARDIOGRAM TRACING: CPT | Performed by: EMERGENCY MEDICINE

## 2024-01-01 PROCEDURE — 85347 COAGULATION TIME ACTIVATED: CPT

## 2024-01-01 PROCEDURE — 85045 AUTOMATED RETICULOCYTE COUNT: CPT | Performed by: INTERNAL MEDICINE

## 2024-01-01 PROCEDURE — 86901 BLOOD TYPING SEROLOGIC RH(D): CPT | Performed by: STUDENT IN AN ORGANIZED HEALTH CARE EDUCATION/TRAINING PROGRAM

## 2024-01-01 PROCEDURE — 93970 EXTREMITY STUDY: CPT

## 2024-01-01 PROCEDURE — 258N000003 HC RX IP 258 OP 636: Performed by: ANESTHESIOLOGY

## 2024-01-01 PROCEDURE — 43255 EGD CONTROL BLEEDING ANY: CPT | Performed by: INTERNAL MEDICINE

## 2024-01-01 PROCEDURE — 87798 DETECT AGENT NOS DNA AMP: CPT | Performed by: INTERNAL MEDICINE

## 2024-01-01 PROCEDURE — 80048 BASIC METABOLIC PNL TOTAL CA: CPT | Performed by: INTERNAL MEDICINE

## 2024-01-01 PROCEDURE — C1880 VENA CAVA FILTER: HCPCS

## 2024-01-01 PROCEDURE — 999N000259 HC STATISTIC EXTUBATION

## 2024-01-01 PROCEDURE — 99207 PR NO BILLABLE SERVICE THIS VISIT: CPT | Performed by: STUDENT IN AN ORGANIZED HEALTH CARE EDUCATION/TRAINING PROGRAM

## 2024-01-01 PROCEDURE — 86923 COMPATIBILITY TEST ELECTRIC: CPT

## 2024-01-01 PROCEDURE — 85004 AUTOMATED DIFF WBC COUNT: CPT | Performed by: INTERNAL MEDICINE

## 2024-01-01 PROCEDURE — 85390 FIBRINOLYSINS SCREEN I&R: CPT | Mod: 26 | Performed by: PATHOLOGY

## 2024-01-01 PROCEDURE — 258N000003 HC RX IP 258 OP 636: Performed by: NURSE ANESTHETIST, CERTIFIED REGISTERED

## 2024-01-01 PROCEDURE — 87070 CULTURE OTHR SPECIMN AEROBIC: CPT | Performed by: INTERNAL MEDICINE

## 2024-01-01 PROCEDURE — 85610 PROTHROMBIN TIME: CPT | Performed by: EMERGENCY MEDICINE

## 2024-01-01 PROCEDURE — 99232 SBSQ HOSP IP/OBS MODERATE 35: CPT | Performed by: INTERNAL MEDICINE

## 2024-01-01 PROCEDURE — 85025 COMPLETE CBC W/AUTO DIFF WBC: CPT | Performed by: EMERGENCY MEDICINE

## 2024-01-01 PROCEDURE — P9012 CRYOPRECIPITATE EACH UNIT: HCPCS

## 2024-01-01 PROCEDURE — 37244 VASC EMBOLIZE/OCCLUDE BLEED: CPT | Mod: GC | Performed by: STUDENT IN AN ORGANIZED HEALTH CARE EDUCATION/TRAINING PROGRAM

## 2024-01-01 PROCEDURE — 87101 SKIN FUNGI CULTURE: CPT | Performed by: INTERNAL MEDICINE

## 2024-01-01 PROCEDURE — 99239 HOSP IP/OBS DSCHRG MGMT >30: CPT | Performed by: INTERNAL MEDICINE

## 2024-01-01 PROCEDURE — 99222 1ST HOSP IP/OBS MODERATE 55: CPT | Mod: GC | Performed by: INTERNAL MEDICINE

## 2024-01-01 PROCEDURE — 255N000002 HC RX 255 OP 636: Performed by: STUDENT IN AN ORGANIZED HEALTH CARE EDUCATION/TRAINING PROGRAM

## 2024-01-01 PROCEDURE — 87102 FUNGUS ISOLATION CULTURE: CPT | Performed by: INTERNAL MEDICINE

## 2024-01-01 PROCEDURE — 80061 LIPID PANEL: CPT | Performed by: HOSPITALIST

## 2024-01-01 PROCEDURE — 86141 C-REACTIVE PROTEIN HS: CPT | Performed by: INTERNAL MEDICINE

## 2024-01-01 PROCEDURE — 82374 ASSAY BLOOD CARBON DIOXIDE: CPT | Performed by: INTERNAL MEDICINE

## 2024-01-01 PROCEDURE — 84132 ASSAY OF SERUM POTASSIUM: CPT | Performed by: ANESTHESIOLOGY

## 2024-01-01 PROCEDURE — 82310 ASSAY OF CALCIUM: CPT | Performed by: HOSPITALIST

## 2024-01-01 PROCEDURE — P9011 BLOOD SPLIT UNIT: HCPCS

## 2024-01-01 PROCEDURE — 86431 RHEUMATOID FACTOR QUANT: CPT | Performed by: INTERNAL MEDICINE

## 2024-01-01 PROCEDURE — 71275 CT ANGIOGRAPHY CHEST: CPT

## 2024-01-01 PROCEDURE — 83880 ASSAY OF NATRIURETIC PEPTIDE: CPT | Performed by: EMERGENCY MEDICINE

## 2024-01-01 PROCEDURE — 87486 CHLMYD PNEUM DNA AMP PROBE: CPT | Performed by: INTERNAL MEDICINE

## 2024-01-01 PROCEDURE — 85379 FIBRIN DEGRADATION QUANT: CPT | Performed by: INTERNAL MEDICINE

## 2024-01-01 PROCEDURE — 71275 CT ANGIOGRAPHY CHEST: CPT | Mod: 26 | Performed by: RADIOLOGY

## 2024-01-01 PROCEDURE — 84155 ASSAY OF PROTEIN SERUM: CPT | Performed by: STUDENT IN AN ORGANIZED HEALTH CARE EDUCATION/TRAINING PROGRAM

## 2024-01-01 PROCEDURE — 31645 BRNCHSC W/THER ASPIR 1ST: CPT | Performed by: INTERNAL MEDICINE

## 2024-01-01 PROCEDURE — 250N000012 HC RX MED GY IP 250 OP 636 PS 637: Performed by: STUDENT IN AN ORGANIZED HEALTH CARE EDUCATION/TRAINING PROGRAM

## 2024-01-01 PROCEDURE — 74018 RADEX ABDOMEN 1 VIEW: CPT

## 2024-01-01 PROCEDURE — 258N000001 HC RX 258: Performed by: INTERNAL MEDICINE

## 2024-01-01 PROCEDURE — 31624 DX BRONCHOSCOPE/LAVAGE: CPT

## 2024-01-01 PROCEDURE — 99233 SBSQ HOSP IP/OBS HIGH 50: CPT | Mod: GC | Performed by: INTERNAL MEDICINE

## 2024-01-01 PROCEDURE — 85004 AUTOMATED DIFF WBC COUNT: CPT | Performed by: STUDENT IN AN ORGANIZED HEALTH CARE EDUCATION/TRAINING PROGRAM

## 2024-01-01 PROCEDURE — 06H03DZ INSERTION OF INTRALUMINAL DEVICE INTO INFERIOR VENA CAVA, PERCUTANEOUS APPROACH: ICD-10-PCS | Performed by: RADIOLOGY

## 2024-01-01 PROCEDURE — 86803 HEPATITIS C AB TEST: CPT | Performed by: INTERNAL MEDICINE

## 2024-01-01 PROCEDURE — 82805 BLOOD GASES W/O2 SATURATION: CPT | Performed by: EMERGENCY MEDICINE

## 2024-01-01 PROCEDURE — 272N000188 HC ACCESSORY CR12

## 2024-01-01 PROCEDURE — 83735 ASSAY OF MAGNESIUM: CPT | Performed by: EMERGENCY MEDICINE

## 2024-01-01 PROCEDURE — 272N000452 HC KIT SHRLOCK 5FR POWER PICC TRIPLE LUMEN

## 2024-01-01 PROCEDURE — 272N000500 HC NEEDLE CR2

## 2024-01-01 PROCEDURE — 94002 VENT MGMT INPAT INIT DAY: CPT

## 2024-01-01 PROCEDURE — 74174 CTA ABD&PLVS W/CONTRAST: CPT

## 2024-01-01 PROCEDURE — 250N000024 HC ISOFLURANE, PER MIN

## 2024-01-01 PROCEDURE — 83516 IMMUNOASSAY NONANTIBODY: CPT | Performed by: INTERNAL MEDICINE

## 2024-01-01 PROCEDURE — 272N000001 HC OR GENERAL SUPPLY STERILE: Performed by: INTERNAL MEDICINE

## 2024-01-01 PROCEDURE — 82947 ASSAY GLUCOSE BLOOD QUANT: CPT | Performed by: INTERNAL MEDICINE

## 2024-01-01 PROCEDURE — 93010 ELECTROCARDIOGRAM REPORT: CPT | Performed by: INTERNAL MEDICINE

## 2024-01-01 PROCEDURE — P9059 PLASMA, FRZ BETWEEN 8-24HOUR: HCPCS | Performed by: STUDENT IN AN ORGANIZED HEALTH CARE EDUCATION/TRAINING PROGRAM

## 2024-01-01 PROCEDURE — 87077 CULTURE AEROBIC IDENTIFY: CPT | Performed by: INTERNAL MEDICINE

## 2024-01-01 PROCEDURE — 83605 ASSAY OF LACTIC ACID: CPT | Performed by: ANESTHESIOLOGY

## 2024-01-01 PROCEDURE — 76000 FLUOROSCOPY <1 HR PHYS/QHP: CPT | Mod: TC

## 2024-01-01 PROCEDURE — 75774 ARTERY X-RAY EACH VESSEL: CPT

## 2024-01-01 PROCEDURE — 255N000002 HC RX 255 OP 636: Performed by: ANESTHESIOLOGY

## 2024-01-01 PROCEDURE — 86635 COCCIDIOIDES ANTIBODY: CPT | Performed by: INTERNAL MEDICINE

## 2024-01-01 PROCEDURE — 258N000001 HC RX 258: Performed by: ANESTHESIOLOGY

## 2024-01-01 PROCEDURE — 84132 ASSAY OF SERUM POTASSIUM: CPT | Performed by: HOSPITALIST

## 2024-01-01 PROCEDURE — 85220 BLOOC CLOT FACTOR V TEST: CPT | Performed by: INTERNAL MEDICINE

## 2024-01-01 PROCEDURE — 272N000566 HC SHEATH CR3

## 2024-01-01 PROCEDURE — 85014 HEMATOCRIT: CPT | Performed by: STUDENT IN AN ORGANIZED HEALTH CARE EDUCATION/TRAINING PROGRAM

## 2024-01-01 PROCEDURE — 99239 HOSP IP/OBS DSCHRG MGMT >30: CPT | Mod: GC | Performed by: INTERNAL MEDICINE

## 2024-01-01 PROCEDURE — 02CR3ZZ EXTIRPATION OF MATTER FROM LEFT PULMONARY ARTERY, PERCUTANEOUS APPROACH: ICD-10-PCS | Performed by: RADIOLOGY

## 2024-01-01 PROCEDURE — 99232 SBSQ HOSP IP/OBS MODERATE 35: CPT | Mod: GC | Performed by: STUDENT IN AN ORGANIZED HEALTH CARE EDUCATION/TRAINING PROGRAM

## 2024-01-01 PROCEDURE — 76937 US GUIDE VASCULAR ACCESS: CPT | Mod: 26 | Performed by: STUDENT IN AN ORGANIZED HEALTH CARE EDUCATION/TRAINING PROGRAM

## 2024-01-01 PROCEDURE — 37185 PRIM ART M-THRMBC SBSQ VSL: CPT

## 2024-01-01 PROCEDURE — 999N000254 HC STATISTIC VENTILATOR TRANSFER

## 2024-01-01 PROCEDURE — 250N000009 HC RX 250: Performed by: HOSPITALIST

## 2024-01-01 PROCEDURE — 86900 BLOOD TYPING SEROLOGIC ABO: CPT | Performed by: INTERNAL MEDICINE

## 2024-01-01 PROCEDURE — C1760 CLOSURE DEV, VASC: HCPCS

## 2024-01-01 PROCEDURE — 83880 ASSAY OF NATRIURETIC PEPTIDE: CPT | Performed by: INTERNAL MEDICINE

## 2024-01-01 PROCEDURE — 4A023N6 MEASUREMENT OF CARDIAC SAMPLING AND PRESSURE, RIGHT HEART, PERCUTANEOUS APPROACH: ICD-10-PCS | Performed by: INTERNAL MEDICINE

## 2024-01-01 PROCEDURE — 75726 ARTERY X-RAYS ABDOMEN: CPT

## 2024-01-01 PROCEDURE — 99223 1ST HOSP IP/OBS HIGH 75: CPT | Performed by: PHYSICIAN ASSISTANT

## 2024-01-01 PROCEDURE — 82607 VITAMIN B-12: CPT | Performed by: INTERNAL MEDICINE

## 2024-01-01 PROCEDURE — 87641 MR-STAPH DNA AMP PROBE: CPT | Performed by: HOSPITALIST

## 2024-01-01 PROCEDURE — 99222 1ST HOSP IP/OBS MODERATE 55: CPT | Performed by: PHYSICIAN ASSISTANT

## 2024-01-01 PROCEDURE — 76937 US GUIDE VASCULAR ACCESS: CPT

## 2024-01-01 PROCEDURE — 83036 HEMOGLOBIN GLYCOSYLATED A1C: CPT | Performed by: HOSPITALIST

## 2024-01-01 PROCEDURE — 82805 BLOOD GASES W/O2 SATURATION: CPT | Performed by: HOSPITALIST

## 2024-01-01 PROCEDURE — 83605 ASSAY OF LACTIC ACID: CPT | Performed by: INTERNAL MEDICINE

## 2024-01-01 PROCEDURE — 370N000003 HC ANESTHESIA WARD SERVICE

## 2024-01-01 PROCEDURE — P9059 PLASMA, FRZ BETWEEN 8-24HOUR: HCPCS

## 2024-01-01 PROCEDURE — 85652 RBC SED RATE AUTOMATED: CPT | Performed by: INTERNAL MEDICINE

## 2024-01-01 PROCEDURE — 36247 INS CATH ABD/L-EXT ART 3RD: CPT

## 2024-01-01 PROCEDURE — 272N000278 HC DEVICE 5FR SECURACATH

## 2024-01-01 PROCEDURE — 99221 1ST HOSP IP/OBS SF/LOW 40: CPT | Performed by: STUDENT IN AN ORGANIZED HEALTH CARE EDUCATION/TRAINING PROGRAM

## 2024-01-01 PROCEDURE — 87350 HEPATITIS BE AG IA: CPT | Performed by: INTERNAL MEDICINE

## 2024-01-01 PROCEDURE — 250N000009 HC RX 250: Performed by: EMERGENCY MEDICINE

## 2024-01-01 PROCEDURE — 0W3P8ZZ CONTROL BLEEDING IN GASTROINTESTINAL TRACT, VIA NATURAL OR ARTIFICIAL OPENING ENDOSCOPIC: ICD-10-PCS | Performed by: INTERNAL MEDICINE

## 2024-01-01 PROCEDURE — 82805 BLOOD GASES W/O2 SATURATION: CPT | Performed by: ANESTHESIOLOGY

## 2024-01-01 PROCEDURE — 99223 1ST HOSP IP/OBS HIGH 75: CPT | Mod: GC | Performed by: INTERNAL MEDICINE

## 2024-01-01 PROCEDURE — 250N000011 HC RX IP 250 OP 636: Performed by: RADIOLOGY

## 2024-01-01 PROCEDURE — 97166 OT EVAL MOD COMPLEX 45 MIN: CPT | Mod: GO

## 2024-01-01 PROCEDURE — 74174 CTA ABD&PLVS W/CONTRAST: CPT | Mod: 26 | Performed by: RADIOLOGY

## 2024-01-01 PROCEDURE — 250N000013 HC RX MED GY IP 250 OP 250 PS 637: Performed by: ANESTHESIOLOGY

## 2024-01-01 PROCEDURE — 84466 ASSAY OF TRANSFERRIN: CPT | Performed by: INTERNAL MEDICINE

## 2024-01-01 PROCEDURE — 99292 CRITICAL CARE ADDL 30 MIN: CPT | Mod: GC | Performed by: INTERNAL MEDICINE

## 2024-01-01 PROCEDURE — 99223 1ST HOSP IP/OBS HIGH 75: CPT | Performed by: HOSPITALIST

## 2024-01-01 PROCEDURE — C1751 CATH, INF, PER/CENT/MIDLINE: HCPCS | Performed by: INTERNAL MEDICINE

## 2024-01-01 PROCEDURE — 87641 MR-STAPH DNA AMP PROBE: CPT | Performed by: INTERNAL MEDICINE

## 2024-01-01 PROCEDURE — 85027 COMPLETE CBC AUTOMATED: CPT | Performed by: HOSPITALIST

## 2024-01-01 PROCEDURE — 99207 PR NO BILLABLE SERVICE THIS VISIT: CPT | Performed by: INTERNAL MEDICINE

## 2024-01-01 PROCEDURE — P9037 PLATE PHERES LEUKOREDU IRRAD: HCPCS

## 2024-01-01 PROCEDURE — 250N000012 HC RX MED GY IP 250 OP 636 PS 637: Performed by: EMERGENCY MEDICINE

## 2024-01-01 PROCEDURE — 83010 ASSAY OF HAPTOGLOBIN QUANT: CPT | Performed by: INTERNAL MEDICINE

## 2024-01-01 PROCEDURE — 37244 VASC EMBOLIZE/OCCLUDE BLEED: CPT

## 2024-01-01 PROCEDURE — C1769 GUIDE WIRE: HCPCS | Performed by: INTERNAL MEDICINE

## 2024-01-01 PROCEDURE — 86481 TB AG RESPONSE T-CELL SUSP: CPT | Performed by: INTERNAL MEDICINE

## 2024-01-01 PROCEDURE — 343N000001 HC RX 343 MED OP 636: Performed by: INTERNAL MEDICINE

## 2024-01-01 PROCEDURE — P9016 RBC LEUKOCYTES REDUCED: HCPCS

## 2024-01-01 PROCEDURE — 06HY33Z INSERTION OF INFUSION DEVICE INTO LOWER VEIN, PERCUTANEOUS APPROACH: ICD-10-PCS | Performed by: INTERNAL MEDICINE

## 2024-01-01 PROCEDURE — 80061 LIPID PANEL: CPT | Mod: ORL | Performed by: NURSE PRACTITIONER

## 2024-01-01 RX ORDER — POTASSIUM CHLORIDE 29.8 MG/ML
20 INJECTION INTRAVENOUS ONCE
Status: COMPLETED | OUTPATIENT
Start: 2024-01-01 | End: 2024-01-01

## 2024-01-01 RX ORDER — NOREPINEPHRINE BITARTRATE 0.02 MG/ML
.01-.6 INJECTION, SOLUTION INTRAVENOUS CONTINUOUS
Status: DISCONTINUED | OUTPATIENT
Start: 2024-01-01 | End: 2024-01-01 | Stop reason: HOSPADM

## 2024-01-01 RX ORDER — DEXTROSE MONOHYDRATE 25 G/50ML
25-50 INJECTION, SOLUTION INTRAVENOUS
Status: DISCONTINUED | OUTPATIENT
Start: 2024-01-01 | End: 2024-01-01

## 2024-01-01 RX ORDER — DEXTROSE MONOHYDRATE 25 G/50ML
25 INJECTION, SOLUTION INTRAVENOUS ONCE
Status: COMPLETED | OUTPATIENT
Start: 2024-01-01 | End: 2024-01-01

## 2024-01-01 RX ORDER — AMOXICILLIN 250 MG
1 CAPSULE ORAL 2 TIMES DAILY
Status: DISCONTINUED | OUTPATIENT
Start: 2024-01-01 | End: 2024-01-01 | Stop reason: HOSPADM

## 2024-01-01 RX ORDER — NALOXONE HYDROCHLORIDE 0.4 MG/ML
0.2 INJECTION, SOLUTION INTRAMUSCULAR; INTRAVENOUS; SUBCUTANEOUS
Status: DISCONTINUED | OUTPATIENT
Start: 2024-01-01 | End: 2024-01-01 | Stop reason: HOSPADM

## 2024-01-01 RX ORDER — NALOXONE HYDROCHLORIDE 0.4 MG/ML
0.4 INJECTION, SOLUTION INTRAMUSCULAR; INTRAVENOUS; SUBCUTANEOUS
Status: DISCONTINUED | OUTPATIENT
Start: 2024-01-01 | End: 2024-01-01 | Stop reason: HOSPADM

## 2024-01-01 RX ORDER — ACETAZOLAMIDE 500 MG/5ML
500 INJECTION, POWDER, LYOPHILIZED, FOR SOLUTION INTRAVENOUS EVERY 8 HOURS
Status: DISCONTINUED | OUTPATIENT
Start: 2024-01-01 | End: 2024-01-01

## 2024-01-01 RX ORDER — ALBUTEROL SULFATE 0.83 MG/ML
SOLUTION RESPIRATORY (INHALATION)
Status: COMPLETED
Start: 2024-01-01 | End: 2024-01-01

## 2024-01-01 RX ORDER — GUAIFENESIN 200 MG/10ML
200 LIQUID ORAL EVERY 4 HOURS PRN
OUTPATIENT
Start: 2024-01-01

## 2024-01-01 RX ORDER — IODIXANOL 320 MG/ML
100 INJECTION, SOLUTION INTRAVASCULAR ONCE
Status: COMPLETED | OUTPATIENT
Start: 2024-01-01 | End: 2024-01-01

## 2024-01-01 RX ORDER — SILDENAFIL 10 MG/ML
10 POWDER, FOR SUSPENSION ORAL EVERY 8 HOURS
Status: DISCONTINUED | OUTPATIENT
Start: 2024-01-01 | End: 2024-01-01

## 2024-01-01 RX ORDER — GLYCOPYRROLATE 0.2 MG/ML
0.2 INJECTION, SOLUTION INTRAMUSCULAR; INTRAVENOUS ONCE
Status: DISCONTINUED | OUTPATIENT
Start: 2024-01-01 | End: 2024-01-01 | Stop reason: HOSPADM

## 2024-01-01 RX ORDER — SODIUM CHLORIDE 9 MG/ML
INJECTION, SOLUTION INTRAVENOUS CONTINUOUS
OUTPATIENT
Start: 2024-01-01

## 2024-01-01 RX ORDER — CEFTRIAXONE 2 G/1
2 INJECTION, POWDER, FOR SOLUTION INTRAMUSCULAR; INTRAVENOUS EVERY 24 HOURS
Status: CANCELLED | OUTPATIENT
Start: 2024-01-01

## 2024-01-01 RX ORDER — IPRATROPIUM BROMIDE AND ALBUTEROL SULFATE 2.5; .5 MG/3ML; MG/3ML
3 SOLUTION RESPIRATORY (INHALATION)
OUTPATIENT
Start: 2024-01-01

## 2024-01-01 RX ORDER — CALCIUM CARBONATE 500 MG/1
1000 TABLET, CHEWABLE ORAL 4 TIMES DAILY PRN
Status: DISCONTINUED | OUTPATIENT
Start: 2024-01-01 | End: 2024-01-01 | Stop reason: HOSPADM

## 2024-01-01 RX ORDER — POTASSIUM CHLORIDE 1.5 G/1.58G
20 POWDER, FOR SOLUTION ORAL ONCE
Status: COMPLETED | OUTPATIENT
Start: 2024-01-01 | End: 2024-01-01

## 2024-01-01 RX ORDER — AMOXICILLIN 250 MG
2 CAPSULE ORAL 2 TIMES DAILY
Status: DISCONTINUED | OUTPATIENT
Start: 2024-01-01 | End: 2024-01-01

## 2024-01-01 RX ORDER — MAGNESIUM SULFATE HEPTAHYDRATE 40 MG/ML
2 INJECTION, SOLUTION INTRAVENOUS ONCE
Status: COMPLETED | OUTPATIENT
Start: 2024-01-01 | End: 2024-01-01

## 2024-01-01 RX ORDER — NICOTINE POLACRILEX 4 MG
15-30 LOZENGE BUCCAL
Status: DISCONTINUED | OUTPATIENT
Start: 2024-01-01 | End: 2024-01-01 | Stop reason: HOSPADM

## 2024-01-01 RX ORDER — NICOTINE POLACRILEX 4 MG
15-30 LOZENGE BUCCAL
OUTPATIENT
Start: 2024-01-01

## 2024-01-01 RX ORDER — HEPARIN SODIUM 1000 [USP'U]/ML
2500 INJECTION, SOLUTION INTRAVENOUS; SUBCUTANEOUS ONCE
Status: COMPLETED | OUTPATIENT
Start: 2024-01-01 | End: 2024-01-01

## 2024-01-01 RX ORDER — ALBUTEROL SULFATE 5 MG/ML
10 SOLUTION RESPIRATORY (INHALATION) ONCE
Status: COMPLETED | OUTPATIENT
Start: 2024-01-01 | End: 2024-01-01

## 2024-01-01 RX ORDER — ONDANSETRON 2 MG/ML
4 INJECTION INTRAMUSCULAR; INTRAVENOUS EVERY 6 HOURS PRN
Status: CANCELLED | OUTPATIENT
Start: 2024-01-01

## 2024-01-01 RX ORDER — CALCIUM GLUCONATE 20 MG/ML
1 INJECTION, SOLUTION INTRAVENOUS ONCE
Status: COMPLETED | OUTPATIENT
Start: 2024-01-01 | End: 2024-01-01

## 2024-01-01 RX ORDER — AMOXICILLIN 250 MG
1 CAPSULE ORAL 2 TIMES DAILY
Status: DISCONTINUED | OUTPATIENT
Start: 2024-01-01 | End: 2024-01-01

## 2024-01-01 RX ORDER — AMINO ACIDS/PROTEIN HYDROLYS 11G-40/45
1 LIQUID IN PACKET (ML) ORAL 3 TIMES DAILY
Status: CANCELLED | OUTPATIENT
Start: 2024-01-01

## 2024-01-01 RX ORDER — DOXYCYCLINE 100 MG/10ML
100 INJECTION, POWDER, LYOPHILIZED, FOR SOLUTION INTRAVENOUS EVERY 12 HOURS
Status: CANCELLED | OUTPATIENT
Start: 2024-01-01

## 2024-01-01 RX ORDER — HEPARIN SODIUM 200 [USP'U]/100ML
1 INJECTION, SOLUTION INTRAVENOUS EVERY 5 MIN PRN
Status: DISCONTINUED | OUTPATIENT
Start: 2024-01-01 | End: 2024-01-01

## 2024-01-01 RX ORDER — PROCHLORPERAZINE MALEATE 10 MG
10 TABLET ORAL EVERY 6 HOURS PRN
Status: DISCONTINUED | OUTPATIENT
Start: 2024-01-01 | End: 2024-01-01 | Stop reason: HOSPADM

## 2024-01-01 RX ORDER — CHLOROTHIAZIDE SODIUM 500 MG/1
1000 INJECTION INTRAVENOUS ONCE
Status: COMPLETED | OUTPATIENT
Start: 2024-01-01 | End: 2024-01-01

## 2024-01-01 RX ORDER — ONDANSETRON 4 MG/1
4 TABLET, ORALLY DISINTEGRATING ORAL EVERY 6 HOURS PRN
Status: CANCELLED | OUTPATIENT
Start: 2024-01-01

## 2024-01-01 RX ORDER — AMOXICILLIN 250 MG
2 CAPSULE ORAL 2 TIMES DAILY PRN
Status: DISCONTINUED | OUTPATIENT
Start: 2024-01-01 | End: 2024-01-01 | Stop reason: HOSPADM

## 2024-01-01 RX ORDER — PROCHLORPERAZINE 25 MG
25 SUPPOSITORY, RECTAL RECTAL EVERY 12 HOURS PRN
Status: DISCONTINUED | OUTPATIENT
Start: 2024-01-01 | End: 2024-01-01 | Stop reason: HOSPADM

## 2024-01-01 RX ORDER — DEXTROSE MONOHYDRATE 25 G/50ML
25-50 INJECTION, SOLUTION INTRAVENOUS
OUTPATIENT
Start: 2024-01-01

## 2024-01-01 RX ORDER — ONDANSETRON 4 MG/1
4 TABLET, ORALLY DISINTEGRATING ORAL EVERY 6 HOURS PRN
Status: DISCONTINUED | OUTPATIENT
Start: 2024-01-01 | End: 2024-01-01

## 2024-01-01 RX ORDER — POTASSIUM CHLORIDE 7.45 MG/ML
10 INJECTION INTRAVENOUS
Status: COMPLETED | OUTPATIENT
Start: 2024-01-01 | End: 2024-01-01

## 2024-01-01 RX ORDER — HEPARIN SODIUM 10000 [USP'U]/100ML
0-5000 INJECTION, SOLUTION INTRAVENOUS CONTINUOUS
Status: CANCELLED | OUTPATIENT
Start: 2024-01-01

## 2024-01-01 RX ORDER — HEPARIN SODIUM 200 [USP'U]/100ML
1 INJECTION, SOLUTION INTRAVENOUS CONTINUOUS PRN
Status: DISCONTINUED | OUTPATIENT
Start: 2024-01-01 | End: 2024-01-01 | Stop reason: HOSPADM

## 2024-01-01 RX ORDER — DEXTROSE MONOHYDRATE 25 G/50ML
25-50 INJECTION, SOLUTION INTRAVENOUS
Status: CANCELLED | OUTPATIENT
Start: 2024-01-01

## 2024-01-01 RX ORDER — NALOXONE HYDROCHLORIDE 0.4 MG/ML
0.2 INJECTION, SOLUTION INTRAMUSCULAR; INTRAVENOUS; SUBCUTANEOUS
Status: CANCELLED | OUTPATIENT
Start: 2024-01-01

## 2024-01-01 RX ORDER — LIDOCAINE 40 MG/G
CREAM TOPICAL
Status: DISCONTINUED | OUTPATIENT
Start: 2024-01-01 | End: 2024-01-01 | Stop reason: HOSPADM

## 2024-01-01 RX ORDER — NICOTINE POLACRILEX 4 MG
15-30 LOZENGE BUCCAL
Status: DISCONTINUED | OUTPATIENT
Start: 2024-01-01 | End: 2024-01-01

## 2024-01-01 RX ORDER — DEXTROSE MONOHYDRATE 25 G/50ML
25-50 INJECTION, SOLUTION INTRAVENOUS
Status: DISCONTINUED | OUTPATIENT
Start: 2024-01-01 | End: 2024-01-01 | Stop reason: HOSPADM

## 2024-01-01 RX ORDER — POTASSIUM CHLORIDE 1.5 G/1.58G
40 POWDER, FOR SOLUTION ORAL ONCE
Status: COMPLETED | OUTPATIENT
Start: 2024-01-01 | End: 2024-01-01

## 2024-01-01 RX ORDER — ONDANSETRON 4 MG/1
4 TABLET, ORALLY DISINTEGRATING ORAL EVERY 6 HOURS PRN
Status: DISCONTINUED | OUTPATIENT
Start: 2024-01-01 | End: 2024-01-01 | Stop reason: HOSPADM

## 2024-01-01 RX ORDER — AMINO ACIDS/PROTEIN HYDROLYS 11G-40/45
1 LIQUID IN PACKET (ML) ORAL 3 TIMES DAILY
Status: DISCONTINUED | OUTPATIENT
Start: 2024-01-01 | End: 2024-01-01 | Stop reason: HOSPADM

## 2024-01-01 RX ORDER — ALBUMIN (HUMAN) 12.5 G/50ML
12.5 SOLUTION INTRAVENOUS EVERY 12 HOURS
OUTPATIENT
Start: 2024-01-01

## 2024-01-01 RX ORDER — CHLOROTHIAZIDE SODIUM 500 MG/1
1000 INJECTION INTRAVENOUS
Status: DISCONTINUED | OUTPATIENT
Start: 2024-01-01 | End: 2024-01-01

## 2024-01-01 RX ORDER — NALOXONE HYDROCHLORIDE 0.4 MG/ML
0.2 INJECTION, SOLUTION INTRAMUSCULAR; INTRAVENOUS; SUBCUTANEOUS
OUTPATIENT
Start: 2024-01-01

## 2024-01-01 RX ORDER — ONDANSETRON 2 MG/ML
4 INJECTION INTRAMUSCULAR; INTRAVENOUS EVERY 6 HOURS PRN
Status: DISCONTINUED | OUTPATIENT
Start: 2024-01-01 | End: 2024-01-01 | Stop reason: HOSPADM

## 2024-01-01 RX ORDER — VANCOMYCIN HYDROCHLORIDE 1 G/200ML
1000 INJECTION, SOLUTION INTRAVENOUS EVERY 24 HOURS
Status: DISCONTINUED | OUTPATIENT
Start: 2024-01-01 | End: 2024-01-01

## 2024-01-01 RX ORDER — POTASSIUM CHLORIDE 29.8 MG/ML
20 INJECTION INTRAVENOUS
Status: COMPLETED | OUTPATIENT
Start: 2024-01-01 | End: 2024-01-01

## 2024-01-01 RX ORDER — ACETAZOLAMIDE 500 MG/5ML
500 INJECTION, POWDER, LYOPHILIZED, FOR SOLUTION INTRAVENOUS 2 TIMES DAILY
Status: DISCONTINUED | OUTPATIENT
Start: 2024-01-01 | End: 2024-01-01

## 2024-01-01 RX ORDER — FUROSEMIDE 10 MG/ML
40 INJECTION INTRAMUSCULAR; INTRAVENOUS EVERY 8 HOURS
Status: DISCONTINUED | OUTPATIENT
Start: 2024-01-01 | End: 2024-01-01

## 2024-01-01 RX ORDER — IOPAMIDOL 755 MG/ML
77 INJECTION, SOLUTION INTRAVASCULAR ONCE
Status: COMPLETED | OUTPATIENT
Start: 2024-01-01 | End: 2024-01-01

## 2024-01-01 RX ORDER — DEXTROSE MONOHYDRATE 100 MG/ML
INJECTION, SOLUTION INTRAVENOUS CONTINUOUS PRN
OUTPATIENT
Start: 2024-01-01

## 2024-01-01 RX ORDER — AMOXICILLIN 250 MG
1 CAPSULE ORAL 2 TIMES DAILY PRN
OUTPATIENT
Start: 2024-01-01

## 2024-01-01 RX ORDER — LIDOCAINE 40 MG/G
CREAM TOPICAL
Status: CANCELLED | OUTPATIENT
Start: 2024-01-01 | End: 2024-01-01

## 2024-01-01 RX ORDER — POLYETHYLENE GLYCOL 3350 17 G/17G
17 POWDER, FOR SOLUTION ORAL DAILY PRN
Status: DISCONTINUED | OUTPATIENT
Start: 2024-01-01 | End: 2024-01-01 | Stop reason: HOSPADM

## 2024-01-01 RX ORDER — IPRATROPIUM BROMIDE AND ALBUTEROL SULFATE 2.5; .5 MG/3ML; MG/3ML
3 SOLUTION RESPIRATORY (INHALATION)
Status: DISCONTINUED | OUTPATIENT
Start: 2024-01-01 | End: 2024-01-01

## 2024-01-01 RX ORDER — AMOXICILLIN 250 MG
2 CAPSULE ORAL 2 TIMES DAILY
OUTPATIENT
Start: 2024-01-01

## 2024-01-01 RX ORDER — DEXTROSE MONOHYDRATE 100 MG/ML
INJECTION, SOLUTION INTRAVENOUS CONTINUOUS PRN
Status: DISCONTINUED | OUTPATIENT
Start: 2024-01-01 | End: 2024-01-01 | Stop reason: HOSPADM

## 2024-01-01 RX ORDER — ACETAZOLAMIDE 500 MG/5ML
500 INJECTION, POWDER, LYOPHILIZED, FOR SOLUTION INTRAVENOUS EVERY 12 HOURS
Status: DISCONTINUED | OUTPATIENT
Start: 2024-01-01 | End: 2024-01-01

## 2024-01-01 RX ORDER — NALOXONE HYDROCHLORIDE 0.4 MG/ML
0.4 INJECTION, SOLUTION INTRAMUSCULAR; INTRAVENOUS; SUBCUTANEOUS
Status: CANCELLED | OUTPATIENT
Start: 2024-01-01

## 2024-01-01 RX ORDER — MAGNESIUM HYDROXIDE/ALUMINUM HYDROXICE/SIMETHICONE 120; 1200; 1200 MG/30ML; MG/30ML; MG/30ML
30 SUSPENSION ORAL EVERY 4 HOURS PRN
Status: DISCONTINUED | OUTPATIENT
Start: 2024-01-01 | End: 2024-01-01

## 2024-01-01 RX ORDER — IOPAMIDOL 755 MG/ML
135 INJECTION, SOLUTION INTRAVASCULAR ONCE
Status: COMPLETED | OUTPATIENT
Start: 2024-01-01 | End: 2024-01-01

## 2024-01-01 RX ORDER — DEXTROSE MONOHYDRATE 100 MG/ML
INJECTION, SOLUTION INTRAVENOUS CONTINUOUS PRN
Status: CANCELLED | OUTPATIENT
Start: 2024-01-01

## 2024-01-01 RX ORDER — EPINEPHRINE 0.1 MG/ML
INJECTION INTRAVENOUS PRN
Status: DISCONTINUED | OUTPATIENT
Start: 2024-01-01 | End: 2024-01-01

## 2024-01-01 RX ORDER — FENTANYL CITRATE 50 UG/ML
100 INJECTION, SOLUTION INTRAMUSCULAR; INTRAVENOUS ONCE
Status: DISCONTINUED | OUTPATIENT
Start: 2024-01-01 | End: 2024-01-01 | Stop reason: HOSPADM

## 2024-01-01 RX ORDER — FENTANYL CITRATE 50 UG/ML
25-50 INJECTION, SOLUTION INTRAMUSCULAR; INTRAVENOUS EVERY 5 MIN PRN
Status: DISCONTINUED | OUTPATIENT
Start: 2024-01-01 | End: 2024-01-01 | Stop reason: HOSPADM

## 2024-01-01 RX ORDER — IPRATROPIUM BROMIDE AND ALBUTEROL SULFATE 2.5; .5 MG/3ML; MG/3ML
3 SOLUTION RESPIRATORY (INHALATION)
Status: DISCONTINUED | OUTPATIENT
Start: 2024-01-01 | End: 2024-01-01 | Stop reason: HOSPADM

## 2024-01-01 RX ORDER — AMOXICILLIN 250 MG
2 CAPSULE ORAL 2 TIMES DAILY
Status: CANCELLED | OUTPATIENT
Start: 2024-01-01

## 2024-01-01 RX ORDER — PROPOFOL 10 MG/ML
INJECTION, EMULSION INTRAVENOUS
Status: DISCONTINUED | OUTPATIENT
Start: 2024-01-01 | End: 2024-01-01

## 2024-01-01 RX ORDER — PROTAMINE SULFATE 10 MG/ML
25 INJECTION, SOLUTION INTRAVENOUS ONCE
Status: COMPLETED | OUTPATIENT
Start: 2024-01-01 | End: 2024-01-01

## 2024-01-01 RX ORDER — POLYETHYLENE GLYCOL 3350 17 G/17G
17 POWDER, FOR SOLUTION ORAL 2 TIMES DAILY PRN
Status: DISCONTINUED | OUTPATIENT
Start: 2024-01-01 | End: 2024-01-01 | Stop reason: HOSPADM

## 2024-01-01 RX ORDER — ACETAZOLAMIDE 500 MG/5ML
500 INJECTION, POWDER, LYOPHILIZED, FOR SOLUTION INTRAVENOUS ONCE
Status: COMPLETED | OUTPATIENT
Start: 2024-01-01 | End: 2024-01-01

## 2024-01-01 RX ORDER — CHLORHEXIDINE GLUCONATE ORAL RINSE 1.2 MG/ML
15 SOLUTION DENTAL 2 TIMES DAILY
Status: DISCONTINUED | OUTPATIENT
Start: 2024-01-01 | End: 2024-01-01 | Stop reason: HOSPADM

## 2024-01-01 RX ORDER — LIDOCAINE 40 MG/G
CREAM TOPICAL
Status: DISCONTINUED | OUTPATIENT
Start: 2024-01-01 | End: 2024-01-01 | Stop reason: ALTCHOICE

## 2024-01-01 RX ORDER — CHLORHEXIDINE GLUCONATE ORAL RINSE 1.2 MG/ML
15 SOLUTION DENTAL EVERY 12 HOURS
Status: DISCONTINUED | OUTPATIENT
Start: 2024-01-01 | End: 2024-01-01 | Stop reason: HOSPADM

## 2024-01-01 RX ORDER — VANCOMYCIN HYDROCHLORIDE
1500 EVERY 24 HOURS
Status: DISCONTINUED | OUTPATIENT
Start: 2024-01-01 | End: 2024-01-01

## 2024-01-01 RX ORDER — POLYETHYLENE GLYCOL 3350 17 G/17G
17 POWDER, FOR SOLUTION ORAL 2 TIMES DAILY PRN
OUTPATIENT
Start: 2024-01-01

## 2024-01-01 RX ORDER — CEFAZOLIN SODIUM 1 G/50ML
750 SOLUTION INTRAVENOUS EVERY 24 HOURS
Status: DISCONTINUED | OUTPATIENT
Start: 2024-01-01 | End: 2024-01-01

## 2024-01-01 RX ORDER — FUROSEMIDE 10 MG/ML
60 INJECTION INTRAMUSCULAR; INTRAVENOUS EVERY 12 HOURS
Status: DISCONTINUED | OUTPATIENT
Start: 2024-01-01 | End: 2024-01-01

## 2024-01-01 RX ORDER — MIDAZOLAM HCL IN 0.9 % NACL/PF 1 MG/ML
1-8 PLASTIC BAG, INJECTION (ML) INTRAVENOUS CONTINUOUS
Status: DISCONTINUED | OUTPATIENT
Start: 2024-01-01 | End: 2024-01-01

## 2024-01-01 RX ORDER — FUROSEMIDE 10 MG/ML
60 INJECTION INTRAMUSCULAR; INTRAVENOUS ONCE
Status: COMPLETED | OUTPATIENT
Start: 2024-01-01 | End: 2024-01-01

## 2024-01-01 RX ORDER — DOXYCYCLINE 100 MG/10ML
100 INJECTION, POWDER, LYOPHILIZED, FOR SOLUTION INTRAVENOUS EVERY 12 HOURS
Status: DISCONTINUED | OUTPATIENT
Start: 2024-01-01 | End: 2024-01-01 | Stop reason: HOSPADM

## 2024-01-01 RX ORDER — SODIUM CHLORIDE, SODIUM GLUCONATE, SODIUM ACETATE, POTASSIUM CHLORIDE AND MAGNESIUM CHLORIDE 526; 502; 368; 37; 30 MG/100ML; MG/100ML; MG/100ML; MG/100ML; MG/100ML
INJECTION, SOLUTION INTRAVENOUS CONTINUOUS PRN
Status: DISCONTINUED | OUTPATIENT
Start: 2024-01-01 | End: 2024-01-01

## 2024-01-01 RX ORDER — ACETAMINOPHEN 325 MG/10.15ML
650 LIQUID ORAL EVERY 4 HOURS PRN
OUTPATIENT
Start: 2024-01-01

## 2024-01-01 RX ORDER — AMOXICILLIN 250 MG
2 CAPSULE ORAL 2 TIMES DAILY PRN
OUTPATIENT
Start: 2024-01-01

## 2024-01-01 RX ORDER — DEXMEDETOMIDINE HYDROCHLORIDE 4 UG/ML
.1-1.2 INJECTION, SOLUTION INTRAVENOUS CONTINUOUS
Status: DISCONTINUED | OUTPATIENT
Start: 2024-01-01 | End: 2024-01-01

## 2024-01-01 RX ORDER — GUAIFENESIN 200 MG/10ML
200 LIQUID ORAL EVERY 4 HOURS PRN
Status: DISCONTINUED | OUTPATIENT
Start: 2024-01-01 | End: 2024-01-01 | Stop reason: HOSPADM

## 2024-01-01 RX ORDER — ALBUMIN (HUMAN) 12.5 G/50ML
12.5 SOLUTION INTRAVENOUS EVERY 12 HOURS
Status: DISCONTINUED | OUTPATIENT
Start: 2024-01-01 | End: 2024-01-01 | Stop reason: HOSPADM

## 2024-01-01 RX ORDER — SIMETHICONE 40MG/0.6ML
20 SUSPENSION, DROPS(FINAL DOSAGE FORM)(ML) ORAL ONCE
Status: COMPLETED | OUTPATIENT
Start: 2024-01-01 | End: 2024-01-01

## 2024-01-01 RX ORDER — DEXTROSE MONOHYDRATE 100 MG/ML
INJECTION, SOLUTION INTRAVENOUS CONTINUOUS PRN
Status: DISCONTINUED | OUTPATIENT
Start: 2024-01-01 | End: 2024-01-01

## 2024-01-01 RX ORDER — ACETAMINOPHEN 650 MG/1
650 SUPPOSITORY RECTAL EVERY 4 HOURS PRN
Status: DISCONTINUED | OUTPATIENT
Start: 2024-01-01 | End: 2024-01-01 | Stop reason: HOSPADM

## 2024-01-01 RX ORDER — PIPERACILLIN SODIUM, TAZOBACTAM SODIUM 3; .375 G/15ML; G/15ML
3.38 INJECTION, POWDER, LYOPHILIZED, FOR SOLUTION INTRAVENOUS EVERY 6 HOURS
Status: DISCONTINUED | OUTPATIENT
Start: 2024-01-01 | End: 2024-01-01

## 2024-01-01 RX ORDER — GUAIFENESIN 200 MG/10ML
200 LIQUID ORAL EVERY 4 HOURS PRN
Status: CANCELLED | OUTPATIENT
Start: 2024-01-01

## 2024-01-01 RX ORDER — ACETAZOLAMIDE 500 MG/5ML
1000 INJECTION, POWDER, LYOPHILIZED, FOR SOLUTION INTRAVENOUS 3 TIMES DAILY
Status: DISCONTINUED | OUTPATIENT
Start: 2024-01-01 | End: 2024-01-01

## 2024-01-01 RX ORDER — AMOXICILLIN 250 MG
1 CAPSULE ORAL 2 TIMES DAILY PRN
Status: DISCONTINUED | OUTPATIENT
Start: 2024-01-01 | End: 2024-01-01 | Stop reason: HOSPADM

## 2024-01-01 RX ORDER — ACETAZOLAMIDE 500 MG/5ML
1000 INJECTION, POWDER, LYOPHILIZED, FOR SOLUTION INTRAVENOUS EVERY 8 HOURS
Status: DISCONTINUED | OUTPATIENT
Start: 2024-01-01 | End: 2024-01-01

## 2024-01-01 RX ORDER — CALCIUM CARBONATE 500 MG/1
1000 TABLET, CHEWABLE ORAL 4 TIMES DAILY PRN
Status: CANCELLED | OUTPATIENT
Start: 2024-01-01

## 2024-01-01 RX ORDER — FENTANYL CITRATE-0.9 % NACL/PF 10 MCG/ML
PLASTIC BAG, INJECTION (ML) INTRAVENOUS
Status: COMPLETED
Start: 2024-01-01 | End: 2024-01-01

## 2024-01-01 RX ORDER — GUAIFENESIN 600 MG/1
15 TABLET, EXTENDED RELEASE ORAL DAILY
Status: DISCONTINUED | OUTPATIENT
Start: 2024-01-01 | End: 2024-01-01

## 2024-01-01 RX ORDER — SODIUM CHLORIDE 9 MG/ML
INJECTION, SOLUTION INTRAVENOUS CONTINUOUS PRN
Status: DISCONTINUED | OUTPATIENT
Start: 2024-01-01 | End: 2024-01-01

## 2024-01-01 RX ORDER — FUROSEMIDE 10 MG/ML
40 INJECTION INTRAMUSCULAR; INTRAVENOUS EVERY 12 HOURS
Status: DISCONTINUED | OUTPATIENT
Start: 2024-01-01 | End: 2024-01-01

## 2024-01-01 RX ORDER — AMOXICILLIN 250 MG
2 CAPSULE ORAL 2 TIMES DAILY
Status: DISCONTINUED | OUTPATIENT
Start: 2024-01-01 | End: 2024-01-01 | Stop reason: HOSPADM

## 2024-01-01 RX ORDER — QUETIAPINE FUMARATE 25 MG/1
25 TABLET, FILM COATED ORAL 2 TIMES DAILY
Status: DISCONTINUED | OUTPATIENT
Start: 2024-01-01 | End: 2024-01-01

## 2024-01-01 RX ORDER — CEFTRIAXONE 2 G/1
2 INJECTION, POWDER, FOR SOLUTION INTRAMUSCULAR; INTRAVENOUS EVERY 24 HOURS
Status: DISCONTINUED | OUTPATIENT
Start: 2024-01-01 | End: 2024-01-01 | Stop reason: HOSPADM

## 2024-01-01 RX ORDER — HEPARIN SODIUM 10000 [USP'U]/100ML
0-5000 INJECTION, SOLUTION INTRAVENOUS CONTINUOUS
Status: DISCONTINUED | OUTPATIENT
Start: 2024-01-01 | End: 2024-01-01 | Stop reason: ALTCHOICE

## 2024-01-01 RX ORDER — PIPERACILLIN SODIUM, TAZOBACTAM SODIUM 4; .5 G/20ML; G/20ML
4.5 INJECTION, POWDER, LYOPHILIZED, FOR SOLUTION INTRAVENOUS EVERY 6 HOURS
Status: DISCONTINUED | OUTPATIENT
Start: 2024-01-01 | End: 2024-01-01

## 2024-01-01 RX ORDER — MIDAZOLAM HCL IN 0.9 % NACL/PF 1 MG/ML
1-6 PLASTIC BAG, INJECTION (ML) INTRAVENOUS CONTINUOUS
Status: DISCONTINUED | OUTPATIENT
Start: 2024-01-01 | End: 2024-01-01

## 2024-01-01 RX ORDER — LIDOCAINE 40 MG/G
CREAM TOPICAL
Status: CANCELLED | OUTPATIENT
Start: 2024-01-01

## 2024-01-01 RX ORDER — CALCIUM GLUCONATE 94 MG/ML
1 INJECTION, SOLUTION INTRAVENOUS ONCE
Status: DISCONTINUED | OUTPATIENT
Start: 2024-01-01 | End: 2024-01-01

## 2024-01-01 RX ORDER — SILDENAFIL 10 MG/ML
10 POWDER, FOR SUSPENSION ORAL ONCE
Status: COMPLETED | OUTPATIENT
Start: 2024-01-01 | End: 2024-01-01

## 2024-01-01 RX ORDER — HEPARIN SODIUM 10000 [USP'U]/100ML
0-5000 INJECTION, SOLUTION INTRAVENOUS CONTINUOUS
Status: DISCONTINUED | OUTPATIENT
Start: 2024-01-01 | End: 2024-01-01

## 2024-01-01 RX ORDER — CALCIUM GLUCONATE 94 MG/ML
1 INJECTION, SOLUTION INTRAVENOUS ONCE
Status: COMPLETED | OUTPATIENT
Start: 2024-01-01 | End: 2024-01-01

## 2024-01-01 RX ORDER — POTASSIUM CHLORIDE 20MEQ/15ML
40 LIQUID (ML) ORAL ONCE
Status: COMPLETED | OUTPATIENT
Start: 2024-01-01 | End: 2024-01-01

## 2024-01-01 RX ORDER — ACETAMINOPHEN 325 MG/10.15ML
650 LIQUID ORAL EVERY 4 HOURS PRN
Status: DISCONTINUED | OUTPATIENT
Start: 2024-01-01 | End: 2024-01-01 | Stop reason: HOSPADM

## 2024-01-01 RX ORDER — NALOXONE HYDROCHLORIDE 0.4 MG/ML
0.4 INJECTION, SOLUTION INTRAMUSCULAR; INTRAVENOUS; SUBCUTANEOUS
OUTPATIENT
Start: 2024-01-01

## 2024-01-01 RX ORDER — NOREPINEPHRINE BITARTRATE 0.02 MG/ML
INJECTION, SOLUTION INTRAVENOUS
Status: COMPLETED
Start: 2024-01-01 | End: 2024-01-01

## 2024-01-01 RX ORDER — NICOTINE POLACRILEX 4 MG
15-30 LOZENGE BUCCAL
Status: CANCELLED | OUTPATIENT
Start: 2024-01-01

## 2024-01-01 RX ORDER — CALCIUM CARBONATE 500 MG/1
500 TABLET, CHEWABLE ORAL DAILY PRN
Status: DISCONTINUED | OUTPATIENT
Start: 2024-01-01 | End: 2024-01-01

## 2024-01-01 RX ORDER — VANCOMYCIN 2 GRAM/500 ML IN 0.9 % SODIUM CHLORIDE INTRAVENOUS
2000 ONCE
Status: COMPLETED | OUTPATIENT
Start: 2024-01-01 | End: 2024-01-01

## 2024-01-01 RX ORDER — PROPOFOL 10 MG/ML
5-75 INJECTION, EMULSION INTRAVENOUS CONTINUOUS
Status: DISCONTINUED | OUTPATIENT
Start: 2024-01-01 | End: 2024-01-01 | Stop reason: HOSPADM

## 2024-01-01 RX ORDER — NOREPINEPHRINE BITARTRATE 0.06 MG/ML
.01-.6 INJECTION, SOLUTION INTRAVENOUS CONTINUOUS
Status: DISCONTINUED | OUTPATIENT
Start: 2024-01-01 | End: 2024-01-01

## 2024-01-01 RX ORDER — CHLORHEXIDINE GLUCONATE ORAL RINSE 1.2 MG/ML
15 SOLUTION DENTAL EVERY 12 HOURS
OUTPATIENT
Start: 2024-01-01

## 2024-01-01 RX ORDER — GLYCOPYRROLATE 0.2 MG/ML
0.1 INJECTION, SOLUTION INTRAMUSCULAR; INTRAVENOUS EVERY 4 HOURS PRN
Status: DISCONTINUED | OUTPATIENT
Start: 2024-01-01 | End: 2024-01-01 | Stop reason: HOSPADM

## 2024-01-01 RX ORDER — FUROSEMIDE 10 MG/ML
40 INJECTION INTRAMUSCULAR; INTRAVENOUS ONCE
Status: COMPLETED | OUTPATIENT
Start: 2024-01-01 | End: 2024-01-01

## 2024-01-01 RX ORDER — CALCIUM CARBONATE 500 MG/1
1000 TABLET, CHEWABLE ORAL 4 TIMES DAILY PRN
OUTPATIENT
Start: 2024-01-01

## 2024-01-01 RX ORDER — LIDOCAINE 40 MG/G
CREAM TOPICAL
OUTPATIENT
Start: 2024-01-01

## 2024-01-01 RX ORDER — HEPARIN SODIUM 10000 [USP'U]/100ML
0-5000 INJECTION, SOLUTION INTRAVENOUS CONTINUOUS
Status: DISCONTINUED | OUTPATIENT
Start: 2024-01-01 | End: 2024-01-01 | Stop reason: HOSPADM

## 2024-01-01 RX ORDER — PROTAMINE SULFATE 10 MG/ML
15 INJECTION, SOLUTION INTRAVENOUS ONCE
Status: COMPLETED | OUTPATIENT
Start: 2024-01-01 | End: 2024-01-01

## 2024-01-01 RX ORDER — AMINO ACIDS/PROTEIN HYDROLYS 11G-40/45
1 LIQUID IN PACKET (ML) ORAL 3 TIMES DAILY
Status: DISCONTINUED | OUTPATIENT
Start: 2024-01-01 | End: 2024-01-01

## 2024-01-01 RX ORDER — NOREPINEPHRINE BITARTRATE 0.02 MG/ML
.01-.6 INJECTION, SOLUTION INTRAVENOUS CONTINUOUS
OUTPATIENT
Start: 2024-01-01

## 2024-01-01 RX ORDER — ACETAMINOPHEN 325 MG/1
650 TABLET ORAL EVERY 4 HOURS PRN
Status: DISCONTINUED | OUTPATIENT
Start: 2024-01-01 | End: 2024-01-01 | Stop reason: HOSPADM

## 2024-01-01 RX ORDER — ALBUMIN (HUMAN) 12.5 G/50ML
25 SOLUTION INTRAVENOUS ONCE
Status: COMPLETED | OUTPATIENT
Start: 2024-01-01 | End: 2024-01-01

## 2024-01-01 RX ORDER — SODIUM CHLORIDE, SODIUM LACTATE, POTASSIUM CHLORIDE, CALCIUM CHLORIDE 600; 310; 30; 20 MG/100ML; MG/100ML; MG/100ML; MG/100ML
INJECTION, SOLUTION INTRAVENOUS CONTINUOUS PRN
Status: DISCONTINUED | OUTPATIENT
Start: 2024-01-01 | End: 2024-01-01

## 2024-01-01 RX ORDER — DEXMEDETOMIDINE HYDROCHLORIDE 4 UG/ML
.1-1.2 INJECTION, SOLUTION INTRAVENOUS CONTINUOUS
Status: CANCELLED | OUTPATIENT
Start: 2024-01-01

## 2024-01-01 RX ORDER — AMOXICILLIN 250 MG
1 CAPSULE ORAL 2 TIMES DAILY
OUTPATIENT
Start: 2024-01-01

## 2024-01-01 RX ORDER — PROCHLORPERAZINE MALEATE 10 MG
10 TABLET ORAL EVERY 6 HOURS PRN
OUTPATIENT
Start: 2024-01-01

## 2024-01-01 RX ORDER — PROCHLORPERAZINE 25 MG
25 SUPPOSITORY, RECTAL RECTAL EVERY 12 HOURS PRN
Status: CANCELLED | OUTPATIENT
Start: 2024-01-01

## 2024-01-01 RX ORDER — NOREPINEPHRINE BITARTRATE 0.02 MG/ML
.01-.6 INJECTION, SOLUTION INTRAVENOUS CONTINUOUS
Status: CANCELLED | OUTPATIENT
Start: 2024-01-01

## 2024-01-01 RX ORDER — DEXMEDETOMIDINE HYDROCHLORIDE 4 UG/ML
.1-1.2 INJECTION, SOLUTION INTRAVENOUS CONTINUOUS
Status: DISCONTINUED | OUTPATIENT
Start: 2024-01-01 | End: 2024-01-01 | Stop reason: HOSPADM

## 2024-01-01 RX ORDER — AMOXICILLIN 250 MG
1 CAPSULE ORAL 2 TIMES DAILY
Status: CANCELLED | OUTPATIENT
Start: 2024-01-01

## 2024-01-01 RX ORDER — FAMOTIDINE 40 MG/5ML
20 POWDER, FOR SUSPENSION ORAL DAILY
Status: DISCONTINUED | OUTPATIENT
Start: 2024-01-01 | End: 2024-01-01

## 2024-01-01 RX ORDER — LIDOCAINE HYDROCHLORIDE 10 MG/ML
INJECTION, SOLUTION EPIDURAL; INFILTRATION; INTRACAUDAL; PERINEURAL
Status: COMPLETED
Start: 2024-01-01 | End: 2024-01-01

## 2024-01-01 RX ORDER — LIDOCAINE 40 MG/G
CREAM TOPICAL
Status: DISCONTINUED | OUTPATIENT
Start: 2024-01-01 | End: 2024-01-01

## 2024-01-01 RX ORDER — PROPOFOL 10 MG/ML
5-75 INJECTION, EMULSION INTRAVENOUS CONTINUOUS
OUTPATIENT
Start: 2024-01-01

## 2024-01-01 RX ORDER — FENTANYL CITRATE 50 UG/ML
100 INJECTION, SOLUTION INTRAMUSCULAR; INTRAVENOUS ONCE
Status: COMPLETED | OUTPATIENT
Start: 2024-01-01 | End: 2024-01-01

## 2024-01-01 RX ORDER — QUETIAPINE FUMARATE 25 MG/1
50 TABLET, FILM COATED ORAL 2 TIMES DAILY
OUTPATIENT
Start: 2024-01-01

## 2024-01-01 RX ORDER — PROCHLORPERAZINE MALEATE 10 MG
10 TABLET ORAL EVERY 6 HOURS PRN
Status: CANCELLED | OUTPATIENT
Start: 2024-01-01

## 2024-01-01 RX ORDER — CEFTRIAXONE 2 G/1
2 INJECTION, POWDER, FOR SOLUTION INTRAMUSCULAR; INTRAVENOUS EVERY 24 HOURS
Status: DISCONTINUED | OUTPATIENT
Start: 2024-01-01 | End: 2024-01-01

## 2024-01-01 RX ORDER — BUMETANIDE 0.25 MG/ML
4 INJECTION INTRAMUSCULAR; INTRAVENOUS ONCE
Status: COMPLETED | OUTPATIENT
Start: 2024-01-01 | End: 2024-01-01

## 2024-01-01 RX ORDER — ONDANSETRON 4 MG/1
4 TABLET, ORALLY DISINTEGRATING ORAL EVERY 6 HOURS PRN
OUTPATIENT
Start: 2024-01-01

## 2024-01-01 RX ORDER — CHLORHEXIDINE GLUCONATE ORAL RINSE 1.2 MG/ML
15 SOLUTION DENTAL 2 TIMES DAILY
Status: DISCONTINUED | OUTPATIENT
Start: 2024-01-01 | End: 2024-01-01

## 2024-01-01 RX ORDER — MIDAZOLAM HCL IN 0.9 % NACL/PF 1 MG/ML
1-8 PLASTIC BAG, INJECTION (ML) INTRAVENOUS CONTINUOUS
Status: DISCONTINUED | OUTPATIENT
Start: 2024-01-01 | End: 2024-01-01 | Stop reason: HOSPADM

## 2024-01-01 RX ORDER — ONDANSETRON 2 MG/ML
4 INJECTION INTRAMUSCULAR; INTRAVENOUS EVERY 6 HOURS PRN
OUTPATIENT
Start: 2024-01-01

## 2024-01-01 RX ORDER — OLANZAPINE 10 MG/1
5 INJECTION, POWDER, LYOPHILIZED, FOR SOLUTION INTRAMUSCULAR EVERY 4 HOURS PRN
Status: DISCONTINUED | OUTPATIENT
Start: 2024-01-01 | End: 2024-01-01

## 2024-01-01 RX ORDER — ACETAZOLAMIDE 500 MG/5ML
250 INJECTION, POWDER, LYOPHILIZED, FOR SOLUTION INTRAVENOUS ONCE
Status: COMPLETED | OUTPATIENT
Start: 2024-01-01 | End: 2024-01-01

## 2024-01-01 RX ORDER — POTASSIUM CHLORIDE 20MEQ/15ML
20 LIQUID (ML) ORAL ONCE
Status: COMPLETED | OUTPATIENT
Start: 2024-01-01 | End: 2024-01-01

## 2024-01-01 RX ORDER — DOXYCYCLINE 100 MG/10ML
100 INJECTION, POWDER, LYOPHILIZED, FOR SOLUTION INTRAVENOUS EVERY 12 HOURS
Status: DISCONTINUED | OUTPATIENT
Start: 2024-01-01 | End: 2024-01-01

## 2024-01-01 RX ORDER — SILDENAFIL 10 MG/ML
20 POWDER, FOR SUSPENSION ORAL EVERY 8 HOURS
Status: DISCONTINUED | OUTPATIENT
Start: 2024-01-01 | End: 2024-01-01

## 2024-01-01 RX ORDER — HEPARIN SODIUM 5000 [USP'U]/.5ML
5000 INJECTION, SOLUTION INTRAVENOUS; SUBCUTANEOUS EVERY 8 HOURS
Status: DISCONTINUED | OUTPATIENT
Start: 2024-01-01 | End: 2024-01-01

## 2024-01-01 RX ORDER — PROCHLORPERAZINE 25 MG
25 SUPPOSITORY, RECTAL RECTAL EVERY 12 HOURS PRN
OUTPATIENT
Start: 2024-01-01

## 2024-01-01 RX ORDER — FLUMAZENIL 0.1 MG/ML
0.2 INJECTION, SOLUTION INTRAVENOUS
Status: DISCONTINUED | OUTPATIENT
Start: 2024-01-01 | End: 2024-01-01 | Stop reason: HOSPADM

## 2024-01-01 RX ORDER — PROPOFOL 10 MG/ML
5-75 INJECTION, EMULSION INTRAVENOUS CONTINUOUS
Status: DISCONTINUED | OUTPATIENT
Start: 2024-01-01 | End: 2024-01-01

## 2024-01-01 RX ORDER — CHLORHEXIDINE GLUCONATE ORAL RINSE 1.2 MG/ML
15 SOLUTION DENTAL EVERY 12 HOURS
Status: CANCELLED | OUTPATIENT
Start: 2024-01-01

## 2024-01-01 RX ORDER — DEXTROSE MONOHYDRATE 50 MG/ML
INJECTION, SOLUTION INTRAVENOUS CONTINUOUS
Status: DISCONTINUED | OUTPATIENT
Start: 2024-01-01 | End: 2024-01-01

## 2024-01-01 RX ORDER — CALCIUM CHLORIDE 100 MG/ML
INJECTION INTRAVENOUS; INTRAVENTRICULAR PRN
Status: DISCONTINUED | OUTPATIENT
Start: 2024-01-01 | End: 2024-01-01

## 2024-01-01 RX ORDER — AMOXICILLIN 250 MG
1 CAPSULE ORAL 2 TIMES DAILY PRN
Status: CANCELLED | OUTPATIENT
Start: 2024-01-01

## 2024-01-01 RX ORDER — QUETIAPINE FUMARATE 25 MG/1
50 TABLET, FILM COATED ORAL 2 TIMES DAILY
Status: DISCONTINUED | OUTPATIENT
Start: 2024-01-01 | End: 2024-01-01 | Stop reason: HOSPADM

## 2024-01-01 RX ORDER — PIPERACILLIN SODIUM, TAZOBACTAM SODIUM 4; .5 G/20ML; G/20ML
3.38 INJECTION, POWDER, LYOPHILIZED, FOR SOLUTION INTRAVENOUS EVERY 6 HOURS
Status: DISCONTINUED | OUTPATIENT
Start: 2024-01-01 | End: 2024-01-01

## 2024-01-01 RX ORDER — POLYETHYLENE GLYCOL 3350 17 G/17G
17 POWDER, FOR SOLUTION ORAL 2 TIMES DAILY PRN
Status: CANCELLED | OUTPATIENT
Start: 2024-01-01

## 2024-01-01 RX ORDER — AMOXICILLIN 250 MG
2 CAPSULE ORAL 2 TIMES DAILY PRN
Status: CANCELLED | OUTPATIENT
Start: 2024-01-01

## 2024-01-01 RX ORDER — NOREPINEPHRINE BITARTRATE 0.02 MG/ML
.01-.6 INJECTION, SOLUTION INTRAVENOUS CONTINUOUS
Status: DISCONTINUED | OUTPATIENT
Start: 2024-01-01 | End: 2024-01-01

## 2024-01-01 RX ORDER — BUMETANIDE 0.25 MG/ML
0.25 INJECTION INTRAMUSCULAR; INTRAVENOUS ONCE
Status: DISCONTINUED | OUTPATIENT
Start: 2024-01-01 | End: 2024-01-01

## 2024-01-01 RX ADMIN — CALCIUM GLUCONATE 1 G: 20 INJECTION, SOLUTION INTRAVENOUS at 06:43

## 2024-01-01 RX ADMIN — POTASSIUM CHLORIDE 40 MEQ: 1.5 POWDER, FOR SOLUTION ORAL at 12:19

## 2024-01-01 RX ADMIN — Medication 60 ML: at 13:09

## 2024-01-01 RX ADMIN — SODIUM CHLORIDE 8 MG/HR: 9 INJECTION, SOLUTION INTRAVENOUS at 05:15

## 2024-01-01 RX ADMIN — DOXYCYCLINE 100 MG: 100 INJECTION, POWDER, LYOPHILIZED, FOR SOLUTION INTRAVENOUS at 21:03

## 2024-01-01 RX ADMIN — PIPERACILLIN AND TAZOBACTAM 3.38 G: 3; .375 INJECTION, POWDER, LYOPHILIZED, FOR SOLUTION INTRAVENOUS at 01:01

## 2024-01-01 RX ADMIN — POTASSIUM CHLORIDE 20 MEQ: 1.5 POWDER, FOR SOLUTION ORAL at 05:08

## 2024-01-01 RX ADMIN — DEXMEDETOMIDINE 1.2 MCG/KG/HR: 100 INJECTION, SOLUTION INTRAVENOUS at 12:53

## 2024-01-01 RX ADMIN — Medication 150 MCG/HR: at 10:45

## 2024-01-01 RX ADMIN — FUROSEMIDE 40 MG: 10 INJECTION, SOLUTION INTRAVENOUS at 08:26

## 2024-01-01 RX ADMIN — MIDAZOLAM 2 MG: 1 INJECTION INTRAMUSCULAR; INTRAVENOUS at 22:45

## 2024-01-01 RX ADMIN — CHLORHEXIDINE GLUCONATE 0.12% ORAL RINSE 15 ML: 1.2 LIQUID ORAL at 08:14

## 2024-01-01 RX ADMIN — POTASSIUM CHLORIDE 20 MEQ: 29.8 INJECTION, SOLUTION INTRAVENOUS at 21:54

## 2024-01-01 RX ADMIN — INSULIN ASPART 1 UNITS: 100 INJECTION, SOLUTION INTRAVENOUS; SUBCUTANEOUS at 12:47

## 2024-01-01 RX ADMIN — MIDAZOLAM 1 MG: 1 INJECTION INTRAMUSCULAR; INTRAVENOUS at 13:02

## 2024-01-01 RX ADMIN — IPRATROPIUM BROMIDE AND ALBUTEROL SULFATE 3 ML: .5; 3 SOLUTION RESPIRATORY (INHALATION) at 07:51

## 2024-01-01 RX ADMIN — NOREPINEPHRINE BITARTRATE 0.08 MCG/KG/MIN: 0.02 INJECTION, SOLUTION INTRAVENOUS at 06:50

## 2024-01-01 RX ADMIN — IPRATROPIUM BROMIDE AND ALBUTEROL SULFATE 3 ML: .5; 3 SOLUTION RESPIRATORY (INHALATION) at 11:38

## 2024-01-01 RX ADMIN — CHLORHEXIDINE GLUCONATE 0.12% ORAL RINSE 15 ML: 1.2 LIQUID ORAL at 21:23

## 2024-01-01 RX ADMIN — SILDENAFIL CITRATE 20 MG: 10 POWDER, FOR SUSPENSION ORAL at 03:13

## 2024-01-01 RX ADMIN — POTASSIUM CHLORIDE 20 MEQ: 1.5 POWDER, FOR SOLUTION ORAL at 08:53

## 2024-01-01 RX ADMIN — Medication 60 ML: at 08:09

## 2024-01-01 RX ADMIN — POTASSIUM CHLORIDE 20 MEQ: 1.5 POWDER, FOR SOLUTION ORAL at 19:48

## 2024-01-01 RX ADMIN — Medication 60 ML: at 19:46

## 2024-01-01 RX ADMIN — IOPAMIDOL 135 ML: 755 INJECTION, SOLUTION INTRAVENOUS at 19:23

## 2024-01-01 RX ADMIN — Medication 50 MCG: at 04:02

## 2024-01-01 RX ADMIN — CEFTRIAXONE SODIUM 2 G: 2 INJECTION, POWDER, FOR SOLUTION INTRAMUSCULAR; INTRAVENOUS at 16:09

## 2024-01-01 RX ADMIN — Medication 50 MCG: at 11:50

## 2024-01-01 RX ADMIN — Medication 60 ML: at 20:44

## 2024-01-01 RX ADMIN — IPRATROPIUM BROMIDE AND ALBUTEROL SULFATE 3 ML: .5; 3 SOLUTION RESPIRATORY (INHALATION) at 20:54

## 2024-01-01 RX ADMIN — IPRATROPIUM BROMIDE AND ALBUTEROL SULFATE 3 ML: .5; 3 SOLUTION RESPIRATORY (INHALATION) at 15:49

## 2024-01-01 RX ADMIN — NOREPINEPHRINE BITARTRATE 0.07 MCG/KG/MIN: 0.02 INJECTION, SOLUTION INTRAVENOUS at 00:32

## 2024-01-01 RX ADMIN — INSULIN ASPART 1 UNITS: 100 INJECTION, SOLUTION INTRAVENOUS; SUBCUTANEOUS at 04:00

## 2024-01-01 RX ADMIN — INSULIN ASPART 1 UNITS: 100 INJECTION, SOLUTION INTRAVENOUS; SUBCUTANEOUS at 17:24

## 2024-01-01 RX ADMIN — CHLORHEXIDINE GLUCONATE 0.12% ORAL RINSE 15 ML: 1.2 LIQUID ORAL at 20:12

## 2024-01-01 RX ADMIN — INSULIN ASPART 1 UNITS: 100 INJECTION, SOLUTION INTRAVENOUS; SUBCUTANEOUS at 12:23

## 2024-01-01 RX ADMIN — Medication 175 MCG/HR: at 01:32

## 2024-01-01 RX ADMIN — HEPARIN SODIUM 2100 UNITS/HR: 10000 INJECTION, SOLUTION INTRAVENOUS at 14:44

## 2024-01-01 RX ADMIN — Medication 25 MCG: at 17:28

## 2024-01-01 RX ADMIN — SILDENAFIL CITRATE 20 MG: 10 POWDER, FOR SUSPENSION ORAL at 04:35

## 2024-01-01 RX ADMIN — DOXYCYCLINE 100 MG: 100 INJECTION, POWDER, LYOPHILIZED, FOR SOLUTION INTRAVENOUS at 08:13

## 2024-01-01 RX ADMIN — PANTOPRAZOLE SODIUM 40 MG: 40 INJECTION, POWDER, FOR SOLUTION INTRAVENOUS at 12:26

## 2024-01-01 RX ADMIN — NOREPINEPHRINE BITARTRATE 0.1 MCG/KG/MIN: 0.06 INJECTION, SOLUTION INTRAVENOUS at 01:46

## 2024-01-01 RX ADMIN — INSULIN ASPART 1 UNITS: 100 INJECTION, SOLUTION INTRAVENOUS; SUBCUTANEOUS at 16:12

## 2024-01-01 RX ADMIN — ALBUTEROL SULFATE 2.5 MG: 2.5 SOLUTION RESPIRATORY (INHALATION) at 10:09

## 2024-01-01 RX ADMIN — PIPERACILLIN AND TAZOBACTAM 4.5 G: 4; .5 INJECTION, POWDER, FOR SOLUTION INTRAVENOUS at 22:50

## 2024-01-01 RX ADMIN — IPRATROPIUM BROMIDE AND ALBUTEROL SULFATE 3 ML: .5; 3 SOLUTION RESPIRATORY (INHALATION) at 16:34

## 2024-01-01 RX ADMIN — Medication 100 MCG/HR: at 04:36

## 2024-01-01 RX ADMIN — SODIUM ZIRCONIUM CYCLOSILICATE 10 G: 10 POWDER, FOR SUSPENSION ORAL at 08:29

## 2024-01-01 RX ADMIN — IPRATROPIUM BROMIDE AND ALBUTEROL SULFATE 3 ML: .5; 3 SOLUTION RESPIRATORY (INHALATION) at 07:49

## 2024-01-01 RX ADMIN — Medication 50 MCG: at 05:03

## 2024-01-01 RX ADMIN — SILDENAFIL CITRATE 20 MG: 10 POWDER, FOR SUSPENSION ORAL at 19:46

## 2024-01-01 RX ADMIN — Medication 25 MCG: at 06:26

## 2024-01-01 RX ADMIN — INSULIN ASPART 1 UNITS: 100 INJECTION, SOLUTION INTRAVENOUS; SUBCUTANEOUS at 20:36

## 2024-01-01 RX ADMIN — NOREPINEPHRINE BITARTRATE 0.08 MCG/KG/MIN: 0.06 INJECTION, SOLUTION INTRAVENOUS at 17:05

## 2024-01-01 RX ADMIN — DOXYCYCLINE 100 MG: 100 INJECTION, POWDER, LYOPHILIZED, FOR SOLUTION INTRAVENOUS at 08:22

## 2024-01-01 RX ADMIN — DEXTROSE MONOHYDRATE 300 ML: 100 INJECTION, SOLUTION INTRAVENOUS at 06:37

## 2024-01-01 RX ADMIN — PROPOFOL 20 MCG/KG/MIN: 10 INJECTION, EMULSION INTRAVENOUS at 08:18

## 2024-01-01 RX ADMIN — DEXMEDETOMIDINE 1.2 MCG/KG/HR: 100 INJECTION, SOLUTION INTRAVENOUS at 04:54

## 2024-01-01 RX ADMIN — POTASSIUM CHLORIDE 10 MEQ: 7.46 INJECTION, SOLUTION INTRAVENOUS at 01:15

## 2024-01-01 RX ADMIN — QUETIAPINE FUMARATE 25 MG: 25 TABLET ORAL at 08:25

## 2024-01-01 RX ADMIN — SODIUM BICARBONATE 50 MEQ: 84 INJECTION INTRAVENOUS at 06:16

## 2024-01-01 RX ADMIN — POTASSIUM CHLORIDE 40 MEQ: 1.5 POWDER, FOR SOLUTION ORAL at 01:03

## 2024-01-01 RX ADMIN — DOXYCYCLINE 100 MG: 100 INJECTION, POWDER, LYOPHILIZED, FOR SOLUTION INTRAVENOUS at 08:00

## 2024-01-01 RX ADMIN — PROPOFOL 30 MCG/KG/MIN: 10 INJECTION, EMULSION INTRAVENOUS at 23:16

## 2024-01-01 RX ADMIN — BUMETANIDE 0.25 MG/HR: 0.25 INJECTION INTRAMUSCULAR; INTRAVENOUS at 01:58

## 2024-01-01 RX ADMIN — DEXMEDETOMIDINE HYDROCHLORIDE 1 MCG/KG/HR: 400 INJECTION INTRAVENOUS at 10:00

## 2024-01-01 RX ADMIN — SILDENAFIL CITRATE 20 MG: 10 POWDER, FOR SUSPENSION ORAL at 14:54

## 2024-01-01 RX ADMIN — SODIUM CHLORIDE, POTASSIUM CHLORIDE, SODIUM LACTATE AND CALCIUM CHLORIDE 500 ML: 600; 310; 30; 20 INJECTION, SOLUTION INTRAVENOUS at 03:09

## 2024-01-01 RX ADMIN — Medication 2.5 UNITS/HR: at 00:07

## 2024-01-01 RX ADMIN — CHLORHEXIDINE GLUCONATE 0.12% ORAL RINSE 15 ML: 1.2 LIQUID ORAL at 07:45

## 2024-01-01 RX ADMIN — ACETAMINOPHEN 650 MG: 325 TABLET ORAL at 21:01

## 2024-01-01 RX ADMIN — DOXYCYCLINE 100 MG: 100 INJECTION, POWDER, LYOPHILIZED, FOR SOLUTION INTRAVENOUS at 08:28

## 2024-01-01 RX ADMIN — SODIUM CHLORIDE 10 UNITS: 9 INJECTION, SOLUTION INTRAVENOUS at 06:09

## 2024-01-01 RX ADMIN — POTASSIUM CHLORIDE 20 MEQ: 29.8 INJECTION, SOLUTION INTRAVENOUS at 12:19

## 2024-01-01 RX ADMIN — VASOPRESSIN 1.8 UNITS/HR: 20 INJECTION, SOLUTION INTRAMUSCULAR; SUBCUTANEOUS at 23:51

## 2024-01-01 RX ADMIN — PROPOFOL 40 MCG/KG/MIN: 10 INJECTION, EMULSION INTRAVENOUS at 04:36

## 2024-01-01 RX ADMIN — Medication 60 ML: at 08:10

## 2024-01-01 RX ADMIN — Medication 10 MG: at 02:20

## 2024-01-01 RX ADMIN — IPRATROPIUM BROMIDE AND ALBUTEROL SULFATE 3 ML: .5; 3 SOLUTION RESPIRATORY (INHALATION) at 09:12

## 2024-01-01 RX ADMIN — PIPERACILLIN AND TAZOBACTAM 4.5 G: 4; .5 INJECTION, POWDER, FOR SOLUTION INTRAVENOUS at 22:46

## 2024-01-01 RX ADMIN — CALCIUM CHLORIDE INJECTION 500 MG: 100 INJECTION, SOLUTION INTRAVENOUS at 23:36

## 2024-01-01 RX ADMIN — IPRATROPIUM BROMIDE AND ALBUTEROL SULFATE 3 ML: .5; 3 SOLUTION RESPIRATORY (INHALATION) at 12:21

## 2024-01-01 RX ADMIN — ALBUTEROL SULFATE 10 MG: 2.5 SOLUTION RESPIRATORY (INHALATION) at 05:29

## 2024-01-01 RX ADMIN — CHLORHEXIDINE GLUCONATE 0.12% ORAL RINSE 15 ML: 1.2 LIQUID ORAL at 19:48

## 2024-01-01 RX ADMIN — ALBUMIN HUMAN 12.5 G: 0.25 SOLUTION INTRAVENOUS at 20:14

## 2024-01-01 RX ADMIN — VASOPRESSIN 1.8 UNITS/HR: 20 INJECTION, SOLUTION INTRAMUSCULAR; SUBCUTANEOUS at 06:34

## 2024-01-01 RX ADMIN — Medication 150 MCG: at 13:19

## 2024-01-01 RX ADMIN — VASOPRESSIN 1.8 UNITS/HR: 20 INJECTION, SOLUTION INTRAMUSCULAR; SUBCUTANEOUS at 17:57

## 2024-01-01 RX ADMIN — SODIUM CHLORIDE, POTASSIUM CHLORIDE, SODIUM LACTATE AND CALCIUM CHLORIDE: 600; 310; 30; 20 INJECTION, SOLUTION INTRAVENOUS at 22:50

## 2024-01-01 RX ADMIN — Medication 200 MG: at 02:06

## 2024-01-01 RX ADMIN — MIDAZOLAM 1 MG: 1 INJECTION INTRAMUSCULAR; INTRAVENOUS at 11:54

## 2024-01-01 RX ADMIN — Medication 200 MCG/HR: at 13:22

## 2024-01-01 RX ADMIN — DEXMEDETOMIDINE HYDROCHLORIDE 0.7 MCG/KG/HR: 400 INJECTION INTRAVENOUS at 06:52

## 2024-01-01 RX ADMIN — NOREPINEPHRINE BITARTRATE 0.05 MCG/KG/MIN: 0.02 INJECTION, SOLUTION INTRAVENOUS at 03:57

## 2024-01-01 RX ADMIN — FUROSEMIDE 40 MG: 10 INJECTION, SOLUTION INTRAVENOUS at 18:38

## 2024-01-01 RX ADMIN — PIPERACILLIN AND TAZOBACTAM 4.5 G: 4; .5 INJECTION, POWDER, FOR SOLUTION INTRAVENOUS at 17:19

## 2024-01-01 RX ADMIN — IPRATROPIUM BROMIDE AND ALBUTEROL SULFATE 3 ML: .5; 3 SOLUTION RESPIRATORY (INHALATION) at 19:47

## 2024-01-01 RX ADMIN — Medication 25 MCG: at 21:23

## 2024-01-01 RX ADMIN — Medication 25 MCG: at 08:15

## 2024-01-01 RX ADMIN — INSULIN ASPART 1 UNITS: 100 INJECTION, SOLUTION INTRAVENOUS; SUBCUTANEOUS at 04:20

## 2024-01-01 RX ADMIN — SENNOSIDES AND DOCUSATE SODIUM 2 TABLET: 8.6; 5 TABLET ORAL at 08:13

## 2024-01-01 RX ADMIN — Medication 100 MCG/HR: at 16:08

## 2024-01-01 RX ADMIN — Medication 15 ML: at 08:20

## 2024-01-01 RX ADMIN — PANTOPRAZOLE SODIUM 40 MG: 40 INJECTION, POWDER, FOR SOLUTION INTRAVENOUS at 06:35

## 2024-01-01 RX ADMIN — MIDAZOLAM 1 MG: 1 INJECTION INTRAMUSCULAR; INTRAVENOUS at 13:08

## 2024-01-01 RX ADMIN — CHLORHEXIDINE GLUCONATE 15 ML: 1.2 SOLUTION ORAL at 23:14

## 2024-01-01 RX ADMIN — INSULIN ASPART 1 UNITS: 100 INJECTION, SOLUTION INTRAVENOUS; SUBCUTANEOUS at 23:43

## 2024-01-01 RX ADMIN — NOREPINEPHRINE BITARTRATE 0.1 MCG/KG/MIN: 0.06 INJECTION, SOLUTION INTRAVENOUS at 21:06

## 2024-01-01 RX ADMIN — VASOPRESSIN 2.4 UNITS/HR: 20 INJECTION, SOLUTION INTRAMUSCULAR; SUBCUTANEOUS at 21:51

## 2024-01-01 RX ADMIN — Medication 1.8 UNITS/HR: at 04:31

## 2024-01-01 RX ADMIN — QUETIAPINE FUMARATE 50 MG: 25 TABLET ORAL at 20:01

## 2024-01-01 RX ADMIN — KIT FOR THE PREPARATION OF TECHNETIUM TC 99M ALBUMIN AGGREGATED 8.4 MILLICURIE: 2.5 INJECTION, POWDER, FOR SOLUTION INTRAVENOUS at 14:28

## 2024-01-01 RX ADMIN — Medication 50 MCG: at 22:00

## 2024-01-01 RX ADMIN — PROPOFOL 40 MCG/KG/MIN: 10 INJECTION, EMULSION INTRAVENOUS at 06:52

## 2024-01-01 RX ADMIN — DEXTROSE MONOHYDRATE 25 G: 25 INJECTION, SOLUTION INTRAVENOUS at 14:52

## 2024-01-01 RX ADMIN — DOXYCYCLINE 100 MG: 100 INJECTION, POWDER, LYOPHILIZED, FOR SOLUTION INTRAVENOUS at 20:19

## 2024-01-01 RX ADMIN — PROPOFOL 10 MCG/KG/MIN: 10 INJECTION, EMULSION INTRAVENOUS at 00:28

## 2024-01-01 RX ADMIN — HEPARIN SODIUM 1950 UNITS/HR: 10000 INJECTION, SOLUTION INTRAVENOUS at 14:51

## 2024-01-01 RX ADMIN — INSULIN ASPART 1 UNITS: 100 INJECTION, SOLUTION INTRAVENOUS; SUBCUTANEOUS at 04:24

## 2024-01-01 RX ADMIN — Medication 50 MCG: at 19:55

## 2024-01-01 RX ADMIN — IPRATROPIUM BROMIDE AND ALBUTEROL SULFATE 3 ML: .5; 3 SOLUTION RESPIRATORY (INHALATION) at 20:30

## 2024-01-01 RX ADMIN — SODIUM CHLORIDE 8 MG/HR: 9 INJECTION, SOLUTION INTRAVENOUS at 18:17

## 2024-01-01 RX ADMIN — ALTEPLASE 2 MG: 2.2 INJECTION, POWDER, LYOPHILIZED, FOR SOLUTION INTRAVENOUS at 20:43

## 2024-01-01 RX ADMIN — PIPERACILLIN AND TAZOBACTAM 4.5 G: 4; .5 INJECTION, POWDER, FOR SOLUTION INTRAVENOUS at 05:19

## 2024-01-01 RX ADMIN — CEFTRIAXONE SODIUM 2 G: 2 INJECTION, POWDER, FOR SOLUTION INTRAMUSCULAR; INTRAVENOUS at 15:52

## 2024-01-01 RX ADMIN — VANCOMYCIN HYDROCHLORIDE 1000 MG: 1 INJECTION, SOLUTION INTRAVENOUS at 09:35

## 2024-01-01 RX ADMIN — Medication 50 MCG: at 00:16

## 2024-01-01 RX ADMIN — SILDENAFIL CITRATE 20 MG: 10 POWDER, FOR SUSPENSION ORAL at 13:28

## 2024-01-01 RX ADMIN — INSULIN ASPART 1 UNITS: 100 INJECTION, SOLUTION INTRAVENOUS; SUBCUTANEOUS at 11:44

## 2024-01-01 RX ADMIN — DOXYCYCLINE 100 MG: 100 INJECTION, POWDER, LYOPHILIZED, FOR SOLUTION INTRAVENOUS at 17:59

## 2024-01-01 RX ADMIN — ALBUMIN HUMAN 12.5 G: 0.25 SOLUTION INTRAVENOUS at 07:57

## 2024-01-01 RX ADMIN — PIPERACILLIN AND TAZOBACTAM 4.5 G: 4; .5 INJECTION, POWDER, FOR SOLUTION INTRAVENOUS at 10:28

## 2024-01-01 RX ADMIN — ALBUMIN HUMAN 12.5 G: 0.25 SOLUTION INTRAVENOUS at 19:30

## 2024-01-01 RX ADMIN — Medication 150 MCG/HR: at 20:15

## 2024-01-01 RX ADMIN — NOREPINEPHRINE BITARTRATE 0.03 MCG/KG/MIN: 0.02 INJECTION, SOLUTION INTRAVENOUS at 12:25

## 2024-01-01 RX ADMIN — CALCIUM CHLORIDE INJECTION 500 MG: 100 INJECTION, SOLUTION INTRAVENOUS at 00:34

## 2024-01-01 RX ADMIN — Medication 100 MCG/HR: at 18:59

## 2024-01-01 RX ADMIN — Medication 150 MCG/HR: at 05:05

## 2024-01-01 RX ADMIN — Medication 25 MCG: at 21:08

## 2024-01-01 RX ADMIN — Medication 60 ML: at 13:10

## 2024-01-01 RX ADMIN — DEXMEDETOMIDINE 1.2 MCG/KG/HR: 100 INJECTION, SOLUTION INTRAVENOUS at 20:31

## 2024-01-01 RX ADMIN — PROPOFOL 30 MCG/KG/MIN: 10 INJECTION, EMULSION INTRAVENOUS at 03:39

## 2024-01-01 RX ADMIN — PROPOFOL 10 MCG/KG/MIN: 10 INJECTION, EMULSION INTRAVENOUS at 14:37

## 2024-01-01 RX ADMIN — NOREPINEPHRINE BITARTRATE 0.05 MCG/KG/MIN: 0.06 INJECTION, SOLUTION INTRAVENOUS at 15:00

## 2024-01-01 RX ADMIN — OLANZAPINE 5 MG: 10 INJECTION, POWDER, FOR SOLUTION INTRAMUSCULAR at 08:25

## 2024-01-01 RX ADMIN — IPRATROPIUM BROMIDE AND ALBUTEROL SULFATE 3 ML: .5; 3 SOLUTION RESPIRATORY (INHALATION) at 08:31

## 2024-01-01 RX ADMIN — Medication 25 MCG: at 20:11

## 2024-01-01 RX ADMIN — Medication 60 ML: at 21:02

## 2024-01-01 RX ADMIN — Medication 60 ML: at 14:45

## 2024-01-01 RX ADMIN — IPRATROPIUM BROMIDE AND ALBUTEROL SULFATE 3 ML: .5; 3 SOLUTION RESPIRATORY (INHALATION) at 17:11

## 2024-01-01 RX ADMIN — DOXYCYCLINE 100 MG: 100 INJECTION, POWDER, LYOPHILIZED, FOR SOLUTION INTRAVENOUS at 07:54

## 2024-01-01 RX ADMIN — Medication 50 MCG: at 01:10

## 2024-01-01 RX ADMIN — SODIUM CHLORIDE 8 MG/HR: 9 INJECTION, SOLUTION INTRAVENOUS at 14:44

## 2024-01-01 RX ADMIN — BUMETANIDE 2 MG/HR: 0.25 INJECTION INTRAMUSCULAR; INTRAVENOUS at 22:11

## 2024-01-01 RX ADMIN — CHLORHEXIDINE GLUCONATE 0.12% ORAL RINSE 15 ML: 1.2 LIQUID ORAL at 07:58

## 2024-01-01 RX ADMIN — MAGNESIUM SULFATE HEPTAHYDRATE 2 G: 2 INJECTION, SOLUTION INTRAVENOUS at 17:34

## 2024-01-01 RX ADMIN — ACETAZOLAMIDE 1000 MG: 500 INJECTION, POWDER, LYOPHILIZED, FOR SOLUTION INTRAVENOUS at 06:59

## 2024-01-01 RX ADMIN — INSULIN ASPART 1 UNITS: 100 INJECTION, SOLUTION INTRAVENOUS; SUBCUTANEOUS at 11:48

## 2024-01-01 RX ADMIN — MIDAZOLAM 2 MG: 1 INJECTION INTRAMUSCULAR; INTRAVENOUS at 01:08

## 2024-01-01 RX ADMIN — HEPARIN SODIUM 2100 UNITS/HR: 10000 INJECTION, SOLUTION INTRAVENOUS at 16:37

## 2024-01-01 RX ADMIN — POTASSIUM CHLORIDE 20 MEQ: 29.8 INJECTION, SOLUTION INTRAVENOUS at 23:16

## 2024-01-01 RX ADMIN — CHLORHEXIDINE GLUCONATE 0.12% ORAL RINSE 15 ML: 1.2 LIQUID ORAL at 08:08

## 2024-01-01 RX ADMIN — SODIUM CHLORIDE 8 MG/HR: 9 INJECTION, SOLUTION INTRAVENOUS at 22:51

## 2024-01-01 RX ADMIN — BUMETANIDE 1 MG/HR: 0.25 INJECTION INTRAMUSCULAR; INTRAVENOUS at 19:47

## 2024-01-01 RX ADMIN — POTASSIUM CHLORIDE 20 MEQ: 29.8 INJECTION, SOLUTION INTRAVENOUS at 19:54

## 2024-01-01 RX ADMIN — MIDAZOLAM 2 MG: 1 INJECTION INTRAMUSCULAR; INTRAVENOUS at 16:52

## 2024-01-01 RX ADMIN — Medication 40 MG: at 06:38

## 2024-01-01 RX ADMIN — PROPOFOL 40 MCG/KG/MIN: 10 INJECTION, EMULSION INTRAVENOUS at 07:03

## 2024-01-01 RX ADMIN — PIPERACILLIN AND TAZOBACTAM 4.5 G: 4; .5 INJECTION, POWDER, FOR SOLUTION INTRAVENOUS at 04:46

## 2024-01-01 RX ADMIN — PROPOFOL 30 MCG/KG/MIN: 10 INJECTION, EMULSION INTRAVENOUS at 12:04

## 2024-01-01 RX ADMIN — QUETIAPINE FUMARATE 25 MG: 25 TABLET ORAL at 20:07

## 2024-01-01 RX ADMIN — ACETAZOLAMIDE 500 MG: 500 INJECTION, POWDER, LYOPHILIZED, FOR SOLUTION INTRAVENOUS at 08:57

## 2024-01-01 RX ADMIN — Medication 60 ML: at 17:20

## 2024-01-01 RX ADMIN — DEXMEDETOMIDINE 1.2 MCG/KG/HR: 100 INJECTION, SOLUTION INTRAVENOUS at 08:18

## 2024-01-01 RX ADMIN — CEFTRIAXONE SODIUM 2 G: 2 INJECTION, POWDER, FOR SOLUTION INTRAMUSCULAR; INTRAVENOUS at 15:06

## 2024-01-01 RX ADMIN — IPRATROPIUM BROMIDE AND ALBUTEROL SULFATE 3 ML: .5; 3 SOLUTION RESPIRATORY (INHALATION) at 21:30

## 2024-01-01 RX ADMIN — IPRATROPIUM BROMIDE AND ALBUTEROL SULFATE 3 ML: .5; 3 SOLUTION RESPIRATORY (INHALATION) at 09:05

## 2024-01-01 RX ADMIN — DEXMEDETOMIDINE 1.2 MCG/KG/HR: 100 INJECTION, SOLUTION INTRAVENOUS at 00:06

## 2024-01-01 RX ADMIN — DEXMEDETOMIDINE 1.2 MCG/KG/HR: 100 INJECTION, SOLUTION INTRAVENOUS at 22:22

## 2024-01-01 RX ADMIN — CEFTRIAXONE SODIUM 2 G: 2 INJECTION, POWDER, FOR SOLUTION INTRAMUSCULAR; INTRAVENOUS at 15:41

## 2024-01-01 RX ADMIN — MIDAZOLAM HYDROCHLORIDE 2 MG: 1 INJECTION, SOLUTION INTRAMUSCULAR; INTRAVENOUS at 17:47

## 2024-01-01 RX ADMIN — CHLORHEXIDINE GLUCONATE 0.12% ORAL RINSE 15 ML: 1.2 LIQUID ORAL at 08:22

## 2024-01-01 RX ADMIN — BUMETANIDE 4 MG: 0.25 INJECTION INTRAMUSCULAR; INTRAVENOUS at 09:20

## 2024-01-01 RX ADMIN — Medication 50 MCG: at 10:44

## 2024-01-01 RX ADMIN — PROPOFOL 10 MCG/KG/MIN: 10 INJECTION, EMULSION INTRAVENOUS at 15:37

## 2024-01-01 RX ADMIN — MIDAZOLAM IN SODIUM CHLORIDE 1 MG/HR: 1 INJECTION INTRAVENOUS at 22:03

## 2024-01-01 RX ADMIN — SODIUM ZIRCONIUM CYCLOSILICATE 10 G: 10 POWDER, FOR SUSPENSION ORAL at 10:57

## 2024-01-01 RX ADMIN — ACETAMINOPHEN 650 MG: 325 SOLUTION ORAL at 17:51

## 2024-01-01 RX ADMIN — BUMETANIDE 1 MG/HR: 0.25 INJECTION INTRAMUSCULAR; INTRAVENOUS at 12:05

## 2024-01-01 RX ADMIN — INSULIN ASPART 1 UNITS: 100 INJECTION, SOLUTION INTRAVENOUS; SUBCUTANEOUS at 07:56

## 2024-01-01 RX ADMIN — Medication 2 UNITS/HR: at 15:08

## 2024-01-01 RX ADMIN — SODIUM CHLORIDE, POTASSIUM CHLORIDE, SODIUM LACTATE AND CALCIUM CHLORIDE 500 ML: 600; 310; 30; 20 INJECTION, SOLUTION INTRAVENOUS at 04:00

## 2024-01-01 RX ADMIN — Medication 50 MCG: at 00:53

## 2024-01-01 RX ADMIN — Medication 50 MCG: at 16:15

## 2024-01-01 RX ADMIN — SILDENAFIL CITRATE 20 MG: 10 POWDER, FOR SUSPENSION ORAL at 12:19

## 2024-01-01 RX ADMIN — DOXYCYCLINE 100 MG: 100 INJECTION, POWDER, LYOPHILIZED, FOR SOLUTION INTRAVENOUS at 03:17

## 2024-01-01 RX ADMIN — Medication 15 ML: at 14:28

## 2024-01-01 RX ADMIN — Medication 10 MG: at 23:20

## 2024-01-01 RX ADMIN — PIPERACILLIN AND TAZOBACTAM 4.5 G: 4; .5 INJECTION, POWDER, FOR SOLUTION INTRAVENOUS at 17:21

## 2024-01-01 RX ADMIN — NOREPINEPHRINE BITARTRATE 0.03 MCG/KG/MIN: 0.02 INJECTION, SOLUTION INTRAVENOUS at 20:06

## 2024-01-01 RX ADMIN — SILDENAFIL POWDER, 10 MG: 10 FOR SUSPENSION ORAL at 11:58

## 2024-01-01 RX ADMIN — Medication 50 MCG: at 00:05

## 2024-01-01 RX ADMIN — IOHEXOL 20 ML: 350 INJECTION, SOLUTION INTRAVENOUS at 18:33

## 2024-01-01 RX ADMIN — BUMETANIDE 2 MG/HR: 0.25 INJECTION INTRAMUSCULAR; INTRAVENOUS at 15:46

## 2024-01-01 RX ADMIN — POTASSIUM CHLORIDE 20 MEQ: 29.8 INJECTION, SOLUTION INTRAVENOUS at 05:09

## 2024-01-01 RX ADMIN — PROPOFOL 40 MCG/KG/MIN: 10 INJECTION, EMULSION INTRAVENOUS at 10:50

## 2024-01-01 RX ADMIN — Medication 25 MCG: at 13:02

## 2024-01-01 RX ADMIN — MIDAZOLAM 1 MG: 1 INJECTION INTRAMUSCULAR; INTRAVENOUS at 12:15

## 2024-01-01 RX ADMIN — PIPERACILLIN AND TAZOBACTAM 3.38 G: 3; .375 INJECTION, POWDER, FOR SOLUTION INTRAVENOUS at 21:52

## 2024-01-01 RX ADMIN — FUROSEMIDE 40 MG: 10 INJECTION, SOLUTION INTRAVENOUS at 23:30

## 2024-01-01 RX ADMIN — SODIUM CHLORIDE 8 MG/HR: 9 INJECTION, SOLUTION INTRAVENOUS at 04:50

## 2024-01-01 RX ADMIN — OLANZAPINE 5 MG: 10 INJECTION, POWDER, FOR SOLUTION INTRAMUSCULAR at 01:28

## 2024-01-01 RX ADMIN — POTASSIUM CHLORIDE 20 MEQ: 29.8 INJECTION, SOLUTION INTRAVENOUS at 04:13

## 2024-01-01 RX ADMIN — CHLORHEXIDINE GLUCONATE 0.12% ORAL RINSE 15 ML: 1.2 LIQUID ORAL at 19:46

## 2024-01-01 RX ADMIN — DEXMEDETOMIDINE HYDROCHLORIDE 1 MCG/KG/HR: 400 INJECTION INTRAVENOUS at 07:03

## 2024-01-01 RX ADMIN — PIPERACILLIN AND TAZOBACTAM 4.5 G: 4; .5 INJECTION, POWDER, FOR SOLUTION INTRAVENOUS at 12:24

## 2024-01-01 RX ADMIN — PIPERACILLIN AND TAZOBACTAM 4.5 G: 4; .5 INJECTION, POWDER, FOR SOLUTION INTRAVENOUS at 16:23

## 2024-01-01 RX ADMIN — CHLORHEXIDINE GLUCONATE 15 ML: 1.2 SOLUTION ORAL at 10:48

## 2024-01-01 RX ADMIN — PROTAMINE SULFATE 15 MG: 10 INJECTION, SOLUTION INTRAVENOUS at 21:00

## 2024-01-01 RX ADMIN — SILDENAFIL CITRATE 20 MG: 10 POWDER, FOR SUSPENSION ORAL at 12:11

## 2024-01-01 RX ADMIN — HEPARIN SODIUM 2100 UNITS/HR: 10000 INJECTION, SOLUTION INTRAVENOUS at 01:09

## 2024-01-01 RX ADMIN — DEXMEDETOMIDINE HYDROCHLORIDE 1 MCG/KG/HR: 4 INJECTION, SOLUTION INTRAVENOUS at 13:20

## 2024-01-01 RX ADMIN — DEXMEDETOMIDINE HYDROCHLORIDE 1.2 MCG/KG/HR: 400 INJECTION INTRAVENOUS at 03:18

## 2024-01-01 RX ADMIN — Medication 25 MCG: at 05:33

## 2024-01-01 RX ADMIN — SODIUM CHLORIDE 8 MG/HR: 9 INJECTION, SOLUTION INTRAVENOUS at 10:26

## 2024-01-01 RX ADMIN — HEPARIN SODIUM 1800 UNITS/HR: 10000 INJECTION, SOLUTION INTRAVENOUS at 03:14

## 2024-01-01 RX ADMIN — IPRATROPIUM BROMIDE AND ALBUTEROL SULFATE 3 ML: .5; 3 SOLUTION RESPIRATORY (INHALATION) at 09:00

## 2024-01-01 RX ADMIN — Medication 60 ML: at 08:14

## 2024-01-01 RX ADMIN — IPRATROPIUM BROMIDE AND ALBUTEROL SULFATE 3 ML: .5; 3 SOLUTION RESPIRATORY (INHALATION) at 07:55

## 2024-01-01 RX ADMIN — ACETAZOLAMIDE 500 MG: 500 INJECTION, POWDER, LYOPHILIZED, FOR SOLUTION INTRAVENOUS at 20:24

## 2024-01-01 RX ADMIN — INSULIN ASPART 1 UNITS: 100 INJECTION, SOLUTION INTRAVENOUS; SUBCUTANEOUS at 20:01

## 2024-01-01 RX ADMIN — QUETIAPINE FUMARATE 50 MG: 25 TABLET ORAL at 20:52

## 2024-01-01 RX ADMIN — BUMETANIDE 2 MG/HR: 0.25 INJECTION INTRAMUSCULAR; INTRAVENOUS at 11:43

## 2024-01-01 RX ADMIN — DEXTROSE MONOHYDRATE 300 ML: 100 INJECTION, SOLUTION INTRAVENOUS at 14:56

## 2024-01-01 RX ADMIN — DEXMEDETOMIDINE HYDROCHLORIDE 1 MCG/KG/HR: 4 INJECTION, SOLUTION INTRAVENOUS at 10:47

## 2024-01-01 RX ADMIN — Medication 25 MCG: at 06:19

## 2024-01-01 RX ADMIN — PIPERACILLIN AND TAZOBACTAM 4.5 G: 4; .5 INJECTION, POWDER, FOR SOLUTION INTRAVENOUS at 11:25

## 2024-01-01 RX ADMIN — DEXMEDETOMIDINE HYDROCHLORIDE 1.2 MCG/KG/HR: 400 INJECTION INTRAVENOUS at 23:02

## 2024-01-01 RX ADMIN — DEXMEDETOMIDINE HYDROCHLORIDE 1 MCG/KG/HR: 400 INJECTION INTRAVENOUS at 18:22

## 2024-01-01 RX ADMIN — Medication 50 MCG: at 08:45

## 2024-01-01 RX ADMIN — PROPOFOL 15 MCG/KG/MIN: 10 INJECTION, EMULSION INTRAVENOUS at 23:41

## 2024-01-01 RX ADMIN — PROPOFOL 15 MCG/KG/MIN: 10 INJECTION, EMULSION INTRAVENOUS at 21:06

## 2024-01-01 RX ADMIN — MICONAZOLE NITRATE ANTIFUNGAL POWDER: 2 POWDER TOPICAL at 09:24

## 2024-01-01 RX ADMIN — MIDAZOLAM 2 MG: 1 INJECTION INTRAMUSCULAR; INTRAVENOUS at 20:44

## 2024-01-01 RX ADMIN — POTASSIUM CHLORIDE 40 MEQ: 1.5 POWDER, FOR SOLUTION ORAL at 18:59

## 2024-01-01 RX ADMIN — INSULIN ASPART 1 UNITS: 100 INJECTION, SOLUTION INTRAVENOUS; SUBCUTANEOUS at 00:38

## 2024-01-01 RX ADMIN — Medication 60 ML: at 09:01

## 2024-01-01 RX ADMIN — NOREPINEPHRINE BITARTRATE 0.14 MCG/KG/MIN: 0.02 INJECTION, SOLUTION INTRAVENOUS at 21:06

## 2024-01-01 RX ADMIN — POTASSIUM CHLORIDE 20 MEQ: 29.8 INJECTION, SOLUTION INTRAVENOUS at 12:32

## 2024-01-01 RX ADMIN — MAGNESIUM SULFATE HEPTAHYDRATE 2 G: 2 INJECTION, SOLUTION INTRAVENOUS at 04:34

## 2024-01-01 RX ADMIN — CHLORHEXIDINE GLUCONATE 0.12% ORAL RINSE 15 ML: 1.2 LIQUID ORAL at 20:52

## 2024-01-01 RX ADMIN — DEXMEDETOMIDINE 1.2 MCG/KG/HR: 100 INJECTION, SOLUTION INTRAVENOUS at 10:01

## 2024-01-01 RX ADMIN — IPRATROPIUM BROMIDE AND ALBUTEROL SULFATE 3 ML: .5; 3 SOLUTION RESPIRATORY (INHALATION) at 19:52

## 2024-01-01 RX ADMIN — POTASSIUM CHLORIDE 20 MEQ: 29.8 INJECTION, SOLUTION INTRAVENOUS at 05:28

## 2024-01-01 RX ADMIN — Medication 60 ML: at 19:51

## 2024-01-01 RX ADMIN — POTASSIUM CHLORIDE 20 MEQ: 29.8 INJECTION, SOLUTION INTRAVENOUS at 06:08

## 2024-01-01 RX ADMIN — SODIUM CHLORIDE 8 MG/HR: 9 INJECTION, SOLUTION INTRAVENOUS at 18:49

## 2024-01-01 RX ADMIN — FUROSEMIDE 10 MG/HR: 10 INJECTION, SOLUTION INTRAMUSCULAR; INTRAVENOUS at 13:00

## 2024-01-01 RX ADMIN — DEXMEDETOMIDINE HYDROCHLORIDE 1.2 MCG/KG/HR: 400 INJECTION INTRAVENOUS at 21:11

## 2024-01-01 RX ADMIN — POTASSIUM CHLORIDE 40 MEQ: 1.5 POWDER, FOR SOLUTION ORAL at 14:38

## 2024-01-01 RX ADMIN — PROPOFOL 15 MCG/KG/MIN: 10 INJECTION, EMULSION INTRAVENOUS at 07:30

## 2024-01-01 RX ADMIN — ACETAZOLAMIDE 1000 MG: 500 INJECTION, POWDER, LYOPHILIZED, FOR SOLUTION INTRAVENOUS at 14:38

## 2024-01-01 RX ADMIN — Medication 200 MCG/HR: at 03:37

## 2024-01-01 RX ADMIN — DEXTROSE MONOHYDRATE 25 G: 25 INJECTION, SOLUTION INTRAVENOUS at 06:12

## 2024-01-01 RX ADMIN — SENNOSIDES AND DOCUSATE SODIUM 1 TABLET: 8.6; 5 TABLET ORAL at 20:07

## 2024-01-01 RX ADMIN — IPRATROPIUM BROMIDE AND ALBUTEROL SULFATE 3 ML: .5; 3 SOLUTION RESPIRATORY (INHALATION) at 19:40

## 2024-01-01 RX ADMIN — INSULIN ASPART 1 UNITS: 100 INJECTION, SOLUTION INTRAVENOUS; SUBCUTANEOUS at 21:17

## 2024-01-01 RX ADMIN — IPRATROPIUM BROMIDE AND ALBUTEROL SULFATE 3 ML: .5; 3 SOLUTION RESPIRATORY (INHALATION) at 16:16

## 2024-01-01 RX ADMIN — HEPARIN SODIUM 1950 UNITS/HR: 10000 INJECTION, SOLUTION INTRAVENOUS at 14:29

## 2024-01-01 RX ADMIN — NOREPINEPHRINE BITARTRATE 0.1 MCG/KG/MIN: 0.06 INJECTION, SOLUTION INTRAVENOUS at 21:13

## 2024-01-01 RX ADMIN — MAGNESIUM SULFATE HEPTAHYDRATE 2 G: 2 INJECTION, SOLUTION INTRAVENOUS at 01:39

## 2024-01-01 RX ADMIN — MIDAZOLAM 2 MG: 1 INJECTION INTRAMUSCULAR; INTRAVENOUS at 18:50

## 2024-01-01 RX ADMIN — PANTOPRAZOLE SODIUM 40 MG: 40 INJECTION, POWDER, FOR SOLUTION INTRAVENOUS at 00:02

## 2024-01-01 RX ADMIN — ACETAMINOPHEN 650 MG: 325 TABLET ORAL at 08:57

## 2024-01-01 RX ADMIN — BUMETANIDE 2 MG/HR: 0.25 INJECTION INTRAMUSCULAR; INTRAVENOUS at 10:26

## 2024-01-01 RX ADMIN — PROPOFOL 150 MG: 10 INJECTION, EMULSION INTRAVENOUS at 21:05

## 2024-01-01 RX ADMIN — NOREPINEPHRINE BITARTRATE 0.1 MCG/KG/MIN: 0.02 INJECTION, SOLUTION INTRAVENOUS at 17:31

## 2024-01-01 RX ADMIN — Medication 100 MCG/HR: at 04:27

## 2024-01-01 RX ADMIN — NOREPINEPHRINE BITARTRATE 0.02 MCG/KG/MIN: 0.02 INJECTION, SOLUTION INTRAVENOUS at 13:21

## 2024-01-01 RX ADMIN — INSULIN ASPART 1 UNITS: 100 INJECTION, SOLUTION INTRAVENOUS; SUBCUTANEOUS at 23:21

## 2024-01-01 RX ADMIN — Medication 25 MCG: at 01:20

## 2024-01-01 RX ADMIN — CALCIUM GLUCONATE 1 G: 98 INJECTION, SOLUTION INTRAVENOUS at 14:49

## 2024-01-01 RX ADMIN — POTASSIUM CHLORIDE 20 MEQ: 29.8 INJECTION, SOLUTION INTRAVENOUS at 00:37

## 2024-01-01 RX ADMIN — INSULIN ASPART 1 UNITS: 100 INJECTION, SOLUTION INTRAVENOUS; SUBCUTANEOUS at 04:50

## 2024-01-01 RX ADMIN — MIDAZOLAM 2 MG: 1 INJECTION INTRAMUSCULAR; INTRAVENOUS at 11:50

## 2024-01-01 RX ADMIN — Medication 75 MCG/HR: at 18:49

## 2024-01-01 RX ADMIN — MIDAZOLAM 2 MG: 1 INJECTION INTRAMUSCULAR; INTRAVENOUS at 21:08

## 2024-01-01 RX ADMIN — MIDAZOLAM IN SODIUM CHLORIDE 2 MG/HR: 1 INJECTION INTRAVENOUS at 13:29

## 2024-01-01 RX ADMIN — DEXMEDETOMIDINE 1.2 MCG/KG/HR: 100 INJECTION, SOLUTION INTRAVENOUS at 17:33

## 2024-01-01 RX ADMIN — IOPAMIDOL 135 ML: 755 INJECTION, SOLUTION INTRAVENOUS at 03:58

## 2024-01-01 RX ADMIN — POTASSIUM CHLORIDE 20 MEQ: 1.5 POWDER, FOR SOLUTION ORAL at 22:50

## 2024-01-01 RX ADMIN — CHLORHEXIDINE GLUCONATE 0.12% ORAL RINSE 15 ML: 1.2 LIQUID ORAL at 09:19

## 2024-01-01 RX ADMIN — INSULIN ASPART 1 UNITS: 100 INJECTION, SOLUTION INTRAVENOUS; SUBCUTANEOUS at 12:21

## 2024-01-01 RX ADMIN — IPRATROPIUM BROMIDE AND ALBUTEROL SULFATE 3 ML: .5; 3 SOLUTION RESPIRATORY (INHALATION) at 19:42

## 2024-01-01 RX ADMIN — Medication 60 ML: at 19:48

## 2024-01-01 RX ADMIN — Medication 150 MCG/HR: at 21:23

## 2024-01-01 RX ADMIN — DEXMEDETOMIDINE HYDROCHLORIDE 1.2 MCG/KG/HR: 400 INJECTION INTRAVENOUS at 19:00

## 2024-01-01 RX ADMIN — INSULIN ASPART 1 UNITS: 100 INJECTION, SOLUTION INTRAVENOUS; SUBCUTANEOUS at 08:20

## 2024-01-01 RX ADMIN — Medication 25 MCG: at 11:55

## 2024-01-01 RX ADMIN — DEXMEDETOMIDINE 1.2 MCG/KG/HR: 100 INJECTION, SOLUTION INTRAVENOUS at 21:58

## 2024-01-01 RX ADMIN — Medication 150 MCG/HR: at 15:38

## 2024-01-01 RX ADMIN — ACETAZOLAMIDE 500 MG: 500 INJECTION, POWDER, LYOPHILIZED, FOR SOLUTION INTRAVENOUS at 19:48

## 2024-01-01 RX ADMIN — Medication 60 ML: at 13:46

## 2024-01-01 RX ADMIN — VANCOMYCIN HYDROCHLORIDE 2000 MG: 5 INJECTION, POWDER, LYOPHILIZED, FOR SOLUTION INTRAVENOUS at 10:58

## 2024-01-01 RX ADMIN — IPRATROPIUM BROMIDE AND ALBUTEROL SULFATE 3 ML: .5; 3 SOLUTION RESPIRATORY (INHALATION) at 15:29

## 2024-01-01 RX ADMIN — INSULIN ASPART 1 UNITS: 100 INJECTION, SOLUTION INTRAVENOUS; SUBCUTANEOUS at 12:04

## 2024-01-01 RX ADMIN — POTASSIUM CHLORIDE 20 MEQ: 29.8 INJECTION, SOLUTION INTRAVENOUS at 23:52

## 2024-01-01 RX ADMIN — INSULIN ASPART 1 UNITS: 100 INJECTION, SOLUTION INTRAVENOUS; SUBCUTANEOUS at 17:22

## 2024-01-01 RX ADMIN — ALBUMIN HUMAN 12.5 G: 0.25 SOLUTION INTRAVENOUS at 20:53

## 2024-01-01 RX ADMIN — HEPARIN SODIUM 1950 UNITS/HR: 10000 INJECTION, SOLUTION INTRAVENOUS at 04:34

## 2024-01-01 RX ADMIN — INSULIN ASPART 1 UNITS: 100 INJECTION, SOLUTION INTRAVENOUS; SUBCUTANEOUS at 20:55

## 2024-01-01 RX ADMIN — CALCIUM GLUCONATE 1 G: 20 INJECTION, SOLUTION INTRAVENOUS at 06:05

## 2024-01-01 RX ADMIN — Medication 60 ML: at 21:24

## 2024-01-01 RX ADMIN — SODIUM CHLORIDE 8 MG/HR: 9 INJECTION, SOLUTION INTRAVENOUS at 19:49

## 2024-01-01 RX ADMIN — PANTOPRAZOLE SODIUM 40 MG: 40 INJECTION, POWDER, FOR SOLUTION INTRAVENOUS at 19:47

## 2024-01-01 RX ADMIN — Medication 50 MCG: at 13:09

## 2024-01-01 RX ADMIN — DEXMEDETOMIDINE HYDROCHLORIDE 1 MCG/KG/HR: 400 INJECTION INTRAVENOUS at 20:01

## 2024-01-01 RX ADMIN — PANTOPRAZOLE SODIUM 40 MG: 40 INJECTION, POWDER, FOR SOLUTION INTRAVENOUS at 19:30

## 2024-01-01 RX ADMIN — EPOPROSTENOL 20 NG/KG/MIN: 1.5 INJECTION, POWDER, LYOPHILIZED, FOR SOLUTION INTRAVENOUS at 03:05

## 2024-01-01 RX ADMIN — ALTEPLASE 2 MG: 2.2 INJECTION, POWDER, LYOPHILIZED, FOR SOLUTION INTRAVENOUS at 18:58

## 2024-01-01 RX ADMIN — LIDOCAINE HYDROCHLORIDE 1 ML: 10 INJECTION, SOLUTION EPIDURAL; INFILTRATION; INTRACAUDAL; PERINEURAL at 12:55

## 2024-01-01 RX ADMIN — CHLORHEXIDINE GLUCONATE 0.12% ORAL RINSE 15 ML: 1.2 LIQUID ORAL at 19:53

## 2024-01-01 RX ADMIN — PIPERACILLIN AND TAZOBACTAM 3.38 G: 3; .375 INJECTION, POWDER, FOR SOLUTION INTRAVENOUS at 04:43

## 2024-01-01 RX ADMIN — INSULIN ASPART 2 UNITS: 100 INJECTION, SOLUTION INTRAVENOUS; SUBCUTANEOUS at 04:20

## 2024-01-01 RX ADMIN — INSULIN ASPART 1 UNITS: 100 INJECTION, SOLUTION INTRAVENOUS; SUBCUTANEOUS at 10:39

## 2024-01-01 RX ADMIN — HEPARIN SODIUM 1950 UNITS/HR: 10000 INJECTION, SOLUTION INTRAVENOUS at 19:28

## 2024-01-01 RX ADMIN — POTASSIUM CHLORIDE 20 MEQ: 29.8 INJECTION, SOLUTION INTRAVENOUS at 13:36

## 2024-01-01 RX ADMIN — SODIUM CHLORIDE 8 MG/HR: 9 INJECTION, SOLUTION INTRAVENOUS at 00:38

## 2024-01-01 RX ADMIN — HEPARIN SODIUM 2100 UNITS/HR: 10000 INJECTION, SOLUTION INTRAVENOUS at 14:32

## 2024-01-01 RX ADMIN — POTASSIUM CHLORIDE 40 MEQ: 1.5 POWDER, FOR SOLUTION ORAL at 04:25

## 2024-01-01 RX ADMIN — POTASSIUM CHLORIDE 40 MEQ: 1.5 SOLUTION ORAL at 01:27

## 2024-01-01 RX ADMIN — POTASSIUM CHLORIDE 20 MEQ: 1.5 POWDER, FOR SOLUTION ORAL at 03:44

## 2024-01-01 RX ADMIN — EPINEPHRINE 0.01 MCG/KG/MIN: 1 INJECTION INTRAMUSCULAR; INTRAVENOUS; SUBCUTANEOUS at 22:13

## 2024-01-01 RX ADMIN — PROPOFOL 15 MCG/KG/MIN: 10 INJECTION, EMULSION INTRAVENOUS at 13:54

## 2024-01-01 RX ADMIN — Medication 10 MG: at 05:52

## 2024-01-01 RX ADMIN — MIDAZOLAM 2 MG: 1 INJECTION INTRAMUSCULAR; INTRAVENOUS at 06:19

## 2024-01-01 RX ADMIN — Medication 15 ML: at 08:14

## 2024-01-01 RX ADMIN — HEPARIN SODIUM 1800 UNITS/HR: 10000 INJECTION, SOLUTION INTRAVENOUS at 18:02

## 2024-01-01 RX ADMIN — CHLORHEXIDINE GLUCONATE 0.12% ORAL RINSE 15 ML: 1.2 LIQUID ORAL at 08:53

## 2024-01-01 RX ADMIN — CHLOROTHIAZIDE SODIUM 1000 MG: 500 INJECTION, POWDER, LYOPHILIZED, FOR SOLUTION INTRAVENOUS at 08:09

## 2024-01-01 RX ADMIN — VASOPRESSIN 1.8 UNITS/HR: 20 INJECTION, SOLUTION INTRAMUSCULAR; SUBCUTANEOUS at 21:09

## 2024-01-01 RX ADMIN — NOREPINEPHRINE BITARTRATE 0.04 MCG/KG/MIN: 0.02 INJECTION, SOLUTION INTRAVENOUS at 12:56

## 2024-01-01 RX ADMIN — IPRATROPIUM BROMIDE AND ALBUTEROL SULFATE 3 ML: .5; 3 SOLUTION RESPIRATORY (INHALATION) at 16:17

## 2024-01-01 RX ADMIN — Medication 50 MCG: at 12:43

## 2024-01-01 RX ADMIN — INSULIN ASPART 1 UNITS: 100 INJECTION, SOLUTION INTRAVENOUS; SUBCUTANEOUS at 16:04

## 2024-01-01 RX ADMIN — CHLORHEXIDINE GLUCONATE 0.12% ORAL RINSE 15 ML: 1.2 LIQUID ORAL at 19:30

## 2024-01-01 RX ADMIN — CHLORHEXIDINE GLUCONATE 0.12% ORAL RINSE 15 ML: 1.2 LIQUID ORAL at 09:04

## 2024-01-01 RX ADMIN — Medication 150 MCG/HR: at 03:01

## 2024-01-01 RX ADMIN — DEXMEDETOMIDINE 1.2 MCG/KG/HR: 100 INJECTION, SOLUTION INTRAVENOUS at 10:27

## 2024-01-01 RX ADMIN — FENTANYL CITRATE 50 MCG: 50 INJECTION, SOLUTION INTRAMUSCULAR; INTRAVENOUS at 18:00

## 2024-01-01 RX ADMIN — Medication 150 MCG/HR: at 13:29

## 2024-01-01 RX ADMIN — HEPARIN SODIUM 1800 UNITS/HR: 10000 INJECTION, SOLUTION INTRAVENOUS at 15:13

## 2024-01-01 RX ADMIN — POTASSIUM CHLORIDE 20 MEQ: 29.8 INJECTION, SOLUTION INTRAVENOUS at 19:33

## 2024-01-01 RX ADMIN — MAGNESIUM SULFATE HEPTAHYDRATE 2 G: 2 INJECTION, SOLUTION INTRAVENOUS at 12:51

## 2024-01-01 RX ADMIN — EPOPROSTENOL 20 NG/KG/MIN: 1.5 INJECTION, POWDER, LYOPHILIZED, FOR SOLUTION INTRAVENOUS at 15:32

## 2024-01-01 RX ADMIN — SODIUM CHLORIDE 8 MG/HR: 9 INJECTION, SOLUTION INTRAVENOUS at 05:10

## 2024-01-01 RX ADMIN — DEXMEDETOMIDINE HYDROCHLORIDE 1.2 MCG/KG/HR: 400 INJECTION INTRAVENOUS at 02:01

## 2024-01-01 RX ADMIN — PIPERACILLIN AND TAZOBACTAM 4.5 G: 4; .5 INJECTION, POWDER, LYOPHILIZED, FOR SOLUTION INTRAVENOUS at 05:59

## 2024-01-01 RX ADMIN — QUETIAPINE FUMARATE 50 MG: 25 TABLET ORAL at 07:54

## 2024-01-01 RX ADMIN — IPRATROPIUM BROMIDE AND ALBUTEROL SULFATE 3 ML: .5; 3 SOLUTION RESPIRATORY (INHALATION) at 12:06

## 2024-01-01 RX ADMIN — ACETAZOLAMIDE 500 MG: 500 INJECTION, POWDER, LYOPHILIZED, FOR SOLUTION INTRAVENOUS at 10:26

## 2024-01-01 RX ADMIN — PANTOPRAZOLE SODIUM 40 MG: 40 INJECTION, POWDER, FOR SOLUTION INTRAVENOUS at 07:02

## 2024-01-01 RX ADMIN — FUROSEMIDE 60 MG: 10 INJECTION, SOLUTION INTRAMUSCULAR; INTRAVENOUS at 11:55

## 2024-01-01 RX ADMIN — ACETAMINOPHEN 650 MG: 325 TABLET ORAL at 12:20

## 2024-01-01 RX ADMIN — CHLORHEXIDINE GLUCONATE 0.12% ORAL RINSE 15 ML: 1.2 LIQUID ORAL at 20:07

## 2024-01-01 RX ADMIN — DEXMEDETOMIDINE HYDROCHLORIDE 0.6 MCG/KG/HR: 4 INJECTION, SOLUTION INTRAVENOUS at 01:56

## 2024-01-01 RX ADMIN — POTASSIUM CHLORIDE 20 MEQ: 29.8 INJECTION, SOLUTION INTRAVENOUS at 15:38

## 2024-01-01 RX ADMIN — IPRATROPIUM BROMIDE AND ALBUTEROL SULFATE 3 ML: .5; 3 SOLUTION RESPIRATORY (INHALATION) at 16:22

## 2024-01-01 RX ADMIN — DEXMEDETOMIDINE HYDROCHLORIDE 1 MCG/KG/HR: 400 INJECTION INTRAVENOUS at 12:44

## 2024-01-01 RX ADMIN — HEPARIN SODIUM 1950 UNITS/HR: 10000 INJECTION, SOLUTION INTRAVENOUS at 05:48

## 2024-01-01 RX ADMIN — SODIUM ZIRCONIUM CYCLOSILICATE 5 G: 5 POWDER, FOR SUSPENSION ORAL at 18:05

## 2024-01-01 RX ADMIN — INSULIN ASPART 1 UNITS: 100 INJECTION, SOLUTION INTRAVENOUS; SUBCUTANEOUS at 20:43

## 2024-01-01 RX ADMIN — DEXMEDETOMIDINE 1.2 MCG/KG/HR: 100 INJECTION, SOLUTION INTRAVENOUS at 12:04

## 2024-01-01 RX ADMIN — IPRATROPIUM BROMIDE AND ALBUTEROL SULFATE 3 ML: .5; 3 SOLUTION RESPIRATORY (INHALATION) at 11:46

## 2024-01-01 RX ADMIN — MIDAZOLAM 2 MG: 1 INJECTION INTRAMUSCULAR; INTRAVENOUS at 04:35

## 2024-01-01 RX ADMIN — IODIXANOL 100 ML: 320 INJECTION, SOLUTION INTRAVASCULAR at 10:12

## 2024-01-01 RX ADMIN — CHLORHEXIDINE GLUCONATE 0.12% ORAL RINSE 15 ML: 1.2 LIQUID ORAL at 22:01

## 2024-01-01 RX ADMIN — INSULIN ASPART 1 UNITS: 100 INJECTION, SOLUTION INTRAVENOUS; SUBCUTANEOUS at 23:31

## 2024-01-01 RX ADMIN — INSULIN ASPART 1 UNITS: 100 INJECTION, SOLUTION INTRAVENOUS; SUBCUTANEOUS at 00:20

## 2024-01-01 RX ADMIN — SILDENAFIL POWDER, 10 MG: 10 FOR SUSPENSION ORAL at 19:49

## 2024-01-01 RX ADMIN — Medication 200 MCG/HR: at 23:31

## 2024-01-01 RX ADMIN — MAGNESIUM SULFATE HEPTAHYDRATE 2 G: 40 INJECTION, SOLUTION INTRAVENOUS at 17:25

## 2024-01-01 RX ADMIN — Medication 1000 MCG: at 21:08

## 2024-01-01 RX ADMIN — IOPAMIDOL 77 ML: 755 INJECTION, SOLUTION INTRAVENOUS at 12:53

## 2024-01-01 RX ADMIN — DEXMEDETOMIDINE HYDROCHLORIDE 1.2 MCG/KG/HR: 400 INJECTION INTRAVENOUS at 01:05

## 2024-01-01 RX ADMIN — VANCOMYCIN HYDROCHLORIDE 2000 MG: 1 INJECTION, POWDER, LYOPHILIZED, FOR SOLUTION INTRAVENOUS at 10:42

## 2024-01-01 RX ADMIN — DOXYCYCLINE 100 MG: 100 INJECTION, POWDER, LYOPHILIZED, FOR SOLUTION INTRAVENOUS at 20:00

## 2024-01-01 RX ADMIN — POTASSIUM CHLORIDE 20 MEQ: 29.8 INJECTION, SOLUTION INTRAVENOUS at 19:06

## 2024-01-01 RX ADMIN — CHLOROTHIAZIDE SODIUM 1000 MG: 500 INJECTION, POWDER, LYOPHILIZED, FOR SOLUTION INTRAVENOUS at 10:41

## 2024-01-01 RX ADMIN — IPRATROPIUM BROMIDE AND ALBUTEROL SULFATE 3 ML: .5; 3 SOLUTION RESPIRATORY (INHALATION) at 20:19

## 2024-01-01 RX ADMIN — ALBUMIN HUMAN 12.5 G: 0.25 SOLUTION INTRAVENOUS at 07:54

## 2024-01-01 RX ADMIN — BUMETANIDE 2 MG/HR: 0.25 INJECTION INTRAMUSCULAR; INTRAVENOUS at 10:02

## 2024-01-01 RX ADMIN — POTASSIUM CHLORIDE 20 MEQ: 29.8 INJECTION, SOLUTION INTRAVENOUS at 06:33

## 2024-01-01 RX ADMIN — SODIUM CHLORIDE 10 UNITS: 9 INJECTION, SOLUTION INTRAVENOUS at 14:51

## 2024-01-01 RX ADMIN — PROTAMINE SULFATE 25 MG: 10 INJECTION, SOLUTION INTRAVENOUS at 04:17

## 2024-01-01 RX ADMIN — SODIUM CHLORIDE 8 MG/HR: 9 INJECTION, SOLUTION INTRAVENOUS at 10:20

## 2024-01-01 RX ADMIN — INSULIN ASPART 1 UNITS: 100 INJECTION, SOLUTION INTRAVENOUS; SUBCUTANEOUS at 08:10

## 2024-01-01 RX ADMIN — INSULIN ASPART 1 UNITS: 100 INJECTION, SOLUTION INTRAVENOUS; SUBCUTANEOUS at 03:24

## 2024-01-01 RX ADMIN — Medication 50 MG: at 22:45

## 2024-01-01 RX ADMIN — SILDENAFIL POWDER, 10 MG: 10 FOR SUSPENSION ORAL at 13:02

## 2024-01-01 RX ADMIN — BUMETANIDE 2 MG/HR: 0.25 INJECTION INTRAMUSCULAR; INTRAVENOUS at 12:21

## 2024-01-01 RX ADMIN — ONDANSETRON 4 MG: 2 INJECTION INTRAMUSCULAR; INTRAVENOUS at 08:25

## 2024-01-01 RX ADMIN — SILDENAFIL POWDER, 10 MG: 10 FOR SUSPENSION ORAL at 12:34

## 2024-01-01 RX ADMIN — CHLORHEXIDINE GLUCONATE 0.12% ORAL RINSE 15 ML: 1.2 LIQUID ORAL at 08:09

## 2024-01-01 RX ADMIN — SENNOSIDES AND DOCUSATE SODIUM 2 TABLET: 8.6; 5 TABLET ORAL at 08:25

## 2024-01-01 RX ADMIN — POTASSIUM CHLORIDE 20 MEQ: 29.8 INJECTION, SOLUTION INTRAVENOUS at 10:20

## 2024-01-01 RX ADMIN — HEPARIN SODIUM 1950 UNITS/HR: 10000 INJECTION, SOLUTION INTRAVENOUS at 03:58

## 2024-01-01 RX ADMIN — Medication 50 MCG: at 09:00

## 2024-01-01 RX ADMIN — PIPERACILLIN AND TAZOBACTAM 4.5 G: 4; .5 INJECTION, POWDER, FOR SOLUTION INTRAVENOUS at 09:20

## 2024-01-01 RX ADMIN — PANTOPRAZOLE SODIUM 40 MG: 40 INJECTION, POWDER, FOR SOLUTION INTRAVENOUS at 08:53

## 2024-01-01 RX ADMIN — INSULIN ASPART 1 UNITS: 100 INJECTION, SOLUTION INTRAVENOUS; SUBCUTANEOUS at 15:55

## 2024-01-01 RX ADMIN — Medication 25 MCG: at 20:59

## 2024-01-01 RX ADMIN — INSULIN ASPART 1 UNITS: 100 INJECTION, SOLUTION INTRAVENOUS; SUBCUTANEOUS at 23:58

## 2024-01-01 RX ADMIN — POTASSIUM CHLORIDE 40 MEQ: 1.5 POWDER, FOR SOLUTION ORAL at 13:10

## 2024-01-01 RX ADMIN — CALCIUM GLUCONATE 1 G: 98 INJECTION, SOLUTION INTRAVENOUS at 22:23

## 2024-01-01 RX ADMIN — PIPERACILLIN AND TAZOBACTAM 3.38 G: 3; .375 INJECTION, POWDER, FOR SOLUTION INTRAVENOUS at 15:49

## 2024-01-01 RX ADMIN — SILDENAFIL CITRATE 20 MG: 10 POWDER, FOR SUSPENSION ORAL at 19:47

## 2024-01-01 RX ADMIN — SODIUM CHLORIDE 10 UNITS: 9 INJECTION, SOLUTION INTRAVENOUS at 22:30

## 2024-01-01 RX ADMIN — ACETAMINOPHEN 650 MG: 325 TABLET ORAL at 12:25

## 2024-01-01 RX ADMIN — PROPOFOL 30 MCG/KG/MIN: 10 INJECTION, EMULSION INTRAVENOUS at 19:40

## 2024-01-01 RX ADMIN — VASOPRESSIN 2.4 UNITS/HR: 20 INJECTION, SOLUTION INTRAMUSCULAR; SUBCUTANEOUS at 21:38

## 2024-01-01 RX ADMIN — BUMETANIDE 2 MG/HR: 0.25 INJECTION INTRAMUSCULAR; INTRAVENOUS at 22:51

## 2024-01-01 RX ADMIN — CHLOROTHIAZIDE SODIUM 1000 MG: 500 INJECTION, POWDER, LYOPHILIZED, FOR SOLUTION INTRAVENOUS at 17:23

## 2024-01-01 RX ADMIN — POTASSIUM CHLORIDE 20 MEQ: 29.8 INJECTION, SOLUTION INTRAVENOUS at 18:06

## 2024-01-01 RX ADMIN — SENNOSIDES AND DOCUSATE SODIUM 2 TABLET: 8.6; 5 TABLET ORAL at 20:01

## 2024-01-01 RX ADMIN — PROPOFOL 40 MCG/KG/MIN: 10 INJECTION, EMULSION INTRAVENOUS at 19:53

## 2024-01-01 RX ADMIN — SODIUM ZIRCONIUM CYCLOSILICATE 10 G: 10 POWDER, FOR SUSPENSION ORAL at 00:27

## 2024-01-01 RX ADMIN — SODIUM CHLORIDE 8 MG/HR: 9 INJECTION, SOLUTION INTRAVENOUS at 12:53

## 2024-01-01 RX ADMIN — HEPARIN SODIUM 2100 UNITS/HR: 10000 INJECTION, SOLUTION INTRAVENOUS at 03:39

## 2024-01-01 RX ADMIN — CEFTRIAXONE SODIUM 2 G: 2 INJECTION, POWDER, FOR SOLUTION INTRAMUSCULAR; INTRAVENOUS at 16:11

## 2024-01-01 RX ADMIN — SILDENAFIL POWDER, 10 MG: 10 FOR SUSPENSION ORAL at 03:49

## 2024-01-01 RX ADMIN — NOREPINEPHRINE BITARTRATE 0.08 MCG/KG/MIN: 0.02 INJECTION, SOLUTION INTRAVENOUS at 03:28

## 2024-01-01 RX ADMIN — Medication 50 MCG: at 10:57

## 2024-01-01 RX ADMIN — SILDENAFIL CITRATE 10 MG: 10 FOR SUSPENSION ORAL at 08:09

## 2024-01-01 RX ADMIN — FUROSEMIDE 40 MG: 10 INJECTION, SOLUTION INTRAVENOUS at 09:50

## 2024-01-01 RX ADMIN — ACETAZOLAMIDE 1000 MG: 500 INJECTION, POWDER, LYOPHILIZED, FOR SOLUTION INTRAVENOUS at 22:46

## 2024-01-01 RX ADMIN — INSULIN ASPART 1 UNITS: 100 INJECTION, SOLUTION INTRAVENOUS; SUBCUTANEOUS at 12:44

## 2024-01-01 RX ADMIN — POTASSIUM CHLORIDE 40 MEQ: 1.5 POWDER, FOR SOLUTION ORAL at 01:09

## 2024-01-01 RX ADMIN — Medication 50 MCG: at 04:53

## 2024-01-01 RX ADMIN — CHLORHEXIDINE GLUCONATE 0.12% ORAL RINSE 15 ML: 1.2 LIQUID ORAL at 19:47

## 2024-01-01 RX ADMIN — SILDENAFIL CITRATE 20 MG: 10 POWDER, FOR SUSPENSION ORAL at 04:25

## 2024-01-01 RX ADMIN — SILDENAFIL CITRATE 20 MG: 10 POWDER, FOR SUSPENSION ORAL at 12:41

## 2024-01-01 RX ADMIN — Medication 25 MCG: at 12:15

## 2024-01-01 RX ADMIN — SODIUM CHLORIDE 8 MG/HR: 9 INJECTION, SOLUTION INTRAVENOUS at 09:00

## 2024-01-01 RX ADMIN — SODIUM CHLORIDE 8 MG/HR: 9 INJECTION, SOLUTION INTRAVENOUS at 10:38

## 2024-01-01 RX ADMIN — VASOPRESSIN 1.8 UNITS/HR: 20 INJECTION, SOLUTION INTRAMUSCULAR; SUBCUTANEOUS at 03:30

## 2024-01-01 RX ADMIN — IPRATROPIUM BROMIDE AND ALBUTEROL SULFATE 3 ML: .5; 3 SOLUTION RESPIRATORY (INHALATION) at 07:18

## 2024-01-01 RX ADMIN — Medication 25 MCG: at 16:09

## 2024-01-01 RX ADMIN — DOXYCYCLINE 100 MG: 100 INJECTION, POWDER, LYOPHILIZED, FOR SOLUTION INTRAVENOUS at 20:57

## 2024-01-01 RX ADMIN — FUROSEMIDE 40 MG: 10 INJECTION, SOLUTION INTRAVENOUS at 20:01

## 2024-01-01 RX ADMIN — DEXMEDETOMIDINE 1.2 MCG/KG/HR: 100 INJECTION, SOLUTION INTRAVENOUS at 19:34

## 2024-01-01 RX ADMIN — POTASSIUM CHLORIDE 20 MEQ: 29.8 INJECTION, SOLUTION INTRAVENOUS at 00:51

## 2024-01-01 RX ADMIN — POTASSIUM CHLORIDE 20 MEQ: 1.5 POWDER, FOR SOLUTION ORAL at 05:59

## 2024-01-01 RX ADMIN — PIPERACILLIN AND TAZOBACTAM 3.38 G: 4; .5 INJECTION, POWDER, LYOPHILIZED, FOR SOLUTION INTRAVENOUS at 10:43

## 2024-01-01 RX ADMIN — VASOPRESSIN 1.8 UNITS/HR: 20 INJECTION, SOLUTION INTRAMUSCULAR; SUBCUTANEOUS at 14:23

## 2024-01-01 RX ADMIN — INSULIN ASPART 1 UNITS: 100 INJECTION, SOLUTION INTRAVENOUS; SUBCUTANEOUS at 08:26

## 2024-01-01 RX ADMIN — POTASSIUM CHLORIDE 40 MEQ: 1.5 POWDER, FOR SOLUTION ORAL at 18:36

## 2024-01-01 RX ADMIN — MAGNESIUM SULFATE HEPTAHYDRATE 2 G: 2 INJECTION, SOLUTION INTRAVENOUS at 03:43

## 2024-01-01 RX ADMIN — Medication 60 ML: at 08:54

## 2024-01-01 RX ADMIN — HEPARIN SODIUM 1950 UNITS/HR: 10000 INJECTION, SOLUTION INTRAVENOUS at 17:25

## 2024-01-01 RX ADMIN — NOREPINEPHRINE BITARTRATE 0.06 MCG/KG/MIN: 0.06 INJECTION, SOLUTION INTRAVENOUS at 08:26

## 2024-01-01 RX ADMIN — PIPERACILLIN AND TAZOBACTAM 4.5 G: 4; .5 INJECTION, POWDER, FOR SOLUTION INTRAVENOUS at 17:02

## 2024-01-01 RX ADMIN — PIPERACILLIN AND TAZOBACTAM 3.38 G: 3; .375 INJECTION, POWDER, LYOPHILIZED, FOR SOLUTION INTRAVENOUS at 18:18

## 2024-01-01 RX ADMIN — PANTOPRAZOLE SODIUM 40 MG: 40 INJECTION, POWDER, FOR SOLUTION INTRAVENOUS at 21:00

## 2024-01-01 RX ADMIN — PIPERACILLIN AND TAZOBACTAM 4.5 G: 4; .5 INJECTION, POWDER, LYOPHILIZED, FOR SOLUTION INTRAVENOUS at 23:20

## 2024-01-01 RX ADMIN — HEPARIN SODIUM 1800 UNITS/HR: 10000 INJECTION, SOLUTION INTRAVENOUS at 16:47

## 2024-01-01 RX ADMIN — FENTANYL CITRATE 100 MCG: 50 INJECTION INTRAMUSCULAR; INTRAVENOUS at 14:20

## 2024-01-01 RX ADMIN — DEXMEDETOMIDINE 1.2 MCG/KG/HR: 100 INJECTION, SOLUTION INTRAVENOUS at 16:48

## 2024-01-01 RX ADMIN — Medication 15 ML: at 09:19

## 2024-01-01 RX ADMIN — Medication 25 MCG: at 09:34

## 2024-01-01 RX ADMIN — MIDAZOLAM 2 MG: 1 INJECTION INTRAMUSCULAR; INTRAVENOUS at 09:08

## 2024-01-01 RX ADMIN — POTASSIUM CHLORIDE 40 MEQ: 1.5 POWDER, FOR SOLUTION ORAL at 18:08

## 2024-01-01 RX ADMIN — SUCCINYLCHOLINE CHLORIDE 140 MG: 20 INJECTION, SOLUTION INTRAMUSCULAR; INTRAVENOUS; PARENTERAL at 21:05

## 2024-01-01 RX ADMIN — IPRATROPIUM BROMIDE AND ALBUTEROL SULFATE 3 ML: .5; 3 SOLUTION RESPIRATORY (INHALATION) at 16:08

## 2024-01-01 RX ADMIN — INSULIN ASPART 1 UNITS: 100 INJECTION, SOLUTION INTRAVENOUS; SUBCUTANEOUS at 04:25

## 2024-01-01 RX ADMIN — PROPOFOL 15 MCG/KG/MIN: 10 INJECTION, EMULSION INTRAVENOUS at 05:40

## 2024-01-01 RX ADMIN — DEXMEDETOMIDINE HYDROCHLORIDE 1.2 MCG/KG/HR: 4 INJECTION, SOLUTION INTRAVENOUS at 15:56

## 2024-01-01 RX ADMIN — PANTOPRAZOLE SODIUM 40 MG: 40 INJECTION, POWDER, FOR SOLUTION INTRAVENOUS at 20:12

## 2024-01-01 RX ADMIN — PIPERACILLIN AND TAZOBACTAM 4.5 G: 4; .5 INJECTION, POWDER, FOR SOLUTION INTRAVENOUS at 03:20

## 2024-01-01 RX ADMIN — PIPERACILLIN AND TAZOBACTAM 4.5 G: 4; .5 INJECTION, POWDER, FOR SOLUTION INTRAVENOUS at 04:33

## 2024-01-01 RX ADMIN — PANTOPRAZOLE SODIUM 40 MG: 40 INJECTION, POWDER, FOR SOLUTION INTRAVENOUS at 07:32

## 2024-01-01 RX ADMIN — ALBUTEROL SULFATE 10 MG: 2.5 SOLUTION RESPIRATORY (INHALATION) at 22:14

## 2024-01-01 RX ADMIN — PIPERACILLIN AND TAZOBACTAM 4.5 G: 4; .5 INJECTION, POWDER, LYOPHILIZED, FOR SOLUTION INTRAVENOUS at 17:32

## 2024-01-01 RX ADMIN — DEXMEDETOMIDINE HYDROCHLORIDE 1.2 MCG/KG/HR: 400 INJECTION INTRAVENOUS at 05:42

## 2024-01-01 RX ADMIN — BUMETANIDE 2 MG/HR: 0.25 INJECTION INTRAMUSCULAR; INTRAVENOUS at 06:34

## 2024-01-01 RX ADMIN — POTASSIUM CHLORIDE 20 MEQ: 1.5 POWDER, FOR SOLUTION ORAL at 10:02

## 2024-01-01 RX ADMIN — MAGNESIUM SULFATE HEPTAHYDRATE 2 G: 2 INJECTION, SOLUTION INTRAVENOUS at 21:43

## 2024-01-01 RX ADMIN — Medication 25 MCG/HR: at 02:36

## 2024-01-01 RX ADMIN — POTASSIUM CHLORIDE 20 MEQ: 1.5 POWDER, FOR SOLUTION ORAL at 17:17

## 2024-01-01 RX ADMIN — Medication 2 UNITS/HR: at 04:33

## 2024-01-01 RX ADMIN — PROPOFOL 40 MCG/KG/MIN: 10 INJECTION, EMULSION INTRAVENOUS at 17:17

## 2024-01-01 RX ADMIN — Medication 25 MCG: at 13:04

## 2024-01-01 RX ADMIN — SILDENAFIL POWDER, 10 MG: 10 FOR SUSPENSION ORAL at 21:02

## 2024-01-01 RX ADMIN — LIDOCAINE HYDROCHLORIDE 2.5 ML: 10 INJECTION, SOLUTION EPIDURAL; INFILTRATION; INTRACAUDAL; PERINEURAL at 18:00

## 2024-01-01 RX ADMIN — MIDAZOLAM HYDROCHLORIDE 3 MG: 1 INJECTION, SOLUTION INTRAMUSCULAR; INTRAVENOUS at 16:08

## 2024-01-01 RX ADMIN — Medication 15 ML: at 08:53

## 2024-01-01 RX ADMIN — DEXMEDETOMIDINE 1.2 MCG/KG/HR: 100 INJECTION, SOLUTION INTRAVENOUS at 06:34

## 2024-01-01 RX ADMIN — PIPERACILLIN AND TAZOBACTAM 4.5 G: 4; .5 INJECTION, POWDER, FOR SOLUTION INTRAVENOUS at 23:32

## 2024-01-01 RX ADMIN — Medication 25 MCG: at 18:00

## 2024-01-01 RX ADMIN — INSULIN ASPART 1 UNITS: 100 INJECTION, SOLUTION INTRAVENOUS; SUBCUTANEOUS at 08:23

## 2024-01-01 RX ADMIN — SODIUM CHLORIDE, SODIUM GLUCONATE, SODIUM ACETATE, POTASSIUM CHLORIDE AND MAGNESIUM CHLORIDE: 526; 502; 368; 37; 30 INJECTION, SOLUTION INTRAVENOUS at 23:15

## 2024-01-01 RX ADMIN — PROPOFOL 30 MCG/KG/MIN: 10 INJECTION, EMULSION INTRAVENOUS at 01:53

## 2024-01-01 RX ADMIN — CHLOROTHIAZIDE SODIUM 1000 MG: 500 INJECTION, POWDER, LYOPHILIZED, FOR SOLUTION INTRAVENOUS at 09:28

## 2024-01-01 RX ADMIN — MIDAZOLAM HYDROCHLORIDE 2 MG: 1 INJECTION, SOLUTION INTRAMUSCULAR; INTRAVENOUS at 17:59

## 2024-01-01 RX ADMIN — ACETAZOLAMIDE 250 MG: 500 INJECTION, POWDER, LYOPHILIZED, FOR SOLUTION INTRAVENOUS at 12:13

## 2024-01-01 RX ADMIN — DEXMEDETOMIDINE 1.2 MCG/KG/HR: 100 INJECTION, SOLUTION INTRAVENOUS at 15:26

## 2024-01-01 RX ADMIN — PROPOFOL 30 MCG/KG/MIN: 10 INJECTION, EMULSION INTRAVENOUS at 15:48

## 2024-01-01 RX ADMIN — POTASSIUM CHLORIDE 20 MEQ: 1.5 POWDER, FOR SOLUTION ORAL at 18:27

## 2024-01-01 RX ADMIN — INSULIN ASPART 1 UNITS: 100 INJECTION, SOLUTION INTRAVENOUS; SUBCUTANEOUS at 09:53

## 2024-01-01 RX ADMIN — DEXMEDETOMIDINE HYDROCHLORIDE 1 MCG/KG/HR: 400 INJECTION INTRAVENOUS at 15:36

## 2024-01-01 RX ADMIN — PERFLUTREN 4 ML: 6.52 INJECTION, SUSPENSION INTRAVENOUS at 11:20

## 2024-01-01 RX ADMIN — POTASSIUM CHLORIDE 20 MEQ: 29.8 INJECTION, SOLUTION INTRAVENOUS at 18:34

## 2024-01-01 RX ADMIN — Medication 150 MCG/HR: at 06:04

## 2024-01-01 RX ADMIN — CEFTRIAXONE SODIUM 2 G: 2 INJECTION, POWDER, FOR SOLUTION INTRAMUSCULAR; INTRAVENOUS at 15:27

## 2024-01-01 RX ADMIN — DEXMEDETOMIDINE 1.2 MCG/KG/HR: 100 INJECTION, SOLUTION INTRAVENOUS at 12:40

## 2024-01-01 RX ADMIN — CHLORHEXIDINE GLUCONATE 0.12% ORAL RINSE 15 ML: 1.2 LIQUID ORAL at 08:25

## 2024-01-01 RX ADMIN — INSULIN ASPART 1 UNITS: 100 INJECTION, SOLUTION INTRAVENOUS; SUBCUTANEOUS at 12:27

## 2024-01-01 RX ADMIN — ALBUMIN HUMAN 12.5 G: 0.25 SOLUTION INTRAVENOUS at 08:26

## 2024-01-01 RX ADMIN — Medication 50 MCG: at 21:35

## 2024-01-01 RX ADMIN — IOHEXOL 26 ML: 350 INJECTION, SOLUTION INTRAVENOUS at 16:39

## 2024-01-01 RX ADMIN — CHLORHEXIDINE GLUCONATE 0.12% ORAL RINSE 15 ML: 1.2 LIQUID ORAL at 08:13

## 2024-01-01 RX ADMIN — QUETIAPINE FUMARATE 50 MG: 25 TABLET ORAL at 20:15

## 2024-01-01 RX ADMIN — Medication 20 MG: at 18:59

## 2024-01-01 RX ADMIN — INSULIN ASPART 1 UNITS: 100 INJECTION, SOLUTION INTRAVENOUS; SUBCUTANEOUS at 17:23

## 2024-01-01 RX ADMIN — POLYETHYLENE GLYCOL 3350 17 G: 17 POWDER, FOR SOLUTION ORAL at 08:25

## 2024-01-01 RX ADMIN — NOREPINEPHRINE BITARTRATE 0.05 MCG/KG/MIN: 0.02 INJECTION, SOLUTION INTRAVENOUS at 14:03

## 2024-01-01 RX ADMIN — QUETIAPINE FUMARATE 50 MG: 25 TABLET ORAL at 08:22

## 2024-01-01 RX ADMIN — Medication 3 UNITS/HR: at 10:15

## 2024-01-01 RX ADMIN — HEPARIN SODIUM 1800 UNITS/HR: 10000 INJECTION, SOLUTION INTRAVENOUS at 15:55

## 2024-01-01 RX ADMIN — INSULIN ASPART 1 UNITS: 100 INJECTION, SOLUTION INTRAVENOUS; SUBCUTANEOUS at 01:01

## 2024-01-01 RX ADMIN — POTASSIUM CHLORIDE 20 MEQ: 1.5 SOLUTION ORAL at 17:25

## 2024-01-01 RX ADMIN — DEXMEDETOMIDINE HYDROCHLORIDE 0.2 MCG/KG/HR: 4 INJECTION, SOLUTION INTRAVENOUS at 21:38

## 2024-01-01 RX ADMIN — DEXMEDETOMIDINE 1.2 MCG/KG/HR: 100 INJECTION, SOLUTION INTRAVENOUS at 14:03

## 2024-01-01 RX ADMIN — CHLORHEXIDINE GLUCONATE 0.12% ORAL RINSE 15 ML: 1.2 LIQUID ORAL at 07:32

## 2024-01-01 RX ADMIN — ACETAZOLAMIDE 1000 MG: 500 INJECTION, POWDER, LYOPHILIZED, FOR SOLUTION INTRAVENOUS at 14:32

## 2024-01-01 RX ADMIN — Medication 60 ML: at 09:20

## 2024-01-01 RX ADMIN — Medication 50 MCG/HR: at 21:13

## 2024-01-01 RX ADMIN — ACETAMINOPHEN 650 MG: 325 SOLUTION ORAL at 00:12

## 2024-01-01 RX ADMIN — Medication 15 ML: at 08:08

## 2024-01-01 RX ADMIN — DEXMEDETOMIDINE 1.2 MCG/KG/HR: 100 INJECTION, SOLUTION INTRAVENOUS at 02:03

## 2024-01-01 RX ADMIN — Medication 40 MG: at 20:53

## 2024-01-01 RX ADMIN — IPRATROPIUM BROMIDE AND ALBUTEROL SULFATE 3 ML: .5; 3 SOLUTION RESPIRATORY (INHALATION) at 15:57

## 2024-01-01 RX ADMIN — NOREPINEPHRINE BITARTRATE 0.1 MCG/KG/MIN: 0.02 INJECTION, SOLUTION INTRAVENOUS at 07:50

## 2024-01-01 RX ADMIN — MAGNESIUM SULFATE HEPTAHYDRATE 2 G: 40 INJECTION, SOLUTION INTRAVENOUS at 02:25

## 2024-01-01 RX ADMIN — CHLORHEXIDINE GLUCONATE 0.12% ORAL RINSE 15 ML: 1.2 LIQUID ORAL at 07:54

## 2024-01-01 RX ADMIN — IPRATROPIUM BROMIDE AND ALBUTEROL SULFATE 3 ML: .5; 3 SOLUTION RESPIRATORY (INHALATION) at 12:55

## 2024-01-01 RX ADMIN — MIDAZOLAM 1 MG: 1 INJECTION INTRAMUSCULAR; INTRAVENOUS at 12:25

## 2024-01-01 RX ADMIN — INSULIN ASPART 1 UNITS: 100 INJECTION, SOLUTION INTRAVENOUS; SUBCUTANEOUS at 23:50

## 2024-01-01 RX ADMIN — Medication 175 MCG/HR: at 08:12

## 2024-01-01 RX ADMIN — INSULIN ASPART 1 UNITS: 100 INJECTION, SOLUTION INTRAVENOUS; SUBCUTANEOUS at 15:45

## 2024-01-01 RX ADMIN — FUROSEMIDE 40 MG: 10 INJECTION, SOLUTION INTRAVENOUS at 03:31

## 2024-01-01 RX ADMIN — SODIUM CHLORIDE 8 MG/HR: 9 INJECTION, SOLUTION INTRAVENOUS at 17:09

## 2024-01-01 RX ADMIN — SILDENAFIL POWDER, 10 MG: 10 FOR SUSPENSION ORAL at 04:35

## 2024-01-01 RX ADMIN — PANTOPRAZOLE SODIUM 40 MG: 40 INJECTION, POWDER, FOR SOLUTION INTRAVENOUS at 08:36

## 2024-01-01 RX ADMIN — POTASSIUM CHLORIDE 20 MEQ: 29.8 INJECTION, SOLUTION INTRAVENOUS at 20:34

## 2024-01-01 RX ADMIN — LIDOCAINE HYDROCHLORIDE 2 ML: 10 INJECTION, SOLUTION EPIDURAL; INFILTRATION; INTRACAUDAL; PERINEURAL at 10:35

## 2024-01-01 RX ADMIN — IPRATROPIUM BROMIDE AND ALBUTEROL SULFATE 3 ML: .5; 3 SOLUTION RESPIRATORY (INHALATION) at 13:04

## 2024-01-01 RX ADMIN — IPRATROPIUM BROMIDE AND ALBUTEROL SULFATE 3 ML: .5; 3 SOLUTION RESPIRATORY (INHALATION) at 12:05

## 2024-01-01 RX ADMIN — Medication 15 ML: at 08:09

## 2024-01-01 RX ADMIN — PROPOFOL 40 MCG/KG/MIN: 10 INJECTION, EMULSION INTRAVENOUS at 14:09

## 2024-01-01 RX ADMIN — DEXTROSE MONOHYDRATE 300 ML: 100 INJECTION, SOLUTION INTRAVENOUS at 22:39

## 2024-01-01 RX ADMIN — MAGNESIUM SULFATE HEPTAHYDRATE 2 G: 40 INJECTION, SOLUTION INTRAVENOUS at 05:22

## 2024-01-01 RX ADMIN — INSULIN ASPART 1 UNITS: 100 INJECTION, SOLUTION INTRAVENOUS; SUBCUTANEOUS at 13:03

## 2024-01-01 RX ADMIN — PROPOFOL 35 MCG/KG/MIN: 10 INJECTION, EMULSION INTRAVENOUS at 23:53

## 2024-01-01 RX ADMIN — PANTOPRAZOLE SODIUM 40 MG: 40 INJECTION, POWDER, FOR SOLUTION INTRAVENOUS at 06:49

## 2024-01-01 RX ADMIN — POTASSIUM CHLORIDE 20 MEQ: 29.8 INJECTION, SOLUTION INTRAVENOUS at 07:04

## 2024-01-01 RX ADMIN — POTASSIUM CHLORIDE 20 MEQ: 29.8 INJECTION, SOLUTION INTRAVENOUS at 10:48

## 2024-01-01 RX ADMIN — EPOPROSTENOL 20 NG/KG/MIN: 1.5 INJECTION, POWDER, LYOPHILIZED, FOR SOLUTION INTRAVENOUS at 20:49

## 2024-01-01 RX ADMIN — Medication 60 ML: at 08:20

## 2024-01-01 RX ADMIN — CHLORHEXIDINE GLUCONATE 0.12% ORAL RINSE 15 ML: 1.2 LIQUID ORAL at 08:59

## 2024-01-01 RX ADMIN — MIDAZOLAM 2 MG: 1 INJECTION INTRAMUSCULAR; INTRAVENOUS at 20:59

## 2024-01-01 RX ADMIN — SILDENAFIL POWDER, 10 MG: 10 FOR SUSPENSION ORAL at 04:50

## 2024-01-01 RX ADMIN — PANTOPRAZOLE SODIUM 40 MG: 40 INJECTION, POWDER, FOR SOLUTION INTRAVENOUS at 08:13

## 2024-01-01 RX ADMIN — IPRATROPIUM BROMIDE AND ALBUTEROL SULFATE 3 ML: .5; 3 SOLUTION RESPIRATORY (INHALATION) at 13:12

## 2024-01-01 RX ADMIN — Medication 150 MCG: at 13:45

## 2024-01-01 RX ADMIN — DEXMEDETOMIDINE HYDROCHLORIDE 1 MCG/KG/HR: 400 INJECTION INTRAVENOUS at 04:33

## 2024-01-01 RX ADMIN — DEXTROSE MONOHYDRATE: 50 INJECTION, SOLUTION INTRAVENOUS at 12:10

## 2024-01-01 RX ADMIN — POTASSIUM CHLORIDE 20 MEQ: 29.8 INJECTION, SOLUTION INTRAVENOUS at 11:42

## 2024-01-01 RX ADMIN — VASOPRESSIN 1.8 UNITS/HR: 20 INJECTION, SOLUTION INTRAMUSCULAR; SUBCUTANEOUS at 10:49

## 2024-01-01 RX ADMIN — Medication 1.8 UNITS/HR: at 15:43

## 2024-01-01 RX ADMIN — Medication 150 MCG/HR: at 02:15

## 2024-01-01 RX ADMIN — HEPARIN SODIUM 2100 UNITS/HR: 10000 INJECTION, SOLUTION INTRAVENOUS at 02:20

## 2024-01-01 RX ADMIN — POTASSIUM CHLORIDE 20 MEQ: 29.8 INJECTION, SOLUTION INTRAVENOUS at 17:20

## 2024-01-01 RX ADMIN — POTASSIUM CHLORIDE 20 MEQ: 29.8 INJECTION, SOLUTION INTRAVENOUS at 12:33

## 2024-01-01 RX ADMIN — DEXTROSE MONOHYDRATE 25 G: 25 INJECTION, SOLUTION INTRAVENOUS at 22:36

## 2024-01-01 RX ADMIN — VASOPRESSIN 2.4 UNITS/HR: 20 INJECTION, SOLUTION INTRAMUSCULAR; SUBCUTANEOUS at 12:55

## 2024-01-01 RX ADMIN — Medication 100 MCG: at 17:04

## 2024-01-01 RX ADMIN — Medication 25 MCG: at 04:36

## 2024-01-01 RX ADMIN — Medication 200 MCG/HR: at 12:30

## 2024-01-01 RX ADMIN — DEXMEDETOMIDINE HYDROCHLORIDE 0.7 MCG/KG/HR: 4 INJECTION, SOLUTION INTRAVENOUS at 07:04

## 2024-01-01 RX ADMIN — HEPARIN SODIUM 2500 UNITS: 1000 INJECTION INTRAVENOUS; SUBCUTANEOUS at 18:07

## 2024-01-01 RX ADMIN — Medication 2 UNITS/HR: at 17:06

## 2024-01-01 RX ADMIN — CHLORHEXIDINE GLUCONATE 0.12% ORAL RINSE 15 ML: 1.2 LIQUID ORAL at 21:00

## 2024-01-01 RX ADMIN — PIPERACILLIN AND TAZOBACTAM 4.5 G: 4; .5 INJECTION, POWDER, LYOPHILIZED, FOR SOLUTION INTRAVENOUS at 10:25

## 2024-01-01 RX ADMIN — HEPARIN SODIUM 1800 UNITS/HR: 10000 INJECTION, SOLUTION INTRAVENOUS at 03:10

## 2024-01-01 RX ADMIN — SODIUM ZIRCONIUM CYCLOSILICATE 10 G: 10 POWDER, FOR SUSPENSION ORAL at 08:14

## 2024-01-01 RX ADMIN — ACETAZOLAMIDE 500 MG: 500 INJECTION, POWDER, LYOPHILIZED, FOR SOLUTION INTRAVENOUS at 08:13

## 2024-01-01 RX ADMIN — PROPOFOL 35 MCG/KG/MIN: 10 INJECTION, EMULSION INTRAVENOUS at 04:11

## 2024-01-01 RX ADMIN — SODIUM CHLORIDE 8 MG/HR: 9 INJECTION, SOLUTION INTRAVENOUS at 23:55

## 2024-01-01 RX ADMIN — Medication 25 MCG: at 21:07

## 2024-01-01 RX ADMIN — Medication 200 MCG/HR: at 12:19

## 2024-01-01 RX ADMIN — CHLORHEXIDINE GLUCONATE 0.12% ORAL RINSE 15 ML: 1.2 LIQUID ORAL at 20:01

## 2024-01-01 RX ADMIN — INSULIN ASPART 1 UNITS: 100 INJECTION, SOLUTION INTRAVENOUS; SUBCUTANEOUS at 19:56

## 2024-01-01 RX ADMIN — POTASSIUM CHLORIDE 10 MEQ: 7.46 INJECTION, SOLUTION INTRAVENOUS at 02:28

## 2024-01-01 RX ADMIN — SILDENAFIL POWDER, 10 MG: 10 FOR SUSPENSION ORAL at 19:51

## 2024-01-01 RX ADMIN — POTASSIUM CHLORIDE 20 MEQ: 29.8 INJECTION, SOLUTION INTRAVENOUS at 06:29

## 2024-01-01 RX ADMIN — CHLORHEXIDINE GLUCONATE 0.12% ORAL RINSE 15 ML: 1.2 LIQUID ORAL at 08:06

## 2024-01-01 RX ADMIN — MIDAZOLAM IN SODIUM CHLORIDE 1 MG/HR: 1 INJECTION INTRAVENOUS at 02:08

## 2024-01-01 RX ADMIN — LIDOCAINE HYDROCHLORIDE 30 MG: 10 INJECTION, SOLUTION EPIDURAL; INFILTRATION; INTRACAUDAL; PERINEURAL at 17:05

## 2024-01-01 RX ADMIN — POTASSIUM CHLORIDE 20 MEQ: 29.8 INJECTION, SOLUTION INTRAVENOUS at 05:57

## 2024-01-01 RX ADMIN — Medication 150 MCG/HR: at 23:17

## 2024-01-01 RX ADMIN — DEXMEDETOMIDINE HYDROCHLORIDE 1 MCG/KG/HR: 400 INJECTION INTRAVENOUS at 00:29

## 2024-01-01 RX ADMIN — DEXMEDETOMIDINE HYDROCHLORIDE 0.4 MCG/KG/HR: 400 INJECTION INTRAVENOUS at 13:40

## 2024-01-01 RX ADMIN — POTASSIUM CHLORIDE 20 MEQ: 29.8 INJECTION, SOLUTION INTRAVENOUS at 16:18

## 2024-01-01 RX ADMIN — NOREPINEPHRINE BITARTRATE 0.06 MCG/KG/MIN: 0.06 INJECTION, SOLUTION INTRAVENOUS at 13:42

## 2024-01-01 RX ADMIN — ALBUMIN HUMAN 25 G: 0.25 SOLUTION INTRAVENOUS at 21:20

## 2024-01-01 RX ADMIN — DEXMEDETOMIDINE HYDROCHLORIDE 1 MCG/KG/HR: 400 INJECTION INTRAVENOUS at 03:30

## 2024-01-01 RX ADMIN — DOXYCYCLINE 100 MG: 100 INJECTION, POWDER, LYOPHILIZED, FOR SOLUTION INTRAVENOUS at 20:07

## 2024-01-01 RX ADMIN — PIPERACILLIN AND TAZOBACTAM 4.5 G: 4; .5 INJECTION, POWDER, FOR SOLUTION INTRAVENOUS at 00:11

## 2024-01-01 RX ADMIN — BUMETANIDE 1.5 MG/HR: 0.25 INJECTION INTRAMUSCULAR; INTRAVENOUS at 08:43

## 2024-01-01 RX ADMIN — Medication 50 MCG: at 15:34

## 2024-01-01 RX ADMIN — SILDENAFIL CITRATE 20 MG: 10 POWDER, FOR SUSPENSION ORAL at 20:44

## 2024-01-01 RX ADMIN — SODIUM CHLORIDE 8 MG/HR: 9 INJECTION, SOLUTION INTRAVENOUS at 00:23

## 2024-01-01 RX ADMIN — POTASSIUM CHLORIDE 40 MEQ: 1.5 POWDER, FOR SOLUTION ORAL at 00:14

## 2024-01-01 RX ADMIN — PIPERACILLIN AND TAZOBACTAM 4.5 G: 4; .5 INJECTION, POWDER, FOR SOLUTION INTRAVENOUS at 09:03

## 2024-01-01 RX ADMIN — PIPERACILLIN AND TAZOBACTAM 3.38 G: 3; .375 INJECTION, POWDER, LYOPHILIZED, FOR SOLUTION INTRAVENOUS at 08:08

## 2024-01-01 RX ADMIN — INSULIN ASPART 1 UNITS: 100 INJECTION, SOLUTION INTRAVENOUS; SUBCUTANEOUS at 08:59

## 2024-01-01 RX ADMIN — HEPARIN SODIUM 1950 UNITS/HR: 10000 INJECTION, SOLUTION INTRAVENOUS at 03:19

## 2024-01-01 RX ADMIN — INSULIN ASPART 1 UNITS: 100 INJECTION, SOLUTION INTRAVENOUS; SUBCUTANEOUS at 20:19

## 2024-01-01 RX ADMIN — Medication 25 MCG: at 20:58

## 2024-01-01 RX ADMIN — IPRATROPIUM BROMIDE AND ALBUTEROL SULFATE 3 ML: .5; 3 SOLUTION RESPIRATORY (INHALATION) at 09:13

## 2024-01-01 RX ADMIN — INSULIN ASPART 2 UNITS: 100 INJECTION, SOLUTION INTRAVENOUS; SUBCUTANEOUS at 04:10

## 2024-01-01 RX ADMIN — SILDENAFIL CITRATE 20 MG: 10 POWDER, FOR SUSPENSION ORAL at 21:23

## 2024-01-01 RX ADMIN — IPRATROPIUM BROMIDE AND ALBUTEROL SULFATE 3 ML: .5; 3 SOLUTION RESPIRATORY (INHALATION) at 12:46

## 2024-01-01 RX ADMIN — ACETAZOLAMIDE 1000 MG: 500 INJECTION, POWDER, LYOPHILIZED, FOR SOLUTION INTRAVENOUS at 22:50

## 2024-01-01 RX ADMIN — INSULIN ASPART 1 UNITS: 100 INJECTION, SOLUTION INTRAVENOUS; SUBCUTANEOUS at 03:58

## 2024-01-01 RX ADMIN — Medication 50 MCG: at 11:42

## 2024-01-01 RX ADMIN — NOREPINEPHRINE BITARTRATE 0.09 MCG/KG/MIN: 0.02 INJECTION, SOLUTION INTRAVENOUS at 12:05

## 2024-01-01 RX ADMIN — POTASSIUM CHLORIDE 20 MEQ: 29.8 INJECTION, SOLUTION INTRAVENOUS at 21:41

## 2024-01-01 RX ADMIN — DEXMEDETOMIDINE HYDROCHLORIDE 1.2 MCG/KG/HR: 400 INJECTION INTRAVENOUS at 08:11

## 2024-01-01 RX ADMIN — MICONAZOLE NITRATE ANTIFUNGAL POWDER: 2 POWDER TOPICAL at 08:26

## 2024-01-01 RX ADMIN — CHLORHEXIDINE GLUCONATE 0.12% ORAL RINSE 15 ML: 1.2 LIQUID ORAL at 20:42

## 2024-01-01 ASSESSMENT — ACTIVITIES OF DAILY LIVING (ADL)
ADLS_ACUITY_SCORE: 55
ADLS_ACUITY_SCORE: 57
ADLS_ACUITY_SCORE: 55
ADLS_ACUITY_SCORE: 53
ADLS_ACUITY_SCORE: 59
ADLS_ACUITY_SCORE: 59
ADLS_ACUITY_SCORE: 57
ADLS_ACUITY_SCORE: 55
ADLS_ACUITY_SCORE: 57
ADLS_ACUITY_SCORE: 47
ADLS_ACUITY_SCORE: 47
ADLS_ACUITY_SCORE: 55
ADLS_ACUITY_SCORE: 61
ADLS_ACUITY_SCORE: 51
ADLS_ACUITY_SCORE: 51
ADLS_ACUITY_SCORE: 55
ADLS_ACUITY_SCORE: 51
ADLS_ACUITY_SCORE: 55
ADLS_ACUITY_SCORE: 51
ADLS_ACUITY_SCORE: 55
ADLS_ACUITY_SCORE: 51
ADLS_ACUITY_SCORE: 57
ADLS_ACUITY_SCORE: 45
ADLS_ACUITY_SCORE: 53
ADLS_ACUITY_SCORE: 57
ADLS_ACUITY_SCORE: 51
ADLS_ACUITY_SCORE: 55
ADLS_ACUITY_SCORE: 55
ADLS_ACUITY_SCORE: 45
ADLS_ACUITY_SCORE: 55
ADLS_ACUITY_SCORE: 59
ADLS_ACUITY_SCORE: 51
ADLS_ACUITY_SCORE: 47
ADLS_ACUITY_SCORE: 59
ADLS_ACUITY_SCORE: 55
ADLS_ACUITY_SCORE: 51
ADLS_ACUITY_SCORE: 55
ADLS_ACUITY_SCORE: 57
ADLS_ACUITY_SCORE: 61
ADLS_ACUITY_SCORE: 51
ADLS_ACUITY_SCORE: 57
ADLS_ACUITY_SCORE: 55
ADLS_ACUITY_SCORE: 55
ADLS_ACUITY_SCORE: 51
ADLS_ACUITY_SCORE: 55
ADLS_ACUITY_SCORE: 53
ADLS_ACUITY_SCORE: 59
ADLS_ACUITY_SCORE: 59
ADLS_ACUITY_SCORE: 51
ADLS_ACUITY_SCORE: 57
ADLS_ACUITY_SCORE: 47
ADLS_ACUITY_SCORE: 47
ADLS_ACUITY_SCORE: 57
ADLS_ACUITY_SCORE: 57
ADLS_ACUITY_SCORE: 55
DEPENDENT_IADLS:: TRANSPORTATION
ADLS_ACUITY_SCORE: 55
ADLS_ACUITY_SCORE: 59
ADLS_ACUITY_SCORE: 55
ADLS_ACUITY_SCORE: 47
ADLS_ACUITY_SCORE: 55
ADLS_ACUITY_SCORE: 59
ADLS_ACUITY_SCORE: 55
ADLS_ACUITY_SCORE: 59
ADLS_ACUITY_SCORE: 53
ADLS_ACUITY_SCORE: 51
ADLS_ACUITY_SCORE: 51
ADLS_ACUITY_SCORE: 47
ADLS_ACUITY_SCORE: 51
ADLS_ACUITY_SCORE: 41
ADLS_ACUITY_SCORE: 55
ADLS_ACUITY_SCORE: 53
ADLS_ACUITY_SCORE: 59
ADLS_ACUITY_SCORE: 47
ADLS_ACUITY_SCORE: 57
ADLS_ACUITY_SCORE: 47
ADLS_ACUITY_SCORE: 55
ADLS_ACUITY_SCORE: 53
ADLS_ACUITY_SCORE: 53
ADLS_ACUITY_SCORE: 51
ADLS_ACUITY_SCORE: 55
ADLS_ACUITY_SCORE: 57
ADLS_ACUITY_SCORE: 55
ADLS_ACUITY_SCORE: 55
ADLS_ACUITY_SCORE: 57
ADLS_ACUITY_SCORE: 51
ADLS_ACUITY_SCORE: 55
ADLS_ACUITY_SCORE: 55
ADLS_ACUITY_SCORE: 51
ADLS_ACUITY_SCORE: 45
ADLS_ACUITY_SCORE: 55
ADLS_ACUITY_SCORE: 51
ADLS_ACUITY_SCORE: 55
ADLS_ACUITY_SCORE: 53
ADLS_ACUITY_SCORE: 45
ADLS_ACUITY_SCORE: 55
ADLS_ACUITY_SCORE: 55
ADLS_ACUITY_SCORE: 51
ADLS_ACUITY_SCORE: 53
ADLS_ACUITY_SCORE: 53
ADLS_ACUITY_SCORE: 51
ADLS_ACUITY_SCORE: 57
ADLS_ACUITY_SCORE: 51
ADLS_ACUITY_SCORE: 45
ADLS_ACUITY_SCORE: 51
ADLS_ACUITY_SCORE: 57
ADLS_ACUITY_SCORE: 51
ADLS_ACUITY_SCORE: 55
ADLS_ACUITY_SCORE: 55
ADLS_ACUITY_SCORE: 59
ADLS_ACUITY_SCORE: 55
ADLS_ACUITY_SCORE: 55
ADLS_ACUITY_SCORE: 51
ADLS_ACUITY_SCORE: 53
ADLS_ACUITY_SCORE: 51
ADLS_ACUITY_SCORE: 47
ADLS_ACUITY_SCORE: 57
ADLS_ACUITY_SCORE: 55
ADLS_ACUITY_SCORE: 55
ADLS_ACUITY_SCORE: 35
ADLS_ACUITY_SCORE: 47
ADLS_ACUITY_SCORE: 47
DEPENDENT_IADLS:: TRANSPORTATION
ADLS_ACUITY_SCORE: 35
ADLS_ACUITY_SCORE: 51
ADLS_ACUITY_SCORE: 57
ADLS_ACUITY_SCORE: 59
ADLS_ACUITY_SCORE: 51
ADLS_ACUITY_SCORE: 55
ADLS_ACUITY_SCORE: 47
ADLS_ACUITY_SCORE: 55
ADLS_ACUITY_SCORE: 47
ADLS_ACUITY_SCORE: 35
ADLS_ACUITY_SCORE: 51
ADLS_ACUITY_SCORE: 57
ADLS_ACUITY_SCORE: 47
ADLS_ACUITY_SCORE: 57
ADLS_ACUITY_SCORE: 51
ADLS_ACUITY_SCORE: 55
ADLS_ACUITY_SCORE: 51
ADLS_ACUITY_SCORE: 53
ADLS_ACUITY_SCORE: 51
ADLS_ACUITY_SCORE: 57
ADLS_ACUITY_SCORE: 55
ADLS_ACUITY_SCORE: 51
ADLS_ACUITY_SCORE: 61
ADLS_ACUITY_SCORE: 51
ADLS_ACUITY_SCORE: 41
ADLS_ACUITY_SCORE: 47
ADLS_ACUITY_SCORE: 55
ADLS_ACUITY_SCORE: 55
ADLS_ACUITY_SCORE: 61
ADLS_ACUITY_SCORE: 51
ADLS_ACUITY_SCORE: 57
ADLS_ACUITY_SCORE: 55
ADLS_ACUITY_SCORE: 51
ADLS_ACUITY_SCORE: 55
ADLS_ACUITY_SCORE: 51
ADLS_ACUITY_SCORE: 55
ADLS_ACUITY_SCORE: 61
ADLS_ACUITY_SCORE: 35
ADLS_ACUITY_SCORE: 57
ADLS_ACUITY_SCORE: 51
ADLS_ACUITY_SCORE: 51
ADLS_ACUITY_SCORE: 47
ADLS_ACUITY_SCORE: 55
ADLS_ACUITY_SCORE: 51
ADLS_ACUITY_SCORE: 59
ADLS_ACUITY_SCORE: 55
ADLS_ACUITY_SCORE: 53
ADLS_ACUITY_SCORE: 51
ADLS_ACUITY_SCORE: 51
ADLS_ACUITY_SCORE: 57
ADLS_ACUITY_SCORE: 47
ADLS_ACUITY_SCORE: 51
ADLS_ACUITY_SCORE: 55
ADLS_ACUITY_SCORE: 51
ADLS_ACUITY_SCORE: 55
ADLS_ACUITY_SCORE: 57
ADLS_ACUITY_SCORE: 41
ADLS_ACUITY_SCORE: 59
ADLS_ACUITY_SCORE: 47
ADLS_ACUITY_SCORE: 51
ADLS_ACUITY_SCORE: 55
ADLS_ACUITY_SCORE: 47
ADLS_ACUITY_SCORE: 39
ADLS_ACUITY_SCORE: 51
ADLS_ACUITY_SCORE: 55
ADLS_ACUITY_SCORE: 53
ADLS_ACUITY_SCORE: 51
ADLS_ACUITY_SCORE: 55
ADLS_ACUITY_SCORE: 59
ADLS_ACUITY_SCORE: 55
ADLS_ACUITY_SCORE: 59
ADLS_ACUITY_SCORE: 55
ADLS_ACUITY_SCORE: 41
ADLS_ACUITY_SCORE: 47
ADLS_ACUITY_SCORE: 47
ADLS_ACUITY_SCORE: 51
ADLS_ACUITY_SCORE: 53
ADLS_ACUITY_SCORE: 55
ADLS_ACUITY_SCORE: 55
ADLS_ACUITY_SCORE: 57
ADLS_ACUITY_SCORE: 51
ADLS_ACUITY_SCORE: 47
ADLS_ACUITY_SCORE: 57
ADLS_ACUITY_SCORE: 55
ADLS_ACUITY_SCORE: 51
ADLS_ACUITY_SCORE: 55
ADLS_ACUITY_SCORE: 55
ADLS_ACUITY_SCORE: 35
ADLS_ACUITY_SCORE: 55
ADLS_ACUITY_SCORE: 45
ADLS_ACUITY_SCORE: 59
ADLS_ACUITY_SCORE: 47
ADLS_ACUITY_SCORE: 47
ADLS_ACUITY_SCORE: 57
ADLS_ACUITY_SCORE: 47
ADLS_ACUITY_SCORE: 55
ADLS_ACUITY_SCORE: 51
ADLS_ACUITY_SCORE: 59
ADLS_ACUITY_SCORE: 55
ADLS_ACUITY_SCORE: 55
ADLS_ACUITY_SCORE: 51
ADLS_ACUITY_SCORE: 51
ADLS_ACUITY_SCORE: 57
ADLS_ACUITY_SCORE: 35
ADLS_ACUITY_SCORE: 55
ADLS_ACUITY_SCORE: 55
ADLS_ACUITY_SCORE: 51
ADLS_ACUITY_SCORE: 51
ADLS_ACUITY_SCORE: 55
ADLS_ACUITY_SCORE: 51
ADLS_ACUITY_SCORE: 55
ADLS_ACUITY_SCORE: 41
ADLS_ACUITY_SCORE: 41
ADLS_ACUITY_SCORE: 51
ADLS_ACUITY_SCORE: 55
ADLS_ACUITY_SCORE: 47
ADLS_ACUITY_SCORE: 51
ADLS_ACUITY_SCORE: 55
ADLS_ACUITY_SCORE: 57
ADLS_ACUITY_SCORE: 55
ADLS_ACUITY_SCORE: 51
ADLS_ACUITY_SCORE: 53
ADLS_ACUITY_SCORE: 51
ADLS_ACUITY_SCORE: 51
ADLS_ACUITY_SCORE: 55
ADLS_ACUITY_SCORE: 55
ADLS_ACUITY_SCORE: 51
ADLS_ACUITY_SCORE: 51
ADLS_ACUITY_SCORE: 61
ADLS_ACUITY_SCORE: 55
ADLS_ACUITY_SCORE: 51
ADLS_ACUITY_SCORE: 59
ADLS_ACUITY_SCORE: 51
ADLS_ACUITY_SCORE: 59
ADLS_ACUITY_SCORE: 51
ADLS_ACUITY_SCORE: 41
ADLS_ACUITY_SCORE: 35
ADLS_ACUITY_SCORE: 53
ADLS_ACUITY_SCORE: 47
ADLS_ACUITY_SCORE: 55
ADLS_ACUITY_SCORE: 51
ADLS_ACUITY_SCORE: 59
ADLS_ACUITY_SCORE: 55
ADLS_ACUITY_SCORE: 55
ADLS_ACUITY_SCORE: 59
ADLS_ACUITY_SCORE: 55
ADLS_ACUITY_SCORE: 47
ADLS_ACUITY_SCORE: 45
ADLS_ACUITY_SCORE: 55
ADLS_ACUITY_SCORE: 55
ADLS_ACUITY_SCORE: 41
ADLS_ACUITY_SCORE: 51
ADLS_ACUITY_SCORE: 57
ADLS_ACUITY_SCORE: 59
ADLS_ACUITY_SCORE: 51
ADLS_ACUITY_SCORE: 57
ADLS_ACUITY_SCORE: 57
ADLS_ACUITY_SCORE: 53
ADLS_ACUITY_SCORE: 57
ADLS_ACUITY_SCORE: 55
ADLS_ACUITY_SCORE: 47
ADLS_ACUITY_SCORE: 57
ADLS_ACUITY_SCORE: 57
ADLS_ACUITY_SCORE: 51
ADLS_ACUITY_SCORE: 55
ADLS_ACUITY_SCORE: 47
ADLS_ACUITY_SCORE: 57
ADLS_ACUITY_SCORE: 51
ADLS_ACUITY_SCORE: 35
ADLS_ACUITY_SCORE: 51
ADLS_ACUITY_SCORE: 57
ADLS_ACUITY_SCORE: 51
ADLS_ACUITY_SCORE: 57
ADLS_ACUITY_SCORE: 59
ADLS_ACUITY_SCORE: 55
ADLS_ACUITY_SCORE: 57
ADLS_ACUITY_SCORE: 53
ADLS_ACUITY_SCORE: 57
ADLS_ACUITY_SCORE: 59
DEPENDENT_IADLS:: INDEPENDENT
ADLS_ACUITY_SCORE: 55
ADLS_ACUITY_SCORE: 55
ADLS_ACUITY_SCORE: 45
ADLS_ACUITY_SCORE: 53
ADLS_ACUITY_SCORE: 51
ADLS_ACUITY_SCORE: 55
ADLS_ACUITY_SCORE: 57
ADLS_ACUITY_SCORE: 55
ADLS_ACUITY_SCORE: 51
ADLS_ACUITY_SCORE: 55
ADLS_ACUITY_SCORE: 51
ADLS_ACUITY_SCORE: 53
ADLS_ACUITY_SCORE: 55
ADLS_ACUITY_SCORE: 57
ADLS_ACUITY_SCORE: 51
ADLS_ACUITY_SCORE: 61
ADLS_ACUITY_SCORE: 57
ADLS_ACUITY_SCORE: 51
ADLS_ACUITY_SCORE: 35
ADLS_ACUITY_SCORE: 51
ADLS_ACUITY_SCORE: 51
ADLS_ACUITY_SCORE: 55
ADLS_ACUITY_SCORE: 51
ADLS_ACUITY_SCORE: 41
ADLS_ACUITY_SCORE: 59
ADLS_ACUITY_SCORE: 41
ADLS_ACUITY_SCORE: 51
ADLS_ACUITY_SCORE: 59
ADLS_ACUITY_SCORE: 55
ADLS_ACUITY_SCORE: 35
ADLS_ACUITY_SCORE: 53
ADLS_ACUITY_SCORE: 57
ADLS_ACUITY_SCORE: 55
ADLS_ACUITY_SCORE: 55
ADLS_ACUITY_SCORE: 61
ADLS_ACUITY_SCORE: 55
ADLS_ACUITY_SCORE: 57
ADLS_ACUITY_SCORE: 57
ADLS_ACUITY_SCORE: 55
ADLS_ACUITY_SCORE: 51
ADLS_ACUITY_SCORE: 51
ADLS_ACUITY_SCORE: 59
ADLS_ACUITY_SCORE: 51
ADLS_ACUITY_SCORE: 55
ADLS_ACUITY_SCORE: 61
ADLS_ACUITY_SCORE: 55
ADLS_ACUITY_SCORE: 55
ADLS_ACUITY_SCORE: 51
ADLS_ACUITY_SCORE: 41
ADLS_ACUITY_SCORE: 55
ADLS_ACUITY_SCORE: 57
ADLS_ACUITY_SCORE: 55

## 2024-01-01 ASSESSMENT — COLUMBIA-SUICIDE SEVERITY RATING SCALE - C-SSRS
6. HAVE YOU EVER DONE ANYTHING, STARTED TO DO ANYTHING, OR PREPARED TO DO ANYTHING TO END YOUR LIFE?: NO
1. IN THE PAST MONTH, HAVE YOU WISHED YOU WERE DEAD OR WISHED YOU COULD GO TO SLEEP AND NOT WAKE UP?: NO
2. HAVE YOU ACTUALLY HAD ANY THOUGHTS OF KILLING YOURSELF IN THE PAST MONTH?: NO

## 2024-01-01 ASSESSMENT — ENCOUNTER SYMPTOMS
FEVER: 0
COUGH: 0
FATIGUE: 1
CHILLS: 1
WEAKNESS: 1
SHORTNESS OF BREATH: 1
TREMORS: 1

## 2024-09-16 PROBLEM — I50.9 CHF (CONGESTIVE HEART FAILURE) (H): Status: ACTIVE | Noted: 2024-01-01

## 2024-09-16 PROBLEM — J96.01 ACUTE RESPIRATORY FAILURE WITH HYPOXIA AND HYPERCAPNIA (H): Status: ACTIVE | Noted: 2024-01-01

## 2024-09-16 PROBLEM — I24.89 DEMAND ISCHEMIA OF MYOCARDIUM (H): Status: ACTIVE | Noted: 2024-01-01

## 2024-09-16 PROBLEM — N17.9 ACUTE RENAL FAILURE, UNSPECIFIED ACUTE RENAL FAILURE TYPE (H): Status: ACTIVE | Noted: 2024-01-01

## 2024-09-16 PROBLEM — E66.9 OBESITY: Status: ACTIVE | Noted: 2024-01-01

## 2024-09-16 PROBLEM — A41.9 SEPSIS (H): Status: ACTIVE | Noted: 2024-01-01

## 2024-09-16 PROBLEM — J18.9 PNEUMONIA: Status: ACTIVE | Noted: 2024-01-01

## 2024-09-16 PROBLEM — I82.412 ACUTE DEEP VEIN THROMBOSIS (DVT) OF FEMORAL VEIN OF LEFT LOWER EXTREMITY (H): Status: ACTIVE | Noted: 2024-01-01

## 2024-09-16 PROBLEM — J96.02 ACUTE RESPIRATORY FAILURE WITH HYPOXIA AND HYPERCAPNIA (H): Status: ACTIVE | Noted: 2024-01-01

## 2024-09-16 PROBLEM — E87.5 HYPERKALEMIA: Status: ACTIVE | Noted: 2024-01-01

## 2024-09-16 PROBLEM — K64.9 HEMORRHOIDS: Status: ACTIVE | Noted: 2024-01-01

## 2024-09-16 PROBLEM — N19 UREMIA: Status: ACTIVE | Noted: 2024-01-01

## 2024-09-16 PROBLEM — N17.9 AKI (ACUTE KIDNEY INJURY) (H): Status: ACTIVE | Noted: 2024-01-01

## 2024-09-16 PROBLEM — I50.9 ACUTE DECOMPENSATED HEART FAILURE (H): Status: ACTIVE | Noted: 2024-01-01

## 2024-09-16 NOTE — LETTER
"[facility] ICU  [address] [address]  Phone: [phone]  Fax: [phone]    September 17, 2024      To whom it may concern:    RE: Scar \"Vito\" Jason Gomez is here at the hospital due to his wife's recent hospitalization. Please excuse Vito from work on 9/17-9/18/24.     Please contact me for questions or concerns.      Sincerely,      Damaris Hawthorne, DO on 9/17/2024 at 1:20 PM       "

## 2024-09-16 NOTE — ED NOTES
Pt was given albuterol x1, BS diminished with expiratory wheezes. Pt does not use 02 at home, has albuterol MDI PRN at home. Pt was placed on BIPAP ST 14/7 RR 16, titrating 02 needs as needed. Pt tolerating BIPAP well, RT following.

## 2024-09-16 NOTE — ED TRIAGE NOTES
Patient presents to the ED with shortness of breath and fatigue x 8/22. Unable to get around at home, oxygen on room air is 78%. Has not been taking lasix x 1 month      Triage Assessment (Adult)       Row Name 09/16/24 0935          Triage Assessment    Airway WDL WDL        Respiratory WDL    Respiratory WDL cough  shortness of breath     Cough Frequency infrequent     Cough Type nonproductive        Skin Circulation/Temperature WDL    Skin Circulation/Temperature WDL WDL        Cardiac WDL    Cardiac WDL WDL     Cardiac Rhythm ST        Peripheral/Neurovascular WDL    Peripheral Neurovascular WDL WDL        Cognitive/Neuro/Behavioral WDL    Cognitive/Neuro/Behavioral WDL WDL

## 2024-09-16 NOTE — H&P
[facility]    History and Physical - Hospitalist Service       Date of Admission:  9/16/2024    Assessment & Plan      Noelle Gomez is a 59 year old female admitted on 9/16/2024. She presented today with shortness of breath essentially brought in by the family.  Patient in the ED noted to be in respiratory distress tachypneic tachycardic needing supplemental oxygen.  Chest x-ray with fluid overload.  Her blood gases remains with pH of 7.16 with pCO2 of 80 even after Lasix with some urine output with worsening BUN/creatinine 96 and 2.68.  Baseline she does not have kidney disease although patient does take Lasix and losartan for hypertension which she has stopped taking Lasix recently.  Today on evaluation her BNP is approximately 40,000 and troponins are flat suggesting either she had a acute MI in the last 2 weeks of dyspnea history, but also she stopped taking Lasix so that may be accentuating or it could be a cardiorenal etiology.  Patient risk factors of coronary disease including age hypertension and prediabetes with A1c of 5.8 also noted although no evidence of current acute ischemia, troponin mildly high and flat suggesting demand ischemia from respiratory illness, no previous history of stress test.    -At this juncture patient has multiorgan failure including heart failure with unknown ejection fraction, acute kidney failure with relatively stable previous kidney functions and urine analysis pending, and respiratory failure with a combination of hypoxia and hypercapnia.  Surprisingly with that level of CO2 patient is doing fairly well mental wise and not severely encephalopathic, speech is normal ruling out any stroke or such etiology, her potassium was 7.3 on arrival which has improved somewhat to 6.5 now with BiPAP.  -Other differential can be potentially infectious etiology, patient denies any recent fevers, Pro-Timothy mildly high, CRP high and lactate >2- we will also  tentatively obtain blood culture and trend lactic acid and empirically started antibiotics for PNA, monitor for any sepsis evolution.   In the context of current illness this is likely fluid overload as a prime etiology due to stopping her home diuretics at home with lactic acidemia due to hypoxemia, but possible sepsis      Evaluate with echo for CHF and ruled out any MI. -Continue Lasix twice daily if tolerates and have good urinary output, nephrology consulted, empirically started on antibiotics as well with Rocephin and Doxy due to elevated CRP Pro-Timothy and mildly elevated lactic acidosis.  Will follow blood culture and tailor antibiotics accordingly.  Rocephin will also provide any treatment for possible any other skin or urinary infection if that occurs pending further evaluation for this. Rule out any sepsis, rule out any thromboembolism, no history of active smoking to suggest any bronchospasm although she is a previous smoker and may need steroid taper if respiratory status does not improve obesity hypoventilation syndrome from significant obese status likely is also contributed to this.    -Patient has been discussed with the intensivist on-call and referred for intermediate care for admission we will continue to request intensivist consultation being borderline between ICU/ IMC and intensivist as notified by the emergency room itself, for lactate is >2 And ph<7.2.  Currently she is hemodynamically stable, on BiPAP and anticipation is that with BiPAP she will have resolution of hypercapnia and correction of acidemia with improving respiratory status.  Her kidney functions are also tenuous and although she does not need dialysis right away- we will monitor urine output and closely watch in intermediate care setting/ICU setting.Does not seemingly have indication for aspirin or statin at this point, MI ruled out- demand ischemia+ from VIGNESH,   - patient did stop her diuretic and that can contribute to slowly  fluid overload.  Depending on further evaluation medical therapy may be initiated for CAD if found, no indication for heparin drip at this point.  -Denies any active blood loss to suggest uremia is due to upper GI bleed, will monitor for any stools if blackish, empirically PPI started, with this level of uremia this can be because of VIGNESH itself, the etiology of this VIGNESH is cardiorenal-versus medication induced she is on Cozaar but has been holding her Lasix, we will check a UA to assess any further possibilities, patient itself denies any urinary symptoms-or obstruction etiology we will get a renal ultrasound as well low threshold to obtain CT.  -Nephrology consultation    -trend lactate and potassium and Ph    -Also patient's duplex was noted to be positive for DVT-left acute femoral DVT, patient started on heparin drip    Principal Problem:    Acute respiratory failure with hypoxia and hypercapnia (H)  Active Problems:    Asthma    Bilateral lower extremity edema    Former smoker    HTN (hypertension)    Prediabetes    Venous stasis dermatitis of both lower extremities    VIGNESH (acute kidney injury) (H24)    Uremia    Demand ischemia of myocardium (H)    Hyperkalemia    Acute decompensated heart failure (H)    Obesity    Acute renal failure, unspecified acute renal failure type (H24)    Hemorrhoids    Pneumonia    Acute deep vein thrombosis (DVT) of femoral vein of left lower extremity (H)    Sepsis (H)        Acute respiratory failure with hypoxia and hypercapnia    Bilateral lower extremity edema    Former smoker-currently not smoking  Possible pneumonia with sepsis, lactate >2 possibly due to sepsis  Anasarca   scattered petechie but normal platlets and ptt  multifactorial acute resp failure fluid overload and decompensated kidney functions and CHF CHF-possible pneumonia  -Admit and provide BiPAP, pulmonology/ICU consultation   -Continue Lasix twice daily if tolerates and have good urinary output, nephrology  consulted, empirically started on antibiotics as well with Rocephin and Doxy due to elevated CRP Pro-Timothy and mildly elevated lactic acidosis likely due to sepsis.  Will follow blood culture and tailor antibiotics accordingly.  Rocephin will also provide any treatment for possible any other skin or urinary infection if that occurs pending further evaluation for this.  -She does have leg swelling with chronic lymphedema and CHF may not be DVT although will obtain venous duplex.  -Other differential can be potentially infectious etiology, patient denies any recent fevers, Pro-Timothy mildly high, CRP high and lactate >2- we will also tentatively obtain blood culture and trend lactic acid and empirically started antibiotics for PNA, monitor for any sepsis evolution.   In the context of current illness this is likely fluid overload as a prime etiology due to stopping her home diuretics at home with lactic acidemia due to hypoxemia.     Evaluate with echo for CHF and ruled out any active MI.  Rule out any sepsis, rule out any thromboembolism, no history of active smoking to suggest any bronchospasm although she is a previous smoker and may need steroid taper if respiratory status does not improve obesity hypoventilation syndrome from significant obese status likely is also contributed to this.        VIGNESH (acute kidney injury) ?  Cardiorenal-demand ischemia    Uremia-with history of hemorrhoids  -Denies any active blood loss to suggest uremia is due to upper GI bleed, will monitor for any stools if blackish, empirically PPI started, with this level of uremia this can be because of VIGNESH itself, the etiology of this VIGNESH is cardiorenal-versus medication induced she is on Cozaar but has been holding her Lasix, we will check a UA to assess any further possibilities, patient itself denies any urinary symptoms-or obstruction etiology -we will get a renal ultrasound as well , low threshold to obtain CT.  -Nephrology consultation         "Hyperkalemia due to VIGNESH and acidemia  -Improving given calcium and insulin in the ED  -Continue to follow on telemetry closely      Acute decompensated heart failure , with demand ischemia  Active MI ruled out  -Troponin marginally high likely due to this kidney failure itself, nonischemic although patient should get some ischemic eval for the CHF will start with echocardiogram and will request cardiology consultation, will check A1c lipid panel.  -Does not seemingly have indication for aspirin or statin at this point since patient did stop her diuretic and that can contribute to slowly fluid overload.  Depending on further evaluation medical therapy may be initiated no indication for heparin drip at this point.      Prediabetes  -Would advance some diet as she is noticeably awake and alert, sliding-scale coverage for that.          Obesity  Weight loss counseling when able     Diet: NPO for Medical/Clinical Reasons Except for: Meds    DVT Prophylaxis: Heparin drip  Miller Catheter: Not present  Lines: None     Cardiac Monitoring: None  Code Status: Full Code    Clinically Significant Risk Factors Present on Admission        # Hyperkalemia: Highest K = 7.3 mmol/L in last 2 days, will monitor as appropriate   # Hypocalcemia: Lowest Ca = 8.4 mg/dL in last 2 days, will monitor and replace as appropriate        # Coagulation Defect: INR = 1.46 (Ref range: 0.85 - 1.15) and/or PTT = 25 Seconds (Ref range: 22 - 38 Seconds), will monitor for bleeding    # Hypertension: Noted on problem list         # Severe Obesity: Estimated body mass index is 56.15 kg/m  as calculated from the following:    Height as of this encounter: 1.575 m (5' 2\").    Weight as of this encounter: 139.3 kg (307 lb).       # Asthma: noted on problem list              Disposition Plan     Medically Ready for Discharge: Anticipated in 2-4 Days           Jack Minor MD  Hospitalist Service  [facility]  Securely message with " Luther (more info)  Text page via [facility] Paging/Directory     ______________________________________________________________________    Chief Complaint   SOB    History is obtained from the patient, electronic health record, emergency department physician, and patient's family    History of Present Illness   Noelle Gomez is a 59 year old female who with a pertinent history of asthma, former smoker, who presents to this ED via walk-in for evaluation of shortness of breath.     Per the patient, she [place] Since then, it has slowly been worsening. The patient's son reports that in the past 1.5 weeks, the patient's shortness of breath has been going downhill with chills, uncontrollable shaking, weakness, and fatigue too. The patient endorses pain with breathing, and that the shortness of breath is worse when lying flat.     The patient states that she is on Lasix but isn't taking it due to the increased urination being an undesirable side effect. She denies chest pain, chest pressure, fever,   Constitutional:  Positive for chills and fatigue. Negative for fever.   Respiratory:  Positive for shortness of breath (worse when lying down). Negative for cough.         Positive for pain with breathing  Negative for chest pressure   Cardiovascular:  Negative for chest pain.   Neurological:  Positive for tremors and weakness.     Past Medical History    Past Medical History:   Diagnosis Date    Asthma 08/14/2023    Bilateral lower extremity edema 07/14/2021    Former smoker 06/23/2021    HTN (hypertension) 07/14/2021    Lymphedema 02/08/2022    Prediabetes 06/24/2021    Seasonal allergies 07/14/2021    UPJ (ureteropelvic junction) obstruction 03/04/2015    Vitamin D deficiency 06/23/2021       Past Surgical History   History reviewed. No pertinent surgical history.    Prior to Admission Medications   Prior to Admission Medications   Prescriptions Last Dose  Informant Patient Reported? Taking?   furosemide (LASIX) 40 MG tablet Not Taking  Yes No   Sig: Take 40 mg by mouth 2 times daily   Patient not taking: Reported on 9/16/2024   losartan (COZAAR) 100 MG tablet 9/16/2024  Yes Yes   Sig: TAKE 1 TABLET(100 MG) BY MOUTH EVERY DAY      Facility-Administered Medications: None        Social History   I have reviewed this patient's social history and updated it with pertinent information if needed.  Social History     Tobacco Use    Smoking status: Former     Types: Cigarettes    Smokeless tobacco: Never         Family History     No sudden death      Allergies   Allergies   Allergen Reactions    Sulfa Antibiotics Unknown, Itching and Rash        Physical Exam   Vital Signs: Temp: 98.8  F (37.1  C) Temp src: Oral BP: 111/72 Pulse: 86   Resp: 27 SpO2: 94 % O2 Device: Oxymask Oxygen Delivery: 4 LPM  Weight: 307 lbs 0 oz  Mildly drowsy awake/ arousable- on bipap, Tachypenic but hemodymics stable  Vision Baseline  Neck supple  Oral mucosa moist  bilateral air entry heard,dimished  S1-S2 normal  Abdomen is soft , obese-   Extremities- 2-3+ pitting swelling noted-ANASARCA, scattered petechie and leg wounds  No skin cyanosis  Neurologically- no new Gross deficits, generalized lethargy    Medical Decision Making       75 MINUTES SPENT BY ME on the date of service doing chart review, history, exam, documentation & further activities per the note.      Data     I have personally reviewed the following data over the past 24 hrs:    14.7 (H)  \   15.4   / 150     137 99 95.9 (H) /  94   7.3 (HH) 24 2.68 (H) \     ALT: 18 AST: 39 AP: 102 TBILI: 1.1   ALB: 3.5 TOT PROTEIN: 7.6 LIPASE: N/A     Trop: 76 (H) BNP: 39,395 (H)     TSH: N/A T4: N/A A1C: 5.8 (H)     Procal: 0.50 (H) CRP: 177.00 (H) Lactic Acid: 2.7 (H)       INR:  1.46 (H) PTT:  25   D-dimer:  N/A Fibrinogen:  N/A       Imaging results reviewed over the past 24 hrs:   Recent Results (from the past 24 hour(s))   XR Chest Port 1  View    Narrative    EXAM: XR CHEST PORT 1 VIEW  LOCATION: [facility]  DATE: 9/16/2024    INDICATION: Chest Pain  COMPARISON: None.      Impression    IMPRESSION: Possible mild cardiac enlargement with mild pulmonary venous congestion and/or enlargement of the central pulmonary arteries not excluded. Probable mild bibasilar pleural fluid and atelectasis. Chest not well evaluated radiographically   secondary to the markedly shallow inspiration/low lung volumes and large patient body habitus.   US Lower Extremity Venous Duplex Bilateral   Result Value    Radiologist flags Deep vein thrombosis (AA)    Narrative    EXAM: US LOWER EXTREMITY VENOUS DUPLEX BILATERAL  LOCATION: [facility]  DATE: 9/16/2024    INDICATION: Swelling.   COMPARISON: None.  TECHNIQUE: Venous Duplex ultrasound of bilateral lower extremities with and without compression, augmentation and duplex. Color flow and spectral Doppler with waveform analysis performed.    FINDINGS: Exam includes the common femoral, femoral, popliteal veins as well as segmentally visualized deep calf veins and greater saphenous vein.     RIGHT: No deep vein thrombosis. No superficial thrombophlebitis. No popliteal cyst.    LEFT: Occlusive deep vein thrombosis within the upper to mid femoral vein. No additional deep vein thrombosis.  No superficial thrombophlebitis. No popliteal cyst.      Impression    IMPRESSION:    1.  Acute occlusive deep vein thrombosis within the upper to mid left femoral vein.     2.  No evidence of deep vein thrombosis within the right lower extremity.     [Critical Result: Deep vein thrombosis ]    Finding was identified on 9/16/2024 3:28 PM CDT.     Dr. Cee was contacted by me on 9/16/2024 3:30 PM CDT and verbalized understanding of the critical result.   US Renal Complete Non-Vascular    Narrative    EXAM: US RENAL COMPLETE NON-VASCULAR  LOCATION: [facility]  DATE:  9/16/2024    INDICATION: Acute renal failure.  COMPARISON: CT abdomen and pelvis, 2/18/2015.  TECHNIQUE: Routine Bilateral Renal and Bladder Ultrasound.    FINDINGS:    RIGHT KIDNEY: 9.9 x 5.1 x 4.8 cm. Normal without hydronephrosis or masses.     LEFT KIDNEY: 10.9 x 5.4 x 6.3 cm. Normal without hydronephrosis or masses.     BLADDER: Empty.    Incidental note is made of a 3.5 cm cyst involving the right hepatic lobe with internal septations. This is likely benign however as it was present on October 2015 CT scan.      Impression    IMPRESSION: Sonographically normal-appearing kidneys. There is no hydronephrosis.

## 2024-09-16 NOTE — PROCEDURES
Midline Insertion Procedure Note    Pt. Name:   Noelle Gomez  MRN:          [id number]    Procedure: Insertion of a  DUAL Lumen  5 fr  BARD (non-valved) Power MIDLINE catheter.  Lot number [id number].     Indications: Vascular Access    Contraindication(s): None      Procedure Details:    Patient identified with 2 identifiers.      Central line insertion bundle followed: Hand hygiene performed prior to procedure, site cleansed with Cholraprep (CHG), hat, mask, sterile gloves,sterile gown worn, patient draped with maximum barrier head to toe drape, sterile field maintained.    The right upper arm BRACHIAL-Med vein was assessed by ultrasound and found to be anechoic, compressible, patent, and of adequate size.     Lidocaine 1% 2.5 ml administered SQ to the insertion site.     Modified Seldinger Technique (MST) used for insertion,  ONE attempt(s) required to access vein. Imaging during access shows the needle within the vein.    Midline catheter was advance through peel-away sheath.       Catheter threaded without difficulty. Tip placement approximately in the axillary region. Good blood return noted.    Catheter was flushed with 20 cc normal saline.     Catheter secured with Statlock, tissue adhesive (on insertion site only),Biopatch (CHG) and Tegaderm dressing applied.    The sharps that are included in the insertion kit were accounted for and disposed of in the sharps container prior to breakdown of the sterile field.     Patient  tolerated procedure well.    Patient's primary RN notified catheter is a MIDLINE, and is ready for use.    Findings:    Total catheter length  20 cm, with 0 cm exposed. Mid upper arm circumference is 43 cm.     Comments:      A midline catheter is a form of peripheral venous access. Not recommended for the infusion of vesicants (Vancomycin, Vasopressors, TPN, etc.)      Sven Mckeon, MSN, RN, [facility]   Vascular Access - [facility]

## 2024-09-16 NOTE — ED NOTES
"ED Triage Provider Note  [facility] EMERGENCY ROOM  Encounter Date: Sep 16, 2024    History:  Chief Complaint   Patient presents with    Shortness of Breath     Noelle Gomez is a 59 year old female who presents to the ED with ***. {include 1-3 HPI elements}    Review of Systems:  {1 item required}    Exam:  /64   Pulse 94   Temp 98.8  F (37.1  C) (Oral)   Resp (!) 36   Ht 1.575 m (5' 2\")   Wt 139.3 kg (307 lb)   SpO2 100%   BMI 56.15 kg/m    General: No acute distress. Appears stated age.   Cardio: {normal/tachy/ashley:215744::\"normal\"} rate, extremities well perfused  Resp: Normal work of breathing, grossly normal respiratory rate  Neuro: Alert. Facial movement grossly symmetric. Grossly intact strength.   {edit exam as needed, may add problem pertinent system}    Medical Decision Making:  Patient arriving to the ED with problem as above. A medical screening exam was performed. {Initial differential:436440::\"Initial differential diagnosis includes but not limited to ***.\"}    *** orders initiated from Triage. The patient is most appropriate to {ED Triage Destination:081575}.       Cortney Robles RN  9/16/2024 at 12:42 PM  "

## 2024-09-16 NOTE — PROGRESS NOTES
Writer called to bedsides due on O2 needs increased, sating 92% on 8 LPM Oxymask, tachypnea. Pt was tachypnea, SOB, BS diminished. Writer placed pt back on V60. Pt was saturating 96% on FiO2 40% on V60. RN said to continue BIPAP per MD due to high CO2. VBG at 1712 were 7.15/88/44/31. ABG at 1318 were 7.16/80/63/29.   V60 settings;  Mode: AVAPS  RR: 14  Vt: 400  Pmax/Pmin: 25/10  EPAP: 6  FiO2: 40%  Ti: 0.9  Rise: 2      RT following.    Alton Burk, RT

## 2024-09-16 NOTE — ED NOTES
Bed: [facility]17  Expected date: 9/16/24  Expected time: 8:58 AM  Means of arrival:   Comments:  [facility] 58 yo F SOB

## 2024-09-16 NOTE — PROGRESS NOTES
Follow-up VBG shows pH has not improved and potassium is elevated, plan to give Lokelma,  -Valerio catheter placement for urine output monitoring- does have some urine output noted in the valerio, lactic acid has normalized with BiPAP, use intermittently, low threshold for intubation.  Repeat labs in 2 hours again  Patient mentation well at bedside  -Midline line for vascular access placed, heparin drip continued for DVT finding acutely with respiratory failure.  -Lokelma 5 g plan for hyperkalemia, able to swallow well.  -Critical status and moved to ICU  -MAP running low- started on Levophed to keep MAP >65 ?septic shock due to PNA. Hylaine casts in urine suggests dehydration- hold lasix

## 2024-09-16 NOTE — ED PROVIDER NOTES
EMERGENCY DEPARTMENT ENCOUNTER      NAME: Noelle Gomez  AGE: 59 year old female  YOB: 1965  MRN: [id number]  EVALUATION DATE & TIME: 2024  9:19 AM    PCP: No Ref-Primary, Physician    ED PROVIDER: Heladio Cee DO      Chief Complaint   Patient presents with    Shortness of Breath         FINAL IMPRESSION:  1. Acute decompensated heart failure (H)    2. Acute respiratory failure with hypoxia and hypercapnia (H)    3. Hyperkalemia    4. Acute renal failure, unspecified acute renal failure type (H24)          ED COURSE & MEDICAL DECISION MAKIN-year-old female with a history of asthma and hypertension presented to the ED for evaluation of progressively worsening shortness of breath that has been ongoing for the last 3 to 4 weeks.  The patient also reportedly stopped taking her Lasix 1 month ago.  Upon arrival to the ED the patient was noted to have O2 sats of 78%.  She was also tachycardic.  The patient was placed on 10 L of oxygen via an oxime mask which brought her O2 sats up into the mid 90s.  At the time of her initial evaluation the patient appeared to be in mild distress while on the supplemental oxygen.  She to have very shallow respirations with diminished breath sounds and bibasilar crackles.  She also had pitting edema noted in both lower extremities.      Following initial evaluation the patient was placed on BiPAP.  She was also given 60 mg of IV Lasix.    An EKG revealed normal sinus rhythm without any new or concerning ST or T wave changes.    Patient was noted have an elevated white blood cell count of 14.7.  Remainder of the CBC was unremarkable.  The patient's initial lactic acid was 2.7.    Creatinine is 2.7 indicating acute renal failure.  The patient's potassium was initially 7.3 although the laboratory staff stated that the sample was hemolyzed.  A repeat potassium was obtained which was unchanged at 7.2.  In addition to the Lasix that was given for the suspected  congestive heart failure the patient was also given calcium gluconate as well as insulin to treat the hyperkalemia.  There were no EKG changes consistent with hyperkalemia noted.    The patient's initial troponin was 83.  The patient's BNP was 39,395.  Repeat troponin was 76.  Chest x-ray shows cardiac enlargement with mild pulmonary vascular congestion.  The elevated troponins are likely related to the heart strain from the congestive heart failure.    COVID and influenza test were both negative.    An ABG was obtained after the patient had been on BiPAP for a few hours.  ABG shows a pH of 7.1, pCO2 of 80, and pO2 of 63.  The patient's settings were switched to AVAPS and Toradol volume was increased.  Her FiO2 was also increased slightly.   The patient's case and ABG results were discussed with the on-call intensivist Dr. Rivera.  Because the patient was awake and alert and not in any obvious respiratory distress at the time, patient was not felt to be necessary.  A repeat ABG will be obtained in approximate 1 hour.    The patient's case was then discussed with the hospitalist.  The patient was admitted to an intermediate care bed.   The decision was made to hold off on antibiotics after the discussion with the hospitalist.    ADDENDUM  I received a call from radiology stated the patient had acute DVT in her left leg.  The patient was started on heparin.  The admitting hospitalist Dr. Minor was made aware.    Pertinent Labs & Imaging studies reviewed. (See chart for details)  10:13 AM I met with the patient to gather history and to perform my initial exam. We discussed plans for the ED course, including diagnostic testing and treatment.   10:39 AM I spoke to labs regarding potassium result.  12:54 PM I spoke to critical labs regarding potassium lab plan.  12:55 PM Spoke with Dr. Minor, Hospitalist, regarding plan for admission.  1:50 PM I spoke to Dr. Rivera, intensivist, regarding patient plan of care.  1:53 PM I  spoke to labs regarding critical lab results.  1:55 PM Spoke with Dr. Minor regarding plan.        At the conclusion of the encounter I discussed the results of all of the tests and the disposition. The questions were answered. The patient or family acknowledged understanding and was agreeable with the care plan.     Medical Decision Making    History:  Supplemental history from: Documented in chart  External Record(s) reviewed: Documented in chart    Work Up:  Chart documentation includes differential considered and any EKGs or imaging independently interpreted by provider, where specified.  In additional to work up documented, I considered the following work up: Documented in chart, if applicable.    External consultation:  Discussion of management with another provider: Documented in chart, if applicable and Hospitalist and Intensivist    Complicating factors:  Care impacted by chronic illness: Hypertension, Smoking / Nicotine Use, and Other: asthma  Care affected by social determinants of health: Medication Noncompliance    Disposition considerations: Admit.        Critical Care  Performed by: Heladio Cee DO  Authorized by: Heladio Cee DO     Total critical care time: 50 minutes  Critical care time was exclusive of separately billable procedures and treating other patients.  Critical care was necessary to treat or prevent imminent or life-threatening deterioration of the following conditions: Acute congestive heart failure with hypercapnic, hypoxic respiratory failure and hyperkalemia.  Critical care was time spent personally by me on the following activities: development of treatment plan with patient or surrogate, discussions with consultants, examination of patient, evaluation of patient's response to treatment, obtaining history from patient or surrogate, ordering and performing treatments and interventions, ordering and review of laboratory studies, ordering and review of radiographic studies and  re-evaluation of patient's condition, this excludes any separately billable procedures.    PPE worn: n95 mask, goggles    MEDICATIONS GIVEN IN THE EMERGENCY:  Medications   glucose gel 15-30 g (has no administration in time range)     Or   dextrose 50 % injection 25-50 mL (has no administration in time range)     Or   glucagon injection 1 mg (has no administration in time range)   dextrose 10% BOLUS 300 mL (300 mLs Intravenous $New Bag 9/16/24 1456)   cefTRIAXone (ROCEPHIN) 2 g vial to attach to  ml bag for ADULTS or NS 50 ml bag for PEDS (has no administration in time range)   doxycycline (VIBRAMYCIN) 100 mg vial to attach to  mL bag (has no administration in time range)   albuterol (PROVENTIL) (2.5 MG/3ML) 0.083% neb solution (2.5 mg  $Given 9/16/24 1009)   furosemide (LASIX) injection 60 mg (60 mg Intravenous $Given 9/16/24 1155)   dextrose 50 % injection 25 g (25 g Intravenous $Given 9/16/24 1452)   insulin regular 1 unit/mL injection 10 Units (10 Units Intravenous $Given 9/16/24 1451)   calcium gluconate 10 % injection 1 g (1 g Intravenous $New Bag 9/16/24 1449)       NEW PRESCRIPTIONS STARTED AT TODAY'S ER VISIT  New Prescriptions    No medications on file          =================================================================    HPI    Patient information was obtained from: patient, patient's son    Use of : N/A       Noelle Gomez is a 59 year old female with a pertinent history of asthma, former smoker, who presents to this ED via walk-in for evaluation of shortness of breath.    Per the patient, she [place] Since then, it has slowly been worsening. The patient's son reports that in the past 1.5 weeks, the patient's shortness of breath has been going downhill with chills, uncontrollable shaking, weakness, and fatigue too. The patient endorses pain with breathing, and that the shortness of breath is worse when lying  flat.    The patient states that she is on Lasix but isn't taking it due to the increased urination being an undesirable side effect. She denies chest pain, chest pressure, fever, and increased leg swelling.       REVIEW OF SYSTEMS   Review of Systems   Constitutional:  Positive for chills and fatigue. Negative for fever.   Respiratory:  Positive for shortness of breath (worse when lying down). Negative for cough.         Positive for pain with breathing  Negative for chest pressure   Cardiovascular:  Negative for chest pain.   Neurological:  Positive for tremors and weakness.        PAST MEDICAL HISTORY:  Past Medical History:   Diagnosis Date    Asthma 08/14/2023    Bilateral lower extremity edema 07/14/2021    Former smoker 06/23/2021    HTN (hypertension) 07/14/2021    Lymphedema 02/08/2022    Prediabetes 06/24/2021    Seasonal allergies 07/14/2021    UPJ (ureteropelvic junction) obstruction 03/04/2015    Vitamin D deficiency 06/23/2021       PAST SURGICAL HISTORY:  History reviewed. No pertinent surgical history.        CURRENT MEDICATIONS:    losartan (COZAAR) 100 MG tablet  furosemide (LASIX) 40 MG tablet        ALLERGIES:  Allergies   Allergen Reactions    Sulfa Antibiotics Unknown, Itching and Rash       FAMILY HISTORY:  History reviewed. No pertinent family history.    SOCIAL HISTORY:   Social History     Socioeconomic History    Marital status:      Spouse name: None    Number of children: None    Years of education: None    Highest education level: None   Tobacco Use    Smoking status: Former     Types: Cigarettes    Smokeless tobacco: Never     Social Determinants of Health      [facility]    Financial Resource Strain    Received from [facility], [facility]    Social Connections       VITALS:  /59   Pulse 87   Temp 98.8  F (37.1  " C) (Oral)   Resp (!) 31   Ht 1.575 m (5' 2\")   Wt 139.3 kg (307 lb)   SpO2 90%   BMI 56.15 kg/m      PHYSICAL EXAM    General presentation: Alert, Vital signs reviewed.   HENT: ENT inspection is normal. Oropharynx is moist and clear.   Eye: Pupils are equal and reactive to light. EOMI  Neck: The neck is supple, with full ROM, with no evidence of meningismus.  Pulmonary: Mild respiratory distress. Moderate diminished breath sounds bilateraly with bibasilar crackles noted. Shallow respirations noted.  Circulatory: Regular rate and rhythm. Peripheral pulses are strong and equal. No murmurs, rubs, or gallops.   Abdominal: The abdomen is soft. Nontender. No rigidity, guarding, or rebound. Bowel sounds normal.   Neurologic: Alert, oriented to person, place, and time. No motor deficit. No sensory deficit. Cranial nerves II through XII are intact.  Musculoskeletal: No extremity tenderness. Full range of motion in all extremities. 2+ pitting edema in the bilateral lower extremities.  Skin: Skin color is normal. No rash. Warm. Dry to touch.       LAB:  All pertinent labs reviewed and interpreted.  Results for orders placed or performed during the hospital encounter of 09/16/24   XR Chest Port 1 View    Impression    IMPRESSION: Possible mild cardiac enlargement with mild pulmonary venous congestion and/or enlargement of the central pulmonary arteries not excluded. Probable mild bibasilar pleural fluid and atelectasis. Chest not well evaluated radiographically   secondary to the markedly shallow inspiration/low lung volumes and large patient body habitus.   Extra Blue Top Tube   Result Value Ref Range    Hold Specimen JIC    Extra Red Top Tube   Result Value Ref Range    Hold Specimen JIC    Extra Green Top (Lithium Heparin) Tube   Result Value Ref Range    Hold Specimen JIC    Extra Purple Top Tube   Result Value Ref Range    Hold Specimen     Extra Blood Bank Purple Top Tube   Result Value Ref Range    Hold Specimen " JIC    Extra Green Top (Lithium Heparin) ON ICE   Result Value Ref Range    Hold Specimen     Result Value Ref Range    INR 1.46 (H) 0.85 - 1.15   Partial thromboplastin time   Result Value Ref Range    aPTT 25 22 - 38 Seconds   Comprehensive metabolic panel   Result Value Ref Range    Sodium 139 135 - 145 mmol/L    Potassium 7.3 (HH) 3.4 - 5.3 mmol/L    Carbon Dioxide (CO2) 28 22 - 29 mmol/L    Anion Gap 14 7 - 15 mmol/L    Urea Nitrogen 94.8 (H) 8.0 - 23.0 mg/dL    Creatinine 2.75 (H) 0.51 - 0.95 mg/dL    GFR Estimate 19 (L) >60 mL/min/1.73m2    Calcium 8.9 8.8 - 10.4 mg/dL    Chloride 97 (L) 98 - 107 mmol/L    Glucose 129 (H) 70 - 99 mg/dL    Alkaline Phosphatase 102 40 - 150 U/L    AST 39 0 - 45 U/L    ALT 18 0 - 50 U/L    Protein Total 7.6 6.4 - 8.3 g/dL    Albumin 3.5 3.5 - 5.2 g/dL    Bilirubin Total 1.1 <=1.2 mg/dL   Result Value Ref Range    Magnesium 2.9 (H) 1.7 - 2.3 mg/dL   Result Value Ref Range    Troponin T, High Sensitivity 83 (H) <=14 ng/L   Symptomatic Influenza A/B, RSV, & SARS-CoV2 PCR (COVID-19) Nasopharyngeal    Specimen: Nasopharyngeal; Swab   Result Value Ref Range    Influenza A PCR Negative Negative    Influenza B PCR Negative Negative    RSV PCR Negative Negative    SARS CoV2 PCR Negative Negative   CBC with platelets and differential   Result Value Ref Range    WBC Count 14.7 (H) 4.0 - 11.0 10e3/uL    RBC Count 4.07 3.80 - 5.20 10e6/uL    Hemoglobin 15.4 11.7 - 15.7 g/dL    Hematocrit 48.9 (H) 35.0 - 47.0 %     (H) 78 - 100 fL    MCH 37.8 (H) 26.5 - 33.0 pg    MCHC 31.5 31.5 - 36.5 g/dL    RDW 17.2 (H) 10.0 - 15.0 %    Platelet Count 150 150 - 450 10e3/uL    % Neutrophils 81 %    % Lymphocytes 10 %    % Monocytes 7 %    % Eosinophils 0 %    % Basophils 0 %    % Immature Granulocytes 2 %    NRBCs per 100 WBC 2 (H) <1 /100    Absolute Neutrophils 11.9 (H) 1.6 - 8.3 10e3/uL    Absolute Lymphocytes 1.5 0.8 - 5.3 10e3/uL    Absolute Monocytes 1.0 0.0 - 1.3 10e3/uL    Absolute  Eosinophils 0.0 0.0 - 0.7 10e3/uL    Absolute Basophils 0.0 0.0 - 0.2 10e3/uL    Absolute Immature Granulocytes 0.3 <=0.4 10e3/uL    Absolute NRBCs 0.3 10e3/uL   Nt probnp inpatient   Result Value Ref Range    N terminal Pro BNP Inpatient 39,395 (H) 0 - 900 pg/mL   Result Value Ref Range    Procalcitonin 0.50 (H) <0.50 ng/mL   Troponin T, High Sensitivity (now)   Result Value Ref Range    Troponin T, High Sensitivity 76 (H) <=14 ng/L   UA with Microscopic reflex to Culture    Specimen: Urine, Midstream   Result Value Ref Range    Color Urine Yellow Colorless, Straw, Light Yellow, Yellow    Appearance Urine Turbid (A) Clear    Glucose Urine Negative Negative mg/dL    Bilirubin Urine Negative Negative    Ketones Urine Negative Negative mg/dL    Specific Gravity Urine 1.015 1.001 - 1.030    Blood Urine 0.06 mg/dL (A) Negative    pH Urine 5.0 5.0 - 7.0    Protein Albumin Urine 50 (A) Negative mg/dL    Urobilinogen Urine <2.0 <2.0 mg/dL    Nitrite Urine Negative Negative    Leukocyte Esterase Urine 75 Burak/uL (A) Negative    Bacteria Urine Many (A) None Seen /HPF    Mucus Urine Present (A) None Seen /LPF    RBC Urine 8 (H) <=2 /HPF    WBC Urine 7 (H) <=5 /HPF    Squamous Epithelials Urine 8 (H) <=1 /HPF    Hyaline Casts Urine 13 (H) <=2 /LPF   Blood gas arterial   Result Value Ref Range    pH Arterial 7.16 (LL) 7.35 - 7.45    pCO2 Arterial 80 (HH) 35 - 45 mm Hg    pO2 Arterial 63 (L) 80 - 105 mm Hg    FIO2 35     Bicarbonate Arterial 29 (H) 21 - 28 mmol/L    Base Excess/Deficit Arterial -2.5 -3.0 - 3.0 mmol/L    Oxyhemoglobin Arterial 83 (L) 92 - 100 %    O2 Sat, Arterial 85.2 (L) 96.0 - 97.0 %    Peep 7 cm H2O    Potassium Whole Blood 6.5 (HH) 3.4 - 5.3 mmol/L   Lactic acid whole blood   Result Value Ref Range    Lactic Acid 2.7 (H) 0.7 - 2.0 mmol/L   Result Value Ref Range    Hemoglobin A1C 5.8 (H) <5.7 %   Basic metabolic panel   Result Value Ref Range    Sodium 137 135 - 145 mmol/L    Potassium 7.2 (HH) 3.4 - 5.3  mmol/L    Chloride 99 98 - 107 mmol/L    Carbon Dioxide (CO2) 24 22 - 29 mmol/L    Anion Gap 14 7 - 15 mmol/L    Urea Nitrogen 95.9 (H) 8.0 - 23.0 mg/dL    Creatinine 2.68 (H) 0.51 - 0.95 mg/dL    GFR Estimate 20 (L) >60 mL/min/1.73m2    Calcium 8.4 (L) 8.8 - 10.4 mg/dL    Glucose 102 (H) 70 - 99 mg/dL   Result Value Ref Range    Potassium 7.3 (HH) 3.4 - 5.3 mmol/L   Result Value Ref Range    CRP Inflammation 177.00 (H) <5.00 mg/L   ECG 12-LEAD WITH MUSE (LHE)   Result Value Ref Range    Systolic Blood Pressure 119 mmHg    Diastolic Blood Pressure 88 mmHg    Ventricular Rate 102 BPM    Atrial Rate 102 BPM    MS Interval 180 ms    QRS Duration 88 ms     ms    QTc 443 ms    P Axis 77 degrees    R AXIS 96 degrees    T Axis 53 degrees    Interpretation ECG       Sinus tachycardia  Rightward axis  Low voltage QRS  Septal infarct , age undetermined  Abnormal ECG  No previous ECGs available  Confirmed by SEE ED PROVIDER NOTE FOR, ECG INTERPRETATION ([id number]),  CORA SARAH ([id number]) on 9/16/2024 1:44:55 PM         RADIOLOGY:  Reviewed all pertinent imaging. Please see official radiology report.  XR Chest Port 1 View   Final Result   IMPRESSION: Possible mild cardiac enlargement with mild pulmonary venous congestion and/or enlargement of the central pulmonary arteries not excluded. Probable mild bibasilar pleural fluid and atelectasis. Chest not well evaluated radiographically    secondary to the markedly shallow inspiration/low lung volumes and large patient body habitus.      US Lower Extremity Venous Duplex Bilateral    (Results Pending)   US Renal Complete Non-Vascular    (Results Pending)       EKG:    Sinus tachycardia.  Rate of 102.  Normal QRS.  Normal QT.  No ST or T wave changes.  No previous EKG available for comparison.    I have independently reviewed and interpreted the EKG(s) documented above.        I, Lucas Meyer , am serving as a scribe to document services personally performed by Heladio Cee,  DO based on my observation and the provider's statements to me. I, Heladio Cee, attest that Lucas Mirandaamairani is acting in a scribe capacity, has observed my performance of the services and has documented them in accordance with my direction.    Heladio Cee DO  Emergency Medicine  [facility] EMERGENCY ROOM  [address] [address]  [phone]       Hleadio Cee,   09/16/24 1533       Heladio Cee,   09/16/24 154

## 2024-09-16 NOTE — ED NOTES
Called RT to evaluate patient    A-T Advancement Flap Text: The defect edges were debeveled with a #15 scalpel blade.  Given the location of the defect, shape of the defect and the proximity to free margins an A-T advancement flap was deemed most appropriate.  Using a sterile surgical marker, an appropriate advancement flap was drawn incorporating the defect and placing the expected incisions within the relaxed skin tension lines where possible.    The area thus outlined was incised deep to adipose tissue with a #15 scalpel blade.  The skin margins were undermined to an appropriate distance in all directions utilizing iris scissors.

## 2024-09-16 NOTE — PROGRESS NOTES
Pt was transferred to 324 via 4L oxymask, pt was off BIPAP for about 1 hour and placed back on AVAPS @ 1730 with 40%02. RT following.

## 2024-09-16 NOTE — PHARMACY-ADMISSION MEDICATION HISTORY
"Pharmacist Admission Medication History    Admission medication history is complete. The information provided in this note is only as accurate as the sources available at the time of the update.    Information Source(s): Patient via in-person    Pertinent Information:     family reports that she stopped taking lasix (las fill was 3/24) because she \"is a stubborn old woman\"    Changes made to PTA medication list:  Added: None  Deleted: None  Changed: None    Allergies reviewed with patient and updates made in EHR: yes    Medication History Completed By: Jose Fisher RPH 9/16/2024 11:42 AM    PTA Med List   Medication Sig Last Dose    losartan (COZAAR) 100 MG tablet TAKE 1 TABLET(100 MG) BY MOUTH EVERY DAY 9/16/2024       "

## 2024-09-16 NOTE — ED NOTES
Pt ABG results on BIPAP were: 7.16/80/63/29, switched pt to AVAPS RR 14  45% 02, pt tolerating well. MD notified. RT following.

## 2024-09-17 NOTE — PHARMACY-VANCOMYCIN DOSING SERVICE
Pharmacy Vancomycin Initial Note  Date of Service 2024  Patient's  1965  59 year old, female    Indication: Sepsis    Current estimated CrCl = Estimated Creatinine Clearance: 33.4 mL/min (A) (based on SCr of 2.46 mg/dL (H)).    Creatinine for last 3 days  2024:  9:56 AM Creatinine 2.75 mg/dL; 12:59 PM Creatinine 2.68 mg/dL;  8:51 PM Creatinine 2.60 mg/dL; 11:43 PM Creatinine 2.54 mg/dL  2024:  4:14 AM Creatinine 2.46 mg/dL    Recent Vancomycin Level(s) for last 3 days  No results found for requested labs within last 3 days.      Vancomycin IV Administrations (past 72 hours)        No vancomycin orders with administrations in past 72 hours.                    Nephrotoxins and other renal medications (From now, onward)      Start     Dose/Rate Route Frequency Ordered Stop    24 1030  Vancomycin (VANCOCIN) 2,000 mg in 0.9% NaCl 500 mL intermittent infusion         2,000 mg  250 mL/hr over 2 Hours Intravenous ONCE 24 1005      24  norepinephrine (LEVOPHED) 4 mg in  mL infusion PREMIX         0.01-0.6 mcg/kg/min × 139.3 kg  5.2-313.4 mL/hr  Intravenous CONTINUOUS 24              Contrast Orders - past 72 hours (72h ago, onward)      None            [employer] Prediction of Planned Initial Vancomycin Regimen  Loading dose: 2000 mg at 10:00 2024.  Regimen: 750 mg IV every 24 hours.  Start time: 10:00 on 2024  Exposure target: AUC24 (range)400-600 mg/L.hr   AUC24,ss: 548 mg/L.hr  Probability of AUC24 > 400: 75 %  Ctrough,ss: 17.7 mg/L  Probability of Ctrough,ss > 20: 42 %  Probability of nephrotoxicity (Lodise MEREDITH ): 14 %        Plan:  Loading Dose today 2000mg vancomycin once.  Start tomorrow vancomycin  750 mg IV q24h.   Vancomycin monitoring method: AUC  Vancomycin therapeutic monitoring goal: 400-600 mg*h/L  Pharmacy will check vancomycin levels as appropriate in 1-3 Days.    Serum creatinine levels will be ordered daily for the first  week of therapy and at least twice weekly for subsequent weeks.      Chintan Ulrich, [facility]

## 2024-09-17 NOTE — PROGRESS NOTES
SLP: Swallow evaluation ordered per MD. Pt had change in medical status and required intubation overnight. Per discussion with RN, no plans for extubation this date. SLP will cancel order - MD to place when pt is extubated and appropriate for swallow evaluation.

## 2024-09-17 NOTE — CONSULTS
[facility] team is asked to see Noelle Gomez in consultation to evaluate new onset CHF .      Assessment:     59-year-old woman with history of morbid obesity, hypertension not recently on treatment and chronic lower extremity edema is admitted with acute hypoxemic respiratory failure, acute renal failure, lactic acidosis, elevated BNP and evidence of vascular pulmonary congestion with interstitial edema and pleural effusions on chest x-ray.  Etiology uncertain, with no acute ECG changes and declining troponin does not suggest acute coronary syndrome.  Suspect chronic cor pulmonale with gradual decompensation off of furosemide.  Probable obstructive sleep apnea based on symptoms could be causative of pulmonary hypertension  Morbid obesity  Acute renal failure  Deep venous thrombosis detected yesterday, initiated on heparin therapy.  Chronic decompensation may be consistent with CTEPH     Plan:     Ventilatory support per pulmonary medicine  Aggressive diuretics per nephrology  Continue heparin.  Discussed with pulm/critical care whether to pursue pulmonary embolism evaluation     Current History:     Noelle Gomez is a 59-year-old woman with a history of hypertension, chronic lower extremity edema for more than a year with previous ulcerations treated with pressure wraps.  Per  and son she has also been short of breath with minimal activities, and with daytime sleepiness, poor quality sleep.  No apnea has been noted but snoring reported.  Has been on furosemide chronically for lower extremity edema, stopped it a couple of weeks ago out of frustration with urine output.  Has had worsening shortness of breath over the last couple of weeks, with increasing lower extremity edema at times painful.  She presented yesterday with worsening shortness of breath, developed respiratory failure requiring intubation.    No previous history of renal disorder.  Blood pressures have  "generally been well-controlled though checked inconsistently.  No history of chest pain or palpitations.  No syncope..  Support stockings have been worn with some improvement in her lower extremity edema    Currently intubated, at times minimally responsive on ventilator    Past Medical History:     Past Medical History:   Diagnosis Date     Asthma 08/14/2023     Bilateral lower extremity edema 07/14/2021     Former smoker 06/23/2021     HTN (hypertension) 07/14/2021     Lymphedema 02/08/2022     Prediabetes 06/24/2021     Seasonal allergies 07/14/2021     UPJ (ureteropelvic junction) obstruction 03/04/2015     Vitamin D deficiency 06/23/2021     Sleep history: Poorly restorative with daytime sleepiness, sleeping in a chair for 6 months at least  Past Surgical History:     Past Surgical History:   Procedure Laterality Date     MIDLINE DOUBLE LUMEN PLACEMENT  9/16/2024       Family History:   History reviewed. No pertinent family history.  Family history reviewed and is not pertinent to the chief complaint or presenting problem    Social History:    reports that she has quit smoking. Her smoking use included cigarettes. She has never used smokeless tobacco.  Exercise history: Per  and son has been dyspneic washing dishes or walking around the house, has not left the house in 10 days    Meds:     No current outpatient medications on file.       Allergies:   Sulfa antibiotics    Review of Systems:   A 12 point comprehensive review of systems was negative except as noted in the current history.    Objective:      Physical Exam  Wt Readings from Last 3 Encounters:   09/16/24 139.3 kg (307 lb)   10/09/23 127.5 kg (281 lb)   10/02/23 129.6 kg (285 lb 11.2 oz)     Body mass index is 56.15 kg/m .  BP (!) 88/47   Pulse 78   Temp 98.3  F (36.8  C) (Axillary)   Resp 25   Ht 1.575 m (5' 2\")   Wt 139.3 kg (307 lb)   SpO2 97%   BMI 56.15 kg/m      General Appearance: Sedated on ventilator  HEENT: No scleral icterus; " the mucous membranes were pink and moist.  Conjunctiva slightly injected  Neck:  No cervical bruits, jugular venous distention, or thyromegaly appreciated.  Chest:The spine was straight. The chest was symmetric.  Lungs:  Respirations unlabored; the lungs are clear to auscultation anteriorly.  Cardiovascular:    Nonpalpable point of maximal impulse. Auscultation reveals regular first and second heart sounds with no murmurs, rubs, or gallops. Carotid, radial, and dorsalis pedal pulses are intact and symmetric.  Abdomen: Obese.  No organomegaly, masses, bruits, or tenderness. Bowels sounds are present  Extremities: 3-4+ brawny edema into thighs.  No pitting  Skin:  No xanthelasma.  Neurologic: Mood and affect are appropriate. Speech is fluent.      EKG (personally reviewed): 9/16/2024: Sinus tachycardia 102 bpm.  Right axis deviation.  Incomplete right bundle branch block.  Septal infarct, age indeterminant.  No comparison study available    Telemetry shows sinus rhythm, 70 to 100 bpm.    Imaging   EXAM: XR CHEST PORT 1 VIEW  LOCATION: [facility]  DATE: 9/16/2024 9:27 PM  INDICATION: ETT verification and OG verification  COMPARISON: Chest radiograph 9/16/2024 11:50 AM.                                                            IMPRESSION:   Endotracheal tube tip is 3.7 cm above the wilder. Gastric drainage tube descends below the diaphragm and out of the field-of-view.  Mild cardiomegaly with central pulmonary vascular congestion, interstitial edema, and small bilateral pleural effusions appear similar to the recent exam. Bibasilar opacities also appear similar and likely reflect atelectasis. No pneumothorax.      Cardiac diagnostics    Echocardiogram Today:  Flattened septum is consistent with RV pressure/volume overload.  Left ventricular size, wall motion and function are normal. The ejection  fraction is > 65%.  The right ventricle is severely dilated.  Moderately decreased right  "ventricular systolic function  Right ventricular systolic pressure is elevated, consistent with severe  pulmonary hypertension.  There is mild to moderate (1-2+) tricuspid regurgitation.  IVC diameter >2.1 cm collapsing <50% with sniff suggests a high RA pressure  estimated at 15 mmHg or greater.  Trivial pericardial effusion       Lab Results    Chemistry/lipid CBC Cardiac Enzymes/BNP/TSH/INR   Recent Labs   Lab Test 05/16/24  1445   CHOL 141   HDL 61   LDL 56   TRIG 120     Recent Labs   Lab Test 05/16/24  1445   LDL 56     Recent Labs   Lab Test 09/17/24  0843 09/17/24  0741 09/17/24  0535 09/17/24  0414   NA  --   --   --  139   POTASSIUM 5.6*  --    < > 6.3*   CHLORIDE  --   --   --  99   CO2  --   --   --  29   GLC  --  181*   < > 141*   BUN  --   --   --  98.1*   CR  --   --   --  2.46*   GFRESTIMATED  --   --   --  22*   BEN  --   --   --  8.4*    < > = values in this interval not displayed.     Recent Labs   Lab Test 09/17/24  0414 09/16/24  2343 09/16/24  2051   CR 2.46* 2.54* 2.60*     Recent Labs   Lab Test 09/16/24  0956   A1C 5.8*          Recent Labs   Lab Test 09/17/24  0414   WBC 12.9*   HGB 13.7   HCT 42.9   *   *     Recent Labs   Lab Test 09/17/24  0414 09/16/24  1712 09/16/24  0956   HGB 13.7 14.1 15.4    No results for input(s): \"TROPONINI\" in the last 80984 hours.  Recent Labs   Lab Test 09/16/24  0956   NTBNPI 39,395*     No results for input(s): \"TSH\" in the last 82911 hours.  Recent Labs   Lab Test 09/16/24  0956   INR 1.46*            Sukhdeep Fong MD  [facility]  9/17/2024    Note created using Dragon voice recognition software.  Sound alike errors may have escaped editing.    Clinically Significant Risk Factors Present on Admission       # Hyperkalemia: Highest K = 7.3 mmol/L in last 2 days, will monitor as appropriate       # Hypoalbuminemia: Lowest albumin = 2.9 g/dL at 9/17/2024  4:14 AM, will monitor as appropriate   # Coagulation Defect: INR = 1.46 (Ref range: 0.85 - " "1.15) and/or PTT = 25 Seconds (Ref range: 22 - 38 Seconds), will monitor for bleeding  # Thrombocytopenia: Lowest platelets = 105 in last 2 days, will monitor for bleeding   # Circulatory Shock: required vasopressors within past 24 hours    # Severe Obesity: Estimated body mass index is 56.15 kg/m  as calculated from the following:    Height as of this encounter: 1.575 m (5' 2\").    Weight as of this encounter: 139.3 kg (307 lb).                                  "

## 2024-09-17 NOTE — DISCHARGE SUMMARY
"[facility]  Hospitalist Discharge Summary      Date of Admission:  9/16/2024  Date of Discharge:  9/17/2024  Discharging Provider: Walker Mann DO  Discharge Service: Hospitalist Service    Discharge Diagnoses   Bilateral pulmonary emboli  Cor pulmonale  Possible cardiogenic shock  Obesity class III  Acute kidney injury  Probable obstructive sleep apnea  Acute on chronic respiratory failure with hypercapnia and hypoxia  Former smoker  Urinary tract infection  Thrombocytopenia  Acute hyperkalemia  Left femoral vein DVT  Prediabetes    Clinically Significant Risk Factors     # Severe Obesity: Estimated body mass index is 56.15 kg/m  as calculated from the following:    Height as of this encounter: 1.575 m (5' 2\").    Weight as of this encounter: 139.3 kg (307 lb).       Follow-ups Needed After Discharge   Defer to accepting services at [facility] pending clinical course    Unresulted Labs Ordered in the Past 30 Days of this Admission       Date and Time Order Name Status Description    9/17/2024 12:40 PM Factor 5 assay In process     9/17/2024 12:34 PM ANCA IgG by IFA with Reflex to Titer In process     9/17/2024 12:34 PM Anti Nuclear Debbie IgG by IFA with Reflex In process     9/17/2024 11:29 AM Bld morphology pathology review In process     9/17/2024 11:05 AM Haptoglobin In process     9/16/2024  1:18 PM Blood Culture Peripheral Blood Preliminary         These results will be followed up by [facility]    Discharge Disposition   Transferred to [facility] ICU  Condition at discharge: Serious    Hospital Course   Noelle Gomez is a 59 year old, with history of asthma, former smoker, hypertension, prediabetes, lymphedema, morbid obesity and venous stasis, presenting with acute on chronic respiratory failure with hypoxia and hypercapnia, likely secondary to acute on chronic decompensated heart failure, urinary tract infection, shock, and acute kidney injury with hyperkalemia.  Patient with " incidental finding of acute DVT left lower extremity.  Patient required intubation for change in mental status and hypercapnia on evening of 9/16/24.  Initiated on pressor support for shock.  Echocardiogram revealed cor pulmonale.  VQ scan was performed on afternoon of 9/17 with evidence of bilateral multiple wedge-shaped perfusion defects throughout both lungs.  After discussion with Dr. Rivera patient was arranged for transfer to [facility] for ICU admission and urgent thrombectomy.    Consultations This Hospital Stay   INTENSIVIST IP CONSULT  INTENSIVIST IP CONSULT  NEPHROLOGY IP CONSULT  PHARMACY IP CONSULT  CARDIOLOGY IP CONSULT  SPEECH LANGUAGE PATH ADULT IP CONSULT  VASCULAR ACCESS ADULT IP CONSULT  PHARMACY IP CONSULT  WOUND OSTOMY CONTINENCE NURSE  IP CONSULT  VASCULAR ACCESS ADULT IP CONSULT  VASCULAR ACCESS ADULT IP CONSULT  NUTRITION SERVICES ADULT IP CONSULT  CARE MANAGEMENT / SOCIAL WORK IP CONSULT  INTENSIVIST IP CONSULT  PHARMACY TO DOSE VANCO  PHARMACY IP CONSULT  PHARMACY IP CONSULT  PHARMACY TO DOSE VANCO  WOUND OSTOMY CONTINENCE NURSE  IP CONSULT    Code Status   Full Code    Time Spent on this Encounter   IWalker DO, personally saw the patient today and spent greater than 30 minutes discharging this patient.       Walker Mann DO  [facility] 3 ICU  [address] [place] [id number]  Phone: [phone]  Fax: [phone]  ______________________________________________________________________    Physical Exam   Vital Signs: Temp: 98.3  F (36.8  C) Temp src: Axillary BP: 96/48 Pulse: 72   Resp: 25 SpO2: 90 % O2 Device: Mechanical Ventilator Oxygen Delivery: 4 LPM  Weight: 307 lbs 0 oz  General - Sedated, intubated.  HEENT - PERRLA, no scleral icterus  Neck - no carotid bruits, no JVD  Respiratory - Diminished breath sounds.  Cardiovascular - RRR, no murmur.   Abdomen - Obese, soft, BS present, no bruits, no fluid  wave  Extremities - 2+ pitting edema.  Scattered petechiae, purpura and leg wounds.   Integumentary - petechiae and purpura over extremities.   Neurologic - Sedated.        Primary Care Physician   Physician No Ref-Primary    Discharge Orders   No discharge procedures on file.    Significant Results and Procedures   Most Recent 3 CBC's:  Recent Labs   Lab Test 09/17/24  1129 09/17/24 0414 09/16/24  1712   WBC 9.8 12.9* 14.6*   HGB 12.9 13.7 14.1   * 117* 121*   PLT 90* 111* 105*     Most Recent 3 BMP's:  Recent Labs   Lab Test 09/17/24  1523 09/17/24  1157 09/17/24  1153 09/17/24  0958 09/17/24  0843 09/17/24  0710 09/17/24  0705 09/17/24  0535 09/17/24  0414 09/16/24  2348 09/16/24  2343   NA  --   --  139  --   --   --   --   --  139  --  140   POTASSIUM  --   --  5.0  --  5.6*  --  5.7*  --  6.3*   < > 6.2*  6.2*   CHLORIDE  --   --  104  --   --   --   --   --  99  --  99   CO2  --   --  28  --   --   --   --   --  29  --  29   BUN  --   --  84.8*  --   --   --   --   --  98.1*  --  99.2*   CR  --   --  1.90*  --   --   --   --   --  2.46*  --  2.54*   ANIONGAP  --   --  7  --   --   --   --   --  11  --  12   BEN  --   --  6.9*  --   --   --   --   --  8.4*  --  8.5*   * 139* 148*   < >  --    < >  --    < > 141*   < > 169*    < > = values in this interval not displayed.     Most Recent 2 LFT's:  Recent Labs   Lab Test 09/17/24 0414 09/16/24  0956   AST 20 39   ALT 13 18   ALKPHOS 74 102   BILITOTAL 0.7 1.1     Most Recent 3 INR's:  Recent Labs   Lab Test 09/16/24  0956   INR 1.46*     Most Recent 3 Troponin's:No lab results found.  Most Recent 3 BNP's:  Recent Labs   Lab Test 09/16/24  0956   NTBNPI 39,395*     Most Recent D-dimer:  Recent Labs   Lab Test 09/17/24  0705   DD 15.08*     7-Day Micro Results       Collected Updated Procedure Result Status      09/16/2024 1834 09/16/2024 2328 MRSA MSSA PCR, Nasal Swab [[id number]]    Swab from Nares, Bilateral    Final result Component Value    MRSA Target DNA Negative   SA Target DNA Negative            09/16/2024 1431 09/17/2024 0704 Blood Culture Peripheral Blood [[id number]]    Peripheral Blood    Preliminary result Component Value   Culture No growth after 12 hours  [P]                09/16/2024 1157 09/16/2024 1240 Symptomatic Influenza A/B, RSV, & SARS-CoV2 PCR (COVID-19) Nasopharyngeal [[id number]]    Swab from Nasopharyngeal    Final result Component Value   Influenza A PCR Negative   Influenza B PCR Negative   RSV PCR Negative   SARS CoV2 PCR Negative   NEGATIVE: SARS-CoV-2 (COVID-19) RNA not detected, presumed negative.                  Most Recent Urinalysis:  Recent Labs   Lab Test 09/16/24  1454   COLOR Yellow   APPEARANCE Turbid*   URINEGLC Negative   URINEBILI Negative   URINEKETONE Negative   SG 1.015   UBLD 0.06 mg/dL*   URINEPH 5.0   PROTEIN 50*   NITRITE Negative   LEUKEST 75 [place]/uL*   RBCU 8*   WBCU 7*     Most Recent ESR & CRP:  Recent Labs   Lab Test 09/17/24  0414 09/16/24  1259 08/14/23  1630   SED  --   --  17   CRP  --   --  2.2*   CRPI 154.00*   < >  --     < > = values in this interval not displayed.     Most Recent Anemia Panel:  Recent Labs   Lab Test 09/17/24  1129   WBC 9.8   HGB 12.9   HCT 40.4   *   PLT 90*   RETICABSCT 0.207*   RETP 6.0*   ,   Results for orders placed or performed during the hospital encounter of 09/16/24   XR Chest Port 1 View    Narrative    EXAM: XR CHEST PORT 1 VIEW  LOCATION: [facility]  DATE: 9/16/2024    INDICATION: Chest Pain  COMPARISON: None.      Impression    IMPRESSION: Possible mild cardiac enlargement with mild pulmonary venous congestion and/or enlargement of the central pulmonary arteries not excluded. Probable mild bibasilar pleural fluid and atelectasis. Chest not well evaluated radiographically   secondary to the markedly shallow inspiration/low lung volumes and large patient body habitus.   US Lower Extremity Venous Duplex Bilateral     Value     Radiologist flags Deep vein thrombosis (AA)    Narrative    EXAM: US LOWER EXTREMITY VENOUS DUPLEX BILATERAL  LOCATION: [facility]  DATE: 9/16/2024    INDICATION: Swelling.   COMPARISON: None.  TECHNIQUE: Venous Duplex ultrasound of bilateral lower extremities with and without compression, augmentation and duplex. Color flow and spectral Doppler with waveform analysis performed.    FINDINGS: Exam includes the common femoral, femoral, popliteal veins as well as segmentally visualized deep calf veins and greater saphenous vein.     RIGHT: No deep vein thrombosis. No superficial thrombophlebitis. No popliteal cyst.    LEFT: Occlusive deep vein thrombosis within the upper to mid femoral vein. No additional deep vein thrombosis.  No superficial thrombophlebitis. No popliteal cyst.      Impression    IMPRESSION:    1.  Acute occlusive deep vein thrombosis within the upper to mid left femoral vein.     2.  No evidence of deep vein thrombosis within the right lower extremity.     [Critical Result: Deep vein thrombosis ]    Finding was identified on 9/16/2024 3:28 PM CDT.     Dr. Cee was contacted by me on 9/16/2024 3:30 PM CDT and verbalized understanding of the critical result.   US Renal Complete Non-Vascular    Narrative    EXAM: US RENAL COMPLETE NON-VASCULAR  LOCATION: [facility]  DATE: 9/16/2024    INDICATION: Acute renal failure.  COMPARISON: CT abdomen and pelvis, 2/18/2015.  TECHNIQUE: Routine Bilateral Renal and Bladder Ultrasound.    FINDINGS:    RIGHT KIDNEY: 9.9 x 5.1 x 4.8 cm. Normal without hydronephrosis or masses.     LEFT KIDNEY: 10.9 x 5.4 x 6.3 cm. Normal without hydronephrosis or masses.     BLADDER: Empty.    Incidental note is made of a 3.5 cm cyst involving the right hepatic lobe with internal septations. This is likely benign however as it was present on October 2015 CT scan.      Impression    IMPRESSION: Sonographically normal-appearing  kidneys. There is no hydronephrosis.   XR Chest Port 1 View    Narrative    EXAM: XR CHEST PORT 1 VIEW  LOCATION: [facility]  DATE: 2024 9:27 PM    INDICATION: ETT verification and OG verification  COMPARISON: Chest radiograph 2024 11:50 AM.       Impression    IMPRESSION:     Endotracheal tube tip is 3.7 cm above the wilder. Gastric drainage tube descends below the diaphragm and out of the field-of-view.    Mild cardiomegaly with central pulmonary vascular congestion, interstitial edema, and small bilateral pleural effusions appear similar to the recent exam. Bibasilar opacities also appear similar and likely reflect atelectasis. No pneumothorax.   XR Abdomen Port 1 View    Narrative    EXAM: XR ABDOMEN PORT 1 VIEW  LOCATION: [facility]  DATE: 2024    INDICATION: OG placement  COMPARISON: CT 2015      Impression    IMPRESSION: Enteric drainage tube tip projecting at the level of the distal stomach.   NM Lung Scan Perfusion Particulate    Narrative    EXAM: NM LUNG SCAN PERFUSION PARTICULATE  LOCATION: [facility]  DATE: 2024    INDICATION: Shortness of breath. Pulmonary evaluation.  COMPARISON: None available at time of dictation  TECHNIQUE: 8.4 mCi technetium-99m MAA, IV. Standard lung perfusion imaging.    FINDINGS: Multiple wedge-shaped perfusion defects throughout both lungs suspicious for bilateral pulmonary emboli.      Impression    IMPRESSION:     Findings suspicious for bilateral pulmonary emboli.   Echocardiogram Complete     Value    LVEF  > 65%    Narrative    [id number]  [id number]  [id number]  [id number]^MEHNAZ^CLOVIS^[place] [address]     Name: BILLIE CRESPO  MRN: [id number]  : 1965  Study Date: 2024 10:08 AM  Age: 59 yrs  Gender: Female  Patient Location: [facility]  Reason For Study: CHF  Ordering Physician: CLOVIS DARBY  Performed By: [place]      BSA: 2.3 m2  Height: 62 in  Weight: 307 lb  HR: 82  BP: 141/71 mmHg  ______________________________________________________________________________  Procedure  Complete Portable Echo Adult. Definity (NDC #69300-081) given intravenously.  ______________________________________________________________________________  Interpretation Summary     Flattened septum is consistent with RV pressure/volume overload.  Left ventricular size, wall motion and function are normal. The ejection  fraction is > 65%.  The right ventricle is severely dilated.  Moderately decreased right ventricular systolic function  Right ventricular systolic pressure is elevated, consistent with severe  pulmonary hypertension.  There is mild to moderate (1-2+) tricuspid regurgitation.  IVC diameter >2.1 cm collapsing <50% with sniff suggests a high RA pressure  estimated at 15 mmHg or greater.  Trivial pericardial effusion  ______________________________________________________________________________  Left Ventricle  Left ventricular size, wall motion and function are normal. The ejection  fraction is > 65%. There is normal left ventricular wall thickness. Diastolic  Doppler findings (E/E' ratio and/or other parameters) suggest left ventricular  filling pressures are normal. Flattened septum is consistent with RV  pressure/volume overload.     Right Ventricle  The right ventricle is severely dilated. Moderately decreased right  ventricular systolic function. TAPSE is abnormal, which is consistent with  abnormal right ventricular systolic function.     Atria  The left atrium is mildly dilated. The right atrium is moderate to severely  dilated. There is no color Doppler evidence of an atrial shunt.     Mitral Valve  Mitral valve leaflets appear normal. There is no evidence of mitral stenosis  or clinically significant mitral regurgitation.     Tricuspid Valve  The tricuspid valve is not well visualized, but is grossly normal. There is  mild to  moderate (1-2+) tricuspid regurgitation. The right ventricular  systolic pressure is approximated at 53.7 mmHg plus the right atrial pressure.  Right ventricular systolic pressure is elevated, consistent with severe  pulmonary hypertension. Severe (>55mmHg) pulmonary hypertension is present.     Aortic Valve  Aortic valve leaflets appear normal. There is no evidence of aortic stenosis  or clinically significant aortic regurgitation.     Pulmonic Valve  The pulmonic valve is not well seen, but is grossly normal. There is mild (1+)  pulmonic valvular regurgitation.     Vessels  The aorta root is normal. Normal size ascending aorta. IVC diameter >2.1 cm  collapsing <50% with sniff suggests a high RA pressure estimated at 15 mmHg or  greater.     Pericardium  Trivial pericardial effusion.     ______________________________________________________________________________  MMode/2D Measurements & Calculations  IVSd: 0.92 cm  LVIDd: 3.6 cm  LVIDs: 2.3 cm  LVPWd: 0.85 cm  FS: 37.1 %     LV mass(C)d: 92.4 grams  LV mass(C)dI: 40.3 grams/m2  Ao root diam: 2.9 cm  LVOT diam: 2.3 cm  LVOT area: 4.0 cm2  Ao root diam index Ht(cm/m): 1.9  Ao root diam index BSA (cm/m2): 1.3  RV Base: 5.1 cm  RWT: 0.47  TAPSE: 1.5 cm     Doppler Measurements & Calculations  MV E max alexis: 44.3 cm/sec  MV A max alexis: 64.5 cm/sec  MV E/A: 0.69  MV dec slope: 130.1 cm/sec2  MV dec time: 0.34 sec  Ao V2 max: 142.1 cm/sec  Ao max P.0 mmHg  Ao V2 mean: 110.2 cm/sec  Ao mean P.3 mmHg  Ao V2 VTI: 25.8 cm  TOM(I,D): 3.1 cm2  TOM(V,D): 3.2 cm2  LV V1 max P.2 mmHg  LV V1 max: 114.4 cm/sec  LV V1 VTI: 19.9 cm  SV(LVOT): 79.1 ml  SI(LVOT): 34.5 ml/m2  PA V2 max: 77.0 cm/sec  PA max P.4 mmHg  PA acc time: 0.09 sec     PI end-d alexis: 165.2 cm/sec  TR max alexis: 366.5 cm/sec  TR max P.7 mmHg  AV Alexis Ratio (DI): 0.80  TOM Index (cm2/m2): 1.3  E/E': 3.3  E/E' av.7  Lateral E/e': 3.3  Medial E/e': 6.1  Peak E' Alexis: 13.6 cm/sec  PVR: 368.9  RV S  Alexis: 14.5 cm/sec     ______________________________________________________________________________  Report approved by: Ke Scott 09/17/2024 11:39 AM             Discharge Medications   Current Discharge Medication List        CONTINUE these medications which have NOT CHANGED    Details   losartan (COZAAR) 100 MG tablet TAKE 1 TABLET(100 MG) BY MOUTH EVERY DAY      furosemide (LASIX) 40 MG tablet Take 40 mg by mouth 2 times daily           Allergies   Allergies   Allergen Reactions    Sulfa Antibiotics Unknown, Itching and Rash

## 2024-09-17 NOTE — PROGRESS NOTES
Patient was transferred to [facility] ICU until she was able to be received in IR.  Prior to leaving she was given 3mg versed and 100mg fentanyl bolus per Dr Rivera order.  Report given to [facility] ICU RN.

## 2024-09-17 NOTE — PROGRESS NOTES
[facility]    Medicine Progress Note - Hospitalist Service    Date of Admission:  9/16/2024    Assessment & Plan   Noelle Gomez is a 59 year old, with history of asthma, former smoker, hypertension, prediabetes, lymphedema, morbid obesity and venous stasis, presenting with acute on chronic respiratory failure with hypoxia and hypercapnia, likely secondary to acute on chronic decompensated heart failure, septic shock secondary to UTI, and acute kidney injury with hyperkalemia.  Patient with incidental finding of acute DVT left lower extremity.  Patient required intubation for change in mental status and hypercapnia on evening of 9/16/24.  Initiated on pressor support for shock, possibly secondary to UTI.     Acute on chronic heart failure undetermined type.  Acute respiratory failure with hypoxia and hypercapnia  Former smoker  Respiratory acidosis  Acute metabolic encephalopathy  NT-pro BNP, 39,395, troponin T 83->76,   ABG 7.26, 63, 81 on 9/16.   CXR mild pulmonary venous congestion, interstitial edema, bibasilar pleural fluid.  - Continue doxycycline and ceftriaxone as ordered.   - Echocardiogram in process.  - furosemide infusion per nephrology recommendations.    - Continue ventilation and sedation management per intensivist.   - Appreciate cardiology recommendations.   - Appreciate intensivist assistance.   - Appreciate nephrology recommendations.     Septic shock.  Urinary tract infection  Urine culture in process.   - continue ceftriaxone 2 g IV q24h.   procalcitonin 0.5.   WBC 14.7->12.9  Lactic acid normalized.   Blood culture in process.   - norepinephrine continued to maintain MAP > 65.   - continue ceftriaxone and doxycycline.     Thrombocytopenia, mild.   Purpura.  Platelet count 111. Down from 150 on admission.    INR 1.46 on admission, PTT normal.   Bilirubin normal.  Order peripheral morphology  Order LDH, haptoglobin.   Order fibrinogen, D-dimer.     Hyperkalemia  Acute  "kidney injury  Potassium 5.7 down from 7.3 on admission.   Creatinine improved to 2.46 from 2.7 on admission.   US renal on 9/16, no hydronephrosis, or mass  Continue diuresis as above.   Nephrology consult appreciated.     DVT, left femoral vein.   US with acute occlusive deep vein thrombosis within the upper to mid left femoral vein.   - on heparin drip per protocol.      Prediabetes.   Insulin low correction scale QID AC HS    Class III obesity Body mass index is 56.15 kg/m .          Diet: NPO for Medical/Clinical Reasons Except for: Meds    DVT Prophylaxis: heparin  Miller Catheter: PRESENT, indication: Acute retention or obstruction;Wound deterioration and failed external collection device;ICU only: hourly urine output needed for patient care  Lines: PRESENT             Cardiac Monitoring: None  Code Status: Full Code      Clinically Significant Risk Factors Present on Admission        # Hyperkalemia: Highest K = 7.3 mmol/L in last 2 days, will monitor as appropriate       # Hypoalbuminemia: Lowest albumin = 2.9 g/dL at 9/17/2024  4:14 AM, will monitor as appropriate  # Coagulation Defect: INR = 1.46 (Ref range: 0.85 - 1.15) and/or PTT = 25 Seconds (Ref range: 22 - 38 Seconds), will monitor for bleeding  # Thrombocytopenia: Lowest platelets = 105 in last 2 days, will monitor for bleeding   # Hypertension: Noted on problem list   # Circulatory Shock: required vasopressors within past 24 hours          # Severe Obesity: Estimated body mass index is 56.15 kg/m  as calculated from the following:    Height as of this encounter: 1.575 m (5' 2\").    Weight as of this encounter: 139.3 kg (307 lb).       # Asthma: noted on problem list              Disposition Plan     Medically Ready for Discharge: Anticipated in 5+ Days     Walker Mann DO  Hospitalist Service  [facility]  Securely message with [employer] (more info)  Text page via [employer] Paging/Directory "   ______________________________________________________________________    Interval History   Patient sedated. Currently intubated and sedated.  On heparin gtt, and norepinephrine for pressor support.  , Vito, present and appreciative of cares.     Physical Exam   Vital Signs: Temp: 98.3  F (36.8  C) Temp src: Axillary BP: 128/60 Pulse: 90   Resp: 25 SpO2: 96 % O2 Device: Mechanical Ventilator Oxygen Delivery: 4 LPM  Weight: 307 lbs 0 oz    General - Sedated, intubated.  HEENT - PERRLA, no scleral icterus  Neck - no carotid bruits, no JVD  Respiratory - Diminished breath sounds.  Cardiovascular - RRR, no murmur.   Abdomen - Obese, soft, BS present, no bruits, no fluid wave  Extremities - 2+ pitting edema.  Scattered petechiae, purpura and leg wounds.   Integumentary - petechiae and purpura over extremities.   Neurologic - Sedated.     Medical Decision Making       60 MINUTES SPENT BY ME on the date of service doing chart review, history, exam, documentation & further activities per the note.      Data     I have personally reviewed the following data over the past 24 hrs:    12.9 (H)  \   13.7   / 111 (L)     139 99 98.1 (H) /  184 (H)   5.6 (H) 29 2.46 (H) \     ALT: 13 AST: 20 AP: 74 TBILI: 0.7   ALB: 2.9 (L) TOT PROTEIN: 6.1 (L) LIPASE: N/A     Trop: 76 (H) BNP: N/A     TSH: N/A T4: N/A A1C: N/A     Procal: N/A CRP: 154.00 (H) Lactic Acid: 1.0         Imaging results reviewed over the past 24 hrs:   Recent Results (from the past 24 hour(s))   XR Chest Port 1 View    Narrative    EXAM: XR CHEST PORT 1 VIEW  LOCATION: [facility]  DATE: 9/16/2024    INDICATION: Chest Pain  COMPARISON: None.      Impression    IMPRESSION: Possible mild cardiac enlargement with mild pulmonary venous congestion and/or enlargement of the central pulmonary arteries not excluded. Probable mild bibasilar pleural fluid and atelectasis. Chest not well evaluated radiographically   secondary to the markedly  shallow inspiration/low lung volumes and large patient body habitus.   US Lower Extremity Venous Duplex Bilateral   Result Value    Radiologist flags Deep vein thrombosis (AA)    Narrative    EXAM: US LOWER EXTREMITY VENOUS DUPLEX BILATERAL  LOCATION: [facility]  DATE: 9/16/2024    INDICATION: Swelling.   COMPARISON: None.  TECHNIQUE: Venous Duplex ultrasound of bilateral lower extremities with and without compression, augmentation and duplex. Color flow and spectral Doppler with waveform analysis performed.    FINDINGS: Exam includes the common femoral, femoral, popliteal veins as well as segmentally visualized deep calf veins and greater saphenous vein.     RIGHT: No deep vein thrombosis. No superficial thrombophlebitis. No popliteal cyst.    LEFT: Occlusive deep vein thrombosis within the upper to mid femoral vein. No additional deep vein thrombosis.  No superficial thrombophlebitis. No popliteal cyst.      Impression    IMPRESSION:    1.  Acute occlusive deep vein thrombosis within the upper to mid left femoral vein.     2.  No evidence of deep vein thrombosis within the right lower extremity.     [Critical Result: Deep vein thrombosis ]    Finding was identified on 9/16/2024 3:28 PM CDT.     Dr. Cee was contacted by me on 9/16/2024 3:30 PM CDT and verbalized understanding of the critical result.   US Renal Complete Non-Vascular    Narrative    EXAM: US RENAL COMPLETE NON-VASCULAR  LOCATION: [facility]  DATE: 9/16/2024    INDICATION: Acute renal failure.  COMPARISON: CT abdomen and pelvis, 2/18/2015.  TECHNIQUE: Routine Bilateral Renal and Bladder Ultrasound.    FINDINGS:    RIGHT KIDNEY: 9.9 x 5.1 x 4.8 cm. Normal without hydronephrosis or masses.     LEFT KIDNEY: 10.9 x 5.4 x 6.3 cm. Normal without hydronephrosis or masses.     BLADDER: Empty.    Incidental note is made of a 3.5 cm cyst involving the right hepatic lobe with internal septations. This is likely  benign however as it was present on October 2015 CT scan.      Impression    IMPRESSION: Sonographically normal-appearing kidneys. There is no hydronephrosis.   XR Abdomen Port 1 View    Narrative    EXAM: XR ABDOMEN PORT 1 VIEW  LOCATION: [facility]  DATE: 9/16/2024    INDICATION: OG placement  COMPARISON: CT 2/18/2015      Impression    IMPRESSION: Enteric drainage tube tip projecting at the level of the distal stomach.   XR Chest Port 1 View    Narrative    EXAM: XR CHEST PORT 1 VIEW  LOCATION: [facility]  DATE: 9/16/2024 9:27 PM    INDICATION: ETT verification and OG verification  COMPARISON: Chest radiograph 9/16/2024 11:50 AM.       Impression    IMPRESSION:     Endotracheal tube tip is 3.7 cm above the wilder. Gastric drainage tube descends below the diaphragm and out of the field-of-view.    Mild cardiomegaly with central pulmonary vascular congestion, interstitial edema, and small bilateral pleural effusions appear similar to the recent exam. Bibasilar opacities also appear similar and likely reflect atelectasis. No pneumothorax.

## 2024-09-17 NOTE — PROGRESS NOTES
"BP (!) 81/39   Pulse 81   Temp 97.5  F (36.4  C) (Oral)   Resp 28   Ht 1.575 m (5' 2\")   Wt 139.3 kg (307 lb)   SpO2 94%   BMI 56.15 kg/m        The PT was intubated secondary to persistent AMS, hypercapnia refractory to BIPAP (despite escalating AVAPS settings) and worsening hypoxia.I assisted the MDA/CRNA with the intubation as directed. Initial vent settings per ICU-TELE VO (changed RR from 28 to 25 and increased PEEP from 8 to 10). ETT was secured at 22/LIP (good position per MDR read).     In the context of a PEEP of 10, PTL pressures are running 18 to 20. Peak pressures, however, have risen into the low 30s, though this (so far) has resolved to the mid 20's with suctioning (large amounts of thick yellow/brown secretions with a few small plugs of old blood that benefited from a lavage). Initial I-PEEP was > 3 on an I-time of 1. This resolved to < 1.5 after changing the I time to .85.    While the blood gas is improved, PT remains in failure. At the time of the draw, he PT was (and nearly always is) riding the set rate of 25. Further, it was drawn on a FIO2 of 60% and a PEEP of 10 (PF ratio of 125).    The PT was provided a 10mg Albuterol neb for hyper-K+.  " Less than 100K, no urinary symptoms. Will not plan to treat at this time.

## 2024-09-17 NOTE — SIGNIFICANT EVENT
Significant Event Note    Time of event: 9:17 PM September 16, 2024    Description of event:  Acute hypoxemic respiratory failure     Plan:  Emergent intubation done with Anesthesia help  Requested patient to be placed on AC, , Rate of 25, Peep 10, FiO2 1.0  Sedation ordered with fentanyl and dexmedetomidine  End tidal CO2 monitoring requested  ABG to be done in one hour   NE increased to 0.08mcg/kg/min  Lactic acid   And prefer Echo to be done as soon as possible  Discussed with: bedside nurse. Will discuss with hospitalist    Adalberto Toney MD

## 2024-09-17 NOTE — PROGRESS NOTES
"Cross cover - Wants PICC line consent    I was able to talk to Vito -   We discussed the current situation and the need for PICC line    PICC line was explained to him, and why current PIV lines are not appropriate. This will be performed by a PICC nurse at the bedside. We talked including  but not limited to risks/complications of PICC insertion such as bleeding, clots, and infection.     PICC procedure will be explained by the PICC nurse     Vito expressed  -  that he understood the need for PICC  and \"do what you need to do\"    While he was on the phone, I did update him of wife's condition. He was frustrated that he was not made aware at the time of her condition/deterioration including the need for intubation. We extend apology for this.    He was also informed that she is getting medications to raise the BP, getting antibiotics, being treated for renal failure, and being cared by the ICU team.     I also told him that she is getting blood thinner as she was found to have  leg blood clots \"well, Im not surprised about that\"    Vito plans to see her soon.     RN informed   "

## 2024-09-17 NOTE — PROGRESS NOTES
Cross cover    S/P RRT    Currently, sedated (opens eyes on verbal stimulation), intubated on 60% FIO2,  comfortable/not restless. BP 130s, HR of 69 and sinus    Recent K of 6.9 -- Not treated yet per RN    Post intubation earlier, no significant secretions. Not clammy/diaphoretic    Per report, was intubated given BiPAP 100% and was sleepy (BMI of 56-57)     Plans   - Hyperkalemia order set   - restraints, non violent     Patient was placed in restraints after attempting to disrupt lines, tubes or dressings.  Less restrictive measures were ineffective at preventing such disruption.  See flowsheet for details about provider(s) involved, less restrictive measures attempted, patient and family education, discontinuation criteria and restraint type.

## 2024-09-17 NOTE — PLAN OF CARE
Goal Outcome Evaluation:                 Outcome Evaluation: Anticipate DC home with services vs TCU pending progression.  Family transport.

## 2024-09-17 NOTE — CONSULTS
RENAL CONSULT NOTE    REQUESTING PHYSICIAN: Dr. Minor     REASON FOR CONSULT: VIGNESH     ASSESSMENT/PLAN:    VIGNESH - Appears to have baseline normal renal function with Cr 0.8-0.9 as recently as 5/2024. Now VIGNESH with Cr up to 2.8 in the setting of new onset CHF with possible cardiorenal syndrome and hemodynamic injury with hypotension requiring pressors in the setting of chronic ARB use and hypoxia. UA is active appearing with + hyaline casts suggesting underperfusion injury. Labs showing serial improvement in renal function with improved oxygenation and hemodynamics. Would continue supportive management at this time. Nephrology will continue to follow.     Acute hypoxic/hypercapnic respiratory failure - CXR suggesting volume overload and also concern for possible pulmonary embolism with cor pulmonale and new DVT; significant hypercapnia and ultimately required intubation with improvement in ABG; on empiric doxycycline, Vancomycin, and ceftriaxone and planning for VQ scan     Hyperkalemia - severe, s/p shifting measures and Lokelma and improving; holding PTA ARB     Cor pulmonale - Significant volume overload with evidence of RV failure on TTE; LVEF >65%. Given evidence of LLE DVT concern for possible PE causing right heart strain vs chronic RV failure from untreated LORA and pulmonary hypertension. Starting on Lasix drip at 10 ml/hour and will plan BMP's Q6 hours.     Septic shock - On broad spectrum antibiotics. Pancultures are pending. Does have bilateral LE cellulitis and dirty urine. Titrating pressors with need for diuresis.     LLE DVT - On heparin drip     HPI: Noelle Gomez is a 58 yo F with PMH significant for hypertension and infrequent medical care who was admitted with chief complaint of shortness of breath and was in significant respiratory distress upon arrival to the ED. Started on BiPAP but developed worsening mentation requiring emergent intubation and admission to ICU overnight. CXR significant for  volume overload with pulmonary edema and pleural effusions with markedly elevated BNP despite no history of CHF and was given two doses of IV Lasix. Labs also notable for severe hyperkalemia of 7.3 and VIGNESH with Cr up to 2.8 from baseline 0.8-0.9 and nephrology was consulted for management of VIGNESH.     Patient intubated and sedated during evaluation. RN at bedside who assists with some history and history otherwise obtained through chart review. No family at bedside. She has significant lower extremity edema and changes consistent with chronic venous stasis as well as what appears to be LE cellulitis. Documented that patient had stopped taking her PTA Lasix because it was causing her to urinate too much. Uncertain if she had been using NSAIDs at home. Discussed case directly with Dr. Rivera.     REVIEW OF SYSTEMS:  Comprehensive ROS negative except per HPI     ALLERGIES/SENSITIVITIES:  Allergies   Allergen Reactions    Sulfa Antibiotics Unknown, Itching and Rash     Social History     Tobacco Use    Smoking status: Former     Types: Cigarettes    Smokeless tobacco: Never             PHYSICAL EXAM:  Physical Exam   Temp: 98.2  F (36.8  C) Temp src: Axillary BP: (!) 141/71 Pulse: 73   Resp: 25 SpO2: 94 % O2 Device: Mechanical Ventilator Oxygen Delivery: 4 LPM  Vitals:    09/16/24 0934   Weight: 139.3 kg (307 lb)     Vital Signs with Ranges  Temp:  [97.4  F (36.3  C)-98.6  F (37  C)] 98.2  F (36.8  C)  Pulse:  [65-99] 73  Resp:  [16-39] 25  BP: ()/(39-83) 141/71  FiO2 (%):  [35 %-70 %] 50 %  SpO2:  [78 %-100 %] 94 %  I/O last 3 completed shifts:  In: 1463.09 [I.V.:1183.09; NG/GT:280]  Out: 1075 [Urine:1075]    @TMAXR(24)@    Patient Vitals for the past 72 hrs:   Weight   09/16/24 0934 139.3 kg (307 lb)       General - Intubated, sedated   Neck - Neck thick, difficult to assess for JVD   Respiratory - Intubated, FiO2 95%   Cardiovascular - AP RRR with out murmur, BP 90's off pressors   Abdomen - soft, BS present    Extremities - +++ bilateral LE edema with chronic venous stasis changes   Integumentary - + bilateral LE erythema   Neurologic - sedated   Psych:  sedated   :  + Miller, ~700 ml UO so far today     Laboratory:     Recent Labs   Lab 09/17/24  0414 09/16/24  1712 09/16/24  0956   WBC 12.9* 14.6* 14.7*   RBC 3.66* 3.72* 4.07   HGB 13.7 14.1 15.4   HCT 42.9 45.1 48.9*   * 105* 150       Basic Metabolic Panel:  Recent Labs   Lab 09/17/24  0958 09/17/24  0843 09/17/24  0741 09/17/24  0710 09/17/24  0705 09/17/24  0535 09/17/24  0414 09/17/24  0410 09/17/24  0210 09/17/24  0158 09/16/24  2348 09/16/24  2343 09/16/24  2235 09/16/24  2051 09/16/24  1318 09/16/24  1259 09/16/24  0956   NA  --   --   --   --   --   --  139  --   --   --   --  140  --  135  --  137 139   POTASSIUM  --  5.6*  --   --  5.7*  --  6.3*  --   --  6.0*  --  6.2*  6.2*  --  6.9*   < > 7.2* 7.3*   CHLORIDE  --   --   --   --   --   --  99  --   --   --   --  99  --  98  --  99 97*   CO2  --   --   --   --   --   --  29  --   --   --   --  29  --  30*  --  24 28   BUN  --   --   --   --   --   --  98.1*  --   --   --   --  99.2*  --  105.0*  --  95.9* 94.8*   CR  --   --   --   --   --   --  2.46*  --   --   --   --  2.54*  --  2.60*  --  2.68* 2.75*   *  --  181* 214*  --  135* 141* 136*   < >  --    < > 169*   < > 117*   < > 102* 129*   BEN  --   --   --   --   --   --  8.4*  --   --   --   --  8.5*  --  8.9  --  8.4* 8.9    < > = values in this interval not displayed.       INR  Recent Labs   Lab 09/16/24  0956   INR 1.46*       Recent Labs   Lab Test 09/17/24  0843 09/17/24  0705 09/17/24  0414 09/17/24  0158 09/16/24  2343   POTASSIUM 5.6* 5.7* 6.3*   < > 6.2*  6.2*   CHLORIDE  --   --  99  --  99   BUN  --   --  98.1*  --  99.2*    < > = values in this interval not displayed.      Recent Labs   Lab Test 09/17/24  0414 09/16/24  1454 09/16/24  0956   ALBUMIN 2.9*  --  3.5   BILITOTAL 0.7  --  1.1   ALT 13  --  18   AST 20  --  39    PROTEIN  --  50*  --        Personally reviewed today's laboratory studies      Thank you for involving us in the care of this patient. We will continue to follow along with you.    Angelina Brink PA-C   [facility]  [phone]

## 2024-09-17 NOTE — PROGRESS NOTES
Patient follow-up VBG shows pH is only 7.19 patient noted somnolence on bedside evaluation and discussion with intensivist plan is to intubate her.   -Anesthesia called for intubation and will provide sedation subsequently with ICU cares ongoing.  -Also discussed with the patient's son Scar over the phone call to give update.  Her lactate has improved to 1 consistently, potassium is pending after Lokelma.  -Anticipation is that with proper ventilation with mechanical ventilation and sedation-can blow out some CO2 to correct her acidemia although there is metabolic acidemia from renal failure as well and nephrology has been consulted, she may need dialysis depending on her renal function curve since her urine output has not been great today despite elevated dose of Lasix.  Maintain on antibiotics and Levophed for sepsis treatment  On heparin drip for DVT treatment  Emotional compassionate support was provided

## 2024-09-17 NOTE — CONSULTS
Critical Care  Note      09/16/2024    Name: Noelle Gomez MRN#: [id number]   Age: 59 year old YOB: 1965     [facility] Day# 0  ICU DAY #    MV DAY #      This is a TeleICU consult          Problem List:   Principal Problem:    Acute respiratory failure with hypoxia and hypercapnia (H)  Active Problems:    Asthma    Bilateral lower extremity edema    Former smoker    HTN (hypertension)    Prediabetes    Venous stasis dermatitis of both lower extremities    VIGNESH (acute kidney injury) (H24)    Uremia    Demand ischemia of myocardium (H)    Hyperkalemia    Acute decompensated heart failure (H)    Obesity    Acute renal failure, unspecified acute renal failure type (H24)    Hemorrhoids    Pneumonia    Acute deep vein thrombosis (DVT) of femoral vein of left lower extremity (H)    Sepsis (H)           Summary/Hospital Course:           Assessment and plan :     Noelle Gomez IS a 59 year old female admitted on 9/16/2024 for acute hypoxemic respiratory failure who got intubated and currently on pressors for what is believed to be demand ischemia (troponin leak although we do not have an echo).   I have personally reviewed the daily labs, imaging studies, cultures and discussed the case with referring physician and consulting physicians.   My assessment and plan by system for this patient is as follows:    Neurology/Psychiatry:   1. Intubated and sedated   2. Pain/analgesia fentanyl   3. Anxiety on dexmedetomidine  4. Delirium  Plan  Titrate to RASS of 0--1    Cardiovascular:   Septic shock with history of CHF and chronic lymphedema  1.Hemodynamics -currently on nor epinephrine and being treated for congestive heart failure  2.Rhythm - Sinus  3. Ischemia - demand ichemia with troponin leak   4. Lactate has normalized    Plan   Continue pressors to maintain MAP greater than 65  Requested Echo     Pulmonary/Ventilator Management:   Acute hypoxemic respiratory failure  Former smoker  1. Airway Intubated   2.  "Oxygenation/ventilation/mechanics  AC mode, , RR 25, Peep 10, FiO2 titrated to maintaining sats 90 or above  3. Chest xray reveals pulmonary edema  Plan  - Continue ventilator management    GI and Nutrition :   1. NPO for now  2. Start PPI  3. Enteral feedings consult and start enteral feeds  Plan  -Nutrition consult  - start PPI in view of intubation    Renal/Fluids/Electrolytes:   1. Acute kidney injury , creatinine 2.6  2. Electrolyte abnormality hyperkalemia   3. Acid/base status Metabolic and respiratory acidosis  4. Volume status: Fluid overload  Plan  - \"gentle diuresis\" diuresis in the context of pulmonary edema and ongoing pressor requirements  - monitor function and electrolytes as needed with replacement per ICU protocols. - generally avoid nephrotoxic agents such as NSAID, IV contrast unless specifically required  - adjust medications as needed for renal clearance  - follow I/O's as appropriate.  - Nephrology consult   - hyperkalemia protocol to temporize potassium levels. May need dialysis   Infectious Disease:   1. Blood cultures, emperic antibiotics started by primary team  2. On rocephin and doxycyclin per primary team   3. sepsis, source control  Plan  - Continue antibiotics till cultures return     Endocrine:   1. Stress induced hyperglycemia  3.  Plan  - ICU insulin protocol, goal sugar <180    Hematology/Oncology:   1. Leukocytosis   2. Hb 14.1 no signs, symptoms of active blood loss  3. Platelets 105  4.   Plan  - Continue to monitor    Acute occlusive deep vein thrombosis within the upper to mid left femoral vein.   Should be on Heparin     IV/Access:   1. Venous access - requested PICC line  2. Arterial access - None  Plan  - central access required and necessary      ICU Prophylaxis:   1. DVT: Hep infusion for DVT /mechanical  2. VAP: HOB 30 degrees, chlorhexidine rinse  3. Stress Ulcer: PPI/H2 blocker  4. Restraints: Nonviolent soft two point restraints required and necessary " for patient safety and continued cares and good effect as patient continues to pull at necessary lines, tubes despite education and distraction. Will readdress daily.   5. Wound care  -   6. Feeding - NPO for now  7. Family Update:Not done but spoke to hospitalist  8. Disposition - ICU        Key goals for next 24 hours:   Echo   2. Nephrology consult, and monitor potassium  3. Await culture results   4. Nutrition consult               Medical History:     Past Medical History:   Diagnosis Date    Asthma 08/14/2023    Bilateral lower extremity edema 07/14/2021    Former smoker 06/23/2021    HTN (hypertension) 07/14/2021    Lymphedema 02/08/2022    Prediabetes 06/24/2021    Seasonal allergies 07/14/2021    UPJ (ureteropelvic junction) obstruction 03/04/2015    Vitamin D deficiency 06/23/2021     Past Surgical History:   Procedure Laterality Date    MIDLINE DOUBLE LUMEN PLACEMENT  9/16/2024     Social History     Socioeconomic History    Marital status:      Spouse name: Not on file    Number of children: Not on file    Years of education: Not on file    Highest education level: Not on file   Occupational History    Not on file   Tobacco Use    Smoking status: Former     Types: Cigarettes    Smokeless tobacco: Never   Substance and Sexual Activity    Alcohol use: Not on file    Drug use: Not on file    Sexual activity: Not on file   Other Topics Concern    Not on file   Social History Narrative    Not on file     Social Determinants of Health     Financial Resource Strain: High Risk (12/31/2021)    Received from [facility] [employer] [facility], [facility]    Financial Resource Strain     Difficulty of Paying Living Expenses: Not on file     Difficulty of Paying Living Expenses: Not on file   Food Insecurity: Not on file   Transportation Needs: Not on file   Physical Activity: Not on file   Stress: Not on file   Social Connections: Unknown (12/31/2021)    Received  from [facility], [facility]    Social Connections     Frequency of Communication with Friends and Family: Not on file   Interpersonal Safety: Not on file   Housing Stability: Not on file        Allergies   Allergen Reactions    Sulfa Antibiotics Unknown, Itching and Rash              Key Medications:     Current Facility-Administered Medications   Medication Dose Route Frequency Provider Last Rate Last Admin    albuterol (PROVENTIL) neb solution 10 mg  10 mg Nebulization Once Adalberto Toney MD        cefTRIAXone (ROCEPHIN) 2 g vial to attach to  ml bag for ADULTS or NS 50 ml bag for PEDS  2 g Intravenous Q24H Jack Minor MD   2 g at 09/16/24 1609    chlorhexidine (PERIDEX) 0.12 % solution 15 mL  15 mL Mouth/Throat Q12H Adalberto Toney MD        dextrose 10% BOLUS 300 mL  300 mL Intravenous Once Adalberto Toney MD        dextrose 50 % injection 25 g  25 g Intravenous Once Adalberto Toney MD        doxycycline (VIBRAMYCIN) 100 mg vial to attach to  mL bag  100 mg Intravenous Q12H Jack Minor MD   100 mg at 09/16/24 1759    furosemide (LASIX) injection 40 mg  40 mg Intravenous Once Adalberto Toney MD        insulin aspart (NovoLOG) injection (RAPID ACTING)  1-3 Units Subcutaneous TID AC Jack Minor MD        insulin aspart (NovoLOG) injection (RAPID ACTING)  1-3 Units Subcutaneous At Bedtime Jack Minor MD        insulin regular 1 unit/mL injection 10 Units  10 Units Intravenous Once Adalberto Toney MD        [START ON 9/17/2024] pantoprazole (PROTONIX) 2 mg/mL suspension 40 mg  40 mg Per Feeding Tube QAM AC Adalberto Toney MD        Or    [START ON 9/17/2024] pantoprazole (PROTONIX) IV push injection 40 mg  40 mg Intravenous QAM AC Adalberto Toney MD        phenylephrine (KIMBER-SYNEPHRINE) 100 mcg/mL injection             senna-docusate (SENOKOT-S/PERICOLACE) 8.6-50 MG per tablet 1 tablet  1 tablet  Oral BID Jack Minor MD        Or    senna-docusate (SENOKOT-S/PERICOLACE) 8.6-50 MG per tablet 2 tablet  2 tablet Oral BID Jack Minor MD        sodium chloride (PF) 0.9% PF flush 10 mL  10 mL Intracatheter Q8H Jack Minor MD   10 mL at 09/16/24 1807    sodium chloride (PF) 0.9% PF flush 3 mL  3 mL Intracatheter Q8H Jack Minor MD   3 mL at 09/16/24 1808     Current Facility-Administered Medications   Medication Dose Route Frequency Provider Last Rate Last Admin    dexmedeTOMIDine (PRECEDEX) 4 mcg/mL in sodium chloride 0.9 % 100 mL infusion  0.1-1.2 mcg/kg/hr Intravenous Continuous Adalberto Toney MD 10.4 mL/hr at 09/16/24 2145 0.3 mcg/kg/hr at 09/16/24 2145    fentaNYL (SUBLIMAZE) infusion   mcg/hr Intravenous Continuous Adalberto Toney MD 1.5 mL/hr at 09/16/24 2140 75 mcg/hr at 09/16/24 2140    heparin 25,000 units in 0.45% NaCl 250 mL ANTICOAGULANT infusion  0-5,000 Units/hr Intravenous Continuous Jack Minor MD 18 mL/hr at 09/16/24 1802 1,800 Units/hr at 09/16/24 1802    norepinephrine (LEVOPHED) 4 mg in  mL infusion PREMIX  0.01-0.6 mcg/kg/min Intravenous Continuous Jack Minor MD 26.1 mL/hr at 09/16/24 2040 0.05 mcg/kg/min at 09/16/24 2040        Home Meds  Current Facility-Administered Medications   Medication Dose Route Frequency Provider Last Rate Last Admin    albuterol (PROVENTIL) neb solution 10 mg  10 mg Nebulization Once Adalberto Toney MD        alum & mag hydroxide-simethicone (MAALOX) suspension 30 mL  30 mL Oral Q4H PRN Jack Minor MD        benzocaine-menthol (CEPACOL) 15-3.6 MG lozenge 1 lozenge  1 lozenge Buccal Q1H PRN Jack Minor MD        calcium carbonate (TUMS) chewable tablet 1,000 mg  1,000 mg Oral 4x Daily PRN Jack Minor MD        cefTRIAXone (ROCEPHIN) 2 g vial to attach to  ml bag for ADULTS or NS 50 ml bag for PEDS  2 g Intravenous Q24H Jack Minor MD   2 g at 09/16/24 0294    chlorhexidine (PERIDEX) 0.12 % solution  15 mL  15 mL Mouth/Throat Q12H Adalberto Toney MD        dexmedeTOMIDine (PRECEDEX) 4 mcg/mL in sodium chloride 0.9 % 100 mL infusion  0.1-1.2 mcg/kg/hr Intravenous Continuous Adalberto Toney MD 10.4 mL/hr at 09/16/24 2145 0.3 mcg/kg/hr at 09/16/24 2145    dextrose 10% BOLUS 300 mL  300 mL Intravenous Once Adalberto Toney MD        dextrose 50 % injection 25 g  25 g Intravenous Once Adalberto Toney MD        glucose gel 15-30 g  15-30 g Oral Q15 Min PRN Jack Minor MD        Or    dextrose 50 % injection 25-50 mL  25-50 mL Intravenous Q15 Min PRN Jack Minor MD        Or    glucagon injection 1 mg  1 mg Subcutaneous Q15 Min PRN Jack Minor MD        glucose gel 15-30 g  15-30 g Oral Q15 Min PRN Adalberto Toney MD        Or    dextrose 50 % injection 25-50 mL  25-50 mL Intravenous Q15 Min PRN Adalberto Toney MD        Or    glucagon injection 1 mg  1 mg Subcutaneous Q15 Min PRN Adalberto Toney MD        doxycycline (VIBRAMYCIN) 100 mg vial to attach to  mL bag  100 mg Intravenous Q12H Jack Minor MD   100 mg at 09/16/24 1759    fentaNYL (SUBLIMAZE) infusion   mcg/hr Intravenous Continuous Adalberto Toney MD 1.5 mL/hr at 09/16/24 2140 75 mcg/hr at 09/16/24 2140    furosemide (LASIX) injection 40 mg  40 mg Intravenous Once Adalberto Toney MD        guaiFENesin (ROBITUSSIN) 20 mg/mL solution 200 mg  200 mg Oral Q4H PRN Jack Minor MD        heparin 25,000 units in 0.45% NaCl 250 mL ANTICOAGULANT infusion  0-5,000 Units/hr Intravenous Continuous Jack Minor MD 18 mL/hr at 09/16/24 1802 1,800 Units/hr at 09/16/24 1802    insulin aspart (NovoLOG) injection (RAPID ACTING)  1-3 Units Subcutaneous TID AC Jack Minor MD        insulin aspart (NovoLOG) injection (RAPID ACTING)  1-3 Units Subcutaneous At Bedtime Jack Minor MD        insulin regular 1 unit/mL injection 10 Units  10 Units Intravenous Once Adalberto Toney MD        lidocaine (LMX4) cream    Topical Q1H PRN Jack Minor MD        lidocaine (LMX4) cream   Topical Q1H PRN Jack Minor MD        lidocaine (LMX4) cream   Topical Q1H PRN Jack Minor MD        lidocaine 1 % 0.1-1 mL  0.1-1 mL Other Q1H PRN Jack Minor MD   2.5 mL at 09/16/24 1800    lidocaine 1 % 0.1-1 mL  0.1-1 mL Other Q1H PRN Jack Minor MD        lidocaine 1 % 0.1-5 mL  0.1-5 mL Other Q1H PRN Jack Minor MD        melatonin tablet 5 mg  5 mg Oral At Bedtime PRN Jack Minor MD        menthol-zinc oxide (CALMOSEPTINE) 0.44-20.6 % ointment OINT   Topical 4x Daily PRN Jack Minor MD        miconazole (MICATIN) 2 % powder   Topical BID PRN Jack Minor MD        norepinephrine (LEVOPHED) 4 mg in  mL infusion PREMIX  0.01-0.6 mcg/kg/min Intravenous Continuous Jack Minor MD 26.1 mL/hr at 09/16/24 2040 0.05 mcg/kg/min at 09/16/24 2040    ondansetron (ZOFRAN ODT) ODT tab 4 mg  4 mg Oral Q6H PRN Jack Minor MD        Or    ondansetron (ZOFRAN) injection 4 mg  4 mg Intravenous Q6H PRN Jack Minor MD        [START ON 9/17/2024] pantoprazole (PROTONIX) 2 mg/mL suspension 40 mg  40 mg Per Feeding Tube QAM Adalberto Galvan MD        Or    [START ON 9/17/2024] pantoprazole (PROTONIX) IV push injection 40 mg  40 mg Intravenous QA Adalberto Galvan MD        phenylephrine (KIMBER-SYNEPHRINE) 100 mcg/mL injection             polyethylene glycol (MIRALAX) Packet 17 g  17 g Oral BID PRN Jack Minor MD        prochlorperazine (COMPAZINE) injection 10 mg  10 mg Intravenous Q6H PRN Jack Minor MD        Or    prochlorperazine (COMPAZINE) tablet 10 mg  10 mg Oral Q6H PRN Jack Minor MD        Or    prochlorperazine (COMPAZINE) suppository 25 mg  25 mg Rectal Q12H PRN Jack Minor MD senna-docusate (SENOKOT-S/PERICOLACE) 8.6-50 MG per tablet 1 tablet  1 tablet Oral BID PRN Jack Minor MD        Or    senna-docusate (SENOKOT-S/PERICOLACE) 8.6-50 MG per tablet 2 tablet  2 tablet  Oral BID PRN Jack Minor MD        senshyanne-docusate (SENOKOT-S/PERICOLACE) 8.6-50 MG per tablet 1 tablet  1 tablet Oral BID Jack Minor MD        Or    senna-docusate (SENOKOT-S/PERICOLACE) 8.6-50 MG per tablet 2 tablet  2 tablet Oral BID Jack Minor MD        sodium chloride (PF) 0.9% PF flush 10 mL  10 mL Intracatheter Q8H Jack Minor MD   10 mL at 09/16/24 1807    sodium chloride (PF) 0.9% PF flush 10-20 mL  10-20 mL Intracatheter q1 min prn Jack Minor MD   10 mL at 09/16/24 1806    sodium chloride (PF) 0.9% PF flush 10-40 mL  10-40 mL Intracatheter Once PRN Jack Minor MD        sodium chloride (PF) 0.9% PF flush 3 mL  3 mL Intracatheter Q8H Jack Minor MD   3 mL at 09/16/24 1808    sodium chloride (PF) 0.9% PF flush 3 mL  3 mL Intracatheter q1 min prn Jack Minor MD                  Physical Examination:   Temp:  [97.5  F (36.4  C)-98.8  F (37.1  C)] 97.5  F (36.4  C)  Pulse:  [] 81  Resp:  [16-45] 28  BP: ()/(39-88) 81/39  FiO2 (%):  [6 %-55 %] 55 %  SpO2:  [78 %-100 %] 94 %    Intake/Output Summary (Last 24 hours) at 9/16/2024 2148  Last data filed at 9/16/2024 1900  Gross per 24 hour   Intake 505 ml   Output --   Net 505 ml     Wt Readings from Last 4 Encounters:   09/16/24 139.3 kg (307 lb)   10/09/23 127.5 kg (281 lb)   10/02/23 129.6 kg (285 lb 11.2 oz)     BP - Mean:  [] 56  FiO2 (%): 55 %, Resp: 28  Recent Labs   Lab 09/16/24 2051 09/16/24  1712 09/16/24  1318   PH  --   --  7.16*   PCO2  --   --  80*   PO2  --   --  63*   HCO3  --   --  29*   O2PER 100 50 35     Examination deferred since patient is managed through TeleICU       Data:   All data and imaging reviewed     ROUTINE ICU LABS (Last four results)  CMP  Recent Labs   Lab 09/16/24  2051 09/16/24  1808 09/16/24  1712 09/16/24  1629 09/16/24  1559 09/16/24  1522 09/16/24  1431 09/16/24  1318 09/16/24  1259 09/16/24  0956     --   --   --   --   --   --   --  137 139   POTASSIUM 6.9*  --   "6.9*  --   --   --  7.3* 6.5* 7.2* 7.3*   CHLORIDE 98  --   --   --   --   --   --   --  99 97*   CO2 30*  --   --   --   --   --   --   --  24 28   ANIONGAP 7  --   --   --   --   --   --   --  14 14   * 102*  --  94 102*   < >  --   --  102* 129*   .0*  --   --   --   --   --   --   --  95.9* 94.8*   CR 2.60*  --   --   --   --   --   --   --  2.68* 2.75*   GFRESTIMATED 21*  --   --   --   --   --   --   --  20* 19*   BEN 8.9  --   --   --   --   --   --   --  8.4* 8.9   MAG  --   --   --   --   --   --   --   --   --  2.9*   PROTTOTAL  --   --   --   --   --   --   --   --   --  7.6   ALBUMIN  --   --   --   --   --   --   --   --   --  3.5   BILITOTAL  --   --   --   --   --   --   --   --   --  1.1   ALKPHOS  --   --   --   --   --   --   --   --   --  102   AST  --   --   --   --   --   --   --   --   --  39   ALT  --   --   --   --   --   --   --   --   --  18    < > = values in this interval not displayed.     CBC  Recent Labs   Lab 09/16/24 1712 09/16/24 0956   WBC 14.6* 14.7*   RBC 3.72* 4.07   HGB 14.1 15.4   HCT 45.1 48.9*   * 120*   MCH 37.9* 37.8*   MCHC 31.3* 31.5   RDW 17.2* 17.2*   * 150     INR  Recent Labs   Lab 09/16/24  0956   INR 1.46*     Arterial Blood Gas  Recent Labs   Lab 09/16/24 2051 09/16/24 1712 09/16/24  1318   PH  --   --  7.16*   PCO2  --   --  80*   PO2  --   --  63*   HCO3  --   --  29*   O2PER 100 50 35       All cultures:  No results for input(s): \"CULT\" in the last 168 hours.  Recent Results (from the past 24 hour(s))   XR Chest Port 1 View    Narrative    EXAM: XR CHEST PORT 1 VIEW  LOCATION: [facility]  DATE: 9/16/2024    INDICATION: Chest Pain  COMPARISON: None.      Impression    IMPRESSION: Possible mild cardiac enlargement with mild pulmonary venous congestion and/or enlargement of the central pulmonary arteries not excluded. Probable mild bibasilar pleural fluid and atelectasis. Chest not well evaluated " radiographically   secondary to the markedly shallow inspiration/low lung volumes and large patient body habitus.   US Lower Extremity Venous Duplex Bilateral   Result Value    Radiologist flags Deep vein thrombosis (AA)    Narrative    EXAM: US LOWER EXTREMITY VENOUS DUPLEX BILATERAL  LOCATION: [facility]  DATE: 9/16/2024    INDICATION: Swelling.   COMPARISON: None.  TECHNIQUE: Venous Duplex ultrasound of bilateral lower extremities with and without compression, augmentation and duplex. Color flow and spectral Doppler with waveform analysis performed.    FINDINGS: Exam includes the common femoral, femoral, popliteal veins as well as segmentally visualized deep calf veins and greater saphenous vein.     RIGHT: No deep vein thrombosis. No superficial thrombophlebitis. No popliteal cyst.    LEFT: Occlusive deep vein thrombosis within the upper to mid femoral vein. No additional deep vein thrombosis.  No superficial thrombophlebitis. No popliteal cyst.      Impression    IMPRESSION:    1.  Acute occlusive deep vein thrombosis within the upper to mid left femoral vein.     2.  No evidence of deep vein thrombosis within the right lower extremity.     [Critical Result: Deep vein thrombosis ]    Finding was identified on 9/16/2024 3:28 PM CDT.     Dr. Cee was contacted by me on 9/16/2024 3:30 PM CDT and verbalized understanding of the critical result.   US Renal Complete Non-Vascular    Narrative    EXAM: US RENAL COMPLETE NON-VASCULAR  LOCATION: [facility]  DATE: 9/16/2024    INDICATION: Acute renal failure.  COMPARISON: CT abdomen and pelvis, 2/18/2015.  TECHNIQUE: Routine Bilateral Renal and Bladder Ultrasound.    FINDINGS:    RIGHT KIDNEY: 9.9 x 5.1 x 4.8 cm. Normal without hydronephrosis or masses.     LEFT KIDNEY: 10.9 x 5.4 x 6.3 cm. Normal without hydronephrosis or masses.     BLADDER: Empty.    Incidental note is made of a 3.5 cm cyst involving the right hepatic  lobe with internal septations. This is likely benign however as it was present on October 2015 CT scan.      Impression    IMPRESSION: Sonographically normal-appearing kidneys. There is no hydronephrosis.         Billing: This patient is critically ill: Yes. Total critical care time today 56 min.

## 2024-09-17 NOTE — PROGRESS NOTES
ICU UPDATE:  Spoke with Dr. Fong regarding Ms. Gomez's TTE which demonstrates Cor Pulmonale with Mean PA pressures in the high 50's range. Etiology unclear to ascertain and given the findings of a LE DVT, certainly raises concerns for a pulmonary embolism leading to her acute decompensation.  As noted on my earlier consult, she appears to have no left heart disease and likely has pulmonary hypertension at least in part be contributed to by LORA/OHS.  I did speak with Dr. Lambert at [facility] who was able to review the images and agreed with Dr. Fong's and my concerns.  He also agreed with the plan we have in place presently namely:  Obtain VQ scan to rule out large filling defects.  We are unable to give her IV contrast presently due to her renal dysfunction, however, if we do find multiple mismatch defects on the VQ scan, would have a low threshold to perform a CT PE study and discuss the need for thrombectomy.  Check factor V Leiden levels.  Check BRIELLE, ANCA.  Continue diuresis with inotropic support.      We will review her VQ scan and determine how to proceed next.    Total critical care time 30 minutes.    Karol Rivera MD  Pulmonary and Critical Care  [facility]

## 2024-09-17 NOTE — PHARMACY-ADMISSION MEDICATION HISTORY
Admission medication history completed at [facility]. Please see Pharmacist Admission Medication History note from 9/16/2024.

## 2024-09-17 NOTE — PROGRESS NOTES
"Patient has had a busy day so far.  PICC line placed and midline removed. Echocardiogram completed. Lasix drip started. Nuc med VQ scan completed. Orders to initiate tube feeding received, but not initiated at this time due to possibility of transfer. MD asked to defer bathing at this time due to patient instability. Levophed has been off and on throughout the day. Attempted to wean down fentanyl drip, but patient awakes and is agitated wanting \"something\", pointing and obviously frustrated.    While laying down for scan patient face became very red and dusky around mouth and eyes.  However, O2 sat remained stable on monitor.       at bedside most of day.     Platelets dropped below 100 and Dr Rivera notified per heparin protocol order.  "

## 2024-09-17 NOTE — PROGRESS NOTES
Called by Nurse to assess the patient    S: Patient is responsive to call but slower than usual according to the nurse  Patient is being treated for a Pneumonia and is currently on EVAP, at 50%   Patient is also being started on NE to maintain MAPs at 65 mmg Hg    Plan: We have changed settings on EVAP to increase tidal volume to 500,  and rr to 25.   The overall minute ventilation has increased from 9 to 12 l/m.  Repeat VBG in half an hour and check lactates.   Concern for acute respiratory failure that may require intubation if CO2 levels do not respond to the changes.

## 2024-09-17 NOTE — PLAN OF CARE
Patient started on heparin drip.  (Concern about starting heparin with evidence of petechiae was voiced to hospitalist prior to start. ) Antibiotics infusing.  Asked for central line, order received for midline. Miller placed.  Multiple skin issues/wounds noted.    Problem: Comorbidity Management  Goal: Maintenance of Asthma Control  Outcome: Progressing     Problem: Gas Exchange Impaired  Goal: Optimal Gas Exchange  Outcome: Progressing  Intervention: Optimize Oxygenation and Ventilation  Recent Flowsheet Documentation  Taken 9/16/2024 3275 by Demetra King, RN  Head of Bed (HOB) Positioning: HOB at 30-45 degrees     Problem: Acute Kidney Injury/Impairment  Goal: Improved Oral Intake  Outcome: Progressing  Goal: Effective Renal Function  Outcome: Progressing     Problem: Heart Failure  Goal: Optimal Coping  Outcome: Progressing  Goal: Optimal Cardiac Output  Outcome: Progressing  Goal: Stable Heart Rate and Rhythm  Outcome: Progressing   Goal Outcome Evaluation:

## 2024-09-17 NOTE — SIGNIFICANT EVENT
RAPID RESPONSE DOCUMENTATION    Date: 09/16/24   Reason for Rapid Response: Increasing O2 requirements, decreased responsiveness  Start Time: 8:47 PM    Assessment/Plan:  Noelle Gomez is a 59 year old female who was admitted on 9/16/2024 for acute hypoxic respiratory failure. Writer arrived on scene, attending physician Dr. Jack Minro at bedside managing patient. Rapid response called due to worsening mentation despite BiPAP. Decision made to intubate. Patient discussed with attending physician, no further assistance needed per Dr. Minor.    Ochoa Calderon DO PGY-2 9/16/2024  [facility]         -IVF: none  -Monitor, Replete to K>4 and Mg>2  -Diet: NPO w/ feeds  -DVT: Lovenox, SCDs
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
## 2024-09-17 NOTE — PROGRESS NOTES
ICU UPDATE  Talked to radiology, VQ scan reveals significant BL pulmonary emboli.  Subsequently spoke with Dr. Rudolph Pleitez from interventional radiology who agrees that the patient needs an emergent thrombectomy.  We have communicated this information to the patient's spouse and son.  Patient will be transferred lights and sirens to [facility] IR suite and then will remain in the ICU at [facility].    Total time spent coordinating care 35 minutes.    Karol Rivera MD  Pulmonary and Critical Care  [facility]

## 2024-09-17 NOTE — PROGRESS NOTES
"CLINICAL NUTRITION SERVICES - ASSESSMENT NOTE     Nutrition Prescription    RECOMMENDATIONS FOR MDs/PROVIDERS TO ORDER:  None at this time     Malnutrition Status:    Does not meet criteria     Recommendations already ordered by Registered Dietitian (RD):  Due to ongoing high potassium with start Novasource renal to reduce potassium via feeding   Start Novasource renal at 20ml/hr with Prosource TF tid with 150ml FWF q 4hrs, adjust as needed.   =960kcal via TF (1200kcal with prosource), 44g protein (104g protein with prosource), 88g CHO, 48g fat, and 344ml water (1244ml fluid with flushes, adjust as needed)    Future/Additional Recommendations:  Will monitor progress towards goals     REASON FOR ASSESSMENT  Noelle Gomez is a/an 59 year old female assessed by the dietitian for Provider Order - Registered Dietitian to Assess and Order TF per Medical Nutrition Therapy Protocol    Pertinent Medical Admission/History: respiratory failure with intubation, asthma, BLE edema, HTN, preDM, venous stasis dermatitis, VIGNESH, heart failure, pneumonia , morbid obesity     NUTRITION HISTORY  Allergies: NKFA  Unable to get a nutrition hx due to intubation     CURRENT NUTRITION ORDERS  Diet: NPO    PHYSICAL FINDINGS  See malnutrition section below.  Per flowsheet:  Edema- +2 generalized   GI- WDL, has an OG  Skin- moisture groin abrasion, buttocks pressure injury, laceration on foot, leg ulceration (venous stasis dermatitis); WOC to see   Pain- not assessed due to intubation   Oral- no oral concerns noted     LABS  Labs reviewed, K-5.6 (coming down), BUN-98.1, CR-2.46    MEDICATIONS  Medications reviewed, pepcid, lasix, insulin, novolog, protonix, senokot, norepinephrine    ANTHROPOMETRICS  Height: 157.5 cm (5' 2\")  Most Recent Weight: 139.3 kg (307 lb)    IBW: 50 kg  BMI: Obesity Grade III BMI >40  Weight History:   Wt Readings from Last 10 Encounters:   09/16/24 139.3 kg (307 lb)   10/09/23 127.5 kg (281 lb)   10/02/23 129.6 kg " (285 lb 11.2 oz)       ASSESSED NUTRITION NEEDS  Dosing Weight: 50 kg  Estimated Energy Needs: 6357-6695 kcals/day (22-25kcal/kg IBW)  Justification: Super Obesity per critical care guidelines  Estimated Protein Needs: 100-125 grams protein/day (2 - 2.5 grams of pro/kg)  Justification: super obesity per critical care guidelines/may even need further protein for healing  Estimated Fluid Needs: 2L/d or adjusted by MD as needed       MALNUTRITION:  % Weight Loss:  None noted  % Intake:  No decreased intake noted  Subcutaneous Fat Loss:  None observed  Muscle Loss:  None observed  Fluid Retention:  Moderate +2 generalized     Malnutrition Diagnosis: Patient does not meet two of the above criteria necessary for diagnosing malnutrition    NUTRITION DIAGNOSIS  Inadequate protein-energy intake related to mech ventilation as evidenced by NPO status with plans for TF       INTERVENTIONS  Implementation  Due to ongoing high potassium with start Novasource renal to reduce potassium via feeding   Start Novasource renal at 20ml/hr with Prosource TF tid with 150ml FWF q 4hrs, adjust as needed.   =960kcal via TF (1200kcal with prosource), 44g protein (104g protein with prosource), 88g CHO, 48g fat, and 344ml water (1244ml fluid with flushes, adjust as needed)    Education Needs- none at this time     Goals  Total avg nutritional intake to meet a minimum of 22-25 kcal/kg and 2-2.5 g PRO/kg daily (per dosing wt 50 kg) to meet needs and promote healing   Reduce potassium     Monitoring/Evaluation  Will monitor progress towards goals

## 2024-09-17 NOTE — PROCEDURES
"PICC Line Insertion Procedure Note  Pt. Name: Noelle Gomez  MRN:        [id number]  Procedure: Insertion of a  triple Lumen  5 fr  Bard SOLO (valved) Power PICC, Lot number [id number]    Indications: vasopressors    Contraindications : midline present on R    Procedure Details   Patient identified with 2 identifiers and \"Time Out\" conducted.  .     Central line insertion bundle followed: hand hygeine performed prior to procedure, site cleansed with cholraprep, hat, mask, sterile gloves,sterile gown worn, patient draped with maximum barrier head to toe drape, sterile field maintained.    The vein was assessed and found to be compressible and of adequate size. 2 ml 1% Lidocaine administered sq to the insertion site. A 5 Fr PICC was inserted into the CEPHALIC vein of the left arm with ultrasound guidance. 1 attempt(s) required to access vein.   Catheter threaded without difficulty. Good blood return noted.    Modified Seldinger Technique used for insertion.    The 8 sharps that are included in the PICC insertion kit were accounted for and disposed of in the sharps container prior to breakdown of the sterile field.    Catheter secured with Statlock, securacath, biopatch and Tegaderm dressing applied, additional tegaderm to secure lower portion.    Findings:  Total catheter length  39 cm, with 3 cm exposed. Mid upper arm circumference is 39.5 cm. Catheter was flushed with 30 cc NS. Patient  tolerated procedure well.    Tip placement verified by 3CG technology . Tip placement in the SVC/CA junction.    CLABSI prevention brochure left at bedside.    Patient's primary RN notified PICC is ready for use.    Comments: L Cephalic vein 0.5cm, L brachials unaccessable/under artery, both less than 0.25cm. L basilic less than 0.25cm           Rita Daniel RN [facility]    "

## 2024-09-17 NOTE — PROGRESS NOTES
Received patient this AM on AC 25  Peep 10 60%. Oxygen was increased while she was receiving a procedure to 100%, never was able to decrease less than 95%. She was taken to VQ scan on LTV on same settings and tolerated well. While in scan she started coughing and was suctioned returning dark brown thick secretions. She was returned to room and placed back on same vent settings. Veletri was started oxygen was then titrated down to 50%. Breath sounds are coarse/diminished bilaterally. ETT 7.0/22 secure. Patient is being prepared for transport at this time. Report was given to RT at [facility].

## 2024-09-17 NOTE — CONSULTS
Care Management Initial Consult    General Information  Assessment completed with: Spouse or significant other,  Vito  Type of CM/SW Visit: Initial Assessment    Primary Care Provider verified and updated as needed:     Reason for Consult: discharge planning           Communication Assessment  Patient's communication style: spoken language (English or Bilingual)             Cognitive  Cognitive/Neuro/Behavioral: unchanged from my previous assessment  Level of Consciousness: lethargic  Arousal Level: arouses to pain  Orientation: oriented x 4  Mood/Behavior: calm     Speech: clear    Living Environment:   People in home: child(antonia), adult, spouse     Current living Arrangements: apartment           Family/Social Support:  Care provided by: self  Provides care for: no one  Marital Status:   Support system: , Children          Description of Support System: Supportive, Involved         Current Resources:   Patient receiving home care services: No        Community Resources: None  Equipment currently used at home: none  Supplies currently used at home: None        Does the patient's insurance plan have a 3 day qualifying hospital stay waiver?  Yes     Which insurance plan 3 day waiver is available? Alternative insurance waiver    Will the waiver be used for post-acute placement? Undetermined at this time    Lifestyle & Psychosocial Needs:  Social Determinants of Health     Food Insecurity: Not on file   Depression: Not at risk (1/31/2022)    [email or web address], [facility]    PHQ-2     PHQ-2 TOTAL SCORE: 0   Housing Stability: Not on file   Tobacco Use: Medium Risk (9/16/2024)    Patient History     Smoking Tobacco Use: Former     Smokeless Tobacco Use: Never     Passive Exposure: Not on file   Financial Resource Strain: High Risk (12/31/2021)    Received from [facility], [facility], [facility]    Financial Resource Strain     Difficulty of Paying Living Expenses: Not on file     Difficulty of Paying Living Expenses: Not on file   Alcohol Use: Not on file   Transportation Needs: Not on file   Physical Activity: Not on file   Interpersonal Safety: Not on file   Stress: Not on file   Social Connections: Unknown (12/31/2021)    [facility]    Social Connections     Frequency of Communication with Friends and Family: Not on file   Health Literacy: Not on file       Functional Status:  Prior to admission patient needed assistance:   Dependent ADLs:: Independent  Dependent IADLs:: Transportation         Additional Information:  1:55 PM  Assessment: Follow   Last Note:09/17/24  Plan: Assessed via  Vito.  Intubated.  Lives with  Vito and adult son.  Independent at baseline.    Needs: TBD  Hand off sent: No  Transport: Family          Next Steps: follow along and assist with discharge planning    ADIA GutierrezSW

## 2024-09-17 NOTE — PLAN OF CARE
Problem: Adult Inpatient Plan of Care  Goal: Absence of Hospital-Acquired Illness or Injury  Intervention: Identify and Manage Fall Risk  Safety Promotion/Fall Prevention:   room door open   room near nurse's station  Intervention: Prevent Skin Injury  Repo every 2 hours  Goal: Optimal Comfort and Wellbeing  Intervention: Provide Person-Centered Care  Trust Relationship/Rapport: care explained     Problem: Comorbidity Management  Goal: Maintenance of Asthma Control  Intervention: Maintain Asthma Symptom Control  Medication Review/Management: medications reviewed     Problem: Gas Exchange Impaired  Goal: Optimal Gas Exchange  Intervention: Optimize Oxygenation and Ventilation  Head of Bed (HOB) Positioning: HOB at 30 degrees     Problem: Acute Kidney Injury/Impairment  Goal: Effective Renal Function  Intervention: Monitor and Support Renal Function  Medication Review/Management: medications reviewed     Problem: Risk for Delirium  Goal: Improved Behavioral Control    Goal: Improved Sleep  Intervention: Promote Sleep  Recent Flowsheet Documentation  Taken 9/17/2024 0400 by Rivka Gambino RN  Sleep/Rest Enhancement:   comfort measures   awakenings minimized   Goal Outcome Evaluation:    Intermittently alert with sedation. Pre sedation, aroused by painful stimuli. Intubated during Rapid Response.  No BM. Adequate UOP with valerio.  Repositioned Q 2 hr.   K shift x 2. Ineffective.  See flowsheet for details.

## 2024-09-18 PROBLEM — R23.9 IMPAIRED SKIN INTEGRITY: Status: ACTIVE | Noted: 2024-01-01

## 2024-09-18 NOTE — PHARMACY-VANCOMYCIN DOSING SERVICE
Pharmacy Vancomycin Note  Date of Service 2024  Patient's  1965   59 year old, female    Indication: Sepsis  Day of Therapy: 1  Current vancomycin regimen: 2000 mg x1 today followed by 750 mg IV q24h  Current vancomycin monitoring method: AUC  Current vancomycin therapeutic monitoring goal: 400-600 mg*h/L    [employer] Prediction of Current Vancomycin Regimen  Regimen: 750 mg IV every 24 hours.  Start time: 10:58 on 2024  Exposure target: AUC24 (range)400-600 mg/L.hr   AUC24,ss: 456 mg/L.hr  Probability of AUC24 > 400: 61 %  Ctrough,ss: 13.9 mg/L  Probability of Ctrough,ss > 20: 29 %  Probability of nephrotoxicity (Lodise MEREDITH ): 9 %      Current estimated CrCl = Estimated Creatinine Clearance: 43.2 mL/min (A) (based on SCr of 1.9 mg/dL (H)).    Creatinine for last 3 days  2024:  9:56 AM Creatinine 2.75 mg/dL; 12:59 PM Creatinine 2.68 mg/dL;  8:51 PM Creatinine 2.60 mg/dL; 11:43 PM Creatinine 2.54 mg/dL  2024:  4:14 AM Creatinine 2.46 mg/dL; 11:53 AM Creatinine 1.90 mg/dL    Recent Vancomycin Levels (past 3 days)  No results found for requested labs within last 3 days.    Vancomycin IV Administrations (past 72 hours)                     Vancomycin (VANCOCIN) 2,000 mg in 0.9% NaCl 500 mL intermittent infusion (mg) 2,000 mg New Bag 24 1058                    Nephrotoxins and other renal medications (From now, onward)      Start     Dose/Rate Route Frequency Ordered Stop    24 1030  vancomycin (VANCOCIN) 750 mg in sodium chloride 0.9 % 250 mL intermittent infusion         750 mg  over 60 Minutes Intravenous EVERY 24 HOURS 24 1930  furosemide (LASIX) 100 mg in sodium chloride 0.9 % 100 mL infusion         10 mg/hr  10 mL/hr  Intravenous CONTINUOUS 24 1900  norepinephrine (LEVOPHED) 4 mg in  mL infusion PREMIX         0.01-0.6 mcg/kg/min × 139.3 kg  5.2-313.4 mL/hr  Intravenous CONTINUOUS 24 2422                  Contrast Orders - past 72 hours (72h ago, onward)      Start     Dose/Rate Route Frequency Stop    09/17/24 1900  iohexol (OMNIPAQUE) 350 MG/ML injectable solution 200 mL         200 mL Intravenous ONCE 09/17/24 1833            Interpretation of levels and current regimen:  Empirically adjust regimen based on improving renal function.    [facility] Prediction of Planned New Vancomycin Regimen  Regimen: 1000 mg IV every 24 hours.  Start time: 10:58 on 09/18/2024  Exposure target: AUC24 (range)400-600 mg/L.hr   AUC24,ss: 597 mg/L.hr  Probability of AUC24 > 400: 80 %  Ctrough,ss: 18.1 mg/L  Probability of Ctrough,ss > 20: 44 %  Probability of nephrotoxicity (Lodise MEREDITH 2009): 14 %      Plan:  Increase Dose to 1000 mg q24h  Vancomycin monitoring method: AUC  Vancomycin therapeutic monitoring goal: 400-600 mg*h/L  Pharmacy will check vancomycin levels as appropriate in 1-3 Days.  Serum creatinine levels will be ordered daily for the first week of therapy and at least twice weekly for subsequent weeks.    Cortney Go, [facility]

## 2024-09-18 NOTE — PROGRESS NOTES
Intensivist update    Assumed care at 7pm.  Patient seen and examined.  IR procedure note reviewed.    She waas on NE @0.03  Fent/dex  UFH drip  Furosemide drip @10mg/hr, valerio bag full; no UOP charted since arrival here    Emergent L axillary arterial line placed (see procedure note)  After arterial waveform obtained, NE was able to be paused with MAP remaining >65    Vent:  VCV 24/400/8/0.8  Vt 8cc/kg IBW  Full strength inhaled epo   AB.29/63/121, p:f ratio 151    RR incr to 28bpm; titrate FiO2 as able    Plan:  - check lactate STAT to ensure adequate perfusion  - ABG q6h  - hold furosemide infusion to avoid dropping preload. May need fluid back if she gets more hypotensive.   - will add epi to provide some RV inotropy as welll as pressor support  - remainder of plan per Dr. Rivera's note from earlier today.    Jaylen (Henrry) MD Sh[place]  [facility] ([facility])  Send me a secure message using [employer]  Clinic [phone]  Fax [phone]     Critical care attestation: 35 minutes additional critical care time spent managing RV failure, acute respiratory failure requiring intubation/IMV, circulatory shock requiring continuous vasopressor infusions, acute on chronic cor pulmonale, PE with right heart strain/failure. High risk for organ deterioration and death requiring ICU level care.

## 2024-09-18 NOTE — PLAN OF CARE
[facility] - ICU    RN Progress Note:    Over NOC, Pt remains on full vent support. SaO2 maintained within ordered parameters with FiO2 titration from 80 % - 60 % and 8 cmH2O of PEEP. Pt continues full strength Veletri. AM ABG showing compensated respiratory acidosis (pH 7.37, CO2 54, BiCarb 31) with PF Ratio 145. Mechanical ventilation well tolerated with titration of sedation (fentanyl, Precedex) to RASS Goal -2/-3. Blood pressure maintained within ordered parameters with titration of epinephrine to MAP > 65 mmHg. IV ABX ( doxycycline) administered, as scheduled. Pt continues heparin infusion, per High-intensity Nurse managed Protocol. Bellow the knee sequential compression devices in place, bilaterally. Will continue to monitor. Gilles Mancia RN            Pertinent Assessments:      Please refer to flowsheet rows for full assessment     Vital signs.            Key Events - This Shift:       No overnight events.                 Barriers to Discharge / Downgrade:     Impaired gas exchange/ineffective breathing pattern requiring mechanical ventilation.   Hypotension requiring titratable vasoactive medications (epinephrine, phenylephrine).

## 2024-09-18 NOTE — PROGRESS NOTES
CLINICAL NUTRITION SERVICES - ASSESSMENT NOTE     Nutrition Prescription    RECOMMENDATIONS FOR MDs/PROVIDERS TO ORDER:    Malnutrition Status:    Moderate in acute illness    Recommendations already ordered by Registered Dietitian (RD):  Initiate Novasource Renal at 15 ml/hr increasing 10 ml every 8 hrs to goal rate 45 ml/hr continuous.  Stop Prosource modulars.  Free water flush 175 ml every 4 hrs.  Check phosphorus in am.  Future/Additional Recommendations:  Monitor TF tolerance/hydration.     REASON FOR ASSESSMENT  Noelle Gomez is a/an 59 year old female assessed by the dietitian for patient transferred from [facility] on the [facility] with TF orders.     Patient presented to [facility] with hypoxia, hypercapnia, respiratory failure, UTI, shock and VIGNESH.  Found to have DVT.  Patient intubated for change in mental status and hypercapnia.  Found to have cor pulmonale.  Patient is s/p pulmonary angiogram with mechanical thrombectomy.  History of asthma, former smoker, HTN, prediabetes, lymphedema, morbid obesity, venous stasis.      NUTRITION HISTORY  Reviewed RD note form 9/17/24.  Patient trying to mouth words during visit.  Spoke with patient's son, Scar, by phone for nutrition history.  Patient not eating well the past 2 weeks, mainly eating fruit.  Patient does not eat much protein or meat at baseline.  We discussed protein goal and protein foods/supplements.       CURRENT NUTRITION ORDERS  Diet: NPO  Nutrition Support:  Novasource Renal at 20 ml/hr continuous.  Prosource TF20 1 pkt 3x/day  Free water flush 150 ml every 4 hrs.    (This to provide 480 ml formula, 1200 kcals, 104 g protein, 88 g carb, 1244 ml free water.)    TF currently held- not started with transfer to this facility, no indication that TF was started at [facility].    LABS  CMP  Recent Labs   Lab 09/18/24  1036 09/18/24  0759 09/18/24  0442 09/18/24  0331 09/18/24  0206 09/17/24  2321 09/17/24  2148 09/17/24  1157 09/17/24  1153 09/17/24  0535  09/17/24  0414 09/16/24  1259 09/16/24  0956   NA  --   --   --  137  --  138  --   --  139  --  139   < > 139   POTASSIUM  --   --   --  5.3 5.4* 5.3 6.0*  --  5.0   < > 6.3*   < > 7.3*   * 106* 142* 138*  --  140*  --    < > 148*   < > 141*   < > 129*   BUN  --   --   --  85.6*  --  88.9*  --   --  84.8*  --  98.1*   < > 94.8*   CR  --   --   --  1.80*  --  1.89*  --   --  1.90*  --  2.46*   < > 2.75*   BEN  --   --   --  8.2*  --  8.3*  --   --  6.9*  --  8.4*   < > 8.9   MAG  --   --   --   --   --   --   --   --   --   --   --   --  2.9*   ALBUMIN  --   --   --   --   --   --   --   --   --   --  2.9*  --  3.5   BILITOTAL  --   --   --   --   --   --   --   --   --   --  0.7  --  1.1   ALKPHOS  --   --   --   --   --   --   --   --   --   --  74  --  102   AST  --   --   --   --   --   --   --   --   --   --  20  --  39   ALT  --   --   --   --   --   --   --   --   --   --  13  --  18   TRIG  --   --   --   --   --   --   --   --   --   --  104  --   --     < > = values in this interval not displayed.     Labs reviewed    MEDICATIONS  Current Facility-Administered Medications   Medication Dose Route Frequency Provider Last Rate Last Admin    cefTRIAXone (ROCEPHIN) 2 g vial to attach to  ml bag for ADULTS or NS 50 ml bag for PEDS  2 g Intravenous Q24H Walker Mann DO        chlorhexidine (PERIDEX) 0.12 % solution 15 mL  15 mL Mouth/Throat Q12H Walker Mann DO   15 mL at 09/18/24 0813    doxycycline (VIBRAMYCIN) 100 mg vial to attach to  mL bag  100 mg Intravenous Q12H Walker Mann DO   100 mg at 09/18/24 0813    insulin aspart (NovoLOG) injection (RAPID ACTING)  1-7 Units Subcutaneous Q4H Jaylen Beckford MD   1 Units at 09/18/24 0450    pantoprazole (PROTONIX) 2 mg/mL suspension 40 mg  40 mg Per Feeding Tube QAM AC Walker Mann DO        Or    pantoprazole (PROTONIX) IV push injection 40 mg  40 mg Intravenous QAM Walker Perez DO   40 mg at 09/18/24 0813     Prosource TF20 ENfit Compatibl EN LIQD (PROSOURCE TF20) packet 60 mL  1 packet Per Feeding Tube TID Walker Mann DO   60 mL at 09/18/24 0901    senna-docusate (SENOKOT-S/PERICOLACE) 8.6-50 MG per tablet 1 tablet  1 tablet Oral or Feeding Tube BID Lacy Jackson MD        Or    senna-docusate (SENOKOT-S/PERICOLACE) 8.6-50 MG per tablet 2 tablet  2 tablet Oral or Feeding Tube BID Lacy Jackson MD   2 tablet at 09/18/24 0813    sodium chloride (PF) 0.9% PF flush 10 mL  10 mL Intracatheter Q8H Walker Mann DO   10 mL at 09/18/24 0817    [START ON 9/24/2024] sodium chloride (PF) 0.9% PF flush 10-40 mL  10-40 mL Intracatheter Q7 Days Walker Mann DO        sodium chloride (PF) 0.9% PF flush 3 mL  3 mL Intracatheter Q8H Walker Mann DO        sodium zirconium cyclosilicate (LOKELMA) packet 10 g  10 g Oral or Feeding Tube Daily Lacy Jackson MD   10 g at 09/18/24 0814    vancomycin (VANCOCIN) 1,000 mg in 200 mL dextrose intermittent infusion  1,000 mg Intravenous Q24H Lacy Jackson  mL/hr at 09/18/24 0935 1,000 mg at 09/18/24 0935      Current Facility-Administered Medications   Medication Dose Route Frequency Provider Last Rate Last Admin    dexmedeTOMIDine (PRECEDEX) 1,000 mcg in sodium chloride 0.9 % 250 mL infusion  0.1-1.2 mcg/kg/hr (Order-Specific) Intravenous Continuous Lacy Jackson MD 41.8 mL/hr at 09/18/24 1027 1.2 mcg/kg/hr at 09/18/24 1027    dexmedeTOMIDine (PRECEDEX) 4 mcg/mL in sodium chloride 0.9 % 100 mL infusion  0.1-1.2 mcg/kg/hr Intravenous Continuous Walker Mann DO 41.8 mL/hr at 09/18/24 0811 1.2 mcg/kg/hr at 09/18/24 0811    dextrose 10% infusion   Intravenous Continuous PRN Walker Mann DO        EPINEPHrine (ADRENALIN) 5 mg in sodium chloride 0.9 % 250 mL infusion CENTRAL  0.01-0.3 mcg/kg/min Intravenous Continuous Jaylen Beckford MD   Held at 09/18/24 0853    epoprostenol (VELETRI) inhalation solution  10 ng/kg/min (Ideal) Nebulization Continuous  Jaylen Beckford MD 1.5 mL/hr at 09/18/24 0838 10 ng/kg/min at 09/18/24 0838    fentaNYL (SUBLIMAZE) infusion   mcg/hr Intravenous Continuous Jaylen Beckford MD 1.5 mL/hr at 09/18/24 1139 75 mcg/hr at 09/18/24 1139    [Held by provider] furosemide (LASIX) 100 mg in sodium chloride 0.9 % 100 mL infusion  10 mg/hr Intravenous Continuous Walker Mann DO   Stopped at 09/17/24 1943    heparin 25,000 units in 0.45% NaCl 250 mL ANTICOAGULANT infusion  0-5,000 Units/hr Intravenous Continuous Walker Mann DO 19.5 mL/hr at 09/18/24 0800 1,950 Units/hr at 09/18/24 0800    norepinephrine (LEVOPHED) 4 mg in  mL infusion PREMIX  0.01-0.6 mcg/kg/min Intravenous Continuous Walker Mann DO   Stopped at 09/17/24 2153    Reason statin not prescribed   Other DOES NOT GO TO Daniel Collier MD          Current Facility-Administered Medications   Medication Dose Route Frequency Provider Last Rate Last Admin    benzocaine-menthol (CHLORASEPTIC) 6-10 MG lozenge 1 lozenge  1 lozenge Buccal Q1H PRN Walker Mann DO        calcium carbonate (TUMS) chewable tablet 1,000 mg  1,000 mg Oral 4x Daily PRN Walker Mann DO        dextrose 10% infusion   Intravenous Continuous PRN Walker Mann DO        glucose gel 15-30 g  15-30 g Oral Q15 Min PRN Walker Mann DO        Or    dextrose 50 % injection 25-50 mL  25-50 mL Intravenous Q15 Min PRN Walker Mann DO        Or    glucagon injection 1 mg  1 mg Subcutaneous Q15 Min PRN Walker Mann DO        fentaNYL (SUBLIMAZE) 50 mcg/mL bolus from pump  50 mcg Intravenous Q1H PRN Jaylen Beckford MD   50 mcg at 09/18/24 0402    guaiFENesin (ROBITUSSIN) 20 mg/mL solution 200 mg  200 mg Oral Q4H PRN Walker Mann DO        lidocaine (LMX4) cream   Topical Q1H PRN Walker Mann DO        lidocaine (LMX4) cream   Topical Q1H PRN Walker Mann DO        lidocaine 1 % 0.1-1 mL  0.1-1 mL Other Q1H PRN Walker Mann, DO         lidocaine 1 % 0.1-1 mL  0.1-1 mL Other Q1H PRN Walker Mann DO        lidocaine 1 % 0.1-5 mL  0.1-5 mL Other Q1H PRN Walker Mann DO        melatonin tablet 5 mg  5 mg Oral At Bedtime PRN Walker Mann DO        menthol-zinc oxide (CALMOSEPTINE) 0.44-20.6 % ointment OINT   Topical 4x Daily PRN Walker Mann DO        miconazole (MICATIN) 2 % powder   Topical BID PRN Walker Mann DO   Given at 09/18/24 0924    naloxone (NARCAN) injection 0.2 mg  0.2 mg Intravenous Q2 Min PRN Walker Mann DO        Or    naloxone (NARCAN) injection 0.4 mg  0.4 mg Intravenous Q2 Min PRN Walker Mann DO        Or    naloxone (NARCAN) injection 0.2 mg  0.2 mg Intramuscular Q2 Min PRN Walker Mann DO        Or    naloxone (NARCAN) injection 0.4 mg  0.4 mg Intramuscular Q2 Min PRN Walker Mann DO        ondansetron (ZOFRAN ODT) ODT tab 4 mg  4 mg Oral Q6H PRN Walker Mann DO        Or    ondansetron (ZOFRAN) injection 4 mg  4 mg Intravenous Q6H PRN Walker Mann DO        polyethylene glycol (MIRALAX) Packet 17 g  17 g Oral BID PRN Walker Mann DO        prochlorperazine (COMPAZINE) injection 10 mg  10 mg Intravenous Q6H PRN Walker Mann DO        Or    prochlorperazine (COMPAZINE) tablet 10 mg  10 mg Oral Q6H PRN Walker Mann DO        Or    prochlorperazine (COMPAZINE) suppository 25 mg  25 mg Rectal Q12H PRN Walker Mann DO        Reason statin not prescribed   Other DOES NOT GO TO Daniel Collier MD        senshyanne-docusate (SENOKOT-S/PERICOLACE) 8.6-50 MG per tablet 1 tablet  1 tablet Oral BID PRN Walker Mann DO        Or    senna-docusate (SENOKOT-S/PERICOLACE) 8.6-50 MG per tablet 2 tablet  2 tablet Oral BID PRN Walker Mann DO        sodium chloride (PF) 0.9% PF flush 10-20 mL  10-20 mL Intracatheter q1 min prn Walker Mann DO        sodium chloride (PF) 0.9% PF flush 10-20 mL  10-20 mL Intracatheter q1 min  "prn Walker Mann, DO        sodium chloride (PF) 0.9% PF flush 10-40 mL  10-40 mL Intracatheter Once PRN Walker Mann DO        sodium chloride (PF) 0.9% PF flush 10-40 mL  10-40 mL Intracatheter Q1H PRN Walker Mann,         sodium chloride (PF) 0.9% PF flush 3 mL  3 mL Intracatheter q1 min prn Walker Mann, DO          Medications reviewed    ANTHROPOMETRICS  Height: 0 cm (Data Unavailable) 5' 2\"  Most Recent Weight: 140.9 kg (310 lb 9.6 oz)    BMI: Obesity Grade III BMI >40  Weight History: 09/16/24 : 139.3 kg (307 lb)  10/09/23 : 127.5 kg (281 lb)  10/02/23 : 129.6 kg (285 lb 11.2 oz)     Dosing Weight: 140.9 kg-energy, 50 kg, IBW-protein, 72.7 kg-fluid    ASSESSED NUTRITION NEEDS  Estimated Energy Needs: 2129 kcals/day (REE using [facility])  Justification: Obese, Vented, and Wound healing  Estimated Protein Needs: + grams protein/day (1.5 - 2+ grams of pro/kg)  Justification: VIGNESH, intubated and critically ill.  Watch renal function.    Estimated Fluid Needs: 2180+ mL/day (30+ mL/kg-adjusted wt)   Justification: or per MD    PHYSICAL FINDINGS  See malnutrition section below.  Per chart:    Vent  OG  Lymphedema-bilateral LE  Edema-hands, moderate  Last BM 9/15/24.  Skin-excoriated groin, buttocks-erythema, rt foot and rt leg lacerations.    MALNUTRITION:  % Weight Loss:  None noted  % Intake:  <75% for > 7 days (moderate malnutrition)  Subcutaneous Fat Loss:  None observed  Muscle Loss:  None observed  Fluid Retention:  Mild generalized per chart.  Lymphedema in deandre LE.    Malnutrition Diagnosis: Moderate malnutrition  In Context of:  Acute illness or injury    NUTRITION DIAGNOSIS  Swallowing difficulty related to respiratory failure as evidenced by intubation, NPO      INTERVENTIONS  Implementation  Nutrition Education: Spoke with patient's son, we discussed protein goal, protein foods/supplements for healing/repletion.    Enteral Nutrition - Initiate Novasource Renal at 15 " ml/hr increasing 10 ml every 8 hrs to goal rate 45 ml/hr continuous.  Stop Prosource modulars.  Free water flush 175 ml every 4 hrs.  TF at goal to meet estimated needs:  1080 ml formula, 2160 kcals, 98 g protein, 197 g carb, 774 ml free water from formula plus 1050 ml flushes daily.    Check phosphorus in am.    Goals  Tolerate TF at goal rate  Meet nutrition needs  Electrolytes WNL  BG < 180     Monitoring/Evaluation  Progress toward goals will be monitored and evaluated per protocol.

## 2024-09-18 NOTE — PROGRESS NOTES
Patient received from [facility] via EMS in IR holding room on full mechanical ventilator support and full strength veletri. Patient was transported from IR to ICU via transport vent and full strength veletri without incident.    Kiana Levy, RT

## 2024-09-18 NOTE — PROGRESS NOTES
Respiratory Care Note     Ventilator Day #3     ETT SIZE: 7.0/ secured at 22cm at teeth/gums     Nebs: epoprostenol (VELETRI) 20 ng/kg CONTINUOUS @ 3 mL/hr      Ventilator settings: FiO2 (%): 60 %, Resp: 28, Vent Mode: CMV/AC, Resp Rate (Set): 28 breaths/min, Tidal Volume (Set, mL): 400 mL, PEEP (cm H2O): 8 cmH2O, Resp Rate (Set): 28 breaths/min, Tidal Volume (Set, mL): 400 mL, PEEP (cm H2O): 8 cmH2O      SBT: not meeting criteria yet--not Veletri, needing 60% Fi02    Last AB.37/53/139/31/99.8%, collected on 80% Fi02    Shift Summary: Uneventful night from resp care stand point. Remains intubated and mechanically ventilated; see vent flowsheet for vent details. Able to decrease Fi02 to 60% from 80% given P/F ratio of 174; still Sp02 mid to high 90s. Continues on Veletri, last changed at 0305. Suctioning small, thick, brown sputum. Pplat 28, PIP low 30s. Will closely follow and assist CCM with airway and ventilator management.     /61   Pulse 67   Temp 98.7  F (37.1  C) (Oral)   Resp 28   SpO2 100%      Colt Villarreal, RRT

## 2024-09-18 NOTE — PROCEDURES
ARTERIAL LINE INSERTION PROCEDURE NOTE  (NON-OR)    Procedure Date:  9/17/2024   Performing Physician:  Jaylen Beckford MD    Pre-Procedure Diagnosis:     RESPIRATORY FAILURE  Post-Procedure Diagnosis:  Same as Pre-Procedure Diagnosis    Procedure:  Arterial line insertion  Left  axillary  Indications:  HYPOTENSION, RESPIRATORY FAILURE, and HEMODYNAMIC SHOCK      Estimated Blood Loss: Minimal   Complications: none      Procedure Details: Emergent procedure due to PE, RV failure, hemodynamic instability requiring vasopressors.  The site of the procedure was properly noted/marked. The patient was identified as Noelle Gomez with Date of Birth 1965 and the procedure verified as Arterial Line Insertion.  A Time Out was held and the above information confirmed.    In sterile fashion, the line site was prepped with Chlorhexidine.  Strict sterile conditions were maintained,  Cap, mask, and sterile gloves were worn by all participants.  The arterial line was placed in the Left  axillary  artery percutaneously,  without  difficulty.  The linewas  sutured in place  and an occlusive sterile dressing was applied.  The total number of needle stick attempts was 1.      Condition: critical and unchanged    Jaylen Beckford MD, 9/17/2024, 8:00 PM

## 2024-09-18 NOTE — IR NOTE
Patient Name: Noelle Gomez  Medical Record Number: [id number]  Today's Date: 9/17/2024    Procedure: Pulmonary angio  Proceduralist: Dr. Pleitez      Sedation medications administered: 2 mg midazolam and 0 mcg fentanyl   Sedation time: 50 minutes    Report given to: ICU RN      Other Notes: Pt arrived to IR room 1 from  ED . Emergency Consent reviewed.  Pt positioned and monitored per protocol. Pt tolerated procedure without any noted complications. . Pt transferred back to  [id number] . Pulses dopplered in RLE and marked. Unable to obtain in LLE prior to procedure.

## 2024-09-18 NOTE — PLAN OF CARE
[facility] - ICU    RN Progress Note:            Pertinent Assessments:      Please refer to flowsheet rows for full assessment     - Redirectable when the patient gets agitated for the most part, follows commands and nods her head for simple questions  - Severe lymphedema on both legs, unable to hear pulses on both legs thru doppler. Dr. Jackson (ICU) was informed. Still the legs are warm, cr and no change in color as observed from early morning during handover  - Oral temperature = 100.1 fahrenheit. Ice packs and cooling measures done           Key Events - This Shift:       - The fentanyl infusion was paused for couple of hours but the patient got agitated and restless, does not follow commands and sat up in the bed. Fentanyl bolus and infusion was started. Her Son Scar was at the bedside when this happened.  - Started feeding as ordered.  - Lympedema wraps were applied on both legs per physical therapy  - Temperature recheck = 99.1 fahrenheit  - Versed 2 mg IV was given once for agitation and restlessness. ET tube bite block was placed together with respiratory therapist  - Lasix and Seroquel were added.                  Barriers to Discharge / Downgrade:     - intubated and sedated

## 2024-09-18 NOTE — PROGRESS NOTES
Patient arrived at the ICU intubated on full support vent/Veletri. Changes are made per MD. PIP 35 cmH2O, Pplat 33 cmH2O. Sats mid 90s.    Darshan Mckeon, RT

## 2024-09-18 NOTE — DISCHARGE INSTRUCTIONS
BLE - Daily  Cleanse with water or wipes, gently dry.  Apply Sween Cream (pink/white tube) to affected areas.

## 2024-09-18 NOTE — CONSULTS
[facility] Nurse Inpatient Assessment     Consulted for: BLE    Summary: Patient has followed at wound clinic previously. Son states legs are better know than previously, although more swollen r/t     Patient History (according to provider note(s):    Assessment & Plan  Noelle Gomez is a 59 year old female admitted on 9/16/2024. She presented today with shortness of breath essentially brought in by the family.  Patient in the ED noted to be in respiratory distress tachypneic tachycardic needing supplemental oxygen.  Chest x-ray with fluid overload.  Her blood gases remains with pH of 7.16 with pCO2 of 80 even after Lasix with some urine output with worsening BUN/creatinine 96 and 2.68.  Baseline she does not have kidney disease although patient does take Lasix and losartan for hypertension which she has stopped taking Lasix recently.  Today on evaluation her BNP is approximately 40,000 and troponins are flat suggesting either she had a acute MI in the last 2 weeks of dyspnea history, but also she stopped taking Lasix so that may be accentuating or it could be a cardiorenal etiology.  Patient risk factors of coronary disease including age hypertension and prediabetes with A1c of 5.8 also noted although no evidence of current acute ischemia, troponin mildly high and flat suggesting demand ischemia from respiratory illness, no previous history of stress test.     Assessment:    Skin Injury Location: BLE      RLE                                                             LLE  Last photo: 9/18/24  Skin injury due to: Venous stasis dermatitis  Skin history and plan of care: See summary section  Affected area:      Skin assessment: Intact, Dry/flaky, Dry drainage, and Scaly - no drainage noted at this time     Odor: none  Pain: no grimacing or signs of discomfort  Pain interventions prior to dressing change: patient tolerated well and slow and gentle cares   Treatment goal: Increase  moisture , Maintain (prevention of deterioration), and Protection  STATUS: initial assessment this admission  Supplies ordered: supplies stored on unit    Treatment Plan:   BLE - Daily  Cleanse with water or wipes, gently dry.  Apply Sween Cream (pink/white tube) to affected areas.    Orders: Written    RECOMMEND PRIMARY TEAM ORDER: Lymphedema consult  Education provided: plan of care  Discussed plan of care with: Patient, Family, and Nurse  [facility] nurse follow-up plan: signing off  Notify WO if wound(s) deteriorate.  Nursing to notify the Provider(s) and re-consult the [facility] Nurse if new skin concern.    DATA:     Current support surface: Standard  Low air loss (JENNY pump, Isolibrium, Pulsate)  Containment of urine/stool: Incontinence Protocol  BMI: Body mass index is 56.81 kg/m .   Active diet order: Orders Placed This Encounter      NPO for Medical/Clinical Reasons Except for: Meds     Output: I/O last 3 completed shifts:  In: 537 [I.V.:537]  Out: 2550 [Urine:2550]     Labs:   Recent Labs   Lab 09/17/24  1129 09/17/24  0414 09/16/24  1712 09/16/24  0956   ALBUMIN  --  2.9*  --  3.5   HGB 12.9 13.7   < > 15.4   INR  --   --   --  1.46*   WBC 9.8 12.9*   < > 14.7*   A1C  --   --   --  5.8*    < > = values in this interval not displayed.     Pressure injury risk assessment:   Sensory Perception: 3-->slightly limited  Moisture: 3-->occasionally moist  Activity: 1-->bedfast  Mobility: 3-->slightly limited  Nutrition: 2-->probably inadequate  Friction and Shear: 2-->potential problem  Jose Ramon Score: 14    Jessica Black, MSN RN CWOCN  Pager no longer is use, please contact through [employer] group: [email or web address]

## 2024-09-18 NOTE — PROGRESS NOTES
PULMONARY / CRITICAL CARE PROGRESS NOTE    Date / Time of Admission:  9/17/2024  5:01 PM    Assessment:   Active Problems:    Impaired skin integrity      Code Status:  Full Code    59yoF with morbid obesity and lymphedema presents with chronic thromboembolic pulmonary hypertension, probable LORA all contributing to Cor Pulmonale in setting of volume overload causing hypercapneic respiratory failure currently intubated.     Plan:   Pulmonary:  1) Acute hypoxic respiratory failure  2) CTEPH  3) Concern for pneumonia but not clearly present  -Velitri off today  -FiO2 down below 40%  -PEEP at 8 due to body habitus  -SBT once agitation improves, consider extubation to bipap given hypercapnea  -Sleep study, may need bipap prior to discharge    C/V:  1) WHO II and III and IV pulmonary hypertension  -Diuresis, already 2L negative, creatinine starting to improve. Lasix 40mg every 8 hours  -Consider RHC once extubated and diuresed to see where wedge is  -Epi if hypotensive for better RV support    ID:  1) Concern for pneumonia  -Sputum sample ordered  -Blood culture negative  -Empiric Zosyn/doxycycline. Stopped vancomycin due to negative MRSA swab.  -Could likely stop antibiotics 9/19 if still doing well.     Renal:  1) VIGNESH on CKD  2) Hyperkalemia  -Monitor closely now that she has had contrast exposure  -Gentle diuresis, already 2L negative  -Monitor potassium    GI:  1) Need for nutrition  -Resume tube feeds. Hold when ready to extubate      Neuro:  1) Agitation  2) Need for sedation  -Very hard to sedate with precedex alone. Not doing well when fentnayl weaned.   -Initiate Seroquel to help with delirium    Heme:  1) DVT, PE  -heparin drip, eventually DOAC    Endo:  1) Hyperglycemia  -NISS      ICU DAILY CHECKLIST                           Can patient transfer out of MICU? no    FAST HUG:    Feeding:  Feeding: Yes.  Patient is receiving TUBE FEEDS    Miller:Yes  Analgesia/Sedation:Yes RASS goal 0 to -1  Thromboembolic  "prophylaxis: Yes; Mode:  Heparin  HOB>30:  Yes  Stress Ulcer Protocol Active: Yes; Mode: PPI  Glycemic Control: Any glucose > 180 no; Mode of Insulin Therapy: Sliding Scale Insulin    Clinically Significant Risk Factors Present on Admission        # Hyperkalemia: Highest K = 6.9 mmol/L in last 2 days, will monitor as appropriate       # Hypoalbuminemia: Lowest albumin = 2.9 g/dL at 9/17/2024  4:14 AM, will monitor as appropriate  # Coagulation Defect: INR = 1.46 (Ref range: 0.85 - 1.15) and/or PTT = 25 Seconds (Ref range: 22 - 38 Seconds), will monitor for bleeding  # Thrombocytopenia: Lowest platelets = 90 in last 2 days, will monitor for bleeding   # Hypertension: Noted on problem list   # Circulatory Shock: required vasopressors within past 24 hours      # Acute Hypoxic Respiratory Failure: Documented O2 saturation < 90%. Continue supplemental oxygen as needed  # Acute Hypercapnic Respiratory Failure: based on arterial blood gas results.  Continue supplemental oxygen and ventilatory support as indicated.  # Acute Hypercapnic Respiratory Failure: based on arterial blood gas results.  Continue supplemental oxygen and ventilatory support as indicated.        # Severe Obesity: Estimated body mass index is 56.81 kg/m  as calculated from the following:    Height as of 9/16/24: 1.575 m (5' 2\").    Weight as of this encounter: 140.9 kg (310 lb 9.6 oz).    # Moderate Malnutrition: based on nutrition assessment     # Asthma: noted on problem list          INTUBATED:  Can patient have daily waking:  Yes  Can patient have spontaneous breathing trial:  Yes    Restraints? Yes    PHYSICAL THERAPY AND MOBILITY:  Can patient have PT and mobility trial: no  Activity: Bedrest    Critical Care Time: 40 minutes  This patient is critically ill due to hypoxia requiring mechanical ventilation.         Subjective:   HPI:  Noelle Gomez is a 59 year old female with history of significant lymphedema, 40pack-year smoking history,morbid " obesity, who presented with hypercapnea and hypoxia with dyspnea for the past few months. She required 10L O2 to improve O2 sats initiially in the 70s. She was found to have DVT in LLE. She then became hypotensive so initiated on norepi for presumed septic shock due to pneumonia however CXR more consistent with atelectasis and procalcitonin negative. She was placed on bipap but continued to have respiratory acidosis so was intubated.     Echo found cor pulmonale and VQ scan confirmed numerous mismatches consistent with PE. She was Transferred to [place] for thrombectomy but only clot found in Right lung, clear on left lung. She arrived on FiO2 of 100%, PEEP but after IR procedure pulmonary pressures were slightly improved.    She was on full strength velitri, FiO2 80%, sedated. When fentanyl is lightened she is agitated, banging on the rails.     Active Problems:    Impaired skin integrity      Allergies: Sulfa antibiotics     MEDS:  Scheduled Meds:  Current Facility-Administered Medications   Medication Dose Route Frequency Provider Last Rate Last Admin    cefTRIAXone (ROCEPHIN) 2 g vial to attach to  ml bag for ADULTS or NS 50 ml bag for PEDS  2 g Intravenous Q24H Walker Mann DO   2 g at 09/18/24 1527    chlorhexidine (PERIDEX) 0.12 % solution 15 mL  15 mL Mouth/Throat Q12H Walker Mann DO   15 mL at 09/18/24 0813    doxycycline (VIBRAMYCIN) 100 mg vial to attach to  mL bag  100 mg Intravenous Q12H Walker Mann DO   100 mg at 09/18/24 0813    insulin aspart (NovoLOG) injection (RAPID ACTING)  1-7 Units Subcutaneous Q4H Jaylen Beckford MD   1 Units at 09/18/24 1244    pantoprazole (PROTONIX) 2 mg/mL suspension 40 mg  40 mg Per Feeding Tube QAM Walker Perez DO        Or    pantoprazole (PROTONIX) IV push injection 40 mg  40 mg Intravenous QAM Walker Perez DO   40 mg at 09/18/24 0813    senna-docusate (SENOKOT-S/PERICOLACE) 8.6-50 MG per tablet 1 tablet  1  tablet Oral or Feeding Tube BID Lacy Jackson MD        Or    senna-docusate (SENOKOT-S/PERICOLACE) 8.6-50 MG per tablet 2 tablet  2 tablet Oral or Feeding Tube BID Lacy Jackson MD   2 tablet at 09/18/24 0813    sodium chloride (PF) 0.9% PF flush 10 mL  10 mL Intracatheter Q8H Walker Mann DO   10 mL at 09/18/24 1527    [START ON 9/24/2024] sodium chloride (PF) 0.9% PF flush 10-40 mL  10-40 mL Intracatheter Q7 Days Walker Mann DO        sodium chloride (PF) 0.9% PF flush 3 mL  3 mL Intracatheter Q8H Walker Mann DO   3 mL at 09/18/24 1245    sodium zirconium cyclosilicate (LOKELMA) packet 10 g  10 g Oral or Feeding Tube Daily Lacy Jackson MD   10 g at 09/18/24 0814    vancomycin (VANCOCIN) 1,000 mg in 200 mL dextrose intermittent infusion  1,000 mg Intravenous Q24H Lacy Jackson  mL/hr at 09/18/24 0935 1,000 mg at 09/18/24 0935     Continuous Infusions:  Current Facility-Administered Medications   Medication Dose Route Frequency Provider Last Rate Last Admin    dexmedeTOMIDine (PRECEDEX) 1,000 mcg in sodium chloride 0.9 % 250 mL infusion  0.1-1.2 mcg/kg/hr (Order-Specific) Intravenous Continuous Lacy Jackson MD 41.8 mL/hr at 09/18/24 1600 1.2 mcg/kg/hr at 09/18/24 1600    dexmedeTOMIDine (PRECEDEX) 4 mcg/mL in sodium chloride 0.9 % 100 mL infusion  0.1-1.2 mcg/kg/hr Intravenous Continuous Walker Mann DO 41.8 mL/hr at 09/18/24 0811 1.2 mcg/kg/hr at 09/18/24 0811    dextrose 10% infusion   Intravenous Continuous PRN Walker Mann DO        EPINEPHrine (ADRENALIN) 5 mg in sodium chloride 0.9 % 250 mL infusion CENTRAL  0.01-0.3 mcg/kg/min Intravenous Continuous Jaylen Beckford MD   Paused at 09/18/24 1740    epoprostenol (VELETRI) inhalation solution  10 ng/kg/min (Ideal) Nebulization Continuous Jaylen Beckford MD   Stopped at 09/18/24 1425    fentaNYL (SUBLIMAZE) infusion   mcg/hr Intravenous Continuous Jaylen Beckford MD 2 mL/hr at 09/18/24 1718 100 mcg/hr at 09/18/24  1718    [Held by provider] furosemide (LASIX) 100 mg in sodium chloride 0.9 % 100 mL infusion  10 mg/hr Intravenous Continuous Walker Mann DO   Stopped at 09/17/24 1943    heparin 25,000 units in 0.45% NaCl 250 mL ANTICOAGULANT infusion  0-5,000 Units/hr Intravenous Continuous Walker Mann, DO 19.5 mL/hr at 09/18/24 1600 1,950 Units/hr at 09/18/24 1600    norepinephrine (LEVOPHED) 4 mg in  mL infusion PREMIX  0.01-0.6 mcg/kg/min Intravenous Continuous Walker Mann DO   Stopped at 09/17/24 2153    Patient is already receiving anticoagulation with heparin, enoxaparin (LOVENOX), warfarin (COUMADIN)  or other anticoagulant medication   Does not apply Continuous PRN Lacy Jackson MD        Reason statin not prescribed   Other DOES NOT GO TO Daniel Collier MD         PRN Meds:.  Current Facility-Administered Medications   Medication Dose Route Frequency Provider Last Rate Last Admin    benzocaine-menthol (CHLORASEPTIC) 6-10 MG lozenge 1 lozenge  1 lozenge Buccal Q1H PRN Walker Mann DO        calcium carbonate (TUMS) chewable tablet 1,000 mg  1,000 mg Oral 4x Daily PRN Walker Mann DO        dextrose 10% infusion   Intravenous Continuous PRN Walker Mann, DO        glucose gel 15-30 g  15-30 g Oral Q15 Min PRN Walker Mann DO        Or    dextrose 50 % injection 25-50 mL  25-50 mL Intravenous Q15 Min PRN Walker Mann DO        Or    glucagon injection 1 mg  1 mg Subcutaneous Q15 Min PRN Walker Mann, DO        fentaNYL (SUBLIMAZE) 50 mcg/mL bolus from pump  50 mcg Intravenous Q1H PRN Jaylen Beckford MD   50 mcg at 09/18/24 1534    guaiFENesin (ROBITUSSIN) 20 mg/mL solution 200 mg  200 mg Oral Q4H PRN Walker Mann, DO        lidocaine (LMX4) cream   Topical Q1H PRN Walker Mann DO        lidocaine (LMX4) cream   Topical Q1H PRN Walker Mann,         lidocaine 1 % 0.1-1 mL  0.1-1 mL Other Q1H PRN Walker Mann, DO         lidocaine 1 % 0.1-1 mL  0.1-1 mL Other Q1H PRN Walker Mann DO        lidocaine 1 % 0.1-5 mL  0.1-5 mL Other Q1H PRN Walker Mann DO        melatonin tablet 5 mg  5 mg Oral At Bedtime PRN Walker Mann DO        menthol-zinc oxide (CALMOSEPTINE) 0.44-20.6 % ointment OINT   Topical 4x Daily PRN Walker Mann DO        miconazole (MICATIN) 2 % powder   Topical BID PRN Walker Mann DO   Given at 09/18/24 0924    naloxone (NARCAN) injection 0.2 mg  0.2 mg Intravenous Q2 Min PRN Walker Mann DO        Or    naloxone (NARCAN) injection 0.4 mg  0.4 mg Intravenous Q2 Min PRN Walker Mann DO        Or    naloxone (NARCAN) injection 0.2 mg  0.2 mg Intramuscular Q2 Min PRN Walker Mann DO        Or    naloxone (NARCAN) injection 0.4 mg  0.4 mg Intramuscular Q2 Min PRN Walker Mann DO        ondansetron (ZOFRAN ODT) ODT tab 4 mg  4 mg Oral Q6H PRN Walker Mann DO        Or    ondansetron (ZOFRAN) injection 4 mg  4 mg Intravenous Q6H PRN Walker Mann DO        Patient is already receiving anticoagulation with heparin, enoxaparin (LOVENOX), warfarin (COUMADIN)  or other anticoagulant medication   Does not apply Continuous PRN Lacy Jackson MD        polyethylene glycol (MIRALAX) Packet 17 g  17 g Oral BID PRN Walker Mann DO        prochlorperazine (COMPAZINE) injection 10 mg  10 mg Intravenous Q6H PRN Walker Mann DO        Or    prochlorperazine (COMPAZINE) tablet 10 mg  10 mg Oral Q6H PRN Walker Mann DO        Or    prochlorperazine (COMPAZINE) suppository 25 mg  25 mg Rectal Q12H PRN Walker Mann DO        Reason statin not prescribed   Other DOES NOT GO TO MAR Maik, Daniel, MD        senna-docusate (SENOKOT-S/PERICOLACE) 8.6-50 MG per tablet 1 tablet  1 tablet Oral BID PRN Walker Mann DO        Or    senna-docusate (SENOKOT-S/PERICOLACE) 8.6-50 MG per tablet 2 tablet  2 tablet Oral BID PRN Walker Mann DO         sodium chloride (PF) 0.9% PF flush 10-20 mL  10-20 mL Intracatheter q1 min prn Johnathan, Walker, DO        sodium chloride (PF) 0.9% PF flush 10-20 mL  10-20 mL Intracatheter q1 min prn Johnathan, Walker, DO        sodium chloride (PF) 0.9% PF flush 10-40 mL  10-40 mL Intracatheter Once PRN Johnathan, Walker, DO        sodium chloride (PF) 0.9% PF flush 10-40 mL  10-40 mL Intracatheter Q1H PRN Johnathan, Walker, DO        sodium chloride (PF) 0.9% PF flush 3 mL  3 mL Intracatheter q1 min prn Walker Mann, DO             Objective:   VITALS:  /63   Pulse 85   Temp 99.4  F (37.4  C) (Oral)   Resp 29   Wt 140.9 kg (310 lb 9.6 oz)   SpO2 95%   BMI 56.81 kg/m    EXAM:  General appearance: appears older than stated age and agitated  Neck: no adenopathy and supple, symmetrical, trachea midline  Lungs: clear to auscultation bilaterally  Heart: RRR, S1 and S2  Abdomen: soft, non-tender; bowel sounds normal; no masses,  no organomegaly  Extremities: lymphedema with thickening of skin  Skin: Skin color, texture, turgor normal. No rashes or lesions  Neurologic: following commands but then thrashing in bed    I&O:     Intake/Output Summary (Last 24 hours) at 9/18/2024 1812  Last data filed at 9/18/2024 1600  Gross per 24 hour   Intake 1482.8 ml   Output 3630 ml   Net -2147.2 ml           Data Review:  Chest Xray  Personally reviewed image/s and Personally reviewed impression/s      LABS      Latest Ref Rng & Units 9/16/2024    11:43 PM 9/17/2024     4:14 AM 9/17/2024    11:53 AM 9/17/2024    11:21 PM 9/18/2024     3:31 AM 9/18/2024    12:48 PM 9/18/2024     5:11 PM   Glucose Values   Glucose 70 - 99 mg/dL 169  141  148  140  138  138  117        Results for orders placed or performed during the hospital encounter of 09/17/24   Basic metabolic panel   Result Value Ref Range    Sodium 137 135 - 145 mmol/L    Potassium 5.4 (H) 3.4 - 5.3 mmol/L    Chloride 101 98 - 107 mmol/L    Carbon Dioxide (CO2) 28 22  - 29 mmol/L    Anion Gap 8 7 - 15 mmol/L    Urea Nitrogen 82.8 (H) 8.0 - 23.0 mg/dL    Creatinine 1.68 (H) 0.51 - 0.95 mg/dL    GFR Estimate 35 (L) >60 mL/min/1.73m2    Calcium 8.3 (L) 8.8 - 10.4 mg/dL    Glucose 117 (H) 70 - 99 mg/dL     Lab Results   Component Value Date    WBC 9.8 09/17/2024    HGB 12.9 09/17/2024    HCT 40.4 09/17/2024     (H) 09/17/2024    PLT 90 (L) 09/17/2024       ABG:  Recent Labs   Lab 09/18/24  1715 09/18/24  1248 09/18/24  0333 09/17/24  2322   PH 7.43 7.38 7.37 7.37   PCO2 46* 54* 54* 53*   PO2 57* 91 87 139*   HCO3 31* 31* 31* 31*   O2PER 30 45 60 80             By:  Lacy Jackson MD  Pulmonary/Critical Care

## 2024-09-19 NOTE — PROGRESS NOTES
RR down to 26  No other vent changes per shift  BS have been slightly coarse.  No wean   RT will continue to monitor    FiO2 (%): 40 %, Resp: 26, Vent Mode: CMV/AC, Resp Rate (Set): 26 breaths/min, Tidal Volume (Set, mL): 400 mL, PEEP (cm H2O): 8 cmH2O, Resp Rate (Set): 26 breaths/min, Tidal Volume (Set, mL): 400 mL, PEEP (cm H2O): 8 cmH2O    BP (!) 87/51   Pulse 64   Temp 98.2  F (36.8  C) (Oral)   Resp 26   Wt 140.9 kg (310 lb 9.6 oz)   SpO2 98%   BMI 56.81 kg/m      Yemi Stringer, RT

## 2024-09-19 NOTE — PROGRESS NOTES
"Critical Care Progress Note      09/19/2024    Name: Noelle Gomez MRN#: [id number]   Age: 59 year old YOB: 1965     Hsptl Day# 1  ICU DAY #    MV DAY #             Problem List:   Acute respiratory failure  Pulmonary embolism   Clinically Significant Risk Factors Present on Admission        # Hyperkalemia: Highest K = 6 mmol/L in last 2 days, will monitor as appropriate       # Hypoalbuminemia: Lowest albumin = 2.9 g/dL at 9/17/2024  4:14 AM, will monitor as appropriate   # Thrombocytopenia: Lowest platelets = 90 in last 2 days, will monitor for bleeding   # Hypertension: Noted on problem list     # Acute Hypoxic Respiratory Failure: Documented O2 saturation < 90%. Continue supplemental oxygen as needed  # Acute Hypercapnic Respiratory Failure: based on arterial blood gas results.  Continue supplemental oxygen and ventilatory support as indicated.  # Acute Hypercapnic Respiratory Failure: based on arterial blood gas results.  Continue supplemental oxygen and ventilatory support as indicated.        # Severe Obesity: Estimated body mass index is 56.81 kg/m  as calculated from the following:    Height as of 9/16/24: 1.575 m (5' 2\").    Weight as of this encounter: 140.9 kg (310 lb 9.6 oz).    # Moderate Malnutrition: based on nutrition assessment     # Asthma: noted on problem list                         Summary/Hospital Course:     59-year-old female with obesity and lymphedema who was transferred here from [facility] for embolectomy with IR.  Small amount of clot removed consistent with chronic thromboembolic disease.    Assessment and plan :       I have personally reviewed the daily labs, imaging studies, cultures and discussed the case with referring physician and consulting physicians.     My assessment and plan by system for this patient is as follows:    Neurology/Psychiatry:   Sedated with Precedex and fentanyl  Increase Seroquel to 50 mg twice a day      Cardiovascular:   Chronic " thromboembolic pulmonary hypertension  Severely dilated right ventricle  Severe pulmonary hypertension  Atrial dilation bilaterally  Acute DVT of the left femoral vein    Attempted embolectomy by IR with small amount of clot removed  Continue heparin drip  Vasoplegia secondary to sedation.  Switch epi to levo    Attempted diuresis.  Currently 3-1/2 L negative  Lasix 40 mg every 8 hours.  Decreased to twice daily    She is getting traction alkalosis.  Give a dose of Diamox and stop Lokelma      Pulmonary/Ventilator Management:   Acute on chronic hypoxic/hypercapnic respiratory failure  Presumed secondary to obesity hypoventilation syndrome and Chronic thromboembolic disease    On 8 mL/kg tidal volume, decreased rate to 26  FiO2 40%  Added Diamox    No plan for SBT at this point      GI and Nutrition :   Continue tube feeds      Renal/Fluids/Electrolytes:   VIGNESH on CKD  DC Lokelma.  Presented with significant hyperkalemia that is improved  Decrease Lasix to twice daily  Dose of Diamox      - monitor function and electrolytes as needed with replacement per ICU protocols.  - generally avoid nephrotoxic agents such as NSAID, IV contrast unless specifically required  - adjust medications as needed for renal clearance  - follow I/O's as appropriate.    Infectious Disease:   CRP elevated upon admission  Chest x-ray clear but agree with continuing antibiotics for now. Ceftriaxone/doxy, day 4 today      Endocrine:     Plan  - ICU insulin protocol, goal sugar <180      Hematology/Oncology:   Thrombocytopenia slowly improving  Continue heparin drip for DVT and chronic PE        ICU Prophylaxis:   1. DVT: Hep gtt  2. VAP: HOB 30 degrees, chlorhexidine rinse  3. Stress Ulcer: PPI  4. Restraints: Nonviolent soft two point restraints required and necessary for patient safety and continued cares and good effect as patient continues to pull at necessary lines, tubes despite education and distraction. Will readdress daily.          Key  Medications:     Current Facility-Administered Medications   Medication Dose Route Frequency Provider Last Rate Last Admin    cefTRIAXone (ROCEPHIN) 2 g vial to attach to  ml bag for ADULTS or NS 50 ml bag for PEDS  2 g Intravenous Q24H Walker Mann DO   2 g at 09/18/24 1527    chlorhexidine (PERIDEX) 0.12 % solution 15 mL  15 mL Mouth/Throat Q12H Walker Mann, DO   15 mL at 09/19/24 0825    doxycycline (VIBRAMYCIN) 100 mg vial to attach to  mL bag  100 mg Intravenous Q12H Walker Mann,    100 mg at 09/19/24 0828    furosemide (LASIX) injection 40 mg  40 mg Intravenous Q8H Lacy Jackson MD   40 mg at 09/19/24 0950    insulin aspart (NovoLOG) injection (RAPID ACTING)  1-7 Units Subcutaneous Q4H Jaylen Beckford MD   1 Units at 09/19/24 0826    pantoprazole (PROTONIX) 2 mg/mL suspension 40 mg  40 mg Per Feeding Tube QAM AC Walker Mann DO        Or    pantoprazole (PROTONIX) IV push injection 40 mg  40 mg Intravenous QAM  Walker Mann DO   40 mg at 09/19/24 0702    QUEtiapine (SEROquel) tablet 50 mg  50 mg Oral or Feeding Tube BID IvSaul garcia MD        senna-docusate (SENOKOT-S/PERICOLACE) 8.6-50 MG per tablet 1 tablet  1 tablet Oral or Feeding Tube BID Lacy Jackson MD   1 tablet at 09/18/24 2007    Or    senna-docusate (SENOKOT-S/PERICOLACE) 8.6-50 MG per tablet 2 tablet  2 tablet Oral or Feeding Tube BID Lacy Jackson MD   2 tablet at 09/19/24 0825    sodium chloride (PF) 0.9% PF flush 10 mL  10 mL Intracatheter Q8H Walker Mann DO   10 mL at 09/19/24 0827    [START ON 9/24/2024] sodium chloride (PF) 0.9% PF flush 10-40 mL  10-40 mL Intracatheter Q7 Days Walker Mann DO        sodium chloride (PF) 0.9% PF flush 3 mL  3 mL Intracatheter Q8H Walker Mann DO   3 mL at 09/18/24 2018    sodium zirconium cyclosilicate (LOKELMA) packet 10 g  10 g Oral or Feeding Tube Daily Lacy Jackson MD   10 g at 09/19/24 0857     Current  Facility-Administered Medications   Medication Dose Route Frequency Provider Last Rate Last Admin    dexmedeTOMIDine (PRECEDEX) 1,000 mcg in sodium chloride 0.9 % 250 mL infusion  0.1-1.2 mcg/kg/hr (Order-Specific) Intravenous Continuous Lacy Jackson MD 41.8 mL/hr at 09/19/24 1001 1.2 mcg/kg/hr at 09/19/24 1001    dextrose 10% infusion   Intravenous Continuous PRN Walker Mann DO        fentaNYL (SUBLIMAZE) infusion   mcg/hr Intravenous Continuous Jaylen Beckford MD 4 mL/hr at 09/19/24 0337 200 mcg/hr at 09/19/24 0337    heparin 25,000 units in 0.45% NaCl 250 mL ANTICOAGULANT infusion  0-5,000 Units/hr Intravenous Continuous Walker Mann DO 19.5 mL/hr at 09/19/24 0700 1,950 Units/hr at 09/19/24 0700    norepinephrine (LEVOPHED) 4 mg in  mL infusion PREMIX  0.01-0.6 mcg/kg/min Intravenous Continuous IvSaul garcia MD        Patient is already receiving anticoagulation with heparin, enoxaparin (LOVENOX), warfarin (COUMADIN)  or other anticoagulant medication   Does not apply Continuous PRN Lacy Jackson MD        Reason statin not prescribed   Other DOES NOT GO TO Daniel Collier MD                   Physical Examination:   Temp:  [98.2  F (36.8  C)-100.1  F (37.8  C)] 98.6  F (37  C)  Pulse:  [67-98] 74  Resp:  [25-29] 28  BP: (105-142)/(56-62) 128/56  FiO2 (%):  [30 %-60 %] 40 %  SpO2:  [86 %-100 %] 98 %    Intake/Output Summary (Last 24 hours) at 9/19/2024 1008  Last data filed at 9/19/2024 0400  Gross per 24 hour   Intake 2260.51 ml   Output 3065 ml   Net -804.49 ml     Wt Readings from Last 4 Encounters:   09/18/24 140.9 kg (310 lb 9.6 oz)   09/16/24 139.3 kg (307 lb)   10/09/23 127.5 kg (281 lb)   10/02/23 129.6 kg (285 lb 11.2 oz)     BP - Mean:  [76] 76  FiO2 (%): 40 %, Resp: 28, Vent Mode: CMV/AC, Resp Rate (Set): 28 breaths/min, Tidal Volume (Set, mL): 400 mL, PEEP (cm H2O): 8 cmH2O, Resp Rate (Set): 28 breaths/min, Tidal Volume (Set, mL): 400 mL, PEEP (cm H2O): 8  cmH2O  Recent Labs   Lab 09/19/24  0929 09/19/24  0354 09/18/24  1715 09/18/24  1248   PH 7.51* 7.46* 7.43 7.38   PCO2 42 44 46* 54*   PO2 60* 76* 57* 91   HCO3 33* 31* 31* 31*   O2PER 40 55 30 45       GEN: no acute distress   HEENT: head ncat, sclera anicteric, OP patent, trachea midline   PULM: unlabored synchronous with vent, clear anteriorly    CV/COR: RRR S1S2 no gallop,  No rub, no murmur  ABD: soft nontender, hypoactive bowel sounds, no mass  EXT: Peripheral edema  NEURO: Sedated  SKIN: no obvious rash  LINES: clean, dry intact         Data:   All data and imaging reviewed     ROUTINE ICU LABS (Last four results)  CMP  Recent Labs   Lab 09/19/24  0753 09/19/24  0702 09/19/24  0357 09/19/24  0344 09/18/24  2017 09/18/24  1711 09/18/24  1542 09/18/24  1248 09/17/24  0535 09/17/24  0414 09/16/24  1259 09/16/24  0956   NA  --  138  --  139  --  137  --  138   < > 139   < > 139   POTASSIUM  --  3.8  --  4.1  --  5.4*  --  5.5*   < > 6.3*   < > 7.3*   CHLORIDE  --  98  --  99  --  101  --  102   < > 99   < > 97*   CO2  --  29  --  28  --  28  --  29   < > 29   < > 28   ANIONGAP  --  11  --  12  --  8  --  7   < > 11   < > 14   * 159* 155* 152*   < > 117*   < > 138*   < > 141*   < > 129*   BUN  --  77.6*  --  76.3*  --  82.8*  --  81.6*   < > 98.1*   < > 94.8*   CR  --  1.52*  --  1.58*  --  1.68*  --  1.69*   < > 2.46*   < > 2.75*   GFRESTIMATED  --  39*  --  37*  --  35*  --  34*   < > 22*   < > 19*   BEN  --  8.4*  --  8.4*  --  8.3*  --  8.3*   < > 8.4*   < > 8.9   MAG  --   --   --   --   --   --   --   --   --   --   --  2.9*   PHOS  --   --   --  3.1  --   --   --   --   --   --   --   --    PROTTOTAL  --   --   --   --   --   --   --   --   --  6.1*  --  7.6   ALBUMIN  --   --   --   --   --   --   --   --   --  2.9*  --  3.5   BILITOTAL  --   --   --   --   --   --   --   --   --  0.7  --  1.1   ALKPHOS  --   --   --   --   --   --   --   --   --  74  --  102   AST  --   --   --   --   --   --   --   " --   --  20  --  39   ALT  --   --   --   --   --   --   --   --   --  13  --  18    < > = values in this interval not displayed.     CBC  Recent Labs   Lab 09/19/24  0344 09/17/24  1129 09/17/24  0414 09/16/24  1712   WBC 9.2 9.8 12.9* 14.6*   RBC 3.27* 3.45* 3.66* 3.72*   HGB 12.1 12.9 13.7 14.1   HCT 38.1 40.4 42.9 45.1   * 117* 117* 121*   MCH 37.0* 37.4* 37.4* 37.9*   MCHC 31.8 31.9 31.9 31.3*   RDW 16.6* 16.9* 16.8* 17.2*   * 90* 111* 105*     INR  Recent Labs   Lab 09/16/24  0956   INR 1.46*     Arterial Blood Gas  Recent Labs   Lab 09/19/24  0929 09/19/24  0354 09/18/24  1715 09/18/24  1248   PH 7.51* 7.46* 7.43 7.38   PCO2 42 44 46* 54*   PO2 60* 76* 57* 91   HCO3 33* 31* 31* 31*   O2PER 40 55 30 45       All cultures:  No results for input(s): \"CULT\" in the last 168 hours.  No results found for this or any previous visit (from the past 24 hour(s)).      Billing: This patient is critically ill: Yes. Total critical care time today 33 min exclusive of procedures or teaching.          "

## 2024-09-19 NOTE — PLAN OF CARE
[facility] - ICU    RN Progress Note:    Over NOC, Pt remains on full vent support. SaO2 maintained within ordered parameters with 55 % FiO2 and 8 cmH2O of PEEP. AM ABG showing overcompensated respiratory acidosis with respiratory rate 28 bpm (pH 7.46, CO2 44, Bicarb 31) with PF Ratio 138. Mechanical ventilation well tolerated with titration of sedation (fentanyl, Precedex) to RASS Goal -2/-3. Zyprexa administration PRN x 1 s/t severe agitation. Blood pressure maintained within ordered parameters with intermittent low-dose  epinephrine. Sputum in process. IV ABX ( doxycycline) administered, as scheduled. Pt continues heparin infusion, per Nurse managed High-intensity Protocol. Anti-Xa therapeutic x 3. IV lasix administered, as scheduled. I&O trending net negative. Tube feeding advancement toward goal appears well tolerated. Over NOC, blood glucose 131 - 155 mg/dL. Cr. 1.58 (1.68, prior). K 4.1. Will continue to monitor. Gilles Mancia RN                Pertinent Assessments:      Please refer to flowsheet rows for full assessment     Vital signs.            Key Events - This Shift:       No overnight events.                 Barriers to Discharge / Downgrade:     Impaired gas exchange/ineffective breathing pattern requiring mechanical ventilation.   Difficulty weaning sedation s/t severe agitation.

## 2024-09-19 NOTE — PROGRESS NOTES
RC Note:      VENT DAY # 4     CURRENT SETTINGS:  Vent Mode: CMV/AC  FiO2 (%): 55%  Resp Rate (Set): 28  Tidal Volume (Set, mL): 400 mL  PEEP (cm H2O): 8 cmH2O  Resp: 28        PATIENT PARAMETERS:  PIP:27   Pplat:  19  Pmean:  14  Compliance: 41  SBT: No  Secretions: small thick  02 Sats: 96%  BS: Dim coarse upper lobes, and decreased @ bases.     Sputum culture collected and send to the lab.      ETT SIZE 7.0 Secured at 22 cm at teeth/gums/lips       NOTE / SHIFT SUMMARY:       Pt remains intubated and on full vent support.  No changes made during this shift. Ambu/mask at bedside. RT will continue to monitor.

## 2024-09-19 NOTE — CONSULTS
Care Management Initial Consult    General Information  Assessment completed with: Spouse or significant other, Vito  Type of CM/SW Visit: Initial Assessment    Primary Care Provider verified and updated as needed: Yes ([facility])   Readmission within the last 30 days: no previous admission in last 30 days         Advance Care Planning: Advance Care Planning Reviewed: other (see comments) (No ACP documents)          Communication Assessment  Patient's communication style: spoken language (English or Bilingual)             Cognitive  Cognitive/Neuro/Behavioral: .WDL except, all  Level of Consciousness: sedated  Arousal Level: opens eyes spontaneously  Orientation: other (see comments) (MICA sedated and intubated)  Mood/Behavior: agitated, restless  Best Language:  (MICA sedated and intubated)  Speech: endotracheal tube    Living Environment:   People in home: spouse  Vito  Current living Arrangements: apartment      Able to return to prior arrangements: yes       Family/Social Support:  Care provided by: self  Provides care for: no one  Marital Status:   Support system:   Vito       Description of Support System: Supportive         Current Resources:   Patient receiving home care services: No        Community Resources: None  Equipment currently used at home: none  Supplies currently used at home:      Employment/Financial:  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  No    Lifestyle & Psychosocial Needs:  Social Determinants of Health     Food Insecurity: Not on file   Depression: Not at risk (1/31/2022)    [facility], [facility]    PHQ-2     PHQ-2 TOTAL SCORE: 0   Housing Stability: Not on file   Tobacco Use: Medium Risk (9/16/2024)    Patient History      Smoking Tobacco Use: Former     Smokeless Tobacco Use: Never     Passive Exposure: Not on file   Financial Resource Strain: High Risk (12/31/2021)    Received from [facility], [facility]    Financial Resource Strain     Difficulty of Paying Living Expenses: Not on file     Difficulty of Paying Living Expenses: Not on file   Alcohol Use: Not on file   Transportation Needs: Not on file   Physical Activity: Not on file   Interpersonal Safety: Not on file   Stress: Not on file   Social Connections: Unknown (12/31/2021)    Received from [facility], [facility]    Social Connections     Frequency of Communication with Friends and Family: Not on file   Health Literacy: Not on file       Functional Status:  Prior to admission patient needed assistance:   Dependent ADLs:: Independent  Dependent IADLs:: Independent       Mental Health Status:  Mental Health Status: No Current Concerns       Chemical Dependency Status:  Chemical Dependency Status: No Current Concerns             Values/Beliefs:  Spiritual, Cultural Beliefs, Hoahaoism Practices, Values that affect care:                 Discussed  Partnership in Safe Discharge Planning  document with patient/family: No    Additional Information:  Assessed. RNCM introduced self and CM role to pt's  Vito over the phone. Pt currently intubated. Pt lives with spouse Vito in an apartment. Pt Ind with ADLS and most IADLS, except transport pt's  or pt's chris Abbott assist. No home care svcs prior. No mobility aides. 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 Pt has established PCP. Spouse anticipates pt to return home if safe to do so with home care if needed. Spouse to transport if medically safe to do so.     Spouse would like chris Abbott to be primary contact during the day since spouse has odd working hours.       Chris Abbott #  [phone]      Next Steps: Cm will continue to follow plan of care,review recommendations, and assist with any discharge needs anticipated.     Janet Mcqueen RN

## 2024-09-19 NOTE — PLAN OF CARE
[facility] - ICU    RN Progress Note:            Pertinent Assessments:      Please refer to flowsheet rows for full assessment     - Restless and agitated. Non redirectable. At 1430H, The patient suddenly sat up in bed, eyes wide open and was trying to reach for the tube. PRN boluses were given and Dr. Blackwell was informed  - Had a nonsustained VTACH at around 1300H. No hypotension noted. Dr. Blackwell was informed.  - When she lays flat, she would have 85-86% of oxygen saturation  - afebrile  - Copious, very thick yellowish secretions.           Key Events - This Shift:       - Seroquel dosing was increased.  - Lasix was decreased to every 12 hours and Diamox was given once. Noted with adequate urine output. See flowsheet.  - arterial blood gas was collected and adjusted the rate to 26.  - Propofol was started for severe agitation and restlessness                Barriers to Discharge / Downgrade:     - intubated and sedated

## 2024-09-19 NOTE — PROGRESS NOTES
RESPIRATORY CARE NOTE    Patient remains intubated and on full ventilator support on the settings below. Veletri was turned off today. FiO2 has been between 30-55% since shutting off Veletri. Patient has been very agitated when she is awake and therefore desaturating. Plan is to continue to wean FiO2 as tolerated.    CMV/AC 28 rate, 400 tidal volume, peep of 8 and 55% FiO2.    Inez Temple, RT

## 2024-09-19 NOTE — CONSULTS
CLINICAL NUTRITION SERVICES NOTE    Received consult to order and manage TF.  RD assessed and initiated TF please see yesterday's note.  Tolerating TF so far, advancing towards goal.

## 2024-09-20 NOTE — PROGRESS NOTES
CLINICAL NUTRITION SERVICES - BRIEF NOTE      EVALUATION OF THE PROGRESS TOWARD GOALS   Diet: NPO  Nutrition Support: Novasource Renal goal rate 45 ml/hr continuous.  Free water flush 175 ml every 4 hrs.    TF at goal rate provides:  1080 ml formula, 2160 kcals, 98 g protein, 197 g carb, 774 ml free water from formula plus 1050 ml flushes daily.      Propofol at current rate provides ~335 kcals/day.     NEW FINDINGS   Patient remains intubated, sedated.  Propofol started yesterday.    Last BM 9/20/24, rectal tube placed.  I/Os indicate -1.7L since admission with diuresis.    LABS  Sodium 143, creatine 1.36 (H), glucose range 120-170 (H), pCO2 art 62 (H).  Reviewed    MEDICATIONS  Reviewed    INTERVENTIONS  Implementation  Adjust TF as propofol providing additional kcals.  Novasource Renal new goal rate 37 ml/hr continuous.  This to provide 888 ml formula, 1776 kcals, 81 g protein, 162 g carb, 636 ml free water from formula plus 1050 ml flushes.

## 2024-09-20 NOTE — PROGRESS NOTES
"Critical Care Progress Note      09/20/2024    Name: Noelle Gomez MRN#: [id number]   Age: 59 year old YOB: 1965     Hsptl Day# 2  ICU DAY #    MV DAY #             Problem List:   Acute respiratory failure  Pulmonary embolism   Clinically Significant Risk Factors        # Hyperkalemia: Highest K = 5.4 mmol/L in last 2 days, will monitor as appropriate       # Hypoalbuminemia: Lowest albumin = 2.9 g/dL at 9/17/2024  4:14 AM, will monitor as appropriate   # Thrombocytopenia: Lowest platelets = 105 in last 2 days, will monitor for bleeding   # Hypertension: Noted on problem list       # Acute Hypoxic Respiratory Failure: Documented O2 saturation < 90%. Continue supplemental oxygen as needed  # Acute Hypercapnic Respiratory Failure: based on arterial blood gas results.  Continue supplemental oxygen and ventilatory support as indicated.  # Acute Hypercapnic Respiratory Failure: based on arterial blood gas results.  Continue supplemental oxygen and ventilatory support as indicated.         # Severe Obesity: Estimated body mass index is 56.37 kg/m  as calculated from the following:    Height as of 9/16/24: 1.575 m (5' 2\").    Weight as of this encounter: 139.8 kg (308 lb 3.2 oz)., PRESENT ON ADMISSION  # Moderate Malnutrition: based on nutrition assessment, PRESENT ON ADMISSION   # Asthma: noted on problem list                          Summary/Hospital Course:     59-year-old female with obesity and lymphedema who was transferred here from [facility] for embolectomy with IR.  Small amount of clot removed consistent with chronic thromboembolic disease.    Assessment and plan :       I have personally reviewed the daily labs, imaging studies, cultures and discussed the case with referring physician and consulting physicians.     My assessment and plan by system for this patient is as follows:    Neurology/Psychiatry:   Sedated with Precedex, Propofol and fentanyl  Seroquel 50 mg twice a " day      Cardiovascular:   Chronic thromboembolic pulmonary hypertension  Severely dilated right ventricle  Severe pulmonary hypertension  Atrial dilation bilaterally  Acute DVT of the left femoral vein    Attempted embolectomy by IR with small amount of clot removed  Continue heparin drip  Vasoplegia secondary to sedation.  Switched epi to levo    Stop furosemide and start Bumex drip      Pulmonary/Ventilator Management:   Acute on chronic hypoxic/hypercapnic respiratory failure  Presumed secondary to obesity hypoventilation syndrome and Chronic thromboembolic disease    On 8 mL/kg tidal volume, decreased rate to 26  FiO2 40%        GI and Nutrition :   Continue tube feeds    Some coffee-ground material from the OG tube.  Increase PPI to twice daily and check hemoglobin every 6 hours      Renal/Fluids/Electrolytes:   VIGNESH on CKD.  Improving  Start Bumex drip      - monitor function and electrolytes as needed with replacement per ICU protocols.  - generally avoid nephrotoxic agents such as NSAID, IV contrast unless specifically required  - adjust medications as needed for renal clearance  - follow I/O's as appropriate.    Infectious Disease:   CRP elevated upon admission  Chest x-ray clear but agree with continuing antibiotics for now. Ceftriaxone/doxy, day 4 today      Endocrine:     Plan  - ICU insulin protocol, goal sugar <180      Hematology/Oncology:   Thrombocytopenia slowly improving  Continue heparin drip for DVT and chronic PE    Macrocytosis.  Check folate and B12    ICU Prophylaxis:   1. DVT: Hep gtt  2. VAP: HOB 30 degrees, chlorhexidine rinse  3. Stress Ulcer: PPI  4. Restraints: Nonviolent soft two point restraints required and necessary for patient safety and continued cares and good effect as patient continues to pull at necessary lines, tubes despite education and distraction. Will readdress daily.          Key Medications:     Current Facility-Administered Medications   Medication Dose Route  Frequency Provider Last Rate Last Admin    albumin human 25 % injection 12.5 g  12.5 g Intravenous Q12H Saul Saenz MD   12.5 g at 09/20/24 0826    cefTRIAXone (ROCEPHIN) 2 g vial to attach to  ml bag for ADULTS or NS 50 ml bag for PEDS  2 g Intravenous Q24H Walker Mann, DO   2 g at 09/19/24 1506    chlorhexidine (PERIDEX) 0.12 % solution 15 mL  15 mL Mouth/Throat Q12H Walker Mann, DO   15 mL at 09/20/24 0822    doxycycline (VIBRAMYCIN) 100 mg vial to attach to  mL bag  100 mg Intravenous Q12H Walker Mann, DO   100 mg at 09/20/24 0822    insulin aspart (NovoLOG) injection (RAPID ACTING)  1-7 Units Subcutaneous Q4H Jaylen Beckford MD   1 Units at 09/20/24 1221    pantoprazole (PROTONIX) 2 mg/mL suspension 40 mg  40 mg Per Feeding Tube Q12H Saul Saenz MD        Or    pantoprazole (PROTONIX) IV push injection 40 mg  40 mg Intravenous Q12H Saul Saenz MD        QUEtiapine (SEROquel) tablet 50 mg  50 mg Oral or Feeding Tube BID Saul Saenz MD   50 mg at 09/20/24 0822    senna-docusate (SENOKOT-S/PERICOLACE) 8.6-50 MG per tablet 1 tablet  1 tablet Oral or Feeding Tube BID Lacy Jackson MD   1 tablet at 09/18/24 2007    Or    senna-docusate (SENOKOT-S/PERICOLACE) 8.6-50 MG per tablet 2 tablet  2 tablet Oral or Feeding Tube BID Lacy Jackson MD   2 tablet at 09/19/24 2001    sodium chloride (PF) 0.9% PF flush 10 mL  10 mL Intracatheter Q8H Walker Mann DO   10 mL at 09/20/24 0039    [START ON 9/24/2024] sodium chloride (PF) 0.9% PF flush 10-40 mL  10-40 mL Intracatheter Q7 Days Walker Mann DO        sodium chloride (PF) 0.9% PF flush 3 mL  3 mL Intracatheter Q8H Oberdorfer, Walker, DO   3 mL at 09/20/24 0329     Current Facility-Administered Medications   Medication Dose Route Frequency Provider Last Rate Last Admin    bumetanide (BUMEX) 0.25 mg/mL infusion  1 mg/hr Intravenous Continuous IvSaul garcia MD 4 mL/hr at 09/20/24  1205 1 mg/hr at 09/20/24 1205    dexmedeTOMIDine (PRECEDEX) 1,000 mcg in sodium chloride 0.9 % 250 mL infusion  0.1-1.2 mcg/kg/hr (Order-Specific) Intravenous Continuous Lacy Jackson MD 41.8 mL/hr at 09/20/24 1204 1.2 mcg/kg/hr at 09/20/24 1204    dextrose 10% infusion   Intravenous Continuous PRN Walker Mann DO        fentaNYL (SUBLIMAZE) infusion   mcg/hr Intravenous Continuous Jaylen Beckford MD 3.5 mL/hr at 09/20/24 1200 175 mcg/hr at 09/20/24 1200    heparin 25,000 units in 0.45% NaCl 250 mL ANTICOAGULANT infusion  0-5,000 Units/hr Intravenous Continuous Walker Mann DO 19.5 mL/hr at 09/20/24 1200 1,950 Units/hr at 09/20/24 1200    norepinephrine (LEVOPHED) 4 mg in  mL infusion PREMIX  0.01-0.6 mcg/kg/min Intravenous Continuous Saul Saenz MD 58.1 mL/hr at 09/20/24 1230 0.11 mcg/kg/min at 09/20/24 1230    Patient is already receiving anticoagulation with heparin, enoxaparin (LOVENOX), warfarin (COUMADIN)  or other anticoagulant medication   Does not apply Continuous PRN Lacy Jackson MD        propofol (DIPRIVAN) infusion  5-75 mcg/kg/min Intravenous Continuous Saul Saenz MD 12.7 mL/hr at 09/20/24 1200 15 mcg/kg/min at 09/20/24 1200    Reason statin not prescribed   Other DOES NOT GO TO Daniel Collier MD                   Physical Examination:   Temp:  [98.2  F (36.8  C)-99.9  F (37.7  C)] 99.9  F (37.7  C)  Pulse:  [64-92] 92  Resp:  [16-27] 26  BP: (114-131)/(56-62) 114/56  FiO2 (%):  [40 %-60 %] 60 %  SpO2:  [74 %-100 %] 93 %    Intake/Output Summary (Last 24 hours) at 9/19/2024 1008  Last data filed at 9/19/2024 0400  Gross per 24 hour   Intake 2260.51 ml   Output 3065 ml   Net -804.49 ml     Wt Readings from Last 4 Encounters:   09/20/24 139.8 kg (308 lb 3.2 oz)   09/16/24 139.3 kg (307 lb)   10/09/23 127.5 kg (281 lb)   10/02/23 129.6 kg (285 lb 11.2 oz)     BP - Mean:  [80-89] 80  FiO2 (%): 60 %, Resp: 26, Vent Mode: CMV/AC, Resp Rate (Set): 26  breaths/min, Tidal Volume (Set, mL): 400 mL, PEEP (cm H2O): 8 cmH2O, Resp Rate (Set): 26 breaths/min, Tidal Volume (Set, mL): 400 mL, PEEP (cm H2O): 8 cmH2O  Recent Labs   Lab 09/20/24  0832 09/19/24  0929 09/19/24  0354 09/18/24  1715   PH 7.33* 7.51* 7.46* 7.43   PCO2 62* 42 44 46*   PO2 73* 60* 76* 57*   HCO3 33* 33* 31* 31*   O2PER 60 40 55 30       GEN: no acute distress   HEENT: head ncat, sclera anicteric, OP patent, trachea midline   PULM: unlabored synchronous with vent, clear anteriorly    CV/COR: RRR S1S2 no gallop,  No rub, no murmur  ABD: soft nontender, hypoactive bowel sounds, no mass  EXT: Peripheral edema  NEURO: Sedated  SKIN: Scabs on extremities and some ecchymosis  LINES: clean, dry intact         Data:   All data and imaging reviewed     ROUTINE ICU LABS (Last four results)  CMP  Recent Labs   Lab 09/20/24  1334 09/20/24  1221 09/20/24  1126 09/20/24  0748 09/20/24  0531 09/20/24  0037 09/20/24  0033 09/19/24  1950 09/19/24  1700 09/19/24  0357 09/19/24  0344 09/17/24  0535 09/17/24  0414 09/16/24  1259 09/16/24  0956     --   --   --  143  --  141  --  140   < > 139   < > 139   < > 139   POTASSIUM 3.7  --   --   --  3.7  --  3.8  --  3.7   < > 4.1   < > 6.3*   < > 7.3*   CHLORIDE 104  --   --   --  103  --  101  --  100   < > 99   < > 99   < > 97*   CO2 31*  --   --   --  31*  --  30*  --  30*   < > 28   < > 29   < > 28   ANIONGAP 8  --   --   --  9  --  10  --  10   < > 12   < > 11   < > 14   * 154* 172* 166* 203*   < > 163*   < > 138*   < > 152*   < > 141*   < > 129*   BUN 73.1*  --   --   --  74.4*  --  74.5*  --  75.2*   < > 76.3*   < > 98.1*   < > 94.8*   CR 1.37*  --   --   --  1.36*  --  1.45*  --  1.50*   < > 1.58*   < > 2.46*   < > 2.75*   GFRESTIMATED 44*  --   --   --  45*  --  41*  --  40*   < > 37*   < > 22*   < > 19*   BEN 8.0*  --   --   --  8.2*  --  8.4*  --  8.4*   < > 8.4*   < > 8.4*   < > 8.9   MAG  --   --   --   --   --   --   --   --   --   --   --   --   --  "  --  2.9*   PHOS  --   --   --   --   --   --   --   --   --   --  3.1  --   --   --   --    PROTTOTAL  --   --   --   --   --   --   --   --   --   --   --   --  6.1*  --  7.6   ALBUMIN  --   --   --   --   --   --   --   --   --   --   --   --  2.9*  --  3.5   BILITOTAL  --   --   --   --   --   --   --   --   --   --   --   --  0.7  --  1.1   ALKPHOS  --   --   --   --   --   --   --   --   --   --   --   --  74  --  102   AST  --   --   --   --   --   --   --   --   --   --   --   --  20  --  39   ALT  --   --   --   --   --   --   --   --   --   --   --   --  13  --  18    < > = values in this interval not displayed.     CBC  Recent Labs   Lab 09/20/24  0531 09/19/24  0344 09/17/24  1129 09/17/24  0414   WBC 12.1* 9.2 9.8 12.9*   RBC 2.93* 3.27* 3.45* 3.66*   HGB 11.0* 12.1 12.9 13.7   HCT 34.8* 38.1 40.4 42.9   * 117* 117* 117*   MCH 37.5* 37.0* 37.4* 37.4*   MCHC 31.6 31.8 31.9 31.9   RDW 16.5* 16.6* 16.9* 16.8*   * 105* 90* 111*     INR  Recent Labs   Lab 09/16/24  0956   INR 1.46*     Arterial Blood Gas  Recent Labs   Lab 09/20/24  0832 09/19/24  0929 09/19/24  0354 09/18/24  1715   PH 7.33* 7.51* 7.46* 7.43   PCO2 62* 42 44 46*   PO2 73* 60* 76* 57*   HCO3 33* 33* 31* 31*   O2PER 60 40 55 30       All cultures:  No results for input(s): \"CULT\" in the last 168 hours.  Recent Results (from the past 24 hour(s))   XR Chest Port 1 View    Narrative    EXAM: XR CHEST PORT 1 VIEW  LOCATION: [facility]  DATE: 9/20/2024    INDICATION: evaluate etiolog of worsening hypoxemia  COMPARISON: 9/16/2024      Impression    IMPRESSION: Stable endotracheal tube. Enteric tube courses below the diaphragm with tip outside the field-of-view. Unchanged pulmonary vascular congestion, interstitial edema, bibasilar opacities, and bilateral pleural effusions. Stable cardiomediastinal   silhouette. Interval placement of a left subclavian central venous catheter with tip in the upper SVC. No " pneumothorax.   XR Chest Port 1 View    Narrative    EXAM: XR CHEST PORT 1 VIEW  LOCATION: [facility]  DATE: 9/20/2024    INDICATION: Picc placement  COMPARISON: 9/20/2024      Impression    IMPRESSION:     Left PICC tip is difficult to see, though likely terminates near the distal left brachiocephalic or proximal SVC. Recommend advancement (probably 3 to 4 cm).    Endotracheal tube tip roughly 3.5 cm above the wilder. Enteric tube courses into the stomach and off the field-of-view.    No other significant change since earlier same day. Redemonstrated heterogeneous bilateral mid to lower lung predominant opacities and possible small pleural effusions.           Billing: This patient is critically ill: Yes. Total critical care time today 31 min exclusive of procedures or teaching.

## 2024-09-20 NOTE — PROGRESS NOTES
Note entered on behalf of Ingrid Mayberry PICC STAT RN.     Upon arrival at 0605 pt became more alert. Pt is refusing picc line placement until she can discuss with family, pt is intubated. Son called and will come in after 7am. Primary nurse and charge nurse aware internal team to take over vascular access needs from this point.

## 2024-09-20 NOTE — PROCEDURES
"PICC Line Insertion Procedure Note  Pt. Name: Noelle Gomez  MRN:        [id number]    Procedure: Insertion of a  Triple Lumen  5 fr  Bard SOLO (valved) Power PICC, Lot number [id number]    Indications: multiple medications    Contraindications : none noted    Procedure Details     Patient identified with 2 identifiers and \"Time Out\" conducted.  .     Central line insertion bundle followed: hand hygiene performed prior to procedure, site cleansed with cholraprep, hat, mask, sterile gloves, sterile gown worn, patient draped with maximum barrier head to toe drape, sterile field maintained.    The vein was assessed and found to be compressible and of adequate size.     Lidocaine 1% 1 ml administered sq to the insertion site. A 5 Fr PICC was inserted into the BASILIC vein of the right arm with ultrasound guidance. 1 attempt(s) required to access vein.   Catheter threaded without difficulty. Good blood return noted.    Modified Seldinger Technique used for insertion.    The 8 sharps that are included in the PICC insertion kit were accounted for and disposed of in the sharps container prior to breakdown of the sterile field.    Catheter secured with Statlock, biopatch and Tegaderm dressing applied.    Findings:    Total catheter length  41 cm, with 0 cm exposed. Mid upper arm circumference is 42 cm. Catheter was flushed with 30 cc NS. Patient  tolerated procedure well.    Tip placement verified by [employer] . Tip placement in the SVC.      CLABSI prevention brochure left at bedside.    Patient's primary RN notified PICC is ready for use.      Comments:  CURRENT PICC LEFT ARM IS MALPOSITIONED BUT NOT ABLE TO DO OVER WIRE EXCHANGE DUE TO MULTIPLE MEDICATIONS RUNNING. RIGHT ARM CEPHALIC NON COMPRESSIBLE FROM AC AREA TO ABOUT 1/2 WAY UP ARM.      Laureen Martin RN PICC  Vascular Access - [facility]      "

## 2024-09-20 NOTE — PROGRESS NOTES
"Care Management Follow Up    Length of Stay (days): 2    Expected Discharge Date: 09/26/2024    Anticipated Discharge Plan:   TBD    Transportation: TBD    PT Recommendations:    OT Recommendations:   (pending progress)     Barriers to Discharge: medical stability/ Vent Day #5. On precedex, fentanyl, levo,and hep gtts. CARDS following. On IV abx.       Prior Living Situation: \" Pt lives with spouse Vito in an apartment. Pt Ind with ADLS and most IADLS, except transport pt's  or pt's son Scar assist. No home care svcs prior. No mobility aides. 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 Pt has established PCP. Spouse anticipates pt to return home if safe to do so with home care if needed. Spouse to transport if medically safe to do so. Spouse would like shaw Abbott to be primary contact during the day since spouse has odd working hours. \"        Shaw Abbott # [phone]       Discussed  Partnership in Safe Discharge Planning  document with patient/family: No     Handoff Completed: Not at this time     Patient/Spokesperson Updated: No    Additional Information:    No CM updates at this time.       Next Steps: Cm will continue to follow plan of care,review recommendations, and assist with any discharge needs anticipated.     Janet Mcqueen RN      "

## 2024-09-20 NOTE — PLAN OF CARE
[facility] - ICU    RN Progress Note:            Pertinent Assessments:      Please refer to flowsheet rows for full assessment     Keeping RASS goal -2 to -3, woke up all of a sudden and got restless/ agitated, panicky, non redirectable but tracked. On precedex, fentanyl and propofol gtts for sedation,  a little increase with propofol gtt helped patient  settle down. BP labile, on levophed gtt, also on heparin gtt.   Lung sounds diminished. FIO2 increased from 40% to 60%, CXR portable done, no change.  Had 3 BMs, loose, dark black/ brown, hgb this am 11, platelet 121.   OG residual, chocolate brown 40 and 100 ml residuals this shift.         Key Events - This Shift:     Please see above notes.           Barriers to Discharge / Downgrade:   Sedated/ intubated.

## 2024-09-20 NOTE — PLAN OF CARE
[facility] - ICU    RN Progress Note:            Pertinent Assessments:      Please refer to flowsheet rows for full assessment     Patient Rass goal -2,-3 Currently -3, Decreased propfol to 10 and fentanyl to 150. Opened eyes once with reposition and responsive to pain.  Art line pulse pressure wide so monitoring cuff pressor with titration of levophed per Dr. Saenz.            Key Events - This Shift:       Frequent watery stool, rectal tube placed this morning, when repositioning large amounts stool come out in tube and bypass. Patient desats with turning and BP drops.    Noted Picc line pulled out more than documented. CXR done and PICC nurse notified, new PICC in right arm.                  Barriers to Discharge / Downgrade:     Critically ill         Point of Contact Update: YES-OR-NO: Yes   was notified new picc, he stated if any more updates contact son Scar.

## 2024-09-20 NOTE — CONSULTS
CARDIOLOGY CONSULT NOTE         Assessment:   1.  Heart failure-predominantly right-sided secondary to RV dysfunction.  Will utilize IV continuous Bumex drip.  2.  RV dysfunction-RV pressure and volume overload noted with severe dilatation of right ventricle with moderately decreased right ventricular systolic function.  This is most likely secondary to pulmonary hypertension.  3.  Pulm hypertension-moderate to severe, estimated right-sided systolic pressure is 80 mmHg.  This is most likely secondary to WHO class IV, chronic CT PE.  4.  Pulmonary emboli-secondary to DVT, chronic anticoagulation needed.  Defer IVC filter to pulmonary team.  5.  Morbid obesity-drastic need for weight loss.  6.  Sleep apnea-needs treatment for this.     Plan:   1.  Needs weight loss.  2.  Try Bumex IV continuous drip.  3.  Will continue to follow.  4.  Can follow with me as primary cardiologist.     Current History:   [facility] has been requested by Dr. Saenz to evaluate Noelle Gomez  who is a 59 year old year old white female for heart failure.  Patient is currently intubated and no history available.  Review of chart shows that she developed shortness of breath around the end of August.  Family brought her into emergency department at [facility] on the 16th for acute respiratory failure and heart failure.  At that point in time she was intubated.  Following day, 17 September, she needed inotropic support for blood pressure, was noted to have significant cor pulmonale on echo, and had evidence of bilateral multiple wedge-shaped perfusion defects throughout both lungs consistent with pulmonary emboli.  She was then transferred to [facility] for urgent thrombectomy which was done 17 September and she had mechanical suction involve the distal left pulmonary artery, left lower lobe pulmonary artery, left upper lobe pulmonary artery, right lower lobe pulmonary artery as well as right upper lobe  pulmonary artery.  She is continued with bipedal edema and high oxygen needs as well as worsening renal function and cardiology consultation is requested today.    Past Medical History:     Past Medical History:   Diagnosis Date    Asthma 08/14/2023    HTN (hypertension) 07/14/2021    Lymphedema 02/08/2022    Prediabetes 06/24/2021    Seasonal allergies 07/14/2021    UPJ (ureteropelvic junction) obstruction 03/04/2015    Vitamin D deficiency  Morbid obesity  Acute DVT/chronic pulmonary emboli  Obstructive sleep apnea 06/23/2021      Past history is negative for cancer, tuberculosis, diabetes mellitus, myocardial infarction,  rheumatic fever, cerebrovascular accident, chronic kidney disease, peptic ulcer disease, chronic obstructive pulmonary disease, or thyroid disorder  and no lipid disorder.    Past Surgical History:     Past Surgical History:   Procedure Laterality Date    IR PULMONARY ANGIOGRAM BILATERAL  9/17/2024    MIDLINE DOUBLE LUMEN PLACEMENT  9/16/2024    PICC TRIPLE LUMEN PLACEMENT  9/17/2024     Family History:   Reviewed, and unknown and unobtainable.    Social History:   Reviewed, and she  reports that she has quit smoking. Her smoking use included cigarettes. She has never used smokeless tobacco.  She lives at home normally independently with her .    Meds:     Current Facility-Administered Medications   Medication Dose Route Frequency Provider Last Rate Last Admin    albumin human 25 % injection 12.5 g  12.5 g Intravenous Q12H Saul Saenz MD   12.5 g at 09/20/24 0826    cefTRIAXone (ROCEPHIN) 2 g vial to attach to  ml bag for ADULTS or NS 50 ml bag for PEDS  2 g Intravenous Q24H Walker Mann DO   2 g at 09/19/24 1506    chlorhexidine (PERIDEX) 0.12 % solution 15 mL  15 mL Mouth/Throat Q12H Walker Mann, DO   15 mL at 09/20/24 0822    doxycycline (VIBRAMYCIN) 100 mg vial to attach to  mL bag  100 mg Intravenous Q12H Walker Mann DO   100 mg at 09/20/24  0822    insulin aspart (NovoLOG) injection (RAPID ACTING)  1-7 Units Subcutaneous Q4H Jaylen Beckford MD   1 Units at 09/20/24 0823    pantoprazole (PROTONIX) 2 mg/mL suspension 40 mg  40 mg Per Feeding Tube Q12H Saul Saenz MD        Or    pantoprazole (PROTONIX) IV push injection 40 mg  40 mg Intravenous Q12H Saul Saenz MD        QUEtiapine (SEROquel) tablet 50 mg  50 mg Oral or Feeding Tube BID Saul Saenz MD   50 mg at 09/20/24 0822    senna-docusate (SENOKOT-S/PERICOLACE) 8.6-50 MG per tablet 1 tablet  1 tablet Oral or Feeding Tube BID Lacy Jackson MD   1 tablet at 09/18/24 2007    Or    senna-docusate (SENOKOT-S/PERICOLACE) 8.6-50 MG per tablet 2 tablet  2 tablet Oral or Feeding Tube BID Lacy Jackson MD   2 tablet at 09/19/24 2001    sodium chloride (PF) 0.9% PF flush 10 mL  10 mL Intracatheter Q8H Walker Mann,    10 mL at 09/20/24 0039    [START ON 9/24/2024] sodium chloride (PF) 0.9% PF flush 10-40 mL  10-40 mL Intracatheter Q7 Days Walker Mann,         sodium chloride (PF) 0.9% PF flush 3 mL  3 mL Intracatheter Q8H Walker Mann DO   3 mL at 09/20/24 0329     Allergies:   Sulfa antibiotics    Review of Systems:   A 12 point comprehensive review of systems was unobtainable.      Objective:     Physical Exam:  /60   Pulse 88   Temp 99.9  F (37.7  C) (Oral)   Resp 26   Wt 139.8 kg (308 lb 3.2 oz)   SpO2 95%   BMI 56.37 kg/m       Head:    Normocephalic, without obvious abnormality, atraumatic   Eyes:    PERRL, conjunctiva/corneas clear, EOM's intact,           Nose:   Nares normal, septum midline, mucosa normal, no drainage  or sinus tenderness   Throat:   Lips, mucosa, and tongue normal; teeth and gums normal   Neck:   Supple, symmetrical, trachea midline, no adenopathy;        thyroid:  No enlargement/tenderness/nodules; no carotid    bruit, to jaw at 30 degrees JVD   Back:     Unable to examine   Lungs:     Clear to auscultation  "bilaterally, respirations unlabored   Chest wall:    No tenderness or deformity   Heart:    Regular rate and rhythm, S1 and S2 normal, no murmur, rub   or gallop   Abdomen:     Soft, non-tender, bowel sounds active all four quadrants,     no masses, no organomegaly   Extremities:   Extremities normal, atraumatic, no cyanosis, chronic 3/4 bipedal edema   Pulses:   2+ and symmetric all extremities   Skin:   Skin color, texture, turgor normal, no rashes or lesions   Neurologic:   CNII-XII intact. Normal strength, sensation and reflexes       throughout     Cardiographics and Imaging  Radiology Results: Chest x-ray shows Stable endotracheal tube. Enteric tube courses below the diaphragm with tip outside the field-of-view. Unchanged pulmonary vascular congestion, interstitial edema, bibasilar opacities, and bilateral pleural effusions. Stable cardiomediastinal silhouette. Interval placement of a left subclavian central venous catheter with tip in the upper SVC. No pneumothorax.    EKG Results: personally reviewed sinus rhythm, rightward axis, low voltage, no acute changes.    Lab Review   Pertinent Labs  Lab Results: personally reviewed       No results found for: \"CKTOTAL\", \"CKMB\", \"TROPONINI\"    Lab Results   Component Value Date    BUN 74.4 (H) 09/20/2024     09/20/2024    CO2 31 (H) 09/20/2024       Lab Results   Component Value Date    WBC 12.1 (H) 09/20/2024    HGB 11.0 (L) 09/20/2024    HCT 34.8 (L) 09/20/2024     (H) 09/20/2024     (L) 09/20/2024       No results found for: \"BNP\"    Clinically Significant Risk Factors        # Hyperkalemia: Highest K = 5.5 mmol/L in last 2 days, will monitor as appropriate       # Hypoalbuminemia: Lowest albumin = 2.9 g/dL at 9/17/2024  4:14 AM, will monitor as appropriate   # Thrombocytopenia: Lowest platelets = 105 in last 2 days, will monitor for bleeding   # Hypertension: Noted on problem list           # Severe Obesity: Estimated body mass index is 56.37 " "kg/m  as calculated from the following:    Height as of 9/16/24: 1.575 m (5' 2\").    Weight as of this encounter: 139.8 kg (308 lb 3.2 oz)., PRESENT ON ADMISSION  # Moderate Malnutrition: based on nutrition assessment, PRESENT ON ADMISSION   # Asthma: noted on problem list       CKD POA List: Stage 3a (GFR 45-59)      Pulmonary Heart Disease (Pulmonary hypertension or Cor pulmonale): Cor pulmonale              "

## 2024-09-20 NOTE — PROGRESS NOTES
Oxygen need increased to 60%. Thick/tan secretions suctioned. Pt remains on vent/full support; PIP 29 cmH2O, Pplat 24 cmH2O. BS decreased.    Darshan Mckeon, RT

## 2024-09-21 NOTE — PLAN OF CARE
[facility] - ICU    RN Progress Note:            Pertinent Assessments:      Please refer to flowsheet rows for full assessment     Patient continues to desat and drop BP with reposition. Changed out bed to low air low bariatric, transported with dinesh lift. Patient tolerated.     Patient woke up twice, first time heart rate 130's, patient was tracking with her eyes, squeezing my hands, wiggle toes and shook head yes and no appropriately. Second time patient was more agitated, shaking her head, heart rate still elevated so increased sedation.            Key Events - This Shift:       Continued low grade fever, gave tylenol once and ice packs applied.   BP dropped this evening possibly from bumex drip, stopped drip, gave additional albumin and started vasopressin.                 Barriers to Discharge / Downgrade:     Critically ill         Point of Contact Update: YES-OR-NO: NO

## 2024-09-21 NOTE — PLAN OF CARE
[facility] - ICU    RN Progress Note:            Pertinent Assessments:      Please refer to flowsheet rows for full assessment     Vital signs stable with low dose of Levophed, sedated, turned and repositioned every two hours. CHG bath completed. Patient becomes restless with cares.           Key Events - This Shift:       Fever of 102.1 Tylenol and Cooling [place].                Barriers to Discharge / Downgrade:     Endotracheal tube.          Point of Contact Update: No  If No, reason: Uneventful  Name:  Phone Number:  Summary of Conversation:

## 2024-09-21 NOTE — PROGRESS NOTES
CARDIOLOGY PROGRESS NOTE      Assessment/Plan:  1.  Heart failure-predominantly right-sided secondary to RV dysfunction.  With IV continuous Bumex drip weight down approximately 10 kg.    2.  RV dysfunction-RV pressure and volume overload noted with severe dilatation of right ventricle with moderately decreased right ventricular systolic function.  This is most likely secondary to pulmonary hypertension.  3.  Pulm hypertension-moderate to severe, estimated right-sided systolic pressure is 80 mmHg.  This is most likely secondary to WHO class IV, chronic CT PE.  4.  Pulmonary emboli-secondary to DVT, chronic anticoagulation needed.  Defer IVC filter to pulmonary team.  5.  Morbid obesity-drastic need for weight loss.  6.  Sleep apnea-needs treatment for this.  7.  Respiratory failure-ventilator per intensivist.  8.  Chronic kidney disease-creatinine improved, 1.34 when it was 2.68 on admission.  Continue to monitor with Bumex.    Plan:  1.  Continue current meds.  2.  Might need to discuss with cardiothoracic surgery about possible embolectomy.    Discharge Plannin.  Barriers to discharge are numerous.  2.  Can follow with me as primary cardiologist.     LOS: 3 days     Subjective:  Interval History:    59-year-old white female being seen on fourth day of hospitalization.  No history available, sedated on ventilator.    Medications  Current Facility-Administered Medications   Medication Dose Route Frequency Provider Last Rate Last Admin    albumin human 25 % injection 12.5 g  12.5 g Intravenous Q12H IvSaul garcia MD   12.5 g at 24 0754    cefTRIAXone (ROCEPHIN) 2 g vial to attach to  ml bag for ADULTS or NS 50 ml bag for PEDS  2 g Intravenous Q24H Walker Mann, DO   2 g at 24 1541    chlorhexidine (PERIDEX) 0.12 % solution 15 mL  15 mL Mouth/Throat Q12H Walker Mann, DO   15 mL at 24 0754    doxycycline (VIBRAMYCIN) 100 mg vial to attach to  mL bag  100 mg  Intravenous Q12H Walker Mann DO   100 mg at 09/21/24 0754    insulin aspart (NovoLOG) injection (RAPID ACTING)  1-7 Units Subcutaneous Q4H Jaylen Beckford MD   2 Units at 09/21/24 0410    pantoprazole (PROTONIX) 2 mg/mL suspension 40 mg  40 mg Per Feeding Tube Q12H Saul Saenz MD   40 mg at 09/21/24 0638    Or    pantoprazole (PROTONIX) IV push injection 40 mg  40 mg Intravenous Q12H Saul Saenz MD        QUEtiapine (SEROquel) tablet 50 mg  50 mg Oral or Feeding Tube BID Saul Saenz MD   50 mg at 09/21/24 0754    senna-docusate (SENOKOT-S/PERICOLACE) 8.6-50 MG per tablet 1 tablet  1 tablet Oral or Feeding Tube BID Lacy Jackson MD   1 tablet at 09/18/24 2007    Or    senna-docusate (SENOKOT-S/PERICOLACE) 8.6-50 MG per tablet 2 tablet  2 tablet Oral or Feeding Tube BID Lacy Jackson MD   2 tablet at 09/19/24 2001    sodium chloride (PF) 0.9% PF flush 10 mL  10 mL Intracatheter Q8H Walker Mann DO   10 mL at 09/20/24 0039    [START ON 9/24/2024] sodium chloride (PF) 0.9% PF flush 10-40 mL  10-40 mL Intracatheter Q7 Days Walker Mann DO        sodium chloride (PF) 0.9% PF flush 3 mL  3 mL Intracatheter Q8H Walker Mann DO   3 mL at 09/20/24 0329       Objective:   Vital signs in last 24 hours:  Temp:  [97.7  F (36.5  C)-102.3  F (39.1  C)] 97.7  F (36.5  C)  Pulse:  [] 96  Resp:  [12-32] 26  BP: ()/(43-65) 122/60  FiO2 (%):  [45 %-60 %] 45 %  SpO2:  [89 %-100 %] 96 %      Physical Exam:  /60   Pulse 96   Temp 97.7  F (36.5  C) (Oral)   Resp 26   Wt 128.1 kg (282 lb 6.6 oz)   SpO2 96%   BMI 51.65 kg/m      General Appearance:    Sedated, on ventilator,, no distress, appears stated age   Head:    Normocephalic, without obvious abnormality, atraumatic   Throat:   Lips, mucosa, and tongue normal; teeth and gums normal   Neck:   Supple, symmetrical, trachea midline, no adenopathy;        thyroid:  No enlargement/tenderness/nodules; no  "carotid    bruit or JVD   Back:     Symmetric, no curvature, ROM normal, no CVA tenderness   Lungs:     Clear to auscultation bilaterally, respirations unlabored   Chest wall:    No tenderness or deformity   Heart:    Regular rate and rhythm, S1 and S2 normal, no murmur, rub   or gallop   Abdomen:     Soft, non-tender, bowel sounds active all four quadrants,     no masses, no organomegaly   Extremities:   Normal, atraumatic, no cyanosis or edema   Pulses:   2+ and symmetric all extremities   Skin:   Skin color, texture, turgor normal, no rashes or lesions     Cardiographics:      ECG: Personally reviewed by myself shows sinus rhythm, rightward axis, low voltage, no acute changes.    Echocardiogram:   Flattened septum is consistent with RV pressure/volume overload.  Left ventricular size, wall motion and function are normal. The ejection fraction is > 65%.  The right ventricle is severely dilated.  Moderately decreased right ventricular systolic function  Right ventricular systolic pressure is elevated, consistent with severe pulmonary hypertension.  There is mild to moderate (1-2+) tricuspid regurgitation.  IVC diameter >2.1 cm collapsing <50% with sniff suggests a high RA pressure estimated at 15 mmHg or greater.  Trivial pericardial effusion      Lab Results:   Recent Labs   Lab 09/21/24  0423   WBC 7.1   HGB 8.2*   HCT 26.6*   PLT 86*     Recent Labs   Lab 09/21/24  0423      CO2 33*   BUN 70.6*   .       No results found for: \"CKTOTAL\", \"CKMB\", \"TROPONINI\"        "

## 2024-09-21 NOTE — PLAN OF CARE
Goal Outcome Evaluation:       [facility] - ICU    RN Progress Note:            Pertinent Assessments:      Please refer to flowsheet rows for full assessment     Gets aggitated sometimes- have given Fentanyl boluses X2 so far- sensitive to changes  of pressors           Key Events - This Shift:       May need an abd CT and chest CT (while she'd be down there) consent from  received in case she may need blood products if the Hgb continues to trend downward-no obvious bleeding noted                Barriers to Discharge / Downgrade:     Medical condition         Point of Contact Update:

## 2024-09-21 NOTE — PROGRESS NOTES
FiO2 needs varying this shift 45%-60%. Currently 50% FiO2  PIP's 30-37, Plat 21-25, small amount of air trapping - stable (iP 4-8). Discussed with attending. No changes.  Scheduled duo nebs added.  NO SBT    Anabel Davenport, RT

## 2024-09-21 NOTE — PROGRESS NOTES
"Critical Care Progress Note      09/21/2024    Name: Noelle Gomez MRN#: [id number]   Age: 59 year old YOB: 1965     Hsptl Day# 3  ICU DAY #    MV DAY #             Problem List:   Acute respiratory failure  Pulmonary embolism   Clinically Significant Risk Factors        # Hypokalemia: Lowest K = 3.3 mmol/L in last 2 days, will replace as needed     # Hypomagnesemia: Lowest Mg = 1.6 mg/dL in last 2 days, will replace as needed   # Hypoalbuminemia: Lowest albumin = 2.9 g/dL at 9/17/2024  4:14 AM, will monitor as appropriate   # Thrombocytopenia: Lowest platelets = 86 in last 2 days, will monitor for bleeding   # Hypertension: Noted on problem list       # Acute Hypoxic Respiratory Failure: Documented O2 saturation < 90%. Continue supplemental oxygen as needed  # Acute Hypercapnic Respiratory Failure: based on arterial blood gas results.  Continue supplemental oxygen and ventilatory support as indicated.  # Acute Hypercapnic Respiratory Failure: based on arterial blood gas results.  Continue supplemental oxygen and ventilatory support as indicated.         # Severe Obesity: Estimated body mass index is 51.65 kg/m  as calculated from the following:    Height as of 9/16/24: 1.575 m (5' 2\").    Weight as of this encounter: 128.1 kg (282 lb 6.6 oz)., PRESENT ON ADMISSION  # Moderate Malnutrition: based on nutrition assessment, PRESENT ON ADMISSION   # Asthma: noted on problem list                          Summary/Hospital Course:     59-year-old female with obesity and lymphedema who was transferred here from [facility] for embolectomy with IR.  Small amount of clot removed consistent with chronic thromboembolic disease.    Assessment and plan :       I have personally reviewed the daily labs, imaging studies, cultures and discussed the case with referring physician and consulting physicians.     My assessment and plan by system for this patient is as follows:    Neurology/Psychiatry:   Sedated with " Precedex, Propofol and fentanyl  Seroquel 50 mg twice a day      Cardiovascular:   Chronic thromboembolic pulmonary hypertension  Severely dilated right ventricle  Severe pulmonary hypertension  Atrial dilation bilaterally  Acute DVT of the left femoral vein    Attempted embolectomy by IR with small amount of clot removed  Continue heparin drip    Currently on levo/vaso  Diuresed over 6 liters yesterday, bumex at 0.25 mg/h      Pulmonary/Ventilator Management:   Acute on chronic hypoxic/hypercapnic respiratory failure  Presumed secondary to obesity hypoventilation syndrome and Chronic thromboembolic disease    On 8 mL/kg tidal volume, decreased rate to 26  FiO2 50%        GI and Nutrition :   Continue tube feeds    Some coffee-ground material from the OG tube.  Increased PPI to twice daily and check hemoglobin every 6 hours    Hgb trending down, will get ct to make sure he doesn't have retroperitoneal hematoma      Renal/Fluids/Electrolytes:   VIGNESH on CKD.  Improving  Started Bumex drip  Over 6 liters negative      - monitor function and electrolytes as needed with replacement per ICU protocols.  - generally avoid nephrotoxic agents such as NSAID, IV contrast unless specifically required  - adjust medications as needed for renal clearance  - follow I/O's as appropriate.    Infectious Disease:   CRP elevated upon admission  Chest x-ray clear but agree with continuing antibiotics for now. Ceftriaxone/doxy      Endocrine:     Plan  - ICU insulin protocol, goal sugar <180      Hematology/Oncology:   Thrombocytopenia slowly improving  Continue heparin drip for DVT and chronic PE    Macrocytosis. Folate and B12 levels are normal    ICU Prophylaxis:   1. DVT: Hep gtt  2. VAP: HOB 30 degrees, chlorhexidine rinse  3. Stress Ulcer: PPI  4. Restraints: Nonviolent soft two point restraints required and necessary for patient safety and continued cares and good effect as patient continues to pull at necessary lines, tubes despite  education and distraction. Will readdress daily.          Key Medications:     Current Facility-Administered Medications   Medication Dose Route Frequency Provider Last Rate Last Admin    albumin human 25 % injection 12.5 g  12.5 g Intravenous Q12H Saul Saenz MD   12.5 g at 09/21/24 0754    cefTRIAXone (ROCEPHIN) 2 g vial to attach to  ml bag for ADULTS or NS 50 ml bag for PEDS  2 g Intravenous Q24H Walker Mann, DO   2 g at 09/21/24 1611    chlorhexidine (PERIDEX) 0.12 % solution 15 mL  15 mL Mouth/Throat Q12H ObWalker hopkins, DO   15 mL at 09/21/24 0754    doxycycline (VIBRAMYCIN) 100 mg vial to attach to  mL bag  100 mg Intravenous Q12H Walker Mann, DO   100 mg at 09/21/24 0754    insulin aspart (NovoLOG) injection (RAPID ACTING)  1-7 Units Subcutaneous Q4H Jaylen Beckford MD   1 Units at 09/21/24 1612    ipratropium - albuterol 0.5 mg/2.5 mg/3 mL (DUONEB) neb solution 3 mL  3 mL Nebulization 4x daily Saul Saenz MD   3 mL at 09/21/24 1549    pantoprazole (PROTONIX) 2 mg/mL suspension 40 mg  40 mg Per Feeding Tube Q12H Saul Saenz MD   40 mg at 09/21/24 0638    Or    pantoprazole (PROTONIX) IV push injection 40 mg  40 mg Intravenous Q12H Saul Saenz MD        QUEtiapine (SEROquel) tablet 50 mg  50 mg Oral or Feeding Tube BID Saul Saenz MD   50 mg at 09/21/24 0754    senna-docusate (SENOKOT-S/PERICOLACE) 8.6-50 MG per tablet 1 tablet  1 tablet Oral or Feeding Tube BID Lacy Jackson MD   1 tablet at 09/18/24 2007    Or    senna-docusate (SENOKOT-S/PERICOLACE) 8.6-50 MG per tablet 2 tablet  2 tablet Oral or Feeding Tube BID Lacy Jackson MD   2 tablet at 09/19/24 2001    sodium chloride (PF) 0.9% PF flush 10 mL  10 mL Intracatheter Q8H Oberdorfer, Walker, DO   10 mL at 09/21/24 1612    [START ON 9/24/2024] sodium chloride (PF) 0.9% PF flush 10-40 mL  10-40 mL Intracatheter Q7 Days Walker Mann DO        sodium chloride (PF) 0.9%  PF flush 3 mL  3 mL Intracatheter Q8H Walker Mann DO   3 mL at 09/20/24 0329     Current Facility-Administered Medications   Medication Dose Route Frequency Provider Last Rate Last Admin    bumetanide (BUMEX) 0.25 mg/mL infusion  0.25 mg/hr Intravenous Continuous Saul Saenz MD 1 mL/hr at 09/21/24 1600 0.25 mg/hr at 09/21/24 1600    dexmedeTOMIDine (PRECEDEX) 1,000 mcg in sodium chloride 0.9 % 250 mL infusion  0.1-1.2 mcg/kg/hr (Order-Specific) Intravenous Continuous Lacy Jackson MD 41.8 mL/hr at 09/21/24 1600 1.2 mcg/kg/hr at 09/21/24 1600    dextrose 10% infusion   Intravenous Continuous PRN Walker Mann DO        fentaNYL (SUBLIMAZE) infusion   mcg/hr Intravenous Continuous Jaylen Beckford MD 3 mL/hr at 09/21/24 1600 150 mcg/hr at 09/21/24 1600    heparin 25,000 units in 0.45% NaCl 250 mL ANTICOAGULANT infusion  0-5,000 Units/hr Intravenous Continuous Walker Mann DO 19.5 mL/hr at 09/21/24 1600 1,950 Units/hr at 09/21/24 1600    norepinephrine (LEVOPHED) 4 mg in  mL infusion PREMIX  0.01-0.6 mcg/kg/min Intravenous Continuous Saul Saenz MD 26.4 mL/hr at 09/21/24 1600 0.05 mcg/kg/min at 09/21/24 1600    Patient is already receiving anticoagulation with heparin, enoxaparin (LOVENOX), warfarin (COUMADIN)  or other anticoagulant medication   Does not apply Continuous PRN Lacy Jackson MD        propofol (DIPRIVAN) infusion  5-75 mcg/kg/min Intravenous Continuous Saul Saenz MD 12.7 mL/hr at 09/21/24 1600 15 mcg/kg/min at 09/21/24 1600    Reason statin not prescribed   Other DOES NOT GO TO Daniel Collier MD        vasopressin (VASOSTRICT) 20 Units in sodium chloride 0.9 % 100 mL standard conc infusion  2.4 Units/hr Intravenous Continuous Aida Stratton MD 12 mL/hr at 09/21/24 1600 2.4 Units/hr at 09/21/24 1600               Physical Examination:   Temp:  [97.7  F (36.5  C)-102.3  F (39.1  C)] 99.8  F (37.7  C)  Pulse:  [] 93  Resp:   [12-32] 26  BP: ()/(43-65) 110/53  FiO2 (%):  [45 %-55 %] 50 %  SpO2:  [89 %-100 %] 99 %    Intake/Output Summary (Last 24 hours) at 9/19/2024 1008  Last data filed at 9/19/2024 0400  Gross per 24 hour   Intake 2260.51 ml   Output 3065 ml   Net -804.49 ml     Wt Readings from Last 4 Encounters:   09/21/24 128.1 kg (282 lb 6.6 oz)   09/16/24 139.3 kg (307 lb)   10/09/23 127.5 kg (281 lb)   10/02/23 129.6 kg (285 lb 11.2 oz)     BP - Mean:  [55-92] 77  FiO2 (%): 50 %, Resp: 26, Vent Mode: CMV/AC, Resp Rate (Set): 26 breaths/min, Tidal Volume (Set, mL): 400 mL, PEEP (cm H2O): 5 cmH2O, Resp Rate (Set): 26 breaths/min, Tidal Volume (Set, mL): 400 mL, PEEP (cm H2O): 5 cmH2O  Recent Labs   Lab 09/21/24  0950 09/20/24  0832 09/19/24  0929 09/19/24  0354   PH 7.39 7.33* 7.51* 7.46*   PCO2 57* 62* 42 44   PO2 81 73* 60* 76*   HCO3 35* 33* 33* 31*   O2PER 50 60 40 55       GEN: no acute distress   HEENT: head ncat, sclera anicteric, OP patent, trachea midline   PULM: unlabored synchronous with vent, clear anteriorly    CV/COR: RRR S1S2 no gallop,  No rub, no murmur  ABD: soft nontender, hypoactive bowel sounds, no mass  EXT: Peripheral edema  NEURO: Sedated  SKIN: Scabs on extremities and some ecchymosis  LINES: clean, dry intact         Data:   All data and imaging reviewed     ROUTINE ICU LABS (Last four results)  CMP  Recent Labs   Lab 09/21/24  1605 09/21/24  1142 09/21/24  1119 09/21/24  0740 09/21/24  0423 09/20/24  2348 09/20/24  2346 09/20/24  2054 09/20/24  1820 09/20/24  1723 09/20/24  1334 09/19/24  0357 09/19/24  0344 09/17/24  0535 09/17/24  0414 09/16/24  1259 09/16/24  0956   NA  --   --  144  --  145  --  144  --  144  --  143   < > 139   < > 139   < > 139   POTASSIUM  --   --  3.8  --  3.3*  3.3*  --  3.4  --  3.8  --  3.7   < > 4.1   < > 6.3*   < > 7.3*   CHLORIDE  --   --  104  --  103  --  101  --  103  --  104   < > 99   < > 99   < > 97*   CO2  --   --  33*  --  33*  --  33*  --  31*  --  31*   < >  28   < > 29   < > 28   ANIONGAP  --   --  7  --  9  --  10  --  10  --  8   < > 12   < > 11   < > 14   * 167* 175* 135* 237*   < > 180*   < > 198*   < > 193*   < > 152*   < > 141*   < > 129*   BUN  --   --  67.8*  --  70.6*  --  72.6*  --  74.1*  --  73.1*   < > 76.3*   < > 98.1*   < > 94.8*   CR  --   --  1.26*  --  1.34*  --  1.33*  --  1.33*  --  1.37*   < > 1.58*   < > 2.46*   < > 2.75*   GFRESTIMATED  --   --  49*  --  45*  --  46*  --  46*  --  44*   < > 37*   < > 22*   < > 19*   BEN  --   --  7.9*  --  8.0*  --  7.9*  --  7.9*  --  8.0*   < > 8.4*   < > 8.4*   < > 8.9   MAG  --   --   --   --  2.3  --  1.8  --   --   --  1.6*  --   --   --   --   --  2.9*   PHOS  --   --   --   --  2.5  --   --   --   --   --  3.0  --  3.1  --   --   --   --    PROTTOTAL  --   --   --   --   --   --   --   --   --   --   --   --   --   --  6.1*  --  7.6   ALBUMIN  --   --   --   --   --   --   --   --   --   --   --   --   --   --  2.9*  --  3.5   BILITOTAL  --   --   --   --   --   --   --   --   --   --   --   --   --   --  0.7  --  1.1   ALKPHOS  --   --   --   --   --   --   --   --   --   --   --   --   --   --  74  --  102   AST  --   --   --   --   --   --   --   --   --   --   --   --   --   --  20  --  39   ALT  --   --   --   --   --   --   --   --   --   --   --   --   --   --  13  --  18    < > = values in this interval not displayed.     CBC  Recent Labs   Lab 09/21/24  1119 09/21/24  0423 09/20/24  2346 09/20/24  1820 09/20/24  0531 09/19/24  0344 09/17/24  1129   WBC  --  7.1  --   --  12.1* 9.2 9.8   RBC  --  2.23*  --   --  2.93* 3.27* 3.45*   HGB 7.6* 8.2* 8.3* 9.7* 11.0* 12.1 12.9   HCT  --  26.6*  --   --  34.8* 38.1 40.4   MCV  --  119*  --   --  119* 117* 117*   MCH  --  36.8*  --   --  37.5* 37.0* 37.4*   MCHC  --  30.8*  --   --  31.6 31.8 31.9   RDW  --  16.0*  --   --  16.5* 16.6* 16.9*   PLT  --  86*  --   --  121* 105* 90*     INR  Recent Labs   Lab 09/16/24  0956   INR 1.46*     Arterial  "Blood Gas  Recent Labs   Lab 09/21/24  0950 09/20/24  0832 09/19/24  0929 09/19/24  0354   PH 7.39 7.33* 7.51* 7.46*   PCO2 57* 62* 42 44   PO2 81 73* 60* 76*   HCO3 35* 33* 33* 31*   O2PER 50 60 40 55       All cultures:  No results for input(s): \"CULT\" in the last 168 hours.  No results found for this or any previous visit (from the past 24 hour(s)).        Billing: This patient is critically ill: Yes. Total critical care time today 33 min exclusive of procedures or teaching.          "

## 2024-09-21 NOTE — PROGRESS NOTES
Attempted to take patient to CT. Desaturated to low 80's before left the unit. Decision made to cancel transport.    Anabel Davenport, RT

## 2024-09-22 PROBLEM — I27.20 PULMONARY HYPERTENSION (H): Status: ACTIVE | Noted: 2024-01-01

## 2024-09-22 PROBLEM — I26.09 ACUTE COR PULMONALE (H): Status: ACTIVE | Noted: 2024-01-01

## 2024-09-22 PROBLEM — J90 PLEURAL EFFUSION: Status: ACTIVE | Noted: 2024-01-01

## 2024-09-22 PROBLEM — I27.81 COR PULMONALE, CHRONIC (H): Status: ACTIVE | Noted: 2024-01-01

## 2024-09-22 NOTE — PLAN OF CARE
[facility] - ICU    RN Progress Note:            Pertinent Assessments:      Please refer to flowsheet rows for full assessment     Vital signs stable, no fever, transfused one unit blood for HGB of 6.7. Turned and repositioned every two hours. CHG bath complete.           Key Events - This Shift:       Uneventful                Barriers to Discharge / Downgrade:     Endotracheal tube         Point of Contact Update: No  If No, reason: Uneventful  Name:  Phone Number:  Summary of Conversation:

## 2024-09-22 NOTE — PROGRESS NOTES
CARDIOLOGY PROGRESS NOTE      Assessment/Plan:  1.  Heart failure-predominantly right-sided secondary to RV dysfunction.  With IV continuous Bumex drip weight down approximately 14 kg.  Oxygen saturation 90% on 60 L.  Creatinine about the same, continue to monitor and if increases significantly we will need to discuss discontinuing Bumex drip.  2.  RV dysfunction-RV pressure and volume overload noted with severe dilatation of right ventricle with moderately decreased right ventricular systolic function.  This is most likely secondary to pulmonary hypertension.  Intensivist in communication with [facility] for possible RVAD.  3.  Pulmonary hypertension-moderate to severe, estimated right-sided systolic pressure is 80 mmHg.  This is most likely secondary to WHO class IV, chronic CTPE.  4.  Pulmonary emboli-secondary to DVT, chronic anticoagulation needed.  Defer IVC filter to pulmonary team.  Was to be on heparin, discontinued secondary to bleeding, on hold.  5.  Morbid obesity-drastic need for weight loss.  6.  Sleep apnea-needs treatment for this.  7.  Respiratory failure-ventilator per intensivist.  8.  Chronic kidney disease-creatinine improved, 1.30 when it was 2.68 on admission.  Continue to monitor with Bumex as it is up from 1.18 yesterday to 1.30 today.    Plan:  1.  Continue current IV Bumex drip and restart heparin if able.  Agree with need for conversation concerning m RVAD at the [facility].  3.  Defer to intensivist to discuss goals of care with .      Discharge Plannin.  Barriers to discharge are numerous.  2.  Can follow with me as primary cardiologist.     LOS: 4 days     Subjective:  Interval History:    59-year-old white female being seen on fifth day of hospitalization.  No history available, sedated on ventilator.   and son at bedside,  very concerned about how long she can stay on ventilator.    Medications  Current Facility-Administered  Medications   Medication Dose Route Frequency Provider Last Rate Last Admin    albumin human 25 % injection 12.5 g  12.5 g Intravenous Q12H Saul Saenz MD   12.5 g at 09/22/24 0757    chlorhexidine (PERIDEX) 0.12 % solution 15 mL  15 mL Mouth/Throat Q12H Walker Mann DO   15 mL at 09/22/24 0758    insulin aspart (NovoLOG) injection (RAPID ACTING)  1-7 Units Subcutaneous Q4H Jaylen Beckford MD   1 Units at 09/22/24 1223    ipratropium - albuterol 0.5 mg/2.5 mg/3 mL (DUONEB) neb solution 3 mL  3 mL Nebulization 4x daily Saul Saenz MD   3 mL at 09/22/24 1138    pantoprazole (PROTONIX) 2 mg/mL suspension 40 mg  40 mg Per Feeding Tube Q12H Saul Saenz MD   40 mg at 09/21/24 0638    Or    pantoprazole (PROTONIX) IV push injection 40 mg  40 mg Intravenous Q12H Saul Saenz MD   40 mg at 09/22/24 0635    QUEtiapine (SEROquel) tablet 50 mg  50 mg Oral or Feeding Tube BID Saul Saenz MD   50 mg at 09/21/24 2015    senna-docusate (SENOKOT-S/PERICOLACE) 8.6-50 MG per tablet 1 tablet  1 tablet Oral or Feeding Tube BID Lacy Jackson MD   1 tablet at 09/18/24 2007    Or    senna-docusate (SENOKOT-S/PERICOLACE) 8.6-50 MG per tablet 2 tablet  2 tablet Oral or Feeding Tube BID Lacy Jackson MD   2 tablet at 09/19/24 2001    sodium chloride (PF) 0.9% PF flush 10 mL  10 mL Intracatheter Q8H Walker Mann DO   10 mL at 09/21/24 1612    [START ON 9/24/2024] sodium chloride (PF) 0.9% PF flush 10-40 mL  10-40 mL Intracatheter Q7 Days Walker Mann,         sodium chloride (PF) 0.9% PF flush 3 mL  3 mL Intracatheter Q8H Walker Mann DO   3 mL at 09/20/24 0329       Objective:   Vital signs in last 24 hours:  Temp:  [97.5  F (36.4  C)-99.1  F (37.3  C)] 97.7  F (36.5  C)  Pulse:  [] 84  Resp:  [24-30] 26  BP: ()/(50-64) 119/59  FiO2 (%):  [45 %-70 %] 60 %  SpO2:  [85 %-100 %] 92 %      Physical Exam:  /59   Pulse 84   Temp 97.7  F (36.5  C)    "Resp 26   Wt 124.8 kg (275 lb 1.6 oz)   SpO2 92%   BMI 50.32 kg/m      General Appearance:    Sedated, on ventilator,, no distress, appears stated age   Head:    Normocephalic, without obvious abnormality, atraumatic   Throat:   Lips, mucosa, and tongue normal; teeth and gums normal   Neck:   Supple, symmetrical, trachea midline, no adenopathy;        thyroid:  No enlargement/tenderness/nodules; no carotid    bruit or JVD   Back:     Symmetric, no curvature, ROM normal, no CVA tenderness   Lungs:     Clear to auscultation bilaterally, respirations unlabored   Chest wall:    No tenderness or deformity   Heart:    Regular rate and rhythm, S1 and S2 normal, no murmur, rub   or gallop   Abdomen:     Soft, non-tender, bowel sounds active all four quadrants,     no masses, no organomegaly   Extremities:   Normal, atraumatic, no cyanosis or edema   Pulses:   2+ and symmetric all extremities   Skin:   Skin color, texture, turgor normal, no rashes or lesions     Cardiographics:      ECG: Personally reviewed by myself shows sinus rhythm, rightward axis, low voltage, no acute changes.    Echocardiogram:   Flattened septum is consistent with RV pressure/volume overload.  Left ventricular size, wall motion and function are normal. The ejection fraction is > 65%.  The right ventricle is severely dilated.  Moderately decreased right ventricular systolic function  Right ventricular systolic pressure is elevated, consistent with severe pulmonary hypertension.  There is mild to moderate (1-2+) tricuspid regurgitation.  IVC diameter >2.1 cm collapsing <50% with sniff suggests a high RA pressure estimated at 15 mmHg or greater.  Trivial pericardial effusion      Lab Results:   Recent Labs   Lab 09/22/24 0427   WBC 9.6   HGB 7.8*   HCT 25.5*   *     Recent Labs   Lab 09/22/24 0427      CO2 34*   BUN 66.7*   .       No results found for: \"CKTOTAL\", \"CKMB\", \"TROPONINI\"        "

## 2024-09-22 NOTE — PLAN OF CARE
Problem: Adult Inpatient Plan of Care  Goal: Plan of Care Review  Description: The Plan of Care Review/Shift note should be completed every shift.  The Outcome Evaluation is a brief statement about your assessment that the patient is improving, declining, or no change.  This information will be displayed automatically on your shift  note.  Outcome: Progressing   Goal Outcome Evaluation:       [facility] - ICU    RN Progress Note:            Pertinent Assessments:      Please refer to flowsheet rows for full assessment     Sedated and mechanical vented. Vitals stable.            Key Events - This Shift:       Bloody watery stool from [facility] and OG tube hemoglobin at 3 pm 7.6 MD notified to do Hgb every 6 hours. Hgb at 2100 7.5. IVC filter placed via right neck no bleeding or hematoma noted site CDI.                 Barriers to Discharge / Downgrade:     Patient will transfer to [facility] ICU room 4214 .report given to RN Pranay. Shaw Zacarias was updated of transfer.

## 2024-09-22 NOTE — DISCHARGE SUMMARY
ICU Discharge Summary    Admit date: 9/17/2024    Patient YOB: 1965     Date of discharge 9/22/2024    Reason for Admission: [facility]     Hospital Summary: Noelle Gomez is a 59 year old lady with a past medical history significant for asthma, former smoker with a 40-pack-year history of smoking, HTN, lymphedema, prediabetes, vitamin D deficiency, morbid obesity and lower extremity stasis, who presented to [facility] for acute on chronic hypercarbic, hypoxemic respiratory failure with a heart failure exacerbation thought to be due to medication non-compliance. Evaluation during her hospitalization revealed an acute left lower extremity DVT (for which she was initiated on a heparin gtt), moderate-severe right heart failure with a VQ scan that demonstrated mismatched perfusion defects. She was transferred to [facility] to undergo a thrombectomy and was noted to have mostly chronic clot with resection of some small acute clot. She has continued to require vasopressor and ventilatory support. She was noted to develop acute anemia, maroon stool and required 1 unit of PRBC in addition to discontinuation of her heparin gtt.    Hospital summary by organ system:  NEURO:  Is intubated and sedated presently. Demonstrates significant agitation with lightening of sedation but has no focal neurological deficits. Likely has ICU delirium.  Sedated with Precedex and Propofol.  Receiving Fentanyl gtt for pain control.  CVS:  Presented in shock to [facility] and subsequently diagnosed with Cor Pulmonale on TTE. She was initiated on vasopressor support with Norepinephrine and briefly administered Epoprostenol to alleviate right heart strain. A subsequent VQ scan revealed mismatched perfusion defects and she was transferred to [place] to undergo an IR thrombectomy. The latter revealed mostly chronic clot with some minimal acute clot that was extracted. It is noted that  her PA pressures were 77/33 (mean 47mm Hg) pre thrombectomy and went down only to 68/19 (mean 34 mm Hg) following the thrombectomy, confirming a diagnosis of Group 4 PH from Chronic Thromboembolic Pulmonary Disease.   She was initiated on a heparin gtt for management of a DVT that had likely led to her acute decompensation, however, she has demonstrate an acute blood loss anemia over the last 24h and this has been discontinued.   We were able to talk to the heart failure service at [facility], where we believe, she would be better served and appreciate that they agree this is an appropriate transfer. At the time of discharge, she continues to require low dose norepinephrine.  PULM:  Was intubated for altered mental status thought to be due to acute on chronic hypercarbic, hypoxemic respiratory failure thought, initially to be, due to a combination of furosemide non-compliance and LORA/OHS. She has mostly not had high oxygen needs and was briefly treated with Epoprostenol on transfer from [facility] to [facility] to support the RV while she was enroute.   Over the last 24h she has had escalating oxygen requirements and imaging earlier today has revealed a moderate posterior right sided and small left sided pleural effusion which are likely contributing to this. Given that her heparin gtt has been discontinued due to acute blood loss anemia earlier today, we are having an IVC filter placed and she would next require drainage of her right sided pleural effusion. I have talked to Dr. Lambert about this and he understands this may have to happen at [facility] given timing of IVC filter and bed availability at [facility].  GI:  Demonstrates ongoing maroon, watery stool likely consistent with a GI bleed from her recently discontinued heparin. Given that heparin was discontinued >24h ago, will not administer protamine.   She is undergoing Q6h Hemoglobin checks and should be transfused for a Hb<7gm/dl.  RENAL:  Had presented with acute renal  failure and hyperkalemia which has resolved with vasopressor support. She has tolerated ongoing diuresis on a fixed dose Bumex gtt and is presently 10.7 l down from the time of admission.   HEME/ONC:  Acute left lower extremity DVT noted on admission for which she was initiated on a heparin gtt. This has since been discontinued due to blood loss.  Has undergone placement of an IVC filter.  Will need a thorough hypercoagulability work up, son has Factor V Leiden deficiency and has had Pe's also.  INFECTIOUS DISEASES:   CRP elevated upon admission. Chest x-ray clear and no growth on cultures.  Has completed a 7 day course of Ceftriaxone and Doxycycline. Will discontinue today.  ENDOCRINE:No acute issues.  DISPOSITION/SOCIAL ISSUES:Supportive spouse and sons.    Consults:    cardiology and nephrology      Lines/Drains/Tubes:  Central line Right basilic PICC, Placed on 9/20  Arterial line Left radial, Placed on 9/17  ETT placed on 9/16, 7.0  Feeding tube placed on 9/16, tube feeds @ 37ml/h  Miller catheter placed on 9/20  Rectal tube placed on 9/16    /59   Pulse 76   Temp 97.3  F (36.3  C)   Resp 26   Wt 124.8 kg (275 lb 1.6 oz)   SpO2 99%   BMI 50.32 kg/m    Physical Exam  Constitutional:       Comments: Intubated and sedated.  NAD.   HENT:      Head: Normocephalic.      Mouth/Throat:      Mouth: Mucous membranes are dry.   Cardiovascular:      Rate and Rhythm: Normal rate and regular rhythm.      Heart sounds: Normal heart sounds.   Pulmonary:      Comments: Diminished BS BL.  Abdominal:      General: Bowel sounds are normal.      Palpations: Abdomen is soft.   Skin:     General: Skin is warm.   Neurological:      General: No focal deficit present.          Principal Diagnosis:  <principal problem not specified>     Other Diagnoses: Active Problems:    HTN (hypertension)    Lymphedema    UPJ (ureteropelvic junction) obstruction    VIGNESH (acute kidney injury) (H24)    Acute deep vein thrombosis (DVT) of  femoral vein of left lower extremity (H)    Impaired skin integrity    Cor pulmonale, chronic (H)    Acute cor pulmonale (H)    Pulmonary hypertension (H)    Pleural effusion      Admitting Physician: Lacy Jackson MD     Primary Care Physician: No Ref-Primary, Physician    Discharge Physician: MD Jaylen Dye MD Avraham (Henrry) MD Shakeel  [facility] ([facility])  Send me a secure message using MeetMe  [facility] [phone]  Fax [phone]

## 2024-09-22 NOTE — PROCEDURES
[facility]    Procedure: IR Procedure Note    Date/Time: 9/22/2024 5:00 PM    Performed by: Lev Reyes MD  Authorized by: Lev Reyes MD  IR Fellow Physician:    Pre Procedure Diagnosis: DVT, pulmonary HTN, GI bleed  Post Procedure Diagnosis: same    UNIVERSAL PROTOCOL   Site Marked: NA  Prior Images Obtained and Reviewed:  Yes  Required items: Required blood products, implants, devices and special equipment available    Patient identity confirmed:  Arm band, provided demographic data, anonymous protocol, patient vented/unresponsive and hospital-assigned identification number  NA - No sedation, light sedation, or local anesthesia  Confirmation Checklist:  Patient's identity using two indicators, relevant allergies, procedure was appropriate and matched the consent or emergent situation and correct equipment/implants were available  Time out: Immediately prior to the procedure a time out was called    Universal Protocol: the Joint Commission Universal Protocol was followed    Preparation: Patient was prepped and draped in usual sterile fashion      SEDATION    Patient Sedated: No    Findings: Bard [place] retrievable IVC filter placed into infrarenal IVC.    Specimens: none    Procedural Complications: None    Plan: Resume previous management      PROCEDURE  Describe Procedure: Bard [place] retrievable IVC filter placed into infrarenal IVC.  Length of time physician/provider present for 1:1 monitoring during sedation:  0 min

## 2024-09-22 NOTE — PROGRESS NOTES
ICU UPDATE:  Talked to Ace Guerra and Petra at [facility]. They agree that the patient would be more appropriate for their ICU as they would likely place a swan and attempt pulmonary vasodilation with Riociguat. Once she is more stable, she may be a candidate for a thrombectomy for her CTED (with Dr. More) and has the option to be temporized with a BPA (balloon pulmonary angioplasty). The family is agreeable to this transfer.    Separately, the patient has had maroon colored watery stool (300ml) and some blood tinged oral secretions. We have discontinued the heparin gtt and have spoken to IR for an urgent removable IVC filter placement.    We will continue to monitor her hemoglobin Q6h and transfuse for a hemoglobin <7g/dl.    Total critical care time 45 minutes.    Karol Rivera MD  Pulmonary and Critical Care  [facility]

## 2024-09-22 NOTE — PROGRESS NOTES
FiO2 60-70% this shift.  No other vent changes made.  No SBT, high ventilator needs and pressors.    Anabel Davenport, RT

## 2024-09-22 NOTE — PLAN OF CARE
[facility] - ICU    RN Progress Note:            Pertinent Assessments:      Please refer to flowsheet rows for full assessment     VS/Hemodynamics           Key Events - This Shift:     Assumed care of pt at 1200. VSS/afebrile. Dex/Propofol/Fentanyl infusing to achieve ordered RASS goal. Levo titrated to keep MAP > 65. Bumex gtt infusing with good urine output. During 1400 reposition, a large amount of maroon liquid stool bypassed pt's dignicare; about 300cc drained into bag, with an estimated 200-300cc on incontinence pad. Dr. Rivera updated; hgb ordered and awaiting results.             Barriers to Discharge / Downgrade:     ICU level of cares; pt accepted by MD at [facility]; awaiting bed availability         Point of Contact Update: YES-OR-NO: Yes  Name: Scar Padron Timothy  Phone Number: at bedside  Summary of Conversation: updated on POC   Goal Outcome Evaluation:    Plan of Care Reviewed With: spouse, child    Overall Patient Progress: no changeOverall Patient Progress: no change    Outcome Evaluation: see shift note

## 2024-09-22 NOTE — PROGRESS NOTES
RT PROGRESS NOTE    VENT DAY# 7    CURRENT SETTINGS:   FiO2 (%): (S) 60 %, Resp: 26, Vent Mode: CMV/AC, Resp Rate (Set): 26 breaths/min, Tidal Volume (Set, mL): 400 mL, PEEP (cm H2O): 8 cmH2O, Resp Rate (Set): 26 breaths/min, Tidal Volume (Set, mL): 400 mL, PEEP (cm H2O): 8 cmH2O    PATIENT PARAMETERS:  PIP 33  Pplat:  22  Pmean:  14  Compliance: 37.4  Secretions:  Moderate white/tan thick  02 Sats:  94%  BS: coarse    ETT SIZE 7.0 Secured at 22 cm at teeth/gums    Respiratory Medications: Duoneb QID    NOTE / SHIFT SUMMARY:   Patient on full vent support all night . Up and down with FIO2. Currently at 60%, but has needed to increase to 80% with cares. RT will continue to follow.     Randa Genao, RT

## 2024-09-22 NOTE — PROGRESS NOTES
"Critical Care Progress Note      09/22/2024    Name: Noelle Gomez MRN#: [id number]   Age: 59 year old YOB: 1965     Hsptl Day# 4  ICU DAY #    MV DAY #             Problem List:   CTED  Decompensated Pulmonary HTN  Clinically Significant Risk Factors        # Hypokalemia: Lowest K = 3.3 mmol/L in last 2 days, will replace as needed     # Hypomagnesemia: Lowest Mg = 1.6 mg/dL in last 2 days, will replace as needed   # Hypoalbuminemia: Lowest albumin = 2.9 g/dL at 9/17/2024  4:14 AM, will monitor as appropriate   # Thrombocytopenia: Lowest platelets = 86 in last 2 days, will monitor for bleeding   # Hypertension: Noted on problem list       # Acute Hypoxic Respiratory Failure: Documented O2 saturation < 90%. Continue supplemental oxygen as needed  # Acute Hypercapnic Respiratory Failure: based on arterial blood gas results.  Continue supplemental oxygen and ventilatory support as indicated.  # Acute Hypercapnic Respiratory Failure: based on arterial blood gas results.  Continue supplemental oxygen and ventilatory support as indicated.         # Severe Obesity: Estimated body mass index is 50.32 kg/m  as calculated from the following:    Height as of 9/16/24: 1.575 m (5' 2\").    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       Assessment and plan :       I have personally reviewed the daily labs, imaging studies, cultures and discussed the case with referring physician and consulting physicians.     My assessment and plan by system for this patient is as " follows:    Neurology/Psychiatry: No focal deficits on exam. Agitated when sedation is lightened, likely due to ICU delirium.  Sedated with Precedex, Propofol and fentanyl  Seroquel 50 mg twice a day      Cardiovascular: Chronic thromboembolic pulmonary hypertension with a severely dilated right ventricle and moderate PH. Noted to have biatrial dilation and an acute DVT of the left femoral vein. Underwent a VQ scan (unable to undergo CTA due to acute renal failure when she initially presented to [facility]) which demonstrated multiple mismatched defects and subsequently underwent an embolectomy with IR with small amount of clot removed but noted to have mostly chronic clot confirming a diagnosis of CTED.  Has continued to require on and off vasopressor support to assist with diuresis and is 9.9l down from the time of admission. Was briefly on epoprostenol to help offload RV acutely prior to thrombectomy but this has been off since. Had discussed the patient with Dr. Lambert at [facility] initially and he agreed with our management.  Continue norepinephrine to maintain MAP of 65mmHg.  Heparin drip on hold due to declining hemoglobin on 9/21.  Diuresed over 6.4 liters yesterday, bumex at 0.25 mg/h  Will reach out to heart failure team at [facility] to see if there is any other intervention that may be offered.    Pulmonary: Acute on chronic hypoxic/hypercapnic respiratory failure, presumed secondary to obesity hypoventilation syndrome and chronic thromboembolic disease. Has continued to exhibit fluctuating oxygen requirements and had become acute hypoxic on 9/21. Has undergone a thrombectomy on 9/17 with removal of very small acute clot and evidence of chronic thromboembolic disease. Likely has a very small component of LORA contributing to her P.HTN.  Maintain oxygen saturations >92%.  FiO2 (%): 60 %, Resp: 26, Vent Mode: CMV/AC, Resp Rate (Set): 26 breaths/min, Tidal Volume (Set, mL): 400 mL, PEEP (cm H2O): 8 cmH2O,  Resp Rate (Set): 26 breaths/min, Tidal Volume (Set, mL): 400 mL, PEEP (cm H2O): 8 cmH2O    GI and Nutrition : Had some coffee ground material from OG tube and PPI was increased to twice a day. Will attempt to re-evaluate for RP bleed again today.  Continue tube feeds.  CT AP today.    Renal/Fluids/Electrolytes: Had presented to [facility] with acute renal failure with hyperkalemia and metabolic acidosis and in addition was very volume up. Has been on vasopressor support and has had good diuresis with bumex and has stable electrolytes.  Monitor function and electrolytes as needed with replacement per ICU protocols.  Adjust medications as needed for renal clearance.  Follow I/O's as appropriate.    Infectious Disease: CRP elevated upon admission. Chest x-ray clear and no growth on cultures.  Has completed a 7 day course of Ceftriaxone and Doxycycline. Will discontinue today.      Endocrine:   Plan  - ICU insulin protocol, goal sugar <180      Hematology/Oncology: Noted to have acute and chronic pulmonary embolic and s/p IR thrombectomy of small amount of clot. Was initiated on a heparin gtt for her PE but then began to exhibit a declining hemoglobin and on 9/21 required a blood transfusion. Has thrombocytopenia which is slowly improving. Macrocytosis noted, Folate and B12 levels are normal  Heparin gtt on hold. Will undergo a CT abdomen today to look for an RP bleed.  May consider an IVC filter if she cannot go back on a heparin gtt.        ICU Prophylaxis:   1. DVT: Hep gtt  2. VAP: HOB 30 degrees, chlorhexidine rinse  3. Stress Ulcer: PPI  4. Restraints: Nonviolent soft two point restraints required and necessary for patient safety and continued cares and good effect as patient continues to pull at necessary lines, tubes despite education and distraction. Will readdress daily.          Key Medications:     Current Facility-Administered Medications   Medication Dose Route Frequency Provider Last Rate Last Admin     albumin human 25 % injection 12.5 g  12.5 g Intravenous Q12H Saul Saenz MD   12.5 g at 09/22/24 0757    chlorhexidine (PERIDEX) 0.12 % solution 15 mL  15 mL Mouth/Throat Q12H Walker Mann, DO   15 mL at 09/22/24 0758    insulin aspart (NovoLOG) injection (RAPID ACTING)  1-7 Units Subcutaneous Q4H Jaylen Beckford MD   1 Units at 09/22/24 0756    ipratropium - albuterol 0.5 mg/2.5 mg/3 mL (DUONEB) neb solution 3 mL  3 mL Nebulization 4x daily Saul Saenz MD   3 mL at 09/22/24 0749    pantoprazole (PROTONIX) 2 mg/mL suspension 40 mg  40 mg Per Feeding Tube Q12H Saul Saenz MD   40 mg at 09/21/24 0638    Or    pantoprazole (PROTONIX) IV push injection 40 mg  40 mg Intravenous Q12H Saul Saenz MD   40 mg at 09/22/24 0635    QUEtiapine (SEROquel) tablet 50 mg  50 mg Oral or Feeding Tube BID Saul Saenz MD   50 mg at 09/21/24 2015    senna-docusate (SENOKOT-S/PERICOLACE) 8.6-50 MG per tablet 1 tablet  1 tablet Oral or Feeding Tube BID Lacy Jackson MD   1 tablet at 09/18/24 2007    Or    senna-docusate (SENOKOT-S/PERICOLACE) 8.6-50 MG per tablet 2 tablet  2 tablet Oral or Feeding Tube BID Lacy Jackson MD   2 tablet at 09/19/24 2001    sodium chloride (PF) 0.9% PF flush 10 mL  10 mL Intracatheter Q8H Walker Mann DO   10 mL at 09/21/24 1612    [START ON 9/24/2024] sodium chloride (PF) 0.9% PF flush 10-40 mL  10-40 mL Intracatheter Q7 Days Walker Mann,         sodium chloride (PF) 0.9% PF flush 3 mL  3 mL Intracatheter Q8H Walker Mann DO   3 mL at 09/20/24 0329     Current Facility-Administered Medications   Medication Dose Route Frequency Provider Last Rate Last Admin    bumetanide (BUMEX) 0.25 mg/mL infusion  0.25 mg/hr Intravenous Continuous IvSaul garcia MD 1 mL/hr at 09/22/24 0725 0.25 mg/hr at 09/22/24 0725    dexmedeTOMIDine (PRECEDEX) 1,000 mcg in sodium chloride 0.9 % 250 mL infusion  0.1-1.2 mcg/kg/hr (Order-Specific)  Intravenous Continuous Lacy Jackson MD 41.8 mL/hr at 09/22/24 0818 1.2 mcg/kg/hr at 09/22/24 0818    dextrose 10% infusion   Intravenous Continuous PRN Walker Mann DO        fentaNYL (SUBLIMAZE) infusion   mcg/hr Intravenous Continuous Jaylen Beckford MD 3 mL/hr at 09/22/24 1045 150 mcg/hr at 09/22/24 1045    [Held by provider] heparin 25,000 units in 0.45% NaCl 250 mL ANTICOAGULANT infusion  0-5,000 Units/hr Intravenous Continuous Walker Mann DO   Stopped at 09/21/24 2040    norepinephrine (LEVOPHED) 4 mg in  mL infusion PREMIX  0.01-0.6 mcg/kg/min Intravenous Continuous Saul Saenz MD 31.7 mL/hr at 09/22/24 1020 0.06 mcg/kg/min at 09/22/24 1020    Patient is already receiving anticoagulation with heparin, enoxaparin (LOVENOX), warfarin (COUMADIN)  or other anticoagulant medication   Does not apply Continuous PRN Lacy Jackson MD        propofol (DIPRIVAN) infusion  5-75 mcg/kg/min Intravenous Continuous Saul Saenz MD 21.1 mL/hr at 09/22/24 0900 25 mcg/kg/min at 09/22/24 0900    Reason statin not prescribed   Other DOES NOT GO TO Daniel Collier MD        vasopressin (VASOSTRICT) 20 Units in sodium chloride 0.9 % 100 mL standard conc infusion  2.4 Units/hr Intravenous Continuous Aida Stratton MD   Paused at 09/22/24 0145               Physical Examination:   Temp:  [97.5  F (36.4  C)-99.1  F (37.3  C)] 98.1  F (36.7  C)  Pulse:  [] 85  Resp:  [24-30] 26  BP: ()/(50-64) 119/59  FiO2 (%):  [45 %-70 %] 60 %  SpO2:  [85 %-100 %] 92 %    Intake/Output Summary (Last 24 hours) at 9/19/2024 1008  Last data filed at 9/19/2024 0400  Gross per 24 hour   Intake 2260.51 ml   Output 3065 ml   Net -804.49 ml     Wt Readings from Last 4 Encounters:   09/22/24 124.8 kg (275 lb 1.6 oz)   09/16/24 139.3 kg (307 lb)   10/09/23 127.5 kg (281 lb)   10/02/23 129.6 kg (285 lb 11.2 oz)     BP - Mean:  [64-90] 85  FiO2 (%): 60 %, Resp: 26, Vent Mode: CMV/AC, Resp Rate  (Set): 26 breaths/min, Tidal Volume (Set, mL): 400 mL, PEEP (cm H2O): 8 cmH2O, Resp Rate (Set): 26 breaths/min, Tidal Volume (Set, mL): 400 mL, PEEP (cm H2O): 8 cmH2O  Recent Labs   Lab 09/21/24  0950 09/20/24  0832 09/19/24  0929 09/19/24  0354   PH 7.39 7.33* 7.51* 7.46*   PCO2 57* 62* 42 44   PO2 81 73* 60* 76*   HCO3 35* 33* 33* 31*   O2PER 50 60 40 55       GEN: no acute distress   HEENT: head ncat, sclera anicteric, OP patent, trachea midline   PULM: unlabored synchronous with vent, clear anteriorly    CV/COR: RRR S1S2 no gallop,  No rub, no murmur  ABD: soft nontender, hypoactive bowel sounds, no mass  EXT: Peripheral edema  NEURO: Sedated  SKIN: Scabs on extremities and some ecchymosis  LINES: clean, dry intact         Data:   All data and imaging reviewed     ROUTINE ICU LABS (Last four results)  CMP  Recent Labs   Lab 09/22/24  0727 09/22/24  0427 09/22/24  0359 09/21/24  2357 09/21/24  2019 09/21/24  1822 09/21/24  1142 09/21/24  1119 09/21/24  0740 09/21/24  0423 09/20/24  2348 09/20/24  2346 09/20/24  1723 09/20/24  1334 09/19/24  0357 09/19/24  0344 09/17/24  0535 09/17/24  0414 09/16/24  1259 09/16/24  0956   NA  --  145  --   --   --  143  --  144  --  145  --  144   < > 143   < > 139   < > 139   < > 139   POTASSIUM  --  3.9  --   --   --  3.7  --  3.8  --  3.3*  3.3*  --  3.4   < > 3.7   < > 4.1   < > 6.3*   < > 7.3*   CHLORIDE  --  103  --   --   --  103  --  104  --  103  --  101   < > 104   < > 99   < > 99   < > 97*   CO2  --  34*  --   --   --  32*  --  33*  --  33*  --  33*   < > 31*   < > 28   < > 29   < > 28   ANIONGAP  --  8  --   --   --  8  --  7  --  9  --  10   < > 8   < > 12   < > 11   < > 14   * 173* 159* 155*   < > 183*   < > 175*   < > 237*   < > 180*   < > 193*   < > 152*   < > 141*   < > 129*   BUN  --  66.7*  --   --   --  65.2*  --  67.8*  --  70.6*  --  72.6*   < > 73.1*   < > 76.3*   < > 98.1*   < > 94.8*   CR  --  1.30*  --   --   --  1.18*  --  1.26*  --  1.34*  --   1.33*   < > 1.37*   < > 1.58*   < > 2.46*   < > 2.75*   GFRESTIMATED  --  47*  --   --   --  53*  --  49*  --  45*  --  46*   < > 44*   < > 37*   < > 22*   < > 19*   BEN  --  8.4*  --   --   --  7.6*  --  7.9*  --  8.0*  --  7.9*   < > 8.0*   < > 8.4*   < > 8.4*   < > 8.9   MAG  --  1.9  --   --   --   --   --   --   --  2.3  --  1.8  --  1.6*  --   --   --   --   --  2.9*   PHOS  --  2.6  --   --   --   --   --   --   --  2.5  --   --   --  3.0  --  3.1  --   --   --   --    PROTTOTAL  --   --   --   --   --   --   --   --   --   --   --   --   --   --   --   --   --  6.1*  --  7.6   ALBUMIN  --   --   --   --   --   --   --   --   --   --   --   --   --   --   --   --   --  2.9*  --  3.5   BILITOTAL  --   --   --   --   --   --   --   --   --   --   --   --   --   --   --   --   --  0.7  --  1.1   ALKPHOS  --   --   --   --   --   --   --   --   --   --   --   --   --   --   --   --   --  74  --  102   AST  --   --   --   --   --   --   --   --   --   --   --   --   --   --   --   --   --  20  --  39   ALT  --   --   --   --   --   --   --   --   --   --   --   --   --   --   --   --   --  13  --  18    < > = values in this interval not displayed.     CBC  Recent Labs   Lab 09/22/24  0427 09/22/24  0303 09/21/24  2046 09/21/24  1119 09/21/24  0423 09/20/24  1820 09/20/24  0531   WBC 9.6  --  6.6  --  7.1  --  12.1*   RBC 2.27*  --  1.85*  --  2.23*  --  2.93*   HGB 7.8* 7.9* 6.7* 7.6* 8.2*   < > 11.0*   HCT 25.5*  --  22.2*  --  26.6*  --  34.8*   *  --  120*  --  119*  --  119*   MCH 34.4*  --  36.2*  --  36.8*  --  37.5*   MCHC 30.6*  --  30.2*  --  30.8*  --  31.6   RDW 20.8*  --  16.1*  --  16.0*  --  16.5*   *  --  89*  --  86*  --  121*    < > = values in this interval not displayed.     INR  Recent Labs   Lab 09/16/24  0956   INR 1.46*     Arterial Blood Gas  Recent Labs   Lab 09/21/24  0950 09/20/24  0832 09/19/24  0929 09/19/24  0354   PH 7.39 7.33* 7.51* 7.46*   PCO2 57* 62* 42 44   PO2 81  "73* 60* 76*   HCO3 35* 33* 33* 31*   O2PER 50 60 40 55       All cultures:  No results for input(s): \"CULT\" in the last 168 hours.  No results found for this or any previous visit (from the past 24 hour(s)).        Billing: This patient is critically ill: Yes. Total critical care time today 45 min exclusive of procedures or teaching.       Karol Rivera MD  Pulmonary and Critical Care  [facility]       "

## 2024-09-22 NOTE — PROGRESS NOTES
"Care Management Follow Up    Length of Stay (days): 4    Expected Discharge Date: 09/26/2024    Anticipated Discharge Plan:   TBD     Transportation: TBD    PT Recommendations:    OT Recommendations:   (pending progress)     Barriers to Discharge: medical stability/ Vent Day #7. On bumex. Precedex, fetanyl, and levo gtts.       Prior Living Situation: \"Pt lives with spouse Vito in an apartment. Pt Ind with ADLS and most IADLS, except transport pt's  or pt's son Scar assist. No home care svcs prior. No mobility aides. 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 Pt has established PCP. Spouse anticipates pt to return home if safe to do so with home care if needed. Spouse to transport if medically safe to do so. Spouse would like son Scar to be primary contact during the day since spouse has odd working hours. \"       Discussed  Partnership in Safe Discharge Planning  document with patient/family: No     Handoff Completed: Not at this time     Patient/Spokesperson Updated: No    Additional Information:    Bedside RN paged writer.  Vito plans to go to his job tomorrow to talk to them about having some time off this week. He is hoping to have a care conference set up once he knows his schedule more.       Next Steps: Cm assist with setting up care conference and follow up with santos Padron. Cm will continue to follow plan of care,review recommendations, and assist with any discharge needs anticipated.       Janet Mcqueen RN      "

## 2024-09-22 NOTE — IR NOTE
Interventional Radiology Intra-procedural Nursing Note  Patient Name: Noelle Gomez  Medical Record Number: [id number]  Today's Date: September 22, 2024    Procedure: IVC filter placement    Sedation time: no new meds, only ICU continued meds.         Procedure well tolerated by patient without complications. Procedure end with debrief by Dr. Reyes.  Report given to Tika Barnes RN

## 2024-09-22 NOTE — PLAN OF CARE
[facility] - ICU    RN Progress Note:            Pertinent Assessments:      Please refer to flowsheet rows for full assessment     Remained vented and sedated, intermittently agitated during cares and repositions. HGB trending down. On the way to Chest, abdomen and pelvics CT around 1815, desats down to 84% during transportation, did not make CT scan. Bloody gastric residuals 450 ml noted 2030, notified Dr Stratton, stopped tubing feeding and heparin drip, OG on LIS with another 50 ml output. Stat HGB 6.7. one unit of PRBC blood transfusion was ordered. One big rectal tube bypass, dark watery stool output.          Key Events - This Shift:     See above          Barriers to downgrade / discharge:       Sedated and vented     Charli Harris RN

## 2024-09-22 NOTE — PROGRESS NOTES
Pt had 450 ml bloody gastric residual around 2030. Notified Dr Stratton. Tube feeding was stopped, heparin drip was stopped. Stat CBC lab was sent.     Charli Harris RN

## 2024-09-23 PROBLEM — I27.24 CTEPH (CHRONIC THROMBOEMBOLIC PULMONARY HYPERTENSION) (H): Status: ACTIVE | Noted: 2024-01-01

## 2024-09-23 NOTE — PLAN OF CARE
Major Shift Events:      Neuro: sedated and intubated. When sedation turned down, pt aggressive, attempted to hit staff, and grabbing at lines. Pt on soft bilateral wrist restraints     CV: SR in the 80s. Levo gtt for MAP >65. CVP 24 from PICC    Pulm: ETT 23cm @ teeth cmv 70%, 450, 5, 12    GI: OG @ 64cm LIS w/ black colored output      : valerio w/ good UOP. Bumex drip    Skin: BLE venous ulcers, scattered rash and abrasions, PU coccyx area, scattered scabs.      Gtts:   Levo 0.05  Bumex 0.25  Fent 50  Propofol 40  Dex 1      Labs:  PC02 66 (vent settings changed)  K and Mag replaced     Plan: [facility]  For vital signs and complete assessments, please see documentation flowsheets.

## 2024-09-23 NOTE — H&P
Cardiology ICU Note    09/23/2024    Noelle Gomez MRN# [id number]   Age: 59 year old YOB: 1965          H&P:   Noelle Gomez is a 59 year old female who was admitted on 9/23/2024. She has a PMH of asthma, 40 pack year former smoker, HTN, who initially presented to [facility] on 9/16 for acute on chronic hypoxic/hypercapnic respiratory failure thought to be related to decompensated heart failure. She was intubated quickly after admission due to worsening hypercapnea and AMS. TTE at that time revealed severe RV volume and pressure overload. VQ scan obtained (due to VIGNESH) revealed multiple bilateral wedge shaped perfusion defects. She was then transferred to [facility] ICU for thrombectomy. She also was noted to have a LE DVT, subsequently started on a heparin drip. During thrombectomy, she was noted to have mainly chronic clot with only minimal acute clot that was extracted. PAP reduced from 77/33 (mean 47), to 68/19 (mean 34). 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 She unfortunately later developed a GI bleed with melena and a hemoglobin drop from 12->6.7 over 2 days now s/p 1u pRBC. She subsequently required pressor support with levo and vaso, but vaso was weaned off and she remains on low dose levophed. She was then transferred to [facility] for further evaluation and management.          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 Course unfortunately complicated by GI bleed on heparin, so IVC filter was placed. CVP transduced off PICC on admission is 24  ===PLAN===  -RHC in the AM, son (Scar) was contacted overnight for consent  -Mechanical prophylaxis, unable to start heparin  -GI consult for bleed in the AM  -Continue levo, wean as tolerated  -Continue bumex drip, based on I/O will determine if needed to increase (currently 0.25/hr, -10L overall since admission to [facility] per their report)  -Hematology consult in the AM     Pulmonary  # Acute Hypoxic and hypercapnic respiratory failiure 2/2  # CETPH  # LORA/OHS  # ?Previously treated CAP  -Presented AoC hypoxic/hypercapnic respiratory failure thought to be due to HF, now found to be due to PH 2/2 CTEPH. She was also treated with CTX and Doxy for CAP coverage x 5 days.  ===PLAN===  -Plan for CTEPH/RV overload as above  -Track VBG BID to assess for hypercapnia, can add back duonebs if needed  -bumex as above  -daily CXR          Gastrointestinal, Nutrition  #Upper GI bleed in the setting of heparin use  -Continue to hold heparin  -Hgb stable over the past 24 hours, check CBC BID  -GI consult in the AM  -IV protonix 40mg BID     Renal, Electrolytes  # VIGNESH 2/2 cardiogenic shock from CTEPH  -Creatinine is currently stable at 1.3,  robust urine output on bumex drip 0.25/hr  -Continue to track urine outputs, continue bumex  -avoid nephrotoxic agents as able    Infectious Disease  # ?CAP (resolved)  -treated with 5 day course of CTX/Doxycycline    Hematology  # Left femoral DVT  # Family history of Factor 5 Leiden  # CTEPH  -Patient presenting with hypoxia and found to have both a DVT and chronic bilateral pulmonary emboli. Her eldest son has history of factor 5 Leiden.   -Hematology consult in the AM     Endocrinology  # Hyperglycemia  - insulin prn    ICU Checklist:  #Feeding: NPO for procedure  #Analgesia: fentanyl  #Sedation:  precedex/propofol  #Thromboembolism ppx: mechanical (GI bleed)  #HOB: 30 degrees   #Ulcer ppx: PPI BID  #Glucose: insulin PRN  #SBT/SAT: n/a  #Bowel reg: miralax/senna PRN  #Family: , 2 sons  #Dispo: CCU    Family update by me today: Yes     Code Status: Full    The pt was discussed with the attending physician.     Sven Lynn MD  Cardiology fellow         Medications:   Drug and lactation database from the [place] [employer]:  http://toxnet.nlm.nih.gov/cgi-bin/sis/htmlgen?LACT           Past Medical History:     Past Medical History:   Diagnosis Date    Asthma 08/14/2023    Bilateral lower extremity edema 07/14/2021    Former smoker 06/23/2021    HTN (hypertension) 07/14/2021    Lymphedema 02/08/2022    Prediabetes 06/24/2021    Seasonal allergies 07/14/2021    UPJ (ureteropelvic junction) obstruction 03/04/2015    Vitamin D deficiency 06/23/2021            Family History:   Unable to obtain due to patients medical condition.         Social History:   Unable to obtain due to patients medical condition.         Past Surgical History:   Unable to obtain due to patients medical condition.         Review of Systems:   Unable to obtain due to patients medical condition.            Physical Exam:   SpO2 96%       GENERAL: Intubated, sedated. NAD.  HEENT: No icterus. ETT in place, OG tube in place.  CARDIOVASCULAR: RRR. Normal S1 and S2.  RESP: Coarse bilaterally. Mechanical ventilation.    GI Soft, bowel sounds hypoactive but present.  GENITOURINARY: Miller in place.  EXTREMITIES: edematous, warm and well perfused  NEURO: Sedated and intubated.  INTEGUMENTARY: No rashes. Cannula/Line sites CDI.        SpO2: 96 % O2 Device: Mechanical Ventilator      Arterial Blood Gas:   Recent Labs   Lab 09/21/24  0950 09/20/24  0832 09/19/24  0929 09/19/24  0354   PH 7.39 7.33* 7.51* 7.46*   PCO2 57* 62* 42 44   PO2 81 73* 60* 76*   HCO3 35* 33* 33* 31*   O2PER 50 60 40 55        There were no vitals  "filed for this visit.No intake/output data recorded.  Recent Labs   Lab 09/22/24 2321 09/22/24 2035 09/22/24  0727 09/22/24  0427 09/21/24 2019 09/21/24  1822   NA  --   --   --  145  --  143   POTASSIUM  --   --   --  3.9  --  3.7   CHLORIDE  --   --   --  103  --  103   CO2  --   --   --  34*  --  32*   ANIONGAP  --   --   --  8  --  8   * 141*   < > 173*   < > 183*   BUN  --   --   --  66.7*  --  65.2*   CR  --   --   --  1.30*  --  1.18*   BEN  --   --   --  8.4*  --  7.6*    < > = values in this interval not displayed.     No components found for: \"URINE\"   Recent Labs   Lab 09/17/24 0414 09/16/24  0956   AST 20 39   ALT 13 18   BILITOTAL 0.7 1.1   ALBUMIN 2.9* 3.5   PROTTOTAL 6.1* 7.6   ALKPHOS 74 102        Temp  Min: 95.5  F (35.3  C)  Max: 99.1  F (37.3  C)   Recent Labs   Lab 09/22/24 2102 09/22/24  1444 09/22/24 0427 09/22/24  0303 09/21/24 2046 09/21/24  1119 09/21/24  0423 09/20/24  1820 09/20/24  0531 09/19/24  0344   WBC  --   --  9.6  --  6.6  --  7.1  --  12.1* 9.2   HGB 7.5* 7.6* 7.8* 7.9* 6.7*   < > 8.2*   < > 11.0* 12.1   HCT  --   --  25.5*  --  22.2*  --  26.6*  --  34.8* 38.1   MCV  --   --  112*  --  120*  --  119*  --  119* 117*   RDW  --   --  20.8*  --  16.1*  --  16.0*  --  16.5* 16.6*   PLT  --   --  103*  --  89*  --  86*  --  121* 105*    < > = values in this interval not displayed.     Recent Labs   Lab 09/16/24  0956   INR 1.46*   PTT 25     Recent Labs   Lab 09/22/24  2321 09/22/24  2035 09/22/24  1722 09/22/24  1146 09/22/24  0727   * 141* 166* 157* 154*       Lines:           Data:   All lab results reviewed  All lab results reviewed past 24 hours    Recent Results (from the past 24 hour(s))   CT Chest Abdomen Pelvis w/o Contrast    Narrative    EXAM: CT CHEST ABDOMEN PELVIS W/O CONTRAST  LOCATION: [facility]  DATE: 9/22/2024    INDICATION: Acute respiratory failure, sepsis and GI bleed with malignant stools. Recent acute pulmonary " emboli, status post mechanical thrombectomy.  COMPARISON: Portable chest 9/20/2024, VQ scan 8/17/2024, CT 2/18/2015  TECHNIQUE: CT scan of the chest, abdomen, and pelvis was performed without IV contrast. Multiplanar reformats were obtained. Dose reduction techniques were used.   CONTRAST: None.    FINDINGS:   LUNGS AND PLEURA: Moderate bilateral pleural effusions, right greater than left. Consolidative airspace opacities through the dependent portions of both lower lobes, and to a lesser degree upper lobes. Bilateral bronchial wall thickening with upper lung   predominant groundglass opacities, right greater left with small amount mixed patchy consolidative airspace opacity. Varicoid bronchiectasis within the anterior segments of both upper lobes, worse on the left with some surrounding consolidative airspace   opacity. Central airways are patent.    MEDIASTINUM/AXILLAE: Endotracheal tube is 4 cm above the wilder. Right sided PICC tip at cavoatrial junction. No adenopathy nor pericardial effusion. The thoracic aorta and heart are normal in size.    CORONARY ARTERY CALCIFICATION: Mild.    HEPATOBILIARY: Simple hepatic cysts do not require imaging follow-up. Cholelithiasis with no cholecystitis nor bile duct dilatation.    PANCREAS: Normal.    SPLEEN: Mild splenomegaly measuring 14.2 cm.    ADRENAL GLANDS: Stable bilateral adrenal hyperplasia, dating back to 2015.    KIDNEYS/BLADDER: Chronic left UPJ stenosis with moderate to severe hydronephrosis slightly less pronounced compared to 2015 exam. Small bilateral nonobstructing calculi no ureteral calculus. The bladder is decompressed with a Miller catheter.    BOWEL: Normal caliber small bowel. Scattered air-fluid levels throughout nondilated colon. Rectal tube in place. No obstruction or inflammatory change. Trace amount of ascites.    LYMPH NODES: No lymphadenopathy.    VASCULATURE: No aortoiliac aneurysm.    PELVIC ORGANS: Normal.    MUSCULOSKELETAL: Generalized  subcutaneous edema in the lower chest, abdomen and proximal extremities suggests anasarca. Moderate fat-containing umbilical hernia. No suspicious osseous lesions.      Impression    IMPRESSION:  1.  Moderate bilateral pleural effusions, greater on right.  2.  Consolidative airspace opacities within the dependent portions of both lungs, lower lobes greater than upper lobes.  3.  Bilateral bronchial wall thickening with additional mixed mainly groundglass opacities within upper lobes likely infectious/inflammatory. Bilateral anterior segment upper lobe varicoid bronchiectasis, greater on the left.  4.  No source of sepsis within the abdomen and pelvis.  5.  Scattered air-fluid levels throughout nondilated colon with no obstruction or inflammatory change.  6.  Generalized anasarca.   IR IVC Filter Placement    [facility] RADIOLOGY  LOCATION: [facility]  DATE: 9/22/2024    PROCEDURE: IVC FILTER PLACEMENT    INTERVENTIONAL RADIOLOGIST: Lev Reyes MD.    INDICATION: Deep vein thrombosis with contraindications to anticoagulation. GI bleed. Pulmonary hypertension with limited cardiorespiratory reserve. DVT left leg. Patient intubated and sedated.    Air Kerma: 205 mGy  FLUOROSCOPIC TIME: 0.6 minutes   CONTRAST: 25 cc Omnipaque 350    COMPLICATIONS: No immediate complications.    STERILE BARRIER TECHNIQUE: Maximum sterile barrier technique was used. Cutaneous antisepsis was performed at the operative site with application of 2% chlorhexidine and large sterile drape. Prior to the procedure, the surgeon and assistant performed hand   hygiene and wore hat, mask, sterile gown, and sterile gloves during the entire procedure.    PROCEDURE:   Telephone consent obtained from  [facility]. The right neck  was prepped and draped in usual sterile fashion followed by local anesthesia with 1% Xylocaine. Ultrasound revealed a patent and compressible right internal jugular vein, and an ultrasound    image was obtained and placed in the patient's permanent medical record. Using real-time ultrasound for needle placement, a right internal jugular vein puncture was performed followed by placement of the infusion catheter and delivery sheath into the   caudal IVC. An inferior venacavogram was performed. A [employer] retrievable IVC filter was then deployed into the infrarenal IVC. A followup venacavogram was performed. The right jugular vein access was removed with hemostasis obtained with manual   compression.    FINDINGS:   Normal inferior vena cava. Successful placement of a [employer] retrievable IVC filter within the infrarenal IVC.      Impression    IMPRESSION:  Successful placement of a retrievable [employer] IVC filter. It is recommended this filter be removed as soon as clinically reasonable.    CPT code for physician reference only:  08313     I have reviewed today's vital signs, notes, medications, labs and imaging.  I have also seen and examined the patient and agree with the findings and plan as outlined above. Pt is 58 yo WF with hx of tobacco abuse who presents following transfer with PAH, moderate RV dysfn, severe RV dilation, intubated and sedated with CXR significant for right lung opacificaiton/pulmonary edema concerning for aspiration pneumonia with pulmonary edema.  Pt on levophed, vasopressen and consider adding Tessie..  Pt also with hx of CTEPH and DVT with IVC filter placed.  Will add abx and continue diuresis.  Pt critically ill who was seen X3 for total critical care time 50 min.     Chalino De La Torre MD, PhD  Professor, Heart Failure and Cardiac Transplantation  [facility]

## 2024-09-23 NOTE — CONSULTS
Cardiothoracic Surgery Consult Note    Consult Reason: PTE    HPI: Noelle Gomez is a 59 year old female with PMH of asthma, 40 pack year former smoker, HTN, who initially presented to [facility] on 9/16 for acute on chronic hypoxic/hypercapnic respiratory failure thought to be related to decompensated heart failure. She was intubated quickly after admission due to worsening hypercapnea and AMS. TTE at that time revealed severe RV volume and pressure overload. VQ scan obtained (due to VIGNESH) revealed multiple bilateral wedge shaped perfusion defects. She was then transferred to [facility] ICU for thrombectomy. She also was noted to have a LE DVT, subsequently started on a heparin drip. During thrombectomy, she was noted to have mainly chronic clot with only minimal acute clot that was extracted. PAP with minimal reduction post-procedure. 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 She unfortunately later developed a GI bleed with melena and a hemoglobin drop from 12->6.7 over 2 days now s/p 1u pRBC. She subsequently required pressor support with levo and vaso, and transferred to [facility] for further evaluation and management.       Imaging:  TTE  9/17/24 Interpretation Summary     Flattened septum is consistent with RV pressure/volume overload.  Left ventricular size, wall motion and function are normal. The ejection  fraction is > 65%.  The right ventricle is severely dilated.  Moderately decreased right ventricular systolic function  Right ventricular systolic pressure is elevated, consistent with severe  pulmonary hypertension.  There is mild to moderate (1-2+) tricuspid regurgitation.  IVC diameter >2.1 cm collapsing <50% with sniff suggests a high RA pressure  estimated at 15 mmHg or greater.  Trivial pericardial effusion  ______________________________________________________________________________  Left Ventricle  Left ventricular size, wall  motion and function are normal. The ejection  fraction is > 65%. There is normal left ventricular wall thickness. Diastolic  Doppler findings (E/E' ratio and/or other parameters) suggest left ventricular  filling pressures are normal. Flattened septum is consistent with RV  pressure/volume overload.     Right Ventricle  The right ventricle is severely dilated. Moderately decreased right  ventricular systolic function. TAPSE is abnormal, which is consistent with  abnormal right ventricular systolic function.     Atria  The left atrium is mildly dilated. The right atrium is moderate to severely  dilated. There is no color Doppler evidence of an atrial shunt.     Mitral Valve  Mitral valve leaflets appear normal. There is no evidence of mitral stenosis  or clinically significant mitral regurgitation.     Tricuspid Valve  The tricuspid valve is not well visualized, but is grossly normal. There is  mild to moderate (1-2+) tricuspid regurgitation. The right ventricular  systolic pressure is approximated at 53.7 mmHg plus the right atrial pressure.  Right ventricular systolic pressure is elevated, consistent with severe  pulmonary hypertension. Severe (>55mmHg) pulmonary hypertension is present.     Aortic Valve  Aortic valve leaflets appear normal. There is no evidence of aortic stenosis  or clinically significant aortic regurgitation.     Pulmonic Valve  The pulmonic valve is not well seen, but is grossly normal. There is mild (1+)  pulmonic valvular regurgitation.     Vessels  The aorta root is normal. Normal size ascending aorta. IVC diameter >2.1 cm  collapsing <50% with sniff suggests a high RA pressure estimated at 15 mmHg or  greater.     Pericardium  Trivial pericardial effusion.  CTA pulmonary angiogram, 9/23/2024 1:03 PM     HISTORY: Pulmonary hypertension, concerns for PE.; Female sex; Not  pregnant; No prior imaging in the last 24 hours; Pulmonary Embolism  Rule-Out Criteria (PERC) score > 0; Revised Salley  Score (RGS) not >=  11; No D-dimer result available; D-dimer not ordered     COMPARISON: Same day chest radiograph, 9/20/2024     TECHNIQUE: Volumetric CT images obtained through the chest with  contrast. Coronal and axial MIP reformatted images obtained.  Three-dimensional (3D) post-processed angiographic images were  reconstructed, archived to PACS and used in interpretation of this  study.      CONTRAST: iopamidol (ISOVUE-370) solution 77 mL ml isovue 370 IV.     FINDINGS:     Lines and tubes: Endotracheal tube tip terminates in the midthoracic  trachea. Keeler-Sonu catheter tip terminates in the main pulmonary  artery. NG tube terminates in the stomach.     Vascular: There is good contrast opacification of the pulmonary  arterial vasculature. Diffuse bilateral segmental and subsegmental  pulmonary emboli. Heart is normal size without pericardial effusion.  There is evidence of right heart strain with an RV:LV ratio of 1.4. No  significant reflux of contrast into the IVC. No thoracic aortic  aneurysm. The main pulmonary artery is enlarged, measuring up to 3.4  cm.     Remaining Chest: Central tracheobronchial tree is patent. Bilateral  mosaic attenuation with interlobular septal thickening. Patchy  peripheral consolidative and groundglass opacities. In the anterior  left upper lobe, abutting the pleura, there is an irregular  multiloculated cavitary lesion with thick irregular borders. Within  the right lower atelectatic lobe, there is a focal area of  nonenhancement adjacent to a multiloculated cavitary lesion. Moderate  right and small pleural effusions with adjacent bibasilar passive  atelectasis. No pneumothorax. Prominent mediastinal lymph nodes.  Enlarged left axillary lymph nodes, measuring up to 1.4 cm.     Upper Abdomen: Limited evaluation demonstrates no acute findings.  Residual contrast in the fundus of the stomach. Several circumscribed  hypodensities throughout the liver, the largest measuring up to  3.4 cm  in segment 6, which likely represent simple cysts. Cholelithiasis.  Borderline splenomegaly, measuring up to 13.0 cm. Left greater than  right adrenal nodular thickening. Dilated left extrarenal pelvis.     Bones/Soft Tissues: Bilateral posterolateral chest wall anasarca. No  acute abnormalities or suspicious lesions.                                                                      IMPRESSION:   1. Exam is positive for acute diffuse bilateral segmental and  subsegmental pulmonary emboli. There is evidence of right heart strain  with an RV:LV ratio > 1.       2. Multiloculated cavitary lesions in the anterior left upper lobe and  right lower atelectatic lobe with adjacent nonenhancing lung  parenchyma, which may represent evolving pulmonary infarctions versus  necrotizing pneumonia. Additional patchy peripheral consolidative and  groundglass opacities may also represent pulmonary infarction versus  organizing pneumonia.     3. Bilateral mosaic attenuation with interlobular septal thickening is  suggestive of pulmonary edema.     4. The main pulmonary artery is enlarged, measuring up to 3.4 cm.  Recommend clinical correlation for pulmonary arterial hypertension.     5. Moderate right and small left pleural effusions with bibasilar  passive atelectasis.     6. Prominent mediastinal lymph nodes and left axillary  lymphadenopathy, likely reactive.    A/P: Patient is a 59 year old female with PMH of asthma 40 pack year hx, HTN. 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 She was also treated with CTX and Doxy for CAP coverage x 5 days. VIGNESH thought to be 2/2 cardiogenic shock from CTEPH, improving. Family history of Factor 5 Leiden. Heme consult.     - HD stable on low dose NE.  Sedated, on the vent, bumex drip.  - UGIB getting EGD 9/23. GI following  - PA pressures almost systemic. RHC 9/23 awaiting results. Please consult pulmonary  hypertension specialists Dr. Otero or Estrella for PHTN optimization  - Appreciate heme consult for potential FVL  - IVC filter placed in infrarenal IVC in IR at [facility] on . Heparin currently held for acute on chronic PE/DVTs  - Will need coronary evaluation prior to surgical decision.  - Surgical plan pending workup/patient optimization and surgeon/OR availability  - Thank you for the opportunity to participate in the care of this patient.    Patient and plan discussed with attending, Dr. Art Maldonado PA-C   Cardiothoracic Surgery   Pager #[phone]  2024 at 3:23 PM        PMH:  Past Medical History:   Diagnosis Date    Asthma 2023    Bilateral lower extremity edema 2021    Former smoker 2021    HTN (hypertension) 2021    Lymphedema 2022    Prediabetes 2021    Seasonal allergies 2021    UPJ (ureteropelvic junction) obstruction 2015    Vitamin D deficiency 2021     PSH:  Past Surgical History:   Procedure Laterality Date    IR IVC FILTER PLACEMENT  2024    IR PULMONARY ANGIOGRAM BILATERAL  2024    MIDLINE DOUBLE LUMEN PLACEMENT  2024    PICC TRIPLE LUMEN PLACEMENT  2024    PICC TRIPLE LUMEN PLACEMENT  2024     Past Surgical History:   Procedure Laterality Date    IR IVC FILTER PLACEMENT  2024    IR PULMONARY ANGIOGRAM BILATERAL  2024    MIDLINE DOUBLE LUMEN PLACEMENT  2024    PICC TRIPLE LUMEN PLACEMENT  2024    PICC TRIPLE LUMEN PLACEMENT  2024     FH:  No family history on file.  SH:  Social History     Socioeconomic History    Marital status: Single   Tobacco Use    Smoking status: Former     Current packs/day: 0.00     Average packs/day: 0.5 packs/day for 10.0 years (5.0 ttl pk-yrs)     Types: Cigars, Cigarettes     Start date: 2010     Quit date: 2020     Years since quittin.7    Smokeless tobacco: Former    Tobacco comments:     Cigar every once in a while  per pt   Substance and Sexual Activity    Alcohol use: Yes     Comment: 1 glass of wide a night    Drug use: No    Sexual activity: Yes     Partners: Female     Social Determinants of Health      [facility]    Financial Resource Strain    [facility] from [facility], [facility]    Social Connections     Home Meds:  Medications Prior to Admission   Medication Sig Dispense Refill Last Dose    losartan (COZAAR) 100 MG tablet TAKE 1 TABLET(100 MG) BY MOUTH EVERY DAY        Allergies:  Allergies   Allergen Reactions    Sulfa Antibiotics Unknown, Itching and Rash       ROS: Negative unless noted in HPI. Patient sedated and on the ventilator. Unable to obtain any new Hx    Physical Exam:  Temp:  [95.5  F (35.3  C)-98.2  F (36.8  C)] 98  F (36.7  C)  Pulse:  [74-90] 90  Resp:  [10-26] 25  BP: (131-151)/(72-88) 131/88  MAP:  [57 mmHg-80 mmHg] 69 mmHg  Arterial Line BP: ()/(38-55) 107/53  FiO2 (%):  [60 %-100 %] 70 %  SpO2:  [91 %-100 %] 93 %  Gen: NAD, resting comfortably in bed, conversational  Lungs: on the vent course lung sounds  CV: RRR, S1S2 normal, no murmur. LE edema  Abd:  overall soft and non distended, nontender, no masses/guarding/rebound tenderness.   Neuro: sedated unable to assess    Labs:  ABG   Recent Labs   Lab 09/23/24  1158 09/23/24  0749 09/23/24  0212 09/21/24  0950   PH 7.43 7.39 7.40 7.39   PCO2 66* 71* 66* 57*   PO2 64* 76* 89 81   HCO3 43* 43* 41* 35*     CBC  Recent Labs   Lab 09/23/24  0213 09/22/24  2102 09/22/24  1444 09/22/24  0427 09/22/24  0303 09/21/24  2046 09/21/24  1119 09/21/24  0423   WBC 7.9  --   --  9.6  --  6.6  --  7.1   HGB 7.6* 7.5* 7.6* 7.8*   < > 6.7*   < > 8.2*   *  --   --  103*  --  89*  --  86*    < > = values in this interval not displayed.     BMP  Recent Labs   Lab 09/23/24  1204 09/23/24  9110  09/23/24  0918 09/23/24  0752 09/23/24 0218 09/23/24  0213 09/22/24  0727 09/22/24  0427 09/21/24 2019 09/21/24  1822 09/21/24  1142 09/21/24  1119   NA  --   --   --   --   --  149*  --  145  --  143  --  144   POTASSIUM  --  3.5 3.4  --   --  3.5  --  3.9  --  3.7  --  3.8   CHLORIDE  --   --   --   --   --  101  --  103  --  103  --  104   CO2  --   --   --   --   --  38*  --  34*  --  32*  --  33*   BUN  --   --   --   --   --  57.5*  --  66.7*  --  65.2*  --  67.8*   CR  --   --   --   --   --  1.07*  --  1.30*  --  1.18*  --  1.26*   *  --   --  134* 135* 150*   < > 173*   < > 183*   < > 175*    < > = values in this interval not displayed.     LFT  Recent Labs   Lab 09/23/24 0213 09/17/24  0414   AST 24 20   ALT 12 13   ALKPHOS 52 74   BILITOTAL 1.0 0.7   ALBUMIN 3.2* 2.9*     PancreasNo lab results found in last 7 days.

## 2024-09-23 NOTE — PLAN OF CARE
Goal Outcome Evaluation:       Sedation vacation performed, pt MAJOR's, opens eyes, pulls at ETT, not following commands. ABGs done, abnormal results called to cards team, ABG 's done and Vent changes made thru out day. GI consulted, EGD done at bedside, vss, pt w/ 2 clips paced, Pantoprazole drip started, OGT d/c'd by GI MD for scope EGD, orders received by Cards to replace few hrs later, see notes. Cards 2 and GI agree starting Heparin gtt, drip started and will monitor levels closely. RHC done today, numbers taken and recorded, SWAN placed @ 50cm, see cath lab notes. PA pressures elevated, Nitric Oxide started for about 20 mins, orders changed by team to stop Nitric, RT stopped Nitric, orders received to start Vasopressin straight rate at 1.8 units/hr, monitor 1 1/2 hrs, if PA pressures dont decrease restart Nitric, Nitric reordered, RT notified.STAT CT scan done, see results, MD aware. Mag and K+ replaced MD julius aware of all results. ABD U/S done, results pending. Right heel pressure ulcer noticed on pt, WOC ordered for skin-sacral consult, Cards 2 notified to modify WOC consult w/ R Heel Pressure sore, deandre feet elevated, Mepelex dressing placed on R heel, see WOC notes.

## 2024-09-23 NOTE — CONSULTS
CV ICU H&P    ASSESSMENT:     Neurology  #Acute toxic metabolic encephalopathy    -Sedation: Prop 30, dex 1, fentanyl 50  -RAAS goal -2      Pulmonary care  #Acute hypoxic respiratory failure  FiO2 (%): 70 %, Resp: 16, Vent Mode: CMV/AC, Resp Rate (Set): 16 breaths/min, Tidal Volume (Set, mL): 450 mL, PEEP (cm H2O): 8 cmH2O, Resp Rate (Set): 16 breaths/min, Tidal Volume (Set, mL): 450 mL, PEEP (cm H2O): 8 cmH2O   - Goal SpO2 > 92%  - If additional oxygen support is needed may increase PEEP given compensated RV function with ELIO of 5  - Receiving inhaled nitric oxide      Cardiovascular:    #Cardiogenic shock  #RH failure  #Group IV pHTN CTEPH  #s/p IVC filter  #DVT    - Recommend weaning norepi, if unable then switch to vasopressin  - Vasopressor: levophed 0.05  - bumex drip 1mg/hr  - Monitor hemodynamic status.     -TTE 9/17/24: EF 65%, moderately reduced RV systolic function, RVSP >55mmHg, mod TR  -RHC     RA: 11     RV: 79/13     PA: 78/29 (42)     PCW: 12       CO/CI (shu): 5.43/2.46     PVR 5.53        GI:   #Acute GI bleed  #Duodenal ulcer  - NPO  - Nutrition consulted, appreciate recommendations  - PPI per GI recommendation  - Bowel regimen: senna prn        Renal/ Fluid Balance:    #VIGNESH  - ICU electrolyte replacement protocol  - Strict I&Os  - continue monitoring closely    Endocrine:    #hyperglycemia    -Insulin medium dose correction q4h      ID/ Antibiotics:  - No signs of infection  - Cultures negative to date    Heme:     #Acute blood loss anemia  - Hgb goal > 7.0  - Hemoglobin stable.  - heparin gtt    Prophylaxis:    - VTE: heparin gtt  - Bowel regimen: PPI,  senna    Lines/ tubes/ drains:  - ETT  - PICC, PA catheter  - Arterial Line  - Miller  - rectal tube    Disposition:  - CVICU        Patient seen, findings and plan discussed with CVICU staff.       ====================================    HPI:   Noelle Gomez is a 59 year old female admitted on 9/23/2024. She has a PMH of asthma, 40 pack  year former smoker, HTN, who initially presented to [facility] on 9/16 for acute on chronic hypoxic/hypercapnic respiratory failure thought to be related to decompensated heart failure. Pt required intubation on initial day of admission on 9/16/24. TTE was done revealing RV volume and pressure overload. VQ scan was obtained in the setting of VIGNESH revealing multiple bilateral wedge shaped perfusion defects. Also found to LE DVT. Anticoagulation initiated. Patient underwent thrombectomy at [facility] and found to have mainly chronic clot with few acute emboli. PA pressures significantly elevated. Patient temporarily started on epoprostenol. After several days of anticoagulation hgb dropped. Patient started requiring vasopressors. Transferred to our facility for further management. EGD undergone on arrival revealing duodenal ulcer requiring 2 clips. RHC done also revealing pre-capillary pHTN and a ELIO of 5.        PAST MEDICAL HISTORY:   Past Medical History:   Diagnosis Date    Asthma 08/14/2023    Bilateral lower extremity edema 07/14/2021    Former smoker 06/23/2021    HTN (hypertension) 07/14/2021    Lymphedema 02/08/2022    Prediabetes 06/24/2021    Seasonal allergies 07/14/2021    UPJ (ureteropelvic junction) obstruction 03/04/2015    Vitamin D deficiency 06/23/2021       PAST SURGICAL HISTORY:   Past Surgical History:   Procedure Laterality Date    IR IVC FILTER PLACEMENT  9/22/2024    IR PULMONARY ANGIOGRAM BILATERAL  9/17/2024    MIDLINE DOUBLE LUMEN PLACEMENT  9/16/2024    PICC TRIPLE LUMEN PLACEMENT  9/17/2024    PICC TRIPLE LUMEN PLACEMENT  9/20/2024       FAMILY HISTORY: No family history on file.    SOCIAL HISTORY:   Social History     Tobacco Use    Smoking status: Former     Types: Cigarettes    Smokeless tobacco: Never   Substance Use Topics    Alcohol use: Not on file         OBJECTIVE:  1. VITAL SIGNS:   Temp:  [95.5  F (35.3  C)-98.2  F (36.8  C)] 98  F (36.7  C)  Pulse:  [74-90]  90  Resp:  [10-26] 16  BP: (131-151)/(72-88) 131/88  MAP:  [57 mmHg-80 mmHg] 69 mmHg  Arterial Line BP: ()/(38-55) 107/53  FiO2 (%):  [60 %-100 %] 70 %  SpO2:  [92 %-100 %] 93 %    FiO2 (%): 70 %, Resp: 16, Vent Mode: CMV/AC, Resp Rate (Set): 16 breaths/min, Tidal Volume (Set, mL): 450 mL, PEEP (cm H2O): 8 cmH2O, Resp Rate (Set): 16 breaths/min, Tidal Volume (Set, mL): 450 mL, PEEP (cm H2O): 8 cmH2O      2. INTAKE/ OUTPUT:   I/O last 3 completed shifts:  In: 1111.08 [I.V.:1051.08; NG/GT:60]  Out: 3940 [Urine:3840; Stool:100]      3. PHYSICAL EXAMINATION:   General: sedated and intubated  Neuro: sedated and intubated  Resp: Pulmonary crackles  CV: RRR. No murmurs. Pitting edema present  Abdomen: Soft, Non-distended, Non-tender  Extremities: warm and well perfused      4. INVESTIGATIONS:   Arterial Blood Gases   Recent Labs   Lab 09/23/24  1158 09/23/24  0749 09/23/24  0212 09/21/24  0950   PH 7.43 7.39 7.40 7.39   PCO2 66* 71* 66* 57*   PO2 64* 76* 89 81   HCO3 43* 43* 41* 35*     Complete Blood Count   Recent Labs   Lab 09/23/24  0213 09/22/24  2102 09/22/24  1444 09/22/24  0427 09/22/24  0303 09/21/24  2046 09/21/24  1119 09/21/24  0423   WBC 7.9  --   --  9.6  --  6.6  --  7.1   HGB 7.6* 7.5* 7.6* 7.8*   < > 6.7*   < > 8.2*   *  --   --  103*  --  89*  --  86*    < > = values in this interval not displayed.     Basic Metabolic Panel  Recent Labs   Lab 09/23/24  1204 09/23/24  1158 09/23/24  0918 09/23/24  0752 09/23/24  0218 09/23/24  0213 09/22/24  0727 09/22/24  0427 09/21/24 2019 09/21/24  1822 09/21/24  1142 09/21/24  1119   NA  --   --   --   --   --  149*  --  145  --  143  --  144   POTASSIUM  --  3.5 3.4  --   --  3.5  --  3.9  --  3.7  --  3.8   CHLORIDE  --   --   --   --   --  101  --  103  --  103  --  104   CO2  --   --   --   --   --  38*  --  34*  --  32*  --  33*   BUN  --   --   --   --   --  57.5*  --  66.7*  --  65.2*  --  67.8*   CR  --   --   --   --   --  1.07*  --  1.30*  --   1.18*  --  1.26*   *  --   --  134* 135* 150*   < > 173*   < > 183*   < > 175*    < > = values in this interval not displayed.     Liver Function Tests  Recent Labs   Lab 09/23/24  0213 09/17/24  0414   AST 24 20   ALT 12 13   ALKPHOS 52 74   BILITOTAL 1.0 0.7   ALBUMIN 3.2* 2.9*     Pancreatic Enzymes  No lab results found in last 7 days.  Coagulation Profile  No lab results found in last 7 days.            =========================================

## 2024-09-23 NOTE — PROGRESS NOTES
Admitted/transferred from: [facility]  Reason for admission/transfer: higher care management  2 RN skin assessment: completed by nima danielle and danny nava  Result of skin assessment and interventions/actions: BLE venous ulcers, scattered scabs, abd rash, PU and blanchable coccyx, scattered abrasions  Height, weight, drug calc weight: Done  Patient belongings (see Flowsheet)  MDRO education added to care planN/A  ?

## 2024-09-23 NOTE — CONSULTS
GASTROENTEROLOGY CONSULTATION      Date of Admission:  9/23/2024           Reason for Consultation:   We were asked by Dr. De La Torre of the primary team to evaluate this patient with melena.           ASSESSMENT AND RECOMMENDATIONS:   Acute anemia, suspect blood loss  Melena  Certainly has risk factors for upper GI bleeding with her current critical illness and the prior heparin use.  Hemoglobin trend along with description of her stools are convincing for upper GI bleeding.  Coordinating with primary team to get upper endoscopy done while she also will need a right heart cath.  Will plan for endoscopy sometime early this afternoon after her right heart cath is done.  She does currently have some mild pressor needs, norepinephrine 0.05.  My suspicion is that this is not related to her bleeding as she has only received 1 unit of packed red blood cells and her hemoglobin has now remained stable over the past 48 hours.  Regardless, she warrants urgent endoscopy.  - Continue n.p.o.  - Continue IV PPI twice daily  - EGD this afternoon, further recommendations per procedure note      Thank you for involving us in this patient's care. Please do not hesitate to contact the GI service with any questions or concerns.     Pt care plan discussed with Dr. Lazaro, GI staff physician.    Jesus Bhakta  Gastroenterology Fellow, PGY4   -------------------------------------------------------------------------------------------------------------------           History of Present Illness:   Noelle Gomez is a 59 year old female with a history of asthma, hypertension.  She initially presented to an outside hospital 9/16 for acute on chronic hypoxic/hypercapnic respiratory failure.  Initial clinical picture was concerning for decompensated heart failure.  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  She was placed on a heparin drip.  Developed dark, tarry stools with subsequent  hemoglobin drop.  She was transferred to [facility] for further cares.    She is intubated and sedated and currently unable to provide any further subjective history.  Per bedside nurse, stools continue to be dark.  Primary team planning for right heart cath today.             Previous Endoscopy:   None documented           Physical Exam:   Pulse 78   Temp 98.2  F (36.8  C) (Esophageal)   Resp 16   Wt 128.3 kg (282 lb 13.6 oz)   SpO2 96%   BMI 51.73 kg/m    Wt:   Wt Readings from Last 2 Encounters:   09/23/24 128.3 kg (282 lb 13.6 oz)   09/22/24 124.8 kg (275 lb 1.6 oz)      Constitutional: Intubated and sedated  Eyes: Sclera anicteric  Ears/nose/mouth/throat: No response to voice  CV: Extremities wwp.   Respiratory: Ventilated  Abdomen: Nondistended, no hepatosplenomegaly  Skin: warm, perfused, no jaundice  Neuro: Does not follow commands  Psych: Unable to assess    LABS:  BMP  Recent Labs   Lab 09/23/24  0752 09/23/24  0218 09/23/24 0213 09/22/24  2321 09/22/24  0727 09/22/24  0427 09/21/24  2019 09/21/24  1822 09/21/24  1142 09/21/24  1119   NA  --   --  149*  --   --  145  --  143  --  144   POTASSIUM  --   --  3.5  --   --  3.9  --  3.7  --  3.8   CHLORIDE  --   --  101  --   --  103  --  103  --  104   BEN  --   --  9.0  --   --  8.4*  --  7.6*  --  7.9*   CO2  --   --  38*  --   --  34*  --  32*  --  33*   BUN  --   --  57.5*  --   --  66.7*  --  65.2*  --  67.8*   CR  --   --  1.07*  --   --  1.30*  --  1.18*  --  1.26*   * 135* 150* 153*   < > 173*   < > 183*   < > 175*    < > = values in this interval not displayed.     CBC  Recent Labs   Lab 09/23/24  0213 09/22/24  1444 09/22/24  0427 09/22/24  0303 09/21/24  2046 09/21/24  1119 09/21/24 0423   WBC 7.9  --  9.6  --  6.6  --  7.1   RBC 2.19*  --  2.27*  --  1.85*  --  2.23*   HGB 7.6*   < > 7.8*   < > 6.7*   < > 8.2*   HCT 25.4*  --  25.5*  --  22.2*  --  26.6*   *  --  112*  --  120*  --  119*   MCH 34.7*  --  34.4*  --  36.2*  --  36.8*    MCHC 29.9*  --  30.6*  --  30.2*  --  30.8*   RDW 20.6*  --  20.8*  --  16.1*  --  16.0*   *  --  103*  --  89*  --  86*    < > = values in this interval not displayed.     INR  Recent Labs   Lab 09/16/24  0956   INR 1.46*     LFTs  Recent Labs   Lab 09/23/24  0213 09/17/24  0414 09/16/24  0956   ALKPHOS 52 74 102   AST 24 20 39   ALT 12 13 18   BILITOTAL 1.0 0.7 1.1   PROTTOTAL 6.0* 6.1* 7.6   ALBUMIN 3.2* 2.9* 3.5      PANCNo lab results found in last 7 days.            Past Medical History:   Reviewed and edited as appropriate  Past Medical History:   Diagnosis Date    Asthma 08/14/2023    Bilateral lower extremity edema 07/14/2021    Former smoker 06/23/2021    HTN (hypertension) 07/14/2021    Lymphedema 02/08/2022    Prediabetes 06/24/2021    Seasonal allergies 07/14/2021    UPJ (ureteropelvic junction) obstruction 03/04/2015    Vitamin D deficiency 06/23/2021            Past Surgical History:   Reviewed and edited as appropriate   Past Surgical History:   Procedure Laterality Date    IR IVC FILTER PLACEMENT  9/22/2024    IR PULMONARY ANGIOGRAM BILATERAL  9/17/2024    MIDLINE DOUBLE LUMEN PLACEMENT  9/16/2024    PICC TRIPLE LUMEN PLACEMENT  9/17/2024    PICC TRIPLE LUMEN PLACEMENT  9/20/2024            Family History:   Reviewed and edited as appropriate  No family history on file.   No known history of colorectal cancer, liver disease, or inflammatory bowel disease.         Allergies:   Reviewed and edited as appropriate     Allergies   Allergen Reactions    Sulfa Antibiotics Unknown, Itching and Rash              Review of Systems:     A complete 10 point review of systems was performed and is negative except as noted in the HPI

## 2024-09-23 NOTE — Clinical Note
Secured with Catheter remains in place at 50cm.    Secured in normal fashion with sutureby Arabella Castillo.

## 2024-09-23 NOTE — PROGRESS NOTES
"Brief Pulmonary Fellow Note    Patient not seen or examined.    Paged re: new consult for this patient. 60 y/o female with reported pmhx of asthma, 40 pack-year smoking hx, HTN who was found to have extensive b/l PE, felt to be chronic in nature after attempted thrombectomy at OSH. Course c/b GI bleed while on heparin gtt. Transferred to [facility] 9/23 for further mgmt.    Pulmonary is consulted due to abnormal CT chest imaging which was read as \"Multiloculated cavitary lesions in the anterior left upper lobe and  right lower atelectatic lobe with adjacent nonenhancing lung  parenchyma, which may represent evolving pulmonary infarctions versus  necrotizing pneumonia. Additional patchy peripheral consolidative and  groundglass opacities may also represent pulmonary infarction versus  organizing pneumonia\" on CT PE 9/23.    On review of records from [facility], appears that patient was given empiric 3 day course Zosyn for tx of possible pneumonia. Prior infectious workup: Negative sputum culture (ETT aspirate) 9/19, negative COVID/Flu/RSV 9/16, negative blood culture 9/16, negative MRSA nares 9/16. WBC count is normal. CRP is elevated at 237. Patient is currently in undifferentiated shock, presumably primarily cardiogenic.    Imaging reviewed - it appears that what was described on CT as cavitary lesion seems more c/w emphysematous change of L lung. There are scattered GGOs however overall image seems more c/w volume overload given bilateral pleural effusions, septal thickening. Do not believe that patient needs additional empiric antibiotic therapy currently.    Full formal consult to follow tomorrow.    Case discussed with attending, Dr. Denson.    Lev York DO  Pulmonary/Critical Care Fellow  "

## 2024-09-23 NOTE — PROGRESS NOTES
"CLINICAL NUTRITION SERVICES - ASSESSMENT NOTE     Nutrition Prescription    RECOMMENDATIONS FOR MDs/PROVIDERS TO ORDER:  Pending GI status, resume EN.  Place consult for Nutrition \"TF assess and order\"    Malnutrition Status:    Patient does not meet two of the established criteria necessary for diagnosing malnutrition but is at risk for malnutrition    Recommendations already ordered by Registered Dietitian (RD):  None today    Future/Additional Recommendations:  EN as able pending GI input following EGD -   - Vital AF 1.2 @ 35 ml/hr + 3 pkts prosource TF20 provides 840 ml volume, 1248 kcals (25 kcal/kg), 123 g pro (2.5 g/kg), 45 g Fat, 93 g CHO, 4 g fiber, 681 ml free water.   - If ongoing propofol -  Vital AF 1.2 @ 20 ml/hr + 4 pkts prosource TF20  provides 480 ml, 896 kcals, 116 g protein (2.3g/kg), 53 g CHO, 2 g fiber and 389 ml free water  - HOB 30 degrees with gastric feeds  - Certavite daily  - FWF 30 ml q 4 hours       REASON FOR ASSESSMENT  Noelle Gomez is a/an 59 year old female assessed by the dietitian for Nutrition Risk Monitoring    PMH of asthma, 40 pack year former smoker, HTN, who initially presented to [facility] on 9/16 for acute on chronic hypoxic/hypercapnic respiratory failure thought to be related to decompensated heart failure. She was intubated quickly after admission due to worsening hypercapnea and AMS. TTE at that time revealed severe RV volume and pressure overload. VQ scan obtained (due to VIGNESH) revealed multiple bilateral wedge shaped perfusion defects. She was then transferred to [facility] ICU for thrombectomy. She also was noted to have a LE DVT, subsequently started on a heparin drip. During thrombectomy, she was noted to have mainly chronic clot with only minimal acute clot that was extracted. PAP reduced from 77/33 (mean 47), to 68/19 (mean 34). 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 She unfortunately later developed a GI bleed with melena and a hemoglobin drop from 12->6.7 over 2 days now s/p 1u pRBC. She subsequently required pressor support with levo and vaso, but vaso was weaned off and she remains on low dose levophed. She was then transferred to [facility] for further evaluation and management.     NUTRITION HISTORY  - Reviewed RD notes from OSH.  - TF initiated 9/18 (hospital day 2), Pt receiving Novasource Renal @ 37 ml/hr + propofol, held for GI bleed prior to transfer to [facility], suspect fed gastrically. Currently with OG, placed 9/22 per flowsheets.      GI: rectal tube in place, 100 ml out 3rd shift yesterday.  Workup for GI bleed, GI consult noted  Renal: prior hyperkalemia, has required daily K+ replacement since 9/20 (on high replacement protocol) Robust UOP on diuretics.  Electrolyte restricted renal formula no longer indicated  Skin: BLE venous ulcers, PU coccyx (per RN note), WOCN consult pending  Cardiac: RHC this am    CURRENT NUTRITION ORDERS  Diet: NPO with OGT to LIS, dark output.     LABS  Na+ 149 (H)  K+ 3.5  BUN 57.5 (H)  Cr 1.07 (H) - trending down  Mg++ 1.9  Phos 3.4   (H). A1c (9/16) 5.8 (H)  Lactic acid 1.0  9/22  (H) - likely not a fasting level with propofol    MEDICATIONS  Reviewed and notable for - Propofol @ 30 ml/hr (~ 800 kcals per day at this rate), norepi @ 0.05 mcg/kg/min, medium resistance novolog, bumex gtt.  High Mg and K replacement protocols    ANTHROPOMETRICS  Height: 0 cm (Data Unavailable) 5'2\"  Most Recent Weight: 128.3 kg (282 lb 13.6 oz)    IBW: 50 kg  BMI: Obesity Grade III BMI >40 (BMI 51.7 kg/m2)  Weight History:     Wt Readings from Last 20 Encounters:   09/23/24 128.3 kg (282 lb 13.6 oz)   09/22/24 124.8 kg (275 lb 1.6 oz)   09/16/24 139.3 kg (307 lb)   10/09/23 127.5 kg (281 lb)   10/02/23 129.6 kg (285 lb 11.2 oz)       Dosing Weight: 50 kg (IBW)    ASSESSED NUTRITION NEEDS  Estimated Energy Needs: 1100 - 1250 kcals/day (22 - " "25 kcals/kg IBW)  Justification: Obesity guidelines and Vented  Estimated Protein Needs: 125 grams protein/day (2.5 g/kg IBW)  Justification: Obesity guidelines, Hypercatabolism with critical illness, and Increased needs  Estimated Fluid Needs: 1 mL/kcal or per provider pending fluid status    MALNUTRITION  % Intake: </= 50% for >/= 5 days (severe)  % Weight Loss: None noted  Subcutaneous Fat Loss: None observed  Muscle Loss: None observed  Fluid Accumulation/Edema: Does not meet criteria  Malnutrition Diagnosis: Patient does not meet two of the established criteria necessary for diagnosing malnutrition but is at risk for malnutrition    Other: Hair and nails WNL.  Petechiae on arms, scaly dry feet (venous ulcers)    NUTRITION DIAGNOSIS  Inadequate protein-energy intake related to NPO status as evidenced by EN not yet resumed due to concern for GI bleeds      INTERVENTIONS  Implementation   Enteral Nutrition - recommendations above     Goals  Initiate nutrition support in 2 to 3 days     Monitoring/Evaluation  Progress toward goals will be monitored and evaluated per protocol.    Laureen Freeman, RD, LD, CNSC  \"4E Clinical Dietitian\" on Vocera  Weekend/Holiday RD - \"Weekend Clinical Dietitian\" on vocera    "

## 2024-09-23 NOTE — PLAN OF CARE
Lymphedema Therapy Discharge Summary    Reason for therapy discharge:    Discharged to [facility]    Progress towards therapy goal(s). See goals on Care Plan in [facility] electronic health record for goal details.  Goals not met.  Barriers to achieving goals:   limited tolerance for therapy.    Therapy recommendation(s):    Continued therapy is recommended.  Rationale/Recommendations:  Continued therapy recommended for LE edema unless medically this is contraindicated due to medical condition.

## 2024-09-23 NOTE — CONSULTS
Hematology Consult Note   Date of Service: 09/23/2024    Patient: Noelle Gomez  MRN: [id number]  Admission Date: 9/23/2024  Hospital Day # Hospital Day: 1  Primary Outpatient Hematologist: Needs to establish     Reason for Consult: Anticoagulation management.     Assessment & Plan:   Noelle Gomez is a 59 year old female  with history that's most relevant for 40 pack year of tobacco use and obesity who was initially admitted on 9/16 for acute on chronic hypoxic respiratory failure with complicated course by altered mental status ultimately requiring intubation. Work up with VQ scan revealed multiple bilateral wedge shape perfusion defects and she was also found to have LE DVT for which she was started on Heparin gtt. She underwent thrombectomy with IR on 9/17/24 which revealed no central PE, however nonocclusive filling defects within right lobar branches suggesting chronic recanalized thrombus. Had successful thrombectomy of right chronic thombi, no pulmonary artery thrombi appreciated but had elevated pulmonary arterial pressures making diagnosis consistent with CTEPH. Thus far unclear etiology of chronic thombi, it could be smoking and obesity as mentioned for risk factors, however will send for APS labs. Regardless of results and with such significant clot burden, she will likely need long term anticoagulation, however pending APS results will help determine need for warfarin vs potentially been able to be on DOAC. Her course has further been complicated by hgb drop in the setting of GIB for which she had IVC filter on 9/22, and EGD where she was found to have a duondenal ulcer now clipped. Hopefully we are able to obtain APS labs before she restarts AC ( heparin appears to be held now since 9/21/24 and would then resume heparin gtt at low intensity and titrate up to full dose if no bleeding.     Recommendations:   - APS labs ( Lupus anticoagulant, B2 glycoprotein and anti cardiolipin antibodies, all  ordered for you )   - Factor V leiden at this moment would not  since she will require long term AC  - No need to send for protein C or S  in the setting of active clot   - Can restart heparin once those labs are drawn ( although from chart it appears heparin was restarted at 1600hr ), trying to add on blood work to the one drawn before that. Would start with low intensity, no bolus and titrate up if no sings of bleeding   - We will continue to follow, please reach out if further questions   - Case discussed with Dr. Soni       History of Present Illness:    History obtained through chart review as patient is intubated     Noelle Gomez is a 59 year old female with relevant history of former tobacco use ( 40 year pack ) who initially presented to [facility] on 9/16 for acute on chronic hypoxic/hypercapnic respiratory failure thought to be related to decompensated heart failure. VQ scan obtained (due to VIGNESH) revealed multiple bilateral wedge shaped perfusion defects. She was then transferred to [facility] ICU for thrombectomy. She also was noted to have a LE DVT, subsequently started on a heparin drip. During thrombectomy on 9/17, she was noted to have mainly chronic clot with only minimal acute clot that was extracted. 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 She unfortunately later developed a GI bleed with melena and a hemoglobin drop from 12->6.7 over 2 days now s/p 1u pRBC. She subsequently required pressor support with levo and vaso, but vaso was weaned off and she remains on low dose levophed. She was then transferred to [facility] for further evaluation and management. She is s/p EGD with nonbleeding duodenal ulcer s/p clipping     Hematologic History:  No history of DVT/PE   Family history of Factor V Leyden mutation in son     Review of Systems: Pertinent positive and negative systems described in  HPI; the remainder of the 14 systems are negative    Past Medical History:  Past Medical History:   Diagnosis Date    Asthma 08/14/2023    Bilateral lower extremity edema 07/14/2021    Former smoker 06/23/2021    HTN (hypertension) 07/14/2021    Lymphedema 02/08/2022    Prediabetes 06/24/2021    Seasonal allergies 07/14/2021    UPJ (ureteropelvic junction) obstruction 03/04/2015    Vitamin D deficiency 06/23/2021       Past Surgical History:  Past Surgical History:   Procedure Laterality Date    IR IVC FILTER PLACEMENT  9/22/2024    IR PULMONARY ANGIOGRAM BILATERAL  9/17/2024    MIDLINE DOUBLE LUMEN PLACEMENT  9/16/2024    PICC TRIPLE LUMEN PLACEMENT  9/17/2024    PICC TRIPLE LUMEN PLACEMENT  9/20/2024       Social History:  Social History     Socioeconomic History    Marital status:    Tobacco Use    Smoking status: Former     Types: Cigarettes    Smokeless tobacco: Never     Social Determinants of Health     Financial Resource Strain: Unknown (9/20/2024)    Financial Resource Strain     Within the past 12 months, have you or your family members you live with been unable to get utilities (heat, electricity) when it was really needed?: Patient unable to answer   Food Insecurity: Unknown (9/20/2024)    Food Insecurity     Within the past 12 months, did you worry that your food would run out before you got money to buy more?: Patient unable to answer     Within the past 12 months, did the food you bought just not last and you didn t have money to get more?: Patient unable to answer   Transportation Needs: Unknown (9/20/2024)    Transportation Needs     Within the past 12 months, has lack of transportation kept you from medical appointments, getting your medicines, non-medical meetings or appointments, work, or from getting things that you need?: Patient unable to answer    [facility] [employer] [facility]    Social Connections    Interpersonal Safety: Unknown (9/20/2024)    Interpersonal Safety     Do you feel physically and emotionally safe where you currently live?: Patient unable to answer     Within the past 12 months, have you been hit, slapped, kicked or otherwise physically hurt by someone?: Patient unable to answer     Within the past 12 months, have you been humiliated or emotionally abused in other ways by your partner or ex-partner?: Patient unable to answer   Housing Stability: Unknown (9/20/2024)    Housing Stability     Do you have housing? : Patient unable to answer     Are you worried about losing your housing?: Patient unable to answer        Family History  As above     Outpatient Medications:  Current Facility-Administered Medications   Medication Dose Route Frequency Provider Last Rate Last Admin    bumetanide (BUMEX) 0.25 mg/mL infusion  1 mg/hr Intravenous Continuous Travis Rosa MD 4 mL/hr at 09/23/24 1300 1 mg/hr at 09/23/24 1300    chlorhexidine (PERIDEX) 0.12 % solution 15 mL  15 mL Swish & Spit BID Sven Lynn MD   15 mL at 09/23/24 0732    dexmedeTOMIDine (PRECEDEX) 4 mcg/mL in sodium chloride 0.9 % 100 mL infusion  0.1-1.2 mcg/kg/hr (Dosing Weight) Intravenous Continuous Sven Lynn MD 31.2 mL/hr at 09/23/24 1300 1 mcg/kg/hr at 09/23/24 1300    glucose gel 15-30 g  15-30 g Oral Q15 Min PRN Sven Lynn MD        Or    dextrose 50 % injection 25-50 mL  25-50 mL Intravenous Q15 Min PRN Sven Lynn MD        Or    glucagon injection 1 mg  1 mg Subcutaneous Q15 Min PRN Sven Lynn MD        fentaNYL (SUBLIMAZE) 50 mcg/mL bolus from pump  25 mcg Intravenous Q1H PRN Sven Lynn MD        fentaNYL (SUBLIMAZE) infusion   mcg/hr Intravenous Continuous Sven Lynn MD 1 mL/hr at 09/23/24 1300 50 mcg/hr at 09/23/24 1300    insulin aspart (NovoLOG) injection (RAPID ACTING)  1-7 Units Subcutaneous Q4H Sven Lynn MD   1 Units at 09/23/24 1204     ipratropium - albuterol 0.5 mg/2.5 mg/3 mL (DUONEB) neb solution 3 mL  3 mL Nebulization 4x daily Travis Rosa MD   3 mL at 09/23/24 1304    propofol (DIPRIVAN) infusion  5-75 mcg/kg/min (Dosing Weight) Intravenous Continuous Sven Lynn MD 30 mL/hr at 09/23/24 1409 40 mcg/kg/min at 09/23/24 1409    And    propofol (DIPRIVAN) bolus from bag or syringe pump  10 mg Intravenous Q15 Min PRN Sven Lynn MD        And    Medication Instruction   Does not apply Continuous PRN Sven Lynn MD        naloxone (NARCAN) injection 0.2 mg  0.2 mg Intravenous Q2 Min PRN Chalino De La Torre MD        Or    naloxone (NARCAN) injection 0.4 mg  0.4 mg Intravenous Q2 Min PRN Chalino De La Torre MD        Or    naloxone (NARCAN) injection 0.2 mg  0.2 mg Intramuscular Q2 Min PRN Chalino De La Torre MD        Or    naloxone (NARCAN) injection 0.4 mg  0.4 mg Intramuscular Q2 Min PRN Chalino De La Torre MD        norepinephrine (LEVOPHED) 16 mg in  mL infusion MAX CONC CENTRAL LINE  0.01-0.6 mcg/kg/min (Dosing Weight) Intravenous Continuous Sven Lynn MD 5.9 mL/hr at 09/23/24 1300 0.05 mcg/kg/min at 09/23/24 1300    pantoprazole (PROTONIX) IV push injection 40 mg  40 mg Intravenous BID Sven Lynn MD   40 mg at 09/23/24 0732    polyethylene glycol (MIRALAX) Packet 17 g  17 g Oral Daily PRN Sven Lynn MD        senna-docusate (SENOKOT-S/PERICOLACE) 8.6-50 MG per tablet 1 tablet  1 tablet Oral BID PRN Sven Lynn MD        Or    senna-docusate (SENOKOT-S/PERICOLACE) 8.6-50 MG per tablet 2 tablet  2 tablet Oral BID PRN Sven Lynn MD            Physical Exam:    Pulse 90   Temp 98  F (36.7  C) (Esophageal)   Resp 25   Wt 128.3 kg (282 lb 13.6 oz)   SpO2 93%   BMI 51.73 kg/m    Gen: Intubated and sedated   CV: Normal rate, regular rhythm. No m/r/g  Pulm: Coarse sounds bilateral.   Abd: Soft, nt/nd, no rebound/guarding  Ext: Warm and well  perfused. No lower extremity edema    Labs & Studies: I personally reviewed the following studies:  ROUTINE LABS (Last four results):  CMP  Recent Labs   Lab 09/23/24  1204 09/23/24  1158 09/23/24  0918 09/23/24  0752 09/23/24  0218 09/23/24  0213 09/22/24  0727 09/22/24  0427 09/21/24  2019 09/21/24  1822 09/21/24  1142 09/21/24  1119 09/21/24  0740 09/21/24  0423 09/20/24  1723 09/20/24  1334 09/17/24  0535 09/17/24  0414   NA  --   --   --   --   --  149*  --  145  --  143  --  144  --  145   < > 143   < > 139   POTASSIUM  --  3.5 3.4  --   --  3.5  --  3.9  --  3.7  --  3.8  --  3.3*  3.3*   < > 3.7   < > 6.3*   CHLORIDE  --   --   --   --   --  101  --  103  --  103  --  104  --  103   < > 104   < > 99   CO2  --   --   --   --   --  38*  --  34*  --  32*  --  33*  --  33*   < > 31*   < > 29   ANIONGAP  --   --   --   --   --  10  --  8  --  8  --  7  --  9   < > 8   < > 11   *  --   --  134* 135* 150*   < > 173*   < > 183*   < > 175*   < > 237*   < > 193*   < > 141*   BUN  --   --   --   --   --  57.5*  --  66.7*  --  65.2*  --  67.8*  --  70.6*   < > 73.1*   < > 98.1*   CR  --   --   --   --   --  1.07*  --  1.30*  --  1.18*  --  1.26*  --  1.34*   < > 1.37*   < > 2.46*   GFRESTIMATED  --   --   --   --   --  60*  --  47*  --  53*  --  49*  --  45*   < > 44*   < > 22*   BEN  --   --   --   --   --  9.0  --  8.4*  --  7.6*  --  7.9*  --  8.0*   < > 8.0*   < > 8.4*   MAG  --   --  2.2  --   --  1.9  --  1.9  --   --   --   --   --  2.3   < > 1.6*  --   --    PHOS  --   --   --   --   --  3.4  --  2.6  --   --   --   --   --  2.5  --  3.0   < >  --    PROTTOTAL  --   --   --   --   --  6.0*  --   --   --   --   --   --   --   --   --   --   --  6.1*   ALBUMIN  --   --   --   --   --  3.2*  --   --   --   --   --   --   --   --   --   --   --  2.9*   BILITOTAL  --   --   --   --   --  1.0  --   --   --   --   --   --   --   --   --   --   --  0.7   ALKPHOS  --   --   --   --   --  52  --   --   --   --    --   --   --   --   --   --   --  74   AST  --   --   --   --   --  24  --   --   --   --   --   --   --   --   --   --   --  20   ALT  --   --   --   --   --  12  --   --   --   --   --   --   --   --   --   --   --  13    < > = values in this interval not displayed.     CBC  Recent Labs   Lab 09/23/24  0213 09/22/24  2102 09/22/24  1444 09/22/24  0427 09/22/24  0303 09/21/24  2046 09/21/24  1119 09/21/24  0423   WBC 7.9  --   --  9.6  --  6.6  --  7.1   RBC 2.19*  --   --  2.27*  --  1.85*  --  2.23*   HGB 7.6* 7.5* 7.6* 7.8*   < > 6.7*   < > 8.2*   HCT 25.4*  --   --  25.5*  --  22.2*  --  26.6*   *  --   --  112*  --  120*  --  119*   MCH 34.7*  --   --  34.4*  --  36.2*  --  36.8*   MCHC 29.9*  --   --  30.6*  --  30.2*  --  30.8*   RDW 20.6*  --   --  20.8*  --  16.1*  --  16.0*   *  --   --  103*  --  89*  --  86*    < > = values in this interval not displayed.     INRNo lab results found in last 7 days.    Kristen Mehta MD  PGY-4 Hematology Oncology

## 2024-09-23 NOTE — H&P
Cardiology ICU Note    09/23/2024    Noelle Gomez MRN# [id number]   Age: 59 year old YOB: 1965          H&P:   Noelle Gomez is a 59 year old female who was admitted on 9/23/2024. She has a PMH of asthma, 40 pack year former smoker, HTN, who initially presented to [facility] on 9/16 for acute on chronic hypoxic/hypercapnic respiratory failure thought to be related to decompensated heart failure. She was intubated quickly after admission due to worsening hypercapnea and AMS. TTE at that time revealed severe RV volume and pressure overload. VQ scan obtained (due to VIGNESH) revealed multiple bilateral wedge shaped perfusion defects. She was then transferred to [facility] ICU for thrombectomy. She also was noted to have a LE DVT, subsequently started on a heparin drip. During thrombectomy, she was noted to have mainly chronic clot with only minimal acute clot that was extracted. PAP reduced from 77/33 (mean 47), to 68/19 (mean 34). 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 She unfortunately later developed a GI bleed with melena and a hemoglobin drop from 12->6.7 over 2 days now s/p 1u pRBC. She subsequently required pressor support with levo and vaso, but vaso was weaned off and she remains on low dose levophed. She was then transferred to [facility] for further evaluation and management.          Assessment and Plan:     09/23: Patient continues mechanical ventilated and with vasopressors support. Mix Shock. Working up pulmonary hypertension.    Changes:  EGD today to assess GI bleeding.   RHC today for PH work up.   PH work up ordered.   CVTS to evaluate endarterectomy.   CICU team to manage ventilator.     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 Course unfortunately complicated by GI bleed on heparin, so IVC filter was placed. CVP transduced off PICC on admission is 24     CVP PA PVR PCWP CO/CI   09/23 11 78/29/42 5.53 12 5/43/2.46                     Volume: Hypervolemic   Contractility:   > RV: Strain per TTE and CT, ELIO preserved. Elevated pulmonary pressures   > LV functions is preserved.     Meds:  Inotrope: None   Pulmonary vasodilators: None   Vasopressors: Levophed 0.05   Diuresis: Bumetanide 1mg/h       Plan:  RHC today  Consulting CVTS, is the patient candidate for pulmonary endarterectomy?  Consulting CICU team for ventilator management.      Pulmonary    FiO2 (%): 70 %, Resp: 16, Vent Mode: CMV/AC, Resp Rate (Set): 16 breaths/min, Tidal Volume (Set, mL): 450 mL, PEEP (cm H2O): 8 cmH2O, Resp Rate (Set): 16 breaths/min, Tidal Volume (Set, mL): 450 mL, PEEP (cm H2O): 8 cmH2O     # Acute Hypoxic and hypercapnic respiratory failiure 2/2  # CETPH  # LORA/OHS  # ?Previously treated CAP  -Presented AoC hypoxic/hypercapnic respiratory failure thought to be due to HF, now found to be due to PH 2/2 CTEPH. She was also treated with CTX and Doxy for CAP coverage x 5 days.  ===PLAN===  -Plan for CTEPH/RV overload as above  -Consulting CICU team for ventilator management.           Gastrointestinal, Nutrition  #Upper GI bleed in the setting of heparin use  -Continue to hold heparin  -Hgb stable over the past 24 hours, check CBC BID    Plan:   EGD today     Renal, Electrolytes  # VIGNESH 2/2 cardiogenic shock from  CTEPH      Infectious Disease  # ?CAP (resolved)  -treated with 5 day course of CTX/Doxycycline in the past     - No concerns for infection.     Hematology  # Left femoral DVT  # Family history of Factor 5 Leiden  # CTEPH  -Patient presenting with hypoxia and found to have both a DVT and chronic bilateral pulmonary emboli. Her eldest son has history of factor 5 Leiden.   -Hematology consult in the AM     Endocrinology  # Hyperglycemia  - insulin prn    ICU Checklist:  #Feeding: NPO for procedure  #Analgesia: fentanyl  #Sedation: precedex/propofol  #Thromboembolism ppx: mechanical (GI bleed)  #HOB: 30 degrees   #Ulcer ppx: PPI BID  #Glucose: insulin PRN  #SBT/SAT: n/a  #Bowel reg: miralax/senna PRN  #Family: , 2 sons  #Dispo: CCU    Family update by me today: Yes     Code Status: Full    Discussed with Dr Chau attending physician,    Travis Rosa MD  Cardiology Fellow          Medications:            Past Medical History:     Past Medical History:   Diagnosis Date    Asthma 08/14/2023    Bilateral lower extremity edema 07/14/2021    Former smoker 06/23/2021    HTN (hypertension) 07/14/2021    Lymphedema 02/08/2022    Prediabetes 06/24/2021    Seasonal allergies 07/14/2021    UPJ (ureteropelvic junction) obstruction 03/04/2015    Vitamin D deficiency 06/23/2021            Family History:   Unable to obtain due to patients medical condition.         Social History:   Unable to obtain due to patients medical condition.         Past Surgical History:   Unable to obtain due to patients medical condition.         Review of Systems:   Unable to obtain due to patients medical condition.            Physical Exam:   Pulse 77   Temp 98.2  F (36.8  C) (Esophageal)   Resp 13   Wt 128.3 kg (282 lb 13.6 oz)   SpO2 96%   BMI 51.73 kg/m        GENERAL: Intubated, sedated. NAD.  HEENT: No icterus. ETT in place, OG tube in place.  CARDIOVASCULAR: RRR. Normal S1 and S2.  RESP: Coarse bilaterally. Mechanical  "ventilation.    GI Soft, bowel sounds hypoactive but present.  GENITOURINARY: Miller in place.  EXTREMITIES: edematous, warm and well perfused  NEURO: Sedated and intubated.  INTEGUMENTARY: No rashes. Cannula/Line sites CDI.      Resp: 13 SpO2: 96 % O2 Device: Mechanical Ventilator      Arterial Blood Gas:   Recent Labs   Lab 09/23/24  0749 09/23/24  0213 09/23/24  0212 09/21/24  0950 09/20/24  0832   PH 7.39  --  7.40 7.39 7.33*   PCO2 71*  --  66* 57* 62*   PO2 76*  --  89 81 73*   HCO3 43*  --  41* 35* 33*   O2PER 70 70 70 50 60        Vitals:    09/23/24 0330   Weight: 128.3 kg (282 lb 13.6 oz)   I/O last 3 completed shifts:  In: 378.28 [I.V.:318.28; NG/GT:60]  Out: 1650 [Urine:1550; Stool:100]  Recent Labs   Lab 09/23/24  0752 09/23/24  0218 09/23/24  0213 09/22/24  0727 09/22/24  0427   NA  --   --  149*  --  145   POTASSIUM  --   --  3.5  --  3.9   CHLORIDE  --   --  101  --  103   CO2  --   --  38*  --  34*   ANIONGAP  --   --  10  --  8   * 135* 150*   < > 173*   BUN  --   --  57.5*  --  66.7*   CR  --   --  1.07*  --  1.30*   BEN  --   --  9.0  --  8.4*    < > = values in this interval not displayed.     No components found for: \"URINE\"   Recent Labs   Lab 09/23/24  0213 09/17/24  0414 09/16/24  0956   AST 24 20 39   ALT 12 13 18   BILITOTAL 1.0 0.7 1.1   ALBUMIN 3.2* 2.9* 3.5   PROTTOTAL 6.0* 6.1* 7.6   ALKPHOS 52 74 102     Temp: 98.2  F (36.8  C) Temp src: EsophagealTemp  Min: 95.5  F (35.3  C)  Max: 98.4  F (36.9  C)   Recent Labs   Lab 09/23/24  0213 09/22/24 2102 09/22/24  1444 09/22/24  0427 09/22/24  0303 09/21/24  2046 09/21/24  1119 09/21/24  0423 09/20/24  1820 09/20/24  0531   WBC 7.9  --   --  9.6  --  6.6  --  7.1  --  12.1*   HGB 7.6* 7.5* 7.6* 7.8* 7.9* 6.7*   < > 8.2*   < > 11.0*   HCT 25.4*  --   --  25.5*  --  22.2*  --  26.6*  --  34.8*   *  --   --  112*  --  120*  --  119*  --  119*   RDW 20.6*  --   --  20.8*  --  16.1*  --  16.0*  --  16.5*   *  --   --  103*  " --  89*  --  86*  --  121*    < > = values in this interval not displayed.     Recent Labs   Lab 09/16/24  0956   INR 1.46*   PTT 25     Recent Labs   Lab 09/23/24  0752 09/23/24  0218 09/23/24  0213 09/22/24  2321 09/22/24  2035   * 135* 150* 153* 141*       Lines:           Data:   All lab results reviewed  All lab results reviewed past 24 hours    Recent Results (from the past 24 hour(s))   CT Chest Abdomen Pelvis w/o Contrast    Narrative    EXAM: CT CHEST ABDOMEN PELVIS W/O CONTRAST  LOCATION: [facility]  DATE: 9/22/2024    INDICATION: Acute respiratory failure, sepsis and GI bleed with malignant stools. Recent acute pulmonary emboli, status post mechanical thrombectomy.  COMPARISON: Portable chest 9/20/2024, VQ scan 8/17/2024, CT 2/18/2015  TECHNIQUE: CT scan of the chest, abdomen, and pelvis was performed without IV contrast. Multiplanar reformats were obtained. Dose reduction techniques were used.   CONTRAST: None.    FINDINGS:   LUNGS AND PLEURA: Moderate bilateral pleural effusions, right greater than left. Consolidative airspace opacities through the dependent portions of both lower lobes, and to a lesser degree upper lobes. Bilateral bronchial wall thickening with upper lung   predominant groundglass opacities, right greater left with small amount mixed patchy consolidative airspace opacity. Varicoid bronchiectasis within the anterior segments of both upper lobes, worse on the left with some surrounding consolidative airspace   opacity. Central airways are patent.    MEDIASTINUM/AXILLAE: Endotracheal tube is 4 cm above the wilder. Right sided PICC tip at cavoatrial junction. No adenopathy nor pericardial effusion. The thoracic aorta and heart are normal in size.    CORONARY ARTERY CALCIFICATION: Mild.    HEPATOBILIARY: Simple hepatic cysts do not require imaging follow-up. Cholelithiasis with no cholecystitis nor bile duct dilatation.    PANCREAS: Normal.    SPLEEN: Mild  splenomegaly measuring 14.2 cm.    ADRENAL GLANDS: Stable bilateral adrenal hyperplasia, dating back to 2015.    KIDNEYS/BLADDER: Chronic left UPJ stenosis with moderate to severe hydronephrosis slightly less pronounced compared to 2015 exam. Small bilateral nonobstructing calculi no ureteral calculus. The bladder is decompressed with a Miller catheter.    BOWEL: Normal caliber small bowel. Scattered air-fluid levels throughout nondilated colon. Rectal tube in place. No obstruction or inflammatory change. Trace amount of ascites.    LYMPH NODES: No lymphadenopathy.    VASCULATURE: No aortoiliac aneurysm.    PELVIC ORGANS: Normal.    MUSCULOSKELETAL: Generalized subcutaneous edema in the lower chest, abdomen and proximal extremities suggests anasarca. Moderate fat-containing umbilical hernia. No suspicious osseous lesions.      Impression    IMPRESSION:  1.  Moderate bilateral pleural effusions, greater on right.  2.  Consolidative airspace opacities within the dependent portions of both lungs, lower lobes greater than upper lobes.  3.  Bilateral bronchial wall thickening with additional mixed mainly groundglass opacities within upper lobes likely infectious/inflammatory. Bilateral anterior segment upper lobe varicoid bronchiectasis, greater on the left.  4.  No source of sepsis within the abdomen and pelvis.  5.  Scattered air-fluid levels throughout nondilated colon with no obstruction or inflammatory change.  6.  Generalized anasarca.   IR IVC Filter Placement    [facility] RAD[facility]  LOCATION: [facility]  DATE: 9/22/2024    PROCEDURE: IVC FILTER PLACEMENT    INTERVENTIONAL RADIOLOGIST: Lev Reyes MD.    INDICATION: Deep vein thrombosis with contraindications to anticoagulation. GI bleed. Pulmonary hypertension with limited cardiorespiratory reserve. DVT left leg. Patient intubated and sedated.    Air Kerma: 205 mGy  FLUOROSCOPIC TIME: 0.6 minutes   CONTRAST: 25 cc Omnipaque  350    COMPLICATIONS: No immediate complications.    STERILE BARRIER TECHNIQUE: Maximum sterile barrier technique was used. Cutaneous antisepsis was performed at the operative site with application of 2% chlorhexidine and large sterile drape. Prior to the procedure, the surgeon and assistant performed hand   hygiene and wore hat, mask, sterile gown, and sterile gloves during the entire procedure.    PROCEDURE:   Telephone consent obtained from  [facility]. The right neck  was prepped and draped in usual sterile fashion followed by local anesthesia with 1% Xylocaine. Ultrasound revealed a patent and compressible right internal jugular vein, and an ultrasound   image was obtained and placed in the patient's permanent medical record. Using real-time ultrasound for needle placement, a right internal jugular vein puncture was performed followed by placement of the infusion catheter and delivery sheath into the   caudal IVC. An inferior venacavogram was performed. A Bard Rebecca retrievable IVC filter was then deployed into the infrarenal IVC. A followup venacavogram was performed. The right jugular vein access was removed with hemostasis obtained with manual   compression.    FINDINGS:   Normal inferior vena cava. Successful placement of a Bard Bledsoe retrievable IVC filter within the infrarenal IVC.      Impression    IMPRESSION:  Successful placement of a retrievable Bard Bledsoe IVC filter. It is recommended this filter be removed as soon as clinically reasonable.    CPT code for physician reference only:  01547   XR Chest Port 1 View    Narrative    EXAM: XR CHEST PORT 1 VIEW  9/23/2024 1:07 AM      HISTORY: confirm ETT positioning, placed at OSH    COMPARISON: 9/20/2024    FINDINGS: Single view of the chest. Endotracheal tube terminates in  the midthoracic trachea approximately 5.6 cm from the wilder. Right  upper extremity PICC tip terminates in the mid to low SVC. Enteric  tube courses inferiorly below the  diaphragm with tip out of the field  of view. Trachea is midline. Cardiac silhouette is within normal  limits. The pulmonary vasculature is indistinct. Mixed interstitial  and airspace opacities in the right greater than left lungs.  Retrocardiac opacities. Small left greater than right pleural  effusions. No pneumothorax. Contrast in the stomach.      Impression    IMPRESSION:   1. Endotracheal tube terminates in the mid thoracic trachea.  2. Decreased right greater than left mixed interstitial and airspace  opacities, which may represent edema or atypical infection.  3. Small left greater than right pleural effusions, improved compared  to prior.    I have personally reviewed the examination and initial interpretation  and I agree with the findings.    KARAN KLEIN MD         SYSTEM ID:  [id number]   XR Abdomen Port 1 View    Narrative    EXAMINATION:  XR ABDOMEN PORT 1 VIEW 9/23/2024     COMPARISON: Same-day chest radiograph.    HISTORY: confirm OGT positioning, placed at OSH.    TECHNIQUE: Frontal supine view of the abdomen.    FINDINGS: Gastric tube tip and sidehole projecting over the stomach.  Enteric contrast within the gastric fundus. Partially visualized IVC  filter. No abnormally dilated loops of bowel or pneumatosis in the  visualized abdomen. No portal venous gas.  Bibasilar opacities and  effusions better described on same-day chest radiograph.      Impression    IMPRESSION: Gastric tube tip and sidehole project over the stomach.    I have personally reviewed the examination and initial interpretation  and I agree with the findings.    KARAN KLEIN MD         SYSTEM ID:  [id number]     I have reviewed today's vital signs, notes, medications, labs and imaging.  I have also seen and examined the patient and agree with the findings and plan as outlined above. Pt is 60 yo WF with hx of tobacco abuse who presents following transfer with PAH, moderate RV dysfn, severe RV dilation, intubated and  sedated with CXR significant for right lung opacificaiton concerning for aspiration pneumonia with pulmonary edema.  Pt on levophed, vasopressen and will consider inhaled NO.  CVP 18 and will need RHC for hemodynamics.    Pt also with hx of CTEPH and DVT with IVC filter placed.  Will add abx and continue diuresis.  Pt critically ill who was seen X3 for total critical care time 50 min.     Chalino De La Torre MD, PhD  Professor, Heart Failure and Cardiac Transplantation  [facility]

## 2024-09-23 NOTE — CONSULTS
[facility] Nurse Inpatient Assessment     Consulted for: right heel, sacral    Summary: RN reports generalized rash that Providers are aware of and lymph consult. RN reported finding pressure injury to right heel, POA, upon skin assessemnt, [facility] requested updated consult per Provider, this was obtained.     Patient History (according to provider note(s):      Noelle Gomez is a 59 year old female who was admitted on 9/23/2024. She has a PMH of asthma, 40 pack year former smoker, HTN, who initially presented to [facility] on 9/16 for acute on chronic hypoxic/hypercapnic respiratory failure thought to be related to decompensated heart failure. She was intubated quickly after admission due to worsening hypercapnea and AMS. TTE at that time revealed severe RV volume and pressure overload. VQ scan obtained (due to VIGNESH) revealed multiple bilateral wedge shaped perfusion defects. She was then transferred to [facility] ICU for thrombectomy. She also was noted to have a LE DVT, subsequently started on a heparin drip. During thrombectomy, she was noted to have mainly chronic clot with only minimal acute clot that was extracted. PAP reduced from 77/33 (mean 47), to 68/19 (mean 34). 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 She unfortunately later developed a GI bleed with melena and a hemoglobin drop from 12->6.7 over 2 days now s/p 1u pRBC. She subsequently required pressor support with levo and vaso, but vaso was weaned off and she remains on low dose levophed. She was then transferred to [facility] for further evaluation and management.     Assessment:      Areas visualized during today's visit: Focused:, Sacrum/coccyx, Heel, and Right    Skin Injury Location: intergluteal fold, perianal      Last photo: 9/23/2024  Skin injury due to: Incontinence associated dermatitis (IAD) and  Moisture associated skin damage (MASD)  Skin history and plan of care:   blanchable redness in linear/mirror like fashion to buttocks where there is skin to skin contact. Moist, tacky skin. FMS in use, with leaking around tubing, melena. RN reports Gastroenterology is aware. Isolibrium support surface with JENNY pump for moisture management in use.   Affected area:      Skin assessment: Denudement and Erythema     Measurements (length x width x depth, in cm)  see media     Color: red     Temperature  normal      Drainage: none .      Color: none      Odor: none  Pain: unable to assess due to  sedation , none  Pain interventions prior to dressing change: patient tolerated well and slow and gentle cares   Treatment goal: Heal , Infection control/prevention, Decrease moisture, Maintain (prevention of deterioration), Protection, and Simplify plan of care  STATUS: initial assessment  Supplies ordered: gathered, supplies stored on unit, discussed with RN, and discussed with patient      Pressure Injury Location: right heel      Last photo: 9/23/2024  Wound type: Pressure Injury     Pressure Injury Stage: Deep Tissue Pressure Injury (DTPI), present on admission       Wound history/plan of care:   RN reported finding pressure injury to right heel upon skin assessment. Deep purple and red non blanchable discoloration. Skin intact and dry., cracked skin to heel.  Deep tissue pressure injury, POA    Wound base: 95 % Non-blanchable, Purple, Epidermis, and Dry, 5 % Non-blanchable, Erythema, Epidermis, and Dry     Palpation of the wound bed: boggy      Drainage: none     Description of drainage: none     Measurements (length x width x depth, in cm) 1.6  x 1.4  x  0 cm      Periwound skin: Dry/scaly      Color: pink and red      Temperature: normal   Odor: none  Pain: unable to assess due to  sedation , none  Pain intervention prior to dressing change: patient tolerated well and slow and gentle cares   Treatment goal: Heal ,  "Infection control/prevention, Maintain (prevention of deterioration), and Protection  STATUS: initial assessment  Supplies ordered: gathered, supplies stored on unit, discussed with RN, and discussed with patient     My PI Risk Assessment     Sensory Perception: 2 - Very Limited     Moisture: 3 - Occasionally moist      Activity: 1 - Bedfast      Mobility: 1 - Completely immobile      Nutrition: 2 - Probably inadequate      Friction/Shear: 2 - Potential problem      TOTAL: 11     Treatment Plan:     Intergluteal/perianal:  BID  Cleanse gently with Danuta clenase and protect and a soft disposable cloth.   Apply Criticaid moisture barrier in a thin layer.  Montior for skin breakdown near FMS tubing, reposition tubing often when turning patient to remove prolonged pressure to perianal area.    Right heel: Every three days  Cleanse the area with NS and pat dry.  Apply No sting film barrier to periwound skin.  Cover wound with Mepilex 4x4   Turn and reposition Q 2hrs side to side only.  Ensure pt has Branden-cushion while sitting up in the chair.  FYI- If pt has constant incontinent loose stools needing dressing changes Q shift please discontinue the Mepilex dressing and apply criticaid barrier paste BID and PRN.      Pressure Injury Prevention (PIP) Plan:  If patient is declining pressure injury prevention interventions: Explore reason why and address patient's concerns, Educate on pressure injury risk and prevention intervention(s), If patient is still declining, document \"informed refusal\" , and Ensure Care team is aware ( provider, charge nurse, etc)  Mattress: Follow bed algorithm, add Low Air Loss (Air+) mattress pump if skin is very moist or constantly moist.   HOB: Maintain at or below 30 degrees, unless contraindicated  Repositioning in bed: Every 1-2 hours , Left/right positioning; avoid supine, Raise foot of bed prior to raising head of bed, to reduce patient sliding down (shear), and Frequent microturns using " positioning wedges, as patient tolerates  Heels: Keep elevated off mattress, Pillows under calves, and Heel lift boots  Protective Dressing: Sacral Mepilex for prevention (#102665),  especially for the agitated patient   Positioning Equipment:None  Chair positioning: Assist patient to reposition hourly and Do NOT use a donut for sitting (this increases pressure to smaller area and creates a higher potential for injury)    If patient has a buttock pressure injury, or high risk for PI use chair cushion or SPS.  Moisture Management: Perineal cleansing /protection: Follow Incontinence Protocol, Avoid brief in bed, Clean and dry skin folds with bathing , Consider InterDry (#922073) between folds, and Moisturize dry skin  Under Devices: Inspect skin under all medical devices during skin inspection , Ensure tubes are stabilized without tension, and Ensure patient is not lying on medical devices or equipment when repositioned  Ask provider to discontinue device when no longer needed.      Orders: Written    RECOMMEND PRIMARY TEAM ORDER: None, at this time  Education provided: importance of repositioning, plan of care, wound progress, Infection prevention , Moisture management, Hygiene, and Off-loading pressure  Discussed plan of care with: Patient and Nurse  WOC nurse follow-up plan: weekly  Notify WOC if wound(s) deteriorate.  Nursing to notify the Provider(s) and re-consult the WOC Nurse if new skin concern.    DATA:     Current support surface: Standard  Low air loss (JENNY pump, Isolibrium, Pulsate)  Containment of urine/stool: Incontinence Protocol, Incontinent pad in bed, Indwelling catheter, and Internal fecal management  BMI: Body mass index is 51.73 kg/m .   Active diet order: Orders Placed This Encounter      NPO for Medical/Clinical Reasons Except for: NPO but receiving Tube Feeding     Output: I/O last 3 completed shifts:  In: 1116.08 [I.V.:1056.08; NG/GT:60]  Out: 3940 [Urine:3840; Stool:100]     Labs:   Recent  Labs   Lab 09/23/24  1527 09/23/24  1158 09/23/24  0213   ALBUMIN  --   --  3.2*   PREALB  --  6.8*  --    HGB 7.9*  --  7.6*   WBC 9.0  --  7.9     Pressure injury risk assessment:   Sensory Perception: 2-->very limited  Moisture: 3-->occasionally moist  Activity: 1-->bedfast  Mobility: 3-->slightly limited  Nutrition: 2-->probably inadequate  Friction and Shear: 2-->potential problem  Jose Ramon Score: 13    Claritza Maharaj, RN, BSN, CWOCN   Pager no longer is use, please contact through [employer] group: [facility] Nurse [place]  Dept. Office Number: [phone]

## 2024-09-23 NOTE — OR NURSING
Assisted with bedside EGD for pt with melena  in the ICU. Duodenal ulcer located. 3 ml of epi 1:84672 injected with a sclero needle. Blanching  achieved. 2 Ovesco clips were  placed at the site. Pt tolerated the procedure very well.

## 2024-09-24 NOTE — PROGRESS NOTES
CVICU End of Shift Summary:    Neuro: Sedated and intubated- RASS 0/-1. Localizes and purposeful all extremities but not following commands, tracks, PEERLA. Tmax 100. Blood cultures drawn.    CV: Sinus tachycardia 110-120 since off propofol and dex. MAP >65, vaso @ 1.8 straight rate. CVP 15, PA 81/35.  .01 levo required while sleeping as MAP drops to 59.    Pulm: ETT 23cm @ teeth cmv 70%, 450, 8, 16    GI: New OG okay to use. Waiting for nutrition to start tube feeds and standard FWF.    : valerio w/ 200 ml/hr on bumex drip 1    Skin: BLE venous ulcers, scattered rash and abrasions, PU coccyx area, scattered scabs.      Gtts:   Levo off  Bumex 1  Fent 150  Propofol off  Dex off  Protonix 8  Vaso 1.8    Labs:  K and Mag replaced     Plan: Weaning Tessie as sildenafil was started. Weaning fi02 as able to facilitate future extubation. Continue CVICU cares and monitoring.

## 2024-09-24 NOTE — PROGRESS NOTES
Lamar marko catheter reposition at the bedside using fluoroscopy.     Earlier this morning, It was not possible to wedge the patient at the bedside after advancing the catheter 15cm in. It was decided to advance the swan and reposition using fluoroscopy.     Using fluoroscopy I was unable to adjust the swan marko, it kept getting coiled/looped in the RA/Tricuspid valve junction, associated to multiple PVCs. I decided to leave the swan marko in the PA where I was able to get a continues monitor of the pulmonary pressures. CXR to follow to confirm placement. Possible reattempts in the future using swan marko wire. Total time 5-10 minutes.     Travis Rosa MD  Cardiology Fellow.       Cahlino De La Torre MD, PhD  Professor, Heart Failure and Cardiac Transplantation  [facility]

## 2024-09-24 NOTE — PLAN OF CARE
Goal Outcome Evaluation:      Plan of Care Reviewed With: child    Overall Patient Progress: no changeOverall Patient Progress: no change    Outcome Evaluation: Discharge needs are not known at this time.  RNCC will cont to follow the plan of care.

## 2024-09-24 NOTE — CONSULTS
[facility]   Pulmonary Consult Note - Initial    Noelle Gomez MRN: [id number]  Date of Admission:9/23/2024  Date of Service: 09/24/2024    Reason for consult: CT chest findings on 9/23 concerning for multilocular cavitary lesions in anterior left upper lobe and pulmonary opacification concerning for pulmonary infarction versus necrotizing PNA.   Primary service: Cards 2 - Hf ([facility])    ASSESSMENT:     This 59 year old female with past medical history of asthma, 40 pack year history, HTN, venous stasis dermatitis, obesity originally presented 8 days ago to OSH with acute on chronic hypoxic respiratory failure requiring prompt intubation due to AMS and hypercapnia and was found to have evidence of right heart strain and wedge perfusion defects on VQ scan with left femoral vein DVT.  Patient was put on IV heparin drip and transferred to [place] for a subsequent thrombectomy showing acute on chronic pulmonary emboli. Minimal clot was able to be removed during this thrombectomy. Further management was complicated by GI bleeding secondary to a bleeding duodenal ulcer which is now stable s/p EGD intervention and 1 unit pRBC. IVC filter placed by IR on 9/22. Patient was transferred to [facility] yesterday (9/23) for further management and a RHC was completed yesterday showing pressures consistent with precapillary pulmonary hypertension. Pulmonology was consulted for CT chest findings concerning for multilocular cavitary lesions in anterior left upper lobe and pulmonary opacification concerning for pulmonary infarction versus necrotizing PNA.    Imaging was reviewed including CT chest from 9/23. Patient's anterior left upper cavitary lesion most likely represents parenchymal necrosis and scarring secondary to chronic pulmonary emboli which reduced perfusion to the area. The patient's bilateral parenchymal opacification shows detail more consistent with fluid overload in the setting of emphysematous changes and is  not consistent with infection. Bilateral pleural effusions and interlobar septal thickening are noted which are consistent with fluid overload. CXR imaging from 9/24 compared to 9/20 shows interval improvement in bilateral opacification during a period of diuresis. At this time there are no definitive signs of infection. Patient has completed a 4 day course of ceftriaxone and 6 day course of doxycycline. They have not been on empiric antibiotic treatment for the last 2 days. No fever or leukocytosis. Sputum culture 9/19, viral respiratory panel 9/16, blood culture, MRSA nares are negative.    RECOMMENDATIONS:    - Recommend adding BRIELLE, ANCA, PR3/MPO, CRP, ESR testing.  - ?IRMA to r/o marantic endocarditis / septic emboli.  - Recommend holding off on empiric antibiotic therapy.   - Appreciate infectious disease recommendations.  - Continue diuresis.  - Continue Duonebs QID    Pulmonary will follow daily    Patient seen and discussed with Dr. Jarett Patricio M4   Pulmonology Service    I was present with the medical student who participated in the service and in the documentation of the note. I have verified the history and personally performed the physical exam and medical decision making. I agree with the assessment and plan of care as documented in the note with any changes made in blue.    Lev York,   Pulmonary/Critical Care Fellow           HPI:  Noelle Gomez is a 59 year old female with past medical history of asthma, 40 pack year history of smoking, HTN, venous stasis dermatitis, obesity who was transferred to [facility] on 9/23/24 for further management and evaluation of acute hypoxic respiratory failure with ongoing workup from OSH consistent with right heart strain secondary to acute on chronic pulmonary emboli. Patient initially presented to [facility] on 9/16 for acute on chronic hypoxic/hypercapnic respiratory failure thought to be related to decompensated heart failure. She was  intubated quickly after admission due to worsening hypercapnea and AMS. TTE at that time revealed severe RV volume and pressure overload. VQ scan obtained (due to IVGNESH) revealed multiple bilateral wedge shaped perfusion defects. She also was noted to have a LE DVT, subsequently started on a heparin drip. She was then transferred to [facility] ICU for thrombectomy. During thrombectomy, she was noted to have mainly chronic clot with only minimal acute clot that was extracted. PAP reduced from 77/33 (mean 47), to 68/19 (mean 34). 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 She unfortunately later developed a GI bleed with melena and a hemoglobin drop from 12->6.7 over 2 days now s/p 1u pRBC. GI bleed was managed via bedside EGD, which identified and clipped a bleeding duodenal ulcer. However she subsequently required pressor support initially with levo and vaso. Vaso has since weaned off and she remains on low dose levophed. IR placed an IVC filter on 9/22 and she was then transferred to [facility] for further evaluation and management. CT scan shows signs of multilocular cavitary lesions in anterior left upper lobe and multifocal pulmonary opacification concerning for pulmonary infarction.    Interval: Patient seen at the bedside. She is on IV heparin as benefits of anticoagulation for PE outweighs risk of repeat GI bleed. On vasopressin and Bumex. Presedex and propofol discontinued this morning. Patient is moderately sedated on fentanyl and unable to answer questions at the bedside.    ROS: Limited as patient is currently on sedation.     PMHx/PSHx:  Past Medical History:   Diagnosis Date    Asthma 08/14/2023    Bilateral lower extremity edema 07/14/2021    Former smoker 06/23/2021    HTN (hypertension) 07/14/2021    Lymphedema 02/08/2022    Prediabetes 06/24/2021    Seasonal allergies 07/14/2021    UPJ (ureteropelvic junction) obstruction  03/04/2015    Vitamin D deficiency 06/23/2021     Past Surgical History:   Procedure Laterality Date    IR IVC FILTER PLACEMENT  9/22/2024    IR PULMONARY ANGIOGRAM BILATERAL  9/17/2024    MIDLINE DOUBLE LUMEN PLACEMENT  9/16/2024    PICC TRIPLE LUMEN PLACEMENT  9/17/2024    PICC TRIPLE LUMEN PLACEMENT  9/20/2024       Social Hx:   - Tobacco: 40 pack year history. Not currently smoking  - EtOH: One glass of wine a night.  - Illicits: No    Outpatient Medications:   Current Facility-Administered Medications   Medication Dose Route Frequency Provider Last Rate Last Admin    bumetanide (BUMEX) 0.25 mg/mL infusion  0.5 mg/hr Intravenous Continuous Travis Rosa MD 2 mL/hr at 09/24/24 1000 0.5 mg/hr at 09/24/24 1000    chlorhexidine (PERIDEX) 0.12 % solution 15 mL  15 mL Swish & Spit BID Sven Lynn MD   15 mL at 09/24/24 0745    dexmedeTOMIDine (PRECEDEX) 4 mcg/mL in sodium chloride 0.9 % 100 mL infusion  0.1-1.2 mcg/kg/hr (Dosing Weight) Intravenous Continuous Sven Lynn MD   Stopped at 09/24/24 0841    glucose gel 15-30 g  15-30 g Oral Q15 Min PRN Sven Lynn MD        Or    dextrose 50 % injection 25-50 mL  25-50 mL Intravenous Q15 Min PRN Sven Lynn MD        Or    glucagon injection 1 mg  1 mg Subcutaneous Q15 Min PRN Sven Lynn MD        EPINEPHrine (ADRENALIN) injection    PRN Lino Lazaro MD   3 mL at 09/23/24 1345    fentaNYL (SUBLIMAZE) 50 mcg/mL bolus from pump  25 mcg Intravenous Q1H PRN Sven Lynn MD        fentaNYL (SUBLIMAZE) infusion   mcg/hr Intravenous Continuous Sven Lynn MD 3 mL/hr at 09/24/24 1000 150 mcg/hr at 09/24/24 1000    heparin 25,000 units in 0.45% NaCl 250 mL ANTICOAGULANT infusion  0-5,000 Units/hr Intravenous Continuous Travis Rosa MD 21 mL/hr at 09/24/24 1000 2,100 Units/hr at 09/24/24 1000    insulin aspart (NovoLOG) injection (RAPID ACTING)  1-7 Units Subcutaneous Q4H Leah,  Sven CABRAL MD   1 Units at 09/24/24 0820    ipratropium - albuterol 0.5 mg/2.5 mg/3 mL (DUONEB) neb solution 3 mL  3 mL Nebulization 4x daily Travis Rosa MD   3 mL at 09/24/24 0912    propofol (DIPRIVAN) infusion  5-75 mcg/kg/min (Dosing Weight) Intravenous Continuous Sven Lynn MD   Stopped at 09/24/24 0820    And    propofol (DIPRIVAN) bolus from bag or syringe pump  10 mg Intravenous Q15 Min PRN Sven Lynn MD   10 mg at 09/24/24 0552    And    Medication Instruction   Does not apply Continuous PRN Sven Lynn MD        medication instruction   Does not apply Continuous PRN Travis Rosa MD        naloxone (NARCAN) injection 0.2 mg  0.2 mg Intravenous Q2 Min PRN Chalino De La Torre MD        Or    naloxone (NARCAN) injection 0.4 mg  0.4 mg Intravenous Q2 Min PRN Chalino De La Torre MD        Or    naloxone (NARCAN) injection 0.2 mg  0.2 mg Intramuscular Q2 Min PRN Chalino De La Torre MD        Or    naloxone (NARCAN) injection 0.4 mg  0.4 mg Intramuscular Q2 Min PRN Chalino De La Torre MD        norepinephrine (LEVOPHED) 16 mg in  mL infusion MAX CONC CENTRAL LINE  0.01-0.6 mcg/kg/min (Dosing Weight) Intravenous Continuous Sven Lynn MD   Stopped at 09/24/24 0833    ondansetron (ZOFRAN ODT) ODT tab 4 mg  4 mg Oral Q6H PRN Travis Rosa MD        Or    ondansetron (ZOFRAN) injection 4 mg  4 mg Intravenous Q6H PRN Travis Rosa MD        pantoprazole (PROTONIX) 80 mg in sodium chloride 0.9 % 100 mL infusion  8 mg/hr Intravenous Continuous Travis Rosa MD 10 mL/hr at 09/24/24 1000 8 mg/hr at 09/24/24 1000    polyethylene glycol (MIRALAX) Packet 17 g  17 g Oral Daily PRN Sven Lynn MD        prochlorperazine (COMPAZINE) injection 10 mg  10 mg Intravenous Q6H PRN Travis Rosa MD        Or    prochlorperazine (COMPAZINE) tablet 10 mg  10 mg Oral Q6H PRN Travis Rosa MD         Or    prochlorperazine (COMPAZINE) suppository 25 mg  25 mg Rectal Q12H PRN Travis Rosa MD        senna-docusate (SENOKOT-S/PERICOLACE) 8.6-50 MG per tablet 1 tablet  1 tablet Oral BID PRN Sven Lynn MD        Or    senna-docusate (SENOKOT-S/PERICOLACE) 8.6-50 MG per tablet 2 tablet  2 tablet Oral BID PRN Sven Lynn MD        vasopressin (VASOSTRICT) 20 Units in sodium chloride 0.9 % 100 mL standard conc infusion  1.8 Units/hr Intravenous Continuous Travis Rosa MD 9 mL/hr at 09/24/24 1000 1.8 Units/hr at 09/24/24 1000       Allergies:   Allergies   Allergen Reactions    Sulfa Antibiotics Unknown, Itching and Rash       Family Hx:   No family history on file.      Physical Exam:   Pulse (!) 129   Temp 99.9  F (37.7  C)   Resp 22   Wt 122.4 kg (269 lb 13.5 oz)   SpO2 97%   BMI 49.35 kg/m    - Gen: NAD, intubated and mildly sedated on IV fentanyl.  - CV: RRR, no m/r/g  - Lung: Bilateral wheezing throughout anterior lung fields.  - Abd: NTND, +BS  - Ext: 3+ pitting edema bilaterally  - Skin: Ulceration to right foot. Purple/maroon discoloration to anterior aspect of lower legs  - Neuro: CN grossly intact     Images/Studies:   Results for orders placed or performed during the hospital encounter of 09/23/24   XR Chest Port 1 View    Impression    IMPRESSION:   1. Endotracheal tube terminates in the mid thoracic trachea.  2. Decreased right greater than left mixed interstitial and airspace  opacities, which may represent edema or atypical infection.  3. Small left greater than right pleural effusions, improved compared  to prior.    I have personally reviewed the examination and initial interpretation  and I agree with the findings.    KARAN KLEIN MD         SYSTEM ID:  [id number]   XR Abdomen Port 1 View    Impression    IMPRESSION: Gastric tube tip and sidehole project over the stomach.    I have personally reviewed the examination and initial interpretation  and I  agree with the findings.    KARAN KLEIN MD         SYSTEM ID:  [id number]   XR Chest Port 1 View    Impression    Impression:   1.  Endotracheal tube terminates in the midthoracic trachea and  Lyman-Sonu catheter terminates in the main pulmonary artery.  2.  Diffuse mixed  pulmonary opacities unchanged from same day CT  scan.    FLORENTINO CARLOS MD         SYSTEM ID:  [id number]   CT Chest Pulmonary Embolism w Contrast   Result Value Ref Range    Radiologist flags Pulmonary embolism (AA)     Impression    IMPRESSION:   1. Exam is positive for acute diffuse bilateral segmental and  subsegmental pulmonary emboli. There is evidence of right heart strain  with an RV:LV ratio > 1.      2. Multiloculated cavitary lesions in the anterior left upper lobe and  right lower atelectatic lobe with adjacent nonenhancing lung  parenchyma, which may represent evolving pulmonary infarctions versus  necrotizing pneumonia. Additional patchy peripheral consolidative and  groundglass opacities may also represent pulmonary infarction versus  organizing pneumonia.    3. Bilateral mosaic attenuation with interlobular septal thickening is  suggestive of pulmonary edema.    4. The main pulmonary artery is enlarged, measuring up to 3.4 cm.  Recommend clinical correlation for pulmonary arterial hypertension.    5. Moderate right and small left pleural effusions with bibasilar  passive atelectasis.    6. Prominent mediastinal lymph nodes and left axillary  lymphadenopathy, likely reactive.    [Critical Result: Pulmonary embolism]    Finding was identified on 9/23/2024 1:11 PM.     Dr. Travis Rosa  was contacted by Dr. Tang on 9/23/2024  1:28 PM and verbalized understanding of the critical result.       In the event of a positive result for acute pulmonary embolism  resulting in right heart strain, consider calling the   [facility] hospital  for PERT (Pulmonary Embolism Response Team)  Activation?    PERT -- Pulmonary Embolism  Response Team (Multidisciplinary team  including cardiology, interventional radiology, critical care,  hematology)    I have personally reviewed the examination and initial interpretation  and I agree with the findings.    EDMAR VELARDE MD         SYSTEM ID:  [id number]   US Abdomen Limited w Abd/Pelvis Duplex Complete    Impression    Impression:     1. Hepatomegaly with normal Doppler ultrasound.  2. Cholelithiasis with mildly dilated bile ducts, no evidence for  cholecystitis.  3. Mild hydronephrosis of the right kidney.    I have personally reviewed the examination and initial interpretation  and I agree with the findings.    PATY MURRAY DO         SYSTEM ID:  [id number]   XR Chest Port 1 View    Impression    Impression:   1. Right IJ Irene-Sonu catheter has been retracted, now terminating in  the right ventricle.  2. Additional support devices are stable including endotracheal tube  in the midthoracic trachea and right upper extremity PICC tip  terminating at the superior cavoatrial junction.  3. Stable right greater than left mixed interstitial and airspace  opacities.  4. Small bilateral pleural effusions, slightly increased on the left..      I have personally reviewed the examination and initial interpretation  and I agree with the findings.    GERARD NORIEGA MD         SYSTEM ID:  [id number]   XR Chest Port 1 View    Impression    impression: Single view of the chest. Right IJ Irene-Sonu catheter has  been advanced, now terminating in the distal main pulmonary artery.  Otherwise no significant interval change since same day radiograph  obtained at 240.    I have personally reviewed the examination and initial interpretation  and I agree with the findings.    GERARD NORIEGA MD         SYSTEM ID:  [id number]         Labs:   ABG  Recent Labs   Lab 09/24/24  0411 09/23/24  2258 09/23/24  1530 09/23/24  1158   PH 7.49* 7.47* 7.44 7.43   PCO2 60* 61* 64* 66*   PO2 76* 79* 66* 64*   HCO3 46* 44* 43* 43*      CBC  Recent Labs   Lab 09/24/24  0411 09/23/24  1931 09/23/24  1527 09/23/24  0213   WBC 8.4 8.1 9.0 7.9   HGB 7.5* 7.5* 7.9* 7.6*    133* 139* 114*     BMP  Recent Labs   Lab 09/24/24  0753 09/24/24  0420 09/24/24  0411 09/23/24  2303 09/23/24  2259 09/23/24  1537 09/23/24  1527 09/23/24  1204 09/23/24  1158 09/23/24  0218 09/23/24  0213 09/22/24  0727 09/22/24 0427   NA  --   --  150*  --   --   --  149*  --   --   --  149*  --  145   POTASSIUM  --   --  3.8  --  3.6  --  3.8  3.8  --  3.5   < > 3.5  --  3.9   CHLORIDE  --   --  97*  --   --   --  99  --   --   --  101  --  103   CO2  --   --  42*  --   --   --  39*  --   --   --  38*  --  34*   BUN  --   --  45.7*  --   --   --  51.9*  --   --   --  57.5*  --  66.7*   CR  --   --  1.00*  --   --   --  1.04*  --   --   --  1.07*  --  1.30*   * 151* 159* 141*  --    < > 147*   < >  --    < > 150*   < > 173*    < > = values in this interval not displayed.     LFT  Recent Labs   Lab 09/24/24 0411 09/23/24  1527 09/23/24 0213   AST 34 37 24   ALT 19 17 12   ALKPHOS 62 61 52   BILITOTAL 1.9* 1.5* 1.0   ALBUMIN 3.2* 3.1* 3.2*     Coagulation Profile  No lab results found in last 7 days.

## 2024-09-24 NOTE — CONSULTS
General Infectious Disease Service Consultation - [place] Team  Patient:  Noelle Gomez  Date of birth 1965  Medical record number [id number]  Date of Admission: 9/23/2024  Date of Visit:  9/24/2024  Requesting Provider: Chalino De La Torre    Attestation:  I saw and evaluated this patient.  I discussed this patient with  Laney Ley, MS4  I have reviewed today's vital signs, medications, labs and imaging.    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 CT pulmonary angiogram also shows patchy peripheral consolidative and groundglass opacities. There is is an irregular multiloculated cavitary lesion with thick irregular borders in the anterior left upper lobe.  Moderate right and small pleural effusions with adjacent bibasilar passive atelectasis. No pneumothorax. Prominent mediastinal lymph nodes.Enlarged left axillary lymph nodes, measuring up to 1.4 cm. patient also has LLE DVT, anemia, GIB , mild thrombocytopenia,     cavitary lesion on CT  DDX: bacteria/ fungal/ mycobacterium tuberculois/ non TB mycobacterium. septic emboli, malignancy, PE infarction.     patient is intubated and sedated. Therefore, history is limited and based on chart review.   Blood culture on 9/16/24  was negative and 9/24/24 - pending   sputum culture - negative   MRSA / MSSA - neg/neg   TTE - no vegetation seen     Recommendations:  - if febrile > 101F, repeat blood culture   - Quantiferon TB, aspergillus galactomannan, 1-3 B-D glucan,  - Histo/ Blasto serum / urine antigen, fungal serology   - trend CRP   - IRMA   - Pulmonary is following. consider bronchoscopy     ID  will continue to follow       Total time on day of visit including chart review, counseling, documentation, and  coordination of care: 85 min    Frederick Reich MD,M.Med.Sc.  Staff, Infectious Diseases  Pager: [phone]                  Assessment and Recommendations:     Problem List:  Acute on chronic hypoxic/hypercapic respiratory failure. On mechanical ventilation.   CT showing left upper lobe cavitary lesion  Bilateral pulmonary emboli - concerning for CTEPH. Originally on heparin but paused w/GI bleeding.   LE DVT   GI bleed. - paused heparin   Discussion:     Noelle is a 59 year old female with a PMH of asthma, tobacco use, HTN, lymphedema, prediabetes, and lower extremity stasis. She originally presented to to an OSH on 9/16 for worsening shortness of breath in the context of not taking her PTA diuretic. She was admitted for acute hypoxemic/hypercapnic respiratory failure and was quickly admitted to the ICU and intubated. While there they discovered bilateral pulmonary emboli, upon thrombectomy CTEPH was suspected due to chronic clot burden. After initiating heparin this patient unfortunately suffered a GI bleed and was transferred to the [facility] for further management.     Infectious disease was consulted for concern that an infection could be causing this patient's thrombosis as well as a chest CT (9/23) that shows a significant left upper lobe cavitary lesion along with diffuse ground glass. This patient had a negative blood culture (9/16) and negative sputum culture (9/19). She was treated empirically with Doxycycline and Ceftriaxone from (9/16-9/22) before cultures had resulted. Pulmonology on 9/23 suggested it is severe emphysema which is very possible. However, we will consider infectious etiologies.     Infectious differentials high on the list for pulmonary cavitation with a past negative sputum culture include: aspergilosis, tuberculosis, fungal (histo/blasto/cocci), and PJP. Aspergillosis  would fit with the pattern of thrombosis. This patient is not immunosuppressed to our knowledge and therefore there is low suspicion for PJP but we would recommend PCR testing for this anyway. We would recommend at this point to get a bronchoscopy with BAL and send a full panel including fungal. Pulmonology may be hesitant to do a bronchoscopy at this point given her emphysema. We also recommend doing fungal serology, histo/blasto/cocci antigen testing, AFB, non TB mycobacterial testing, aspergillus Glucomannan, and beta-d-glucan. Further, if this patient fevers off of the Precedex we recommend repeating blood cultures.     Bronchoscoy w/Bal if possible for pulmonology considering her emphysema   Fungal serology, histo/cocci/blasto antigen   AFB testing   Non tuberculosis Mycobacterial testing   Aspergillus testing (Glucomannan from BAL, Antigen)  Beta-D-Glucan   PJP PCR   Repeat blood culture if patient fevers off of Precedex.     Recommendations discussed with supervising attending physician, Dr. Reich.     Thank you for this consult. The General ID team will continue to follow this patient. Please feel free to call with any questions.     Laney Ley, MS4   [facility] [employer]   Infectious Disease Service       History of Present Illness:   Noelle is a 50 year old female with a past medical history of asthma, smoking, HTN lymphedema, prediabetes, and lower extremity stasis. She initially presented 9/16 to [facility] because of increased shortness of breath after she stopped taking her PTA diuretic several weeks ago. She was admitted for acute on chronic hypoxic/hypercapnic respiratory failure and quickly escalated to the ICU and required intubation. Her respiratory failure was originally thought to be related to decompensated heart failure. However, she had a VQ scan done after an VIGNESH which revelead mismatched perfusion defects due to bilateral pulmonary emboli and wedge shaped  pulmonary infarctions. She received a thrombectomy which revealed a mostly chronic clot burden with minimmal acute clot concerning for CTEPH. She also had a TTE which showed cor pulmonale and significant right heart strain.     On 9/22 she was having escalating oxygen requirements and imaging revealed a moderate posterior right sided and small left sided pleural effusion which likely contributed to this. Given that her heparin gtt was discontinued due to acute blood loss she was transferred to [facility] for IVC filter placement. IVC filter placed by IR on 9/22. She had a Chest CT done on 9/23 which showed a left upper lobe cavitation and infectious disease was consulted at this time. She is on vasopressin and weaned off norepinephrine.     Antibiotics:   Ceftriaxone (9/16-9/22)  Doxycycline (9/16-9/22)    Sedation:   Bumetanide 1mg/hr   Dexmedetomidine weaned to 0 today mcg/kg/hr   Fentanyl 150 mcg/hr  Propofol weaned to 0 today     Pertinent labs 9/24:   9/16 CRP - 154 now 237  WBC - 8.4   Hgb 7.5   Plates 160   Peripheral blood smear showing macrocytic anemia with mild thrombocytopenia and absolute neutrophilia and lymphocytopenia   VBG: ph: 7.48, c02 60, bicarb 45    Cultures:   9/16 Bcx: negative  9/19 Respiratory aerobic bacterial culture with gram stain: no growth            Review of Systems:   Patient is intubated and sedated and unable to answer ROS questions.     Past Medical History:   Diagnosis Date    Asthma 08/14/2023    Bilateral lower extremity edema 07/14/2021    Former smoker 06/23/2021    HTN (hypertension) 07/14/2021    Lymphedema 02/08/2022    Prediabetes 06/24/2021    Seasonal allergies 07/14/2021    UPJ (ureteropelvic junction) obstruction 03/04/2015    Vitamin D deficiency 06/23/2021       Allergies   Allergen Reactions    Sulfa Antibiotics Unknown, Itching and Rash              Physical Exam:     Pulse (!) 129   Temp 99.9  F (37.7  C)   Resp 22   Wt 122.4 kg (269 lb 13.5 oz)   SpO2 97%   BMI  49.35 kg/m       Physical Exam:    GENERAL:  Intubated in the ICU. Stirs upon exam.    HEAD: Normocephalic and atraumatic  ENT:  nose without ulcers or lesions, oral mucous membranes moist  EYES:  eyes closed.   NECK:  Supple, no adenopathy, no asymmetry, masses, or scars  LUNGS:  Diffuse harsh breath sounds.   CARDIOVASCULAR:  Tachycardic, regular rhythm - difficult to auscultate over breath sounds - on telemetry, peripheral pulses strong, 3+ peripheral edema in extremities.   ABDOMEN:  Soft, no masses and bowel sounds normal  EXT: Extremities warm and positive for edema.  MS: No gross musculoskeletal defects noted  SKIN: erythematous area covering bilateral dorsal feet.   NEUROLOGIC: Patient is sedated.   HEMATOLOGIC/LYMPHATIC:  patient with recent GI bleed - stool does not look melenic. Urine from valerio is not cloud or hematuria.        Chest CT 9/23:    IMPRESSION:   1. Exam is positive for acute diffuse bilateral segmental and  subsegmental pulmonary emboli. There is evidence of right heart strain  with an RV:LV ratio > 1.       2. Multiloculated cavitary lesions in the anterior left upper lobe and  right lower atelectatic lobe with adjacent nonenhancing lung  parenchyma, which may represent evolving pulmonary infarctions versus  necrotizing pneumonia. Additional patchy peripheral consolidative and  groundglass opacities may also represent pulmonary infarction versus  organizing pneumonia.     3. Bilateral mosaic attenuation with interlobular septal thickening is  suggestive of pulmonary edema.     4. The main pulmonary artery is enlarged, measuring up to 3.4 cm.  Recommend clinical correlation for pulmonary arterial hypertension.     5. Moderate right and small left pleural effusions with bibasilar  passive atelectasis.     6. Prominent mediastinal lymph nodes and left axillary  lymphadenopathy, likely reactive.

## 2024-09-24 NOTE — PROGRESS NOTES
Cardiology ICU Note    09/24/2024    Noelle Gomez MRN# [id number]   Age: 59 year old YOB: 1965          H&P:   Noelle Gomez is a 59 year old female who was admitted on 9/23/2024. She has a PMH of asthma, 40 pack year former smoker, HTN, who initially presented to [facility] on 9/16 for acute on chronic hypoxic/hypercapnic respiratory failure thought to be related to decompensated heart failure. She was intubated quickly after admission due to worsening hypercapnea and AMS. TTE at that time revealed severe RV volume and pressure overload. VQ scan obtained (due to VIGNESH) revealed multiple bilateral wedge shaped perfusion defects. She was then transferred to [facility] ICU for thrombectomy. She also was noted to have a LE DVT, subsequently started on a heparin drip. During thrombectomy, she was noted to have mainly chronic clot with only minimal acute clot that was extracted. PAP reduced from 77/33 (mean 47), to 68/19 (mean 34). 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 She unfortunately later developed a GI bleed with melena and a hemoglobin drop from 12->6.7 over 2 days now s/p 1u pRBC. She subsequently required pressor support with levo and vaso, but vaso was weaned off and she remains on low dose levophed. She was then transferred to [facility] for further evaluation and management.          Assessment and Plan:     09/24: Patient continues mechanical ventilated and with vasopressors support. GI clipped duodenal ulcer yesterday, Hematology working up causes of thrombosis, pulmonolgoy/ID working up CT findings. The main goals are to try to extubate the patient and start the patient on long term pulmonary vasodilators.     Changes/Updates  >Keeping NO at 10 - Bridging with sildenafil 10 TID. The goal is to be off NO to facility extubation process, for pulmonary vasodilators we will use sildenafil. Keeping at  10 to help RV while we diurese.   >Bumetanide 1mg/h - CVP 17.   >CVTS on board working up for possible endarterectomy/thrombectomy.   >OG tube today to start feedings   > [place] marko will be adjust at the bedside using fluoroscopy. - Using fluoroscopy I was unable to adjust the swan marko, it kept getting coiled/looped in the RA/Tricuspid valve junction, associated to multiple PVCs. I decided to leave the swan marko in the PA where I was able to get a continues monitor of the pulmonary pressures. CXR to follow to confirm placement. Possible reattempts in the future using swan marko wire.   >Pulm - CT as cavitary lesion seems more c/w emphysematous change of L lung. Waiting for final report   >CICU team to manage ventilator.   > Hematology working up antiphospholipid syndrome.     Neurology   # Acute toxic metabolic encephalopathy  Fentanyl and Propofol     Moves extramiteis does not follow commands     Plan:  Planning to use fentanyl and minimize propofol.   In case of persistance of hypotension we will use verzed.   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 Course unfortunately complicated by GI bleed on heparin, so IVC filter was placed. CVP transduced off PICC on admission is 24     CVP PA PVR PCWP CO/CI   09/23 11 78/29/42 5.53 12 5.43/2.46   09/24 17 76/42/59   6.3/2.7             Volume: Hypervolemic   Contractility:   > RV: Strain per TTE and CT, ELIO preserved. Elevated pulmonary pressures   > LV functions is preserved.     Meds:  Inotrope: None   Pulmonary vasodilators: NO: 10 starting Sildenafil.   Vasopressors: Levophed 0.03 and  Vasopressin 1.8  Diuresis: Bumetanide 1mg/h   Anticoagulation: Heparin for CTEPH.       Plan:  >Keeping NO at 10 - Bridging with sildenafil. The goal is to be off NO to facility extubation process, for pulmonary vasodilators we will use sildenafil. Keeping at 10 to help RV while we diurese.   >Bumetanide 1mg/h - CVP 17.   >CVTS on board working up for possible endarterectomy/thrombectomy.      Pulmonary    FiO2 (%): 80 %, Resp: 22, Vent Mode: CMV/AC, Resp Rate (Set): 16 breaths/min, Tidal Volume (Set, mL): 450 mL, PEEP (cm H2O): 8 cmH2O, Resp Rate (Set): 16 breaths/min, Tidal Volume (Set, mL): 450 mL, PEEP (cm H2O): 8 cmH2O     # Acute Hypoxic and hypercapnic respiratory failiure 2/2  # CETPH  # LORA/OHS  # ?Previously treated CAP - Zozyn/Ceftriaxone/Doxy.  # Cavitary lesion on CT - Emphysema/Infarction    -Presented AoC hypoxic/hypercapnic respiratory failure thought to be due to HF, now found to be due to PH 2/2 CTEPH.   ===PLAN===  -Plan for CTEPH/RV overload as above  -Vent management per CICU team   - Following pulmonary recommendations.       Gastrointestinal, Nutrition  #Upper GI bleed in the setting of heparin use  #S/P Clipping duodenal ulcer on 09/23    Plan:   Pantorazole 8mg/h for 72h following EGD - After 72h start pantoprazole 40mg BID  OG tube to be placed today, start TFs afterwards.   Following GI recommendations.     Renal, Electrolytes  # VIGNESH 2/2 cardiogenic shock from CTEPH  Plan:  Continue diuresis as above.     Infectious Disease  # ?CAP (resolved)  -treated with 5 day course of CTX/Doxycycline in the past. Short course of zozyn before.       Hematology  # Left femoral DVT  # Family history of Factor 5 Leiden  # CTEPH  -Patient presenting with hypoxia and found to have both a DVT and chronic bilateral pulmonary emboli. Her eldest son has history of factor 5 Leiden.     Plan:  Hematology working up antiphospholipid syndrome.      Endocrinology  # Hyperglycemia  - insulin prn    SKIN: Right Heel  Deep Tissue Pressure Injury POA     ICU Checklist:  #Feeding: To be started after OG tube.   #Analgesia: fentanyl  #Sedation: precedex/propofol  #Thromboembolism ppx: mechanical (GI bleed)  #HOB: 30 degrees   #Ulcer ppx: PPI BID  #Glucose: insulin PRN  #SBT/SAT: n/a  #Bowel reg: miralax/senna PRN  #Family: , 2 sons  #Dispo: CCU    Family update by me today: Yes     Code Status: Full    Discussed with Dr Chau attending physician,    Travis Rosa MD  Cardiology Fellow          Medications:            Past Medical History:     Past Medical History:   Diagnosis Date    Asthma 08/14/2023    Bilateral lower extremity edema 07/14/2021    Former smoker 06/23/2021    HTN (hypertension) 07/14/2021    Lymphedema 02/08/2022    Prediabetes 06/24/2021    Seasonal allergies 07/14/2021    UPJ (ureteropelvic junction) obstruction 03/04/2015    Vitamin D deficiency 06/23/2021            Family History:   Unable to obtain due to patients medical condition.         Social History:   Unable to obtain due to patients medical condition.         Past Surgical History:   Unable to obtain due to patients medical condition.         Review of Systems:   Unable to obtain due to patients medical condition.            Physical Exam:   Pulse (!) 129   Temp 99.9  F (37.7  C)   Resp 22   Wt 122.4 kg (269 lb 13.5 oz)   SpO2 97%   BMI 49.35 kg/m        GENERAL: Intubated, sedated. NAD.  HEENT: No icterus. ETT in place, OG tube in place.  CARDIOVASCULAR: RRR. Normal S1 and S2.  RESP: Coarse bilaterally. Mechanical ventilation.    GI Soft, bowel sounds hypoactive but present.  GENITOURINARY: Miller in place.  EXTREMITIES: edematous, warm and well perfused  NEURO: Sedated and intubated.  INTEGUMENTARY: No rashes. Cannula/Line sites CDI.      Resp: 22 SpO2: 97 % O2 Device: Mechanical Ventilator      Arterial Blood Gas:   Recent Labs   Lab 09/24/24  0745 09/24/24  0411 09/23/24  2258 09/23/24  2139 09/23/24  1931 09/23/24  1530  "09/23/24  1158   PH  --  7.49* 7.47*  --   --  7.44 7.43   PCO2  --  60* 61*  --   --  64* 66*   PO2  --  76* 79*  --   --  66* 64*   HCO3  --  46* 44*  --   --  43* 43*   O2PER 80 80  80 80 80   < > 70 70    < > = values in this interval not displayed.        Vitals:    09/23/24 0330 09/23/24 2155 09/24/24 0315   Weight: 128.3 kg (282 lb 13.6 oz) 124.8 kg (275 lb 2.2 oz) 122.4 kg (269 lb 13.5 oz)   I/O last 3 completed shifts:  In: 2756.78 [I.V.:2756.78]  Out: 7840 [Urine:7840]  Recent Labs   Lab 09/24/24  0753 09/24/24  0420 09/24/24 0411 09/23/24  2303 09/23/24  2259 09/23/24  1537 09/23/24  1527   NA  --   --  150*  --   --   --  149*   POTASSIUM  --   --  3.8  --  3.6  --  3.8  3.8   CHLORIDE  --   --  97*  --   --   --  99   CO2  --   --  42*  --   --   --  39*   ANIONGAP  --   --  11  --   --   --  11   * 151* 159*   < >  --    < > 147*   BUN  --   --  45.7*  --   --   --  51.9*   CR  --   --  1.00*  --   --   --  1.04*   BEN  --   --  9.2  --   --   --  9.3    < > = values in this interval not displayed.     No components found for: \"URINE\"   Recent Labs   Lab 09/24/24 0411 09/23/24  1527 09/23/24  0213   AST 34 37 24   ALT 19 17 12   BILITOTAL 1.9* 1.5* 1.0   ALBUMIN 3.2* 3.1* 3.2*   PROTTOTAL 6.2* 6.2* 6.0*   ALKPHOS 62 61 52     Temp: 99.9  F (37.7  C) Temp src: EsophagealTemp  Min: 97.3  F (36.3  C)  Max: 100.4  F (38  C)   Recent Labs   Lab 09/24/24  0411 09/23/24  1931 09/23/24  1527 09/23/24  0213 09/22/24  2102 09/22/24  1444 09/22/24 0427   WBC 8.4 8.1 9.0 7.9  --   --  9.6   HGB 7.5* 7.5* 7.9* 7.6* 7.5*   < > 7.8*   HCT 25.3* 25.0* 26.2* 25.4*  --   --  25.5*   * 116* 115* 116*  --   --  112*   RDW 19.6* 19.8* 20.1* 20.6*  --   --  20.8*    133* 139* 114*  --   --  103*    < > = values in this interval not displayed.     No lab results found in last 7 days.    Recent Labs   Lab 09/24/24  0753 09/24/24  0420 09/24/24  0411 09/23/24  2303 09/23/24 1936   * 151* 159* " 141* 127*       I have reviewed today's vital signs, notes, medications, labs and imaging.  I have also seen and examined the patient and agree with the findings and plan as outlined above. Pt is 58 yo WF with hx of tobacco abuse who presents following transfer with PAH, moderate RV dysfn, severe RV dilation, intubated and sedated with CXR significant for right lung opacificaiton concerning for aspiration pneumonia with pulmonary edema.  Pt on levophed, vasopressen and low dose NO with CVP 18 and PA 88/52 with CI 3.4.  Pt also with hx of CTEPH and DVT with IVC filter placed.  Will add abx and continue diuresis.  Pt critically ill who was seen X3 for total critical care time 50 min.     Chalino De La Torre MD, PhD  Professor, Heart Failure and Cardiac Transplantation  [facility]

## 2024-09-24 NOTE — PLAN OF CARE
Major Shift Events: Aggressive coughing when awake - prop increased to 40 and dex titrated down. Fentanyl remains @ 50. Follows commands but goes for ETT when restraints off- soft restraints in place. SR 70-80s. Vaso straight rate of 1.8 and levo 0.02-0.03. CVP 14, PA 88/31. CMV 16/450/8/80%. ABGs abnormal - MD aware. Bumex gtt @ 0.5, -400mL/hr. Protonix gtt @ 8, minimal rectal tube output. Do not replace OG overnight per MD. Heparin gtt increased per protocol.     Plan: Adjust diuresis as needed, monitor CVP and PA.     For vital signs and complete assessments, please see documentation flowsheets.

## 2024-09-24 NOTE — PROGRESS NOTES
Cardiology ICU Progress Note    Brief HPI:  Noelle Gomez is a 59 year old female admitted on 9/23/2024. She has a PMH of asthma, 40 pack year former smoker, HTN, who initially presented to [facility] on 9/16 for acute on chronic hypoxic/hypercapnic respiratory failure thought to be related to decompensated heart failure. Pt required intubation on initial day of admission on 9/16/24. TTE was done revealing RV volume and pressure overload. VQ scan was obtained in the setting of VIGNESH revealing multiple bilateral wedge shaped perfusion defects. Also found to LE DVT. Anticoagulation initiated. Patient underwent thrombectomy at [facility] and found to have mainly chronic clot with few acute emboli. PA pressures significantly elevated. Patient temporarily started on epoprostenol. After several days of anticoagulation hgb dropped. Patient started requiring vasopressors. Transferred to our facility for further management. EGD undergone on arrival revealing duodenal ulcer requiring 2 clips. RHC done also revealing pre-capillary pHTN and a ELIO of 5.         Assessment and plan by system:       Neurology   # Acute toxic metabolic encephalopathy    -Weaning sedation  -RAAS goal -2     Cardiovascular  #Cardiogenic shock  #RH failure  #Group IV pHTN CTEPH  #s/p IVC filter  #DVT  TTE 9/17/24: EF 65%, moderately reduced RV systolic function, RVSP >55mmHg, mod TR  RHC: RA 11, RV: 79/13, PA 78/29 (42), PCW: 12, CO/CI (shu): 5.43/2.46, PVR 5.53    - weaning inhaled nitric oxide, sildenafil initiated  - Vasopressor: vasopressin 1.8  - bumex drip 0.5mg/hr  - heparin gtt  - Monitor hemodynamic status.         Pulmonary  # Acute hypoxic respiratory failure  pH 7.49, pO2 76, pCO2 60, bicarb 46 on FiO2 80%, PEEP 8, RR 16, Vol 450    - Recommend diamox 500mg tid to improve pH in preporation for extubation  - wean FiO2 as able        Gastrointestinal, Nutrition  # Duodenal ulcer  # Acute GI bleed    - NPO, OG in place and  receiving tube feedings  - Nutrition consulted, appreciate recommendations  - PPI per GI recommendation  - Bowel regimen: senna prn     Renal, Electrolytes  # VIGNESH - improving    - ICU electrolyte replacement protocol  - Strict I&Os  - continue monitoring closely    Infectious Disease  #CAP-previously treated  - No signs of infection  - Cultures negative to date    Hematology  #Acute blood loss anemia    - Hgb goal > 7.0  - Hemoglobin stable.  - heparin gtt        Endocrinology  # Hyperglycemia    -Insulin medium dose correction q4h    Integumentary:  - No skin issues        Patient seen and discussed with staff physician.      Objective:  Most recent vital signs:  Pulse 74   Temp 98.6  F (37  C)   Resp 16   Wt 122.4 kg (269 lb 13.5 oz)   SpO2 99%   BMI 49.35 kg/m    Temp:  [97.3  F (36.3  C)-100.4  F (38  C)] 98.6  F (37  C)  Pulse:  [73-90] 74  Resp:  [0-26] 16  MAP:  [57 mmHg-83 mmHg] 76 mmHg  Arterial Line BP: ()/(40-58) 125/52  FiO2 (%):  [70 %-80 %] 80 %  SpO2:  [91 %-100 %] 99 %  Wt Readings from Last 2 Encounters:   09/24/24 122.4 kg (269 lb 13.5 oz)   09/22/24 124.8 kg (275 lb 1.6 oz)       Intake/Output Summary (Last 24 hours) at 9/24/2024 0657  Last data filed at 9/24/2024 0600  Gross per 24 hour   Intake 2756.78 ml   Output 7840 ml   Net -5083.22 ml     Physical exam:  General: In bed, in NAD  HEENT: PERRL, no scleral icterus or injection  CARDIAC: RRR, no m/r/g appreciated. Peripheral pulses dopplered  RESP: Mechanical ventilation; CTAB, no wheezes, rhonchi or crackles appreciated.  GI: soft, BS hypoactive  : Miller  EXTREMITIES: bilateral edema  SKIN: No acute lesions appreciated  NEURO: Intubated and sedated    Labs (Past three days):  CBC  Recent Labs   Lab 09/24/24  0411 09/23/24  1931 09/23/24  1527 09/23/24  0213   WBC 8.4 8.1 9.0 7.9   RBC 2.19* 2.16* 2.28* 2.19*   HGB 7.5* 7.5* 7.9* 7.6*   HCT 25.3* 25.0* 26.2* 25.4*   * 116* 115* 116*   MCH 34.2* 34.7* 34.6* 34.7*   MCHC 29.6*  30.0* 30.2* 29.9*   RDW 19.6* 19.8* 20.1* 20.6*    133* 139* 114*     BMP  Recent Labs   Lab 09/24/24  0420 09/24/24  0411 09/23/24  2303 09/23/24  2259 09/23/24  1936 09/23/24  1537 09/23/24  1527 09/23/24  1204 09/23/24  1158 09/23/24  0918 09/23/24  0218 09/23/24  0213 09/22/24  0727 09/22/24  0427   NA  --  150*  --   --   --   --  149*  --   --   --   --  149*  --  145   POTASSIUM  --  3.8  --  3.6  --   --  3.8  3.8  --  3.5 3.4  --  3.5  --  3.9   CHLORIDE  --  97*  --   --   --   --  99  --   --   --   --  101  --  103   CO2  --  42*  --   --   --   --  39*  --   --   --   --  38*  --  34*   ANIONGAP  --  11  --   --   --   --  11  --   --   --   --  10  --  8   * 159* 141*  --  127*   < > 147*   < >  --   --    < > 150*   < > 173*   BUN  --  45.7*  --   --   --   --  51.9*  --   --   --   --  57.5*  --  66.7*   CR  --  1.00*  --   --   --   --  1.04*  --   --   --   --  1.07*  --  1.30*   GFRESTIMATED  --  65  --   --   --   --  62  --   --   --   --  60*  --  47*   BEN  --  9.2  --   --   --   --  9.3  --   --   --   --  9.0  --  8.4*   MAG  --  2.2  --  1.8  --   --  2.1  --   --  2.2  --  1.9  --  1.9   PHOS  --  3.3  --   --   --   --  3.0  --   --   --   --  3.4  --  2.6    < > = values in this interval not displayed.     Troponins:     INRNo lab results found in last 7 days.  Liver panel  Recent Labs   Lab 09/24/24  0411 09/23/24  1527 09/23/24  0213   PROTTOTAL 6.2* 6.2* 6.0*   ALBUMIN 3.2* 3.1* 3.2*   BILITOTAL 1.9* 1.5* 1.0   ALKPHOS 62 61 52   AST 34 37 24   ALT 19 17 12       Imaging/procedure results:  XR Chest Port 1 View  RESIDENT PRELIMINARY INTERPRETATION  Impression:   1. Right IJ Severance-Sonu catheter has been retracted, now terminating in  the right ventricle.  2. Additional support devices are stable including endotracheal tube  in the midthoracic trachea and right upper extremity PICC tip  terminating at the superior cavoatrial junction.  3. Stable right greater than left  mixed interstitial and airspace  opacities.  4. Small bilateral pleural effusions, slightly increased on the left..  XR Chest Port 1 View  RESIDENT PRELIMINARY INTERPRETATION  IMPRESSION: Right IJ [place]-Sonu catheter has been advanced, now  terminating in the distal main pulmonary artery.

## 2024-09-24 NOTE — PROGRESS NOTES
CLINICAL NUTRITION SERVICES - BRIEF NOTE      Reason for RD note: Provider order for:     Nutrition Services Adult IP Consult  Start:  09/24/24 1042,   End:  09/24/24 1042,   ONE TIME,   Standing Count:  1 Occurrences,   Routine        Order Class: Hospital Performed   References:    Medical Nutrition Therapy policy and MNT protocol   Provider: (Not yet assigned)   Question: Reason for Consult: Answer: Registered Dietitian to order TF per Medical Nutrition Therapy Guidelines        New Findings/Chart Review:  Enteral Access: OGT (AXR)    GI: Jared class IIa duodenal ulcer s/p Ovesco x2 clips on 9/23 by GI    Labs:   -Na+ 150 (H, uptrended)  -BUN 45.7 (H, downtrended)  -Cr 1.00 (H, dontrended)     Meds: Reviewed, notable for: Medium dose sliding scale insulin, propofol gtt    Interventions:  EN support ordered via OGT:   - Vital AF 1.2 @ 35 ml/hr + 3 pkts prosource TF20 provides 840 ml volume, 1248 kcals (25 kcal/kg), 123 g pro (2.5 g/kg), 45 g Fat, 93 g CHO, 4 g fiber, 681 ml free water.   - Once OGT is verified ok to use for TF by Cards 2, initiate @ 15 mL/hr and advance by 10 mL q8hr as tolerated to goal rate.   - Do not advance unless K+ >/= 3, Mg++ >/=1.5, and phos >/=1.9  - 30 mL q4hr fluid flushes for tube patency. Additional fluids and/or adjustments per MD.   - Multivitamin/mineral (15 mL/day via FT) to help ensure micronutrient needs being met with suspected hypermetabolic demands and potential interruptions to TF infusions.  - Gastric access: HOB >30 degrees.     Future/Additional Recommendations:  Monitor tolerance and lytes with advancement to goal TF rate.    Monitor BUN with higher protein provisions.    If ongoing propofol -  Vital AF 1.2 @ 20 ml/hr + 4 pkts prosource TF20  provides 480 ml, 896 kcals, 116 g protein (2.3 g/kg), 53 g CHO, 2 g fiber and 389 ml free water    Nutrition will continue to follow per protocol.      Deedee Neal RD, LD  Available on [email or web address] - can search by name or unit  Dietitian  **Clinical Nutrition is no longer available via pager

## 2024-09-24 NOTE — CONSULTS
Care Management Initial Consult    General Information  Assessment completed with: Children, VM-chart review, Son Mady  Type of CM/SW Visit: Initial Assessment    Primary Care Provider verified and updated as needed:  (pt sons stated pt has PCP but don't remember the name.)   Readmission within the last 30 days: no previous admission in last 30 days         Advance Care Planning: Advance Care Planning Reviewed: no concerns identified        Communication Assessment  Patient's communication style: Pt is vented and sedated       Cognitive  Cognitive/Neuro/Behavioral: .WDL except  Level of Consciousness: sedated  Arousal Level: opens eyes spontaneously  Orientation: other (see comments) (MICA)  Mood/Behavior: restless  Best Language:  (MICA)  Speech: endotracheal tube    Living Environment:   People in home: spouse, child(antonia), adult  Spouse, Vito and sonScar  Current living Arrangements: apartment      Able to return to prior arrangements: yes     Family/Social Support:  Care provided by: self  Provides care for: no one  Marital Status:   Support system: , Children  Vito       Description of Support System: Supportive, Involved       Current Resources:   Patient receiving home care services: No     Community Resources: None  Equipment currently used at home: none  Supplies currently used at home: None    Employment/Financial:  Employment Status:          Financial Concerns: none   Referral to Financial Worker: No     Does the patient's insurance plan have a 3 day qualifying hospital stay waiver?  Yes     Which insurance plan 3 day waiver is available? Alternative insurance waiver    Will the waiver be used for post-acute placement? Undetermined at this time    Lifestyle & Psychosocial Needs:  Social Determinants of Health     Food Insecurity: Unknown (9/20/2024)    Food Insecurity     Within the past 12 months, did you worry that your food would run out before you got money to buy more?:  Patient unable to answer     Within the past 12 months, did the food you bought just not last and you didn t have money to get more?: Patient unable to answer   Depression: Not at risk (1/31/2022)    Received from [facility], [facility]    PHQ-2     PHQ-2 TOTAL SCORE: 0   Housing Stability: Unknown (9/20/2024)    Housing Stability     Do you have housing? : Patient unable to answer     Are you worried about losing your housing?: Patient unable to answer   Tobacco Use: Medium Risk (9/16/2024)    Patient History     Smoking Tobacco Use: Former     Smokeless Tobacco Use: Never     Passive Exposure: Not on file   Financial Resource Strain: Unknown (9/20/2024)    Financial Resource Strain     Within the past 12 months, have you or your family members you live with been unable to get utilities (heat, electricity) when it was really needed?: Patient unable to answer   Alcohol Use: Not on file   Transportation Needs: Unknown (9/20/2024)    Transportation Needs     Within the past 12 months, has lack of transportation kept you from medical appointments, getting your medicines, non-medical meetings or appointments, work, or from getting things that you need?: Patient unable to answer   Physical Activity: Not on file   Interpersonal Safety: Unknown (9/20/2024)    Interpersonal Safety     Do you feel physically and emotionally safe where you currently live?: Patient unable to answer     Within the past 12 months, have you been hit, slapped, kicked or otherwise physically hurt by someone?: Patient unable to answer     Within the past 12 months, have you been humiliated or emotionally abused in other ways by your partner or ex-partner?: Patient unable to answer   Stress: Not on file   Social Connections: Unknown (12/31/2021)    [facility] [employer] [facility]    Social Connections      Frequency of Communication with Friends and Family: Not on file   Health Literacy: Not on file       Functional Status:  Prior to admission patient needed assistance:   Dependent ADLs:: Independent  Dependent IADLs:: Transportation     Mental Health Status:  Mental Health Status: No Current Concerns       Chemical Dependency Status:  Chemical Dependency Status: No Current Concerns           Values/Beliefs:  Spiritual, Cultural Beliefs, Yarsanism Practices, Values that affect care:           Discussed  Partnership in Safe Discharge Planning  document with patient/family: Yes: Discussed with pt sons Mady.    Additional Information:  Pt transferred from [facility] for higher level of care.  Pt is in ICU vented and sedated.    Care management consulted to complete assessment for elevated risk readmission assessment.  Per chart review, initial assessment completed at [facility] on 9/19/24.  RNCC met with pt sons's Scar and Pablito to introduce RNCC role and to check if there are any new info that needs to be updated since 9/19/24.  Pt sons conformed pt address and support system.  Pt lives with spouse and younger son, Scar.  Pt was ind. with most of her cares and mobility.  Pt doesn't drive and family provides transportation.  Per chart review and discussion with Scar, pt spouse informed the team that he would like to have Scar to be the primary contact due to his work schedule.  Scar has been updating all the family after receiving update from the team.  Pt sons stated the team have been updating them well about the plan of care.  RNCC discussed about TCU vs home with home care vs home with OP rehab discharge options.  RNCC informed pt sons that RNCC/SW will cont. to follow the plan of care and assist with the discharge planning.  Pt sons agreed and stated they don't think their mom will agree to go to rehab.    Next Steps:  awaiting PT/OT recommendation.  RNCC/SW will cont to follow the  plan of care.      Fausto Naranjo, RN, PHN, BSN  4A and 4E/ ICU  Care Coordinator  Phone: [phone]  Pager: [phone]      SEARCHABLE in [facility] - search CARE COORDINATOR       [place] [employer] [place] ([id number]) Saturday & Sunday; ([id number]) [facility] Recognized Holidays     Units: 5A Onc Vocera & 5C Vocera Pager: [phone]    Units: 6B Vocera & 6C Vocera  Pager: [phone]    Units: 7A SOT RNCC Vocera, 7B Med Surg Vocera, & 7C Med Surg Vocera  Pager: [phone]    Units: 6A Vocera & 4A CVICU Vocera, 4C MICU Vocera, and 4E SICU Vocera   Pager: [phone]    Units: 5 Ortho Vocera & 5 Med Surg Vocera  Pager: [phone]    Units: 6 Med Surg Vocera & 8 Med Surg Vocera  Pager [phone]

## 2024-09-24 NOTE — PROGRESS NOTES
Brief CVTS Progress Note    A/P: Patient is a 59 year old female with PMH of asthma 40 pack year hx, HTN. 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 She was also treated with CTX and Doxy for CAP coverage x 5 days. VIGNESH thought to be 2/2 cardiogenic shock from CTEPH, improving. Family history of Factor 5 Leiden. Heme consult.      - HD stable on low dose NE. Sedated, on the vent, bumex drip.  - UGIB getting EGD 9/23, duodenal ulcer found with clips placed. GI following, watch for recurrent bleeding, low intensity heparin was resumed 9/23 PM.  - Appreciate heme consult for potential FVL, following. Would hold off on warfarin/DOAC while working up for potential surgery.  - Pulmonology consult for cavitary lesions in the lung  - PA pressures almost systemic. RHC 9/23 awaiting results this AM. Please consult pulmonary hypertension specialists Dr. Otero or Estrella for PHTN optimization  - IVC filter placed in infrarenal IVC in IR at [facility] on 9/22  - Will need coronary evaluation prior to surgical decision.  - Carotid US ordered  - Surgical plan pending above workup/patient optimization and surgeon/OR availability  - Thank you for the opportunity to participate in the care of this patient. CVTS will continue to follow. Rest per primary team.    Domitila Fernandez PA-C  Cardiothoracic Surgery  Pager [phone]  9:11 AM on 9/24/2024

## 2024-09-24 NOTE — PROGRESS NOTES
GASTROENTEROLOGY PROGRESS NOTE  PATIENT SUMMARY:  Noelle Gomez is a 59 year old female with a history of asthma, hypertension.  She initially presented to an outside hospital 9/16 for acute on chronic hypoxic/hypercapnic respiratory failure.  Initial clinical picture was concerning for decompensated heart failure.  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  She was placed on a heparin drip.  Developed dark, tarry stools with subsequent hemoglobin drop.  She was transferred to [facility] for further cares.     ASSESSMENT/RECOMMENDATIONS:  Acute blood loss anemia  Melena  Jared class IIa duodenal ulcer s/p Ovesco x2 clips  Visible vessel present on EGD now s/p 2 over the scope clips.  Reassuringly, low stool output overnight with a stable hemoglobin and improving hemodynamics, now nearly off of norepinephrine although she does remain on vasopressin.  With CT PE demonstrating numerous bilateral pulmonary emboli and concerns for right heart strain, we acknowledge current high risk for further thrombosis and complications from her pulmonary emboli so we agree that continuing heparin is warranted despite the GI bleed.  Would maintain the PPI infusion for 72 hours from her endoscopy.  Would then transition to IV twice daily until she is taking in p.o. at which time she can be transitioned to omeprazole 40 mg twice daily.  I think we have a good explanation for her ulcer with her critical illness so she does not require repeat endoscopy unless clinical change.  Of note, lesions treated with over the scope clips are quite difficult to intervene on again.  So if there is concern for significant bleeding 72 hours post would consider discussing with IR as endoscopic therapy would be difficult.  However, if bleeding occurs after 72 hours, then endoscopy would likely be best initial step.  - Maintain PPI continuous infusion x 72 hours after procedure  - After 72  hours transition to pantoprazole 40 mg IV twice daily until she is tolerating p.o.  - Once tolerating p.o., omeprazole 40 mg twice daily x 8 weeks - daily thereafter given need for AC  - Okay for enteral feeds from GI perspective      The patient was discussed and plan agreed upon with GI staff, Dr. Lazaro. GI will continue to follow.    Jesus Bhakta  Gastroenterology Fellow, PGY4   _______________________________________________________________  S: No acute overnight events.  Weaned off of norepinephrine but remains on the vasopressin.  Sedation weaned as well this morning.    O:  Pulse 120, temperature 99.3  F (37.4  C), resp. rate 19, weight 122.4 kg (269 lb 13.5 oz), SpO2 100%.    Constitutional: No acute distress  Eyes: Sclera anicteric  Ears/nose/mouth/throat: Hearing intact on gross exam.  Looks to voice  CV: Extremities wwp.   Respiratory: Ventilated  Abdomen: Nondistended, mild wincing to deep palpation in the lower quadrants  Skin: warm, perfused, no jaundice  Neuro: Looks to voice and intermittently nodding/shaking to questions  MSK: In bed.  Moving arms spontaneously      LABS:  [facility]  Recent Labs   Lab 09/24/24  0753 09/24/24  0420 09/24/24  0411 09/23/24  2303 09/23/24  2259 09/23/24  1537 09/23/24  1527 09/23/24  1204 09/23/24  1158 09/23/24  0218 09/23/24  0213 09/22/24  0727 09/22/24  0427   NA  --   --  150*  --   --   --  149*  --   --   --  149*  --  145   POTASSIUM  --   --  3.8  --  3.6  --  3.8  3.8  --  3.5   < > 3.5  --  3.9   CHLORIDE  --   --  97*  --   --   --  99  --   --   --  101  --  103   BEN  --   --  9.2  --   --   --  9.3  --   --   --  9.0  --  8.4*   CO2  --   --  42*  --   --   --  39*  --   --   --  38*  --  34*   BUN  --   --  45.7*  --   --   --  51.9*  --   --   --  57.5*  --  66.7*   CR  --   --  1.00*  --   --   --  1.04*  --   --   --  1.07*  --  1.30*   * 151* 159* 141*  --    < > 147*   < >  --    < > 150*   < > 173*    < > = values in this interval not  displayed.     CBC  Recent Labs   Lab 09/24/24  0411 09/23/24  1931 09/23/24  1527 09/23/24  0213   WBC 8.4 8.1 9.0 7.9   RBC 2.19* 2.16* 2.28* 2.19*   HGB 7.5* 7.5* 7.9* 7.6*   HCT 25.3* 25.0* 26.2* 25.4*   * 116* 115* 116*   MCH 34.2* 34.7* 34.6* 34.7*   MCHC 29.6* 30.0* 30.2* 29.9*   RDW 19.6* 19.8* 20.1* 20.6*    133* 139* 114*     INRNo lab results found in last 7 days.  LFTs  Recent Labs   Lab 09/24/24 0411 09/23/24  1527 09/23/24  0213   ALKPHOS 62 61 52   AST 34 37 24   ALT 19 17 12   BILITOTAL 1.9* 1.5* 1.0   PROTTOTAL 6.2* 6.2* 6.0*   ALBUMIN 3.2* 3.1* 3.2*      PANCNo lab results found in last 7 days.

## 2024-09-25 NOTE — PROGRESS NOTES
[facility]   Pulmonary Consult Note - Follow Up     Noelle Gomez MRN: [id number]  Date of Admission:9/23/2024  Date of Service: 09/25/2024    Reason for consult: CT chest findings on 9/23 concerning for multilocular cavitary lesions in anterior left upper lobe and pulmonary opacification concerning for pulmonary infarction versus necrotizing PNA.   Primary service: Cards 2 - Hf ([facility])    ASSESSMENT:     This 59 year old female with past medical history of asthma, 40 pack year history, HTN, venous stasis dermatitis, obesity originally presented 8 days ago to OSH with acute on chronic hypoxic respiratory failure requiring prompt intubation due to AMS and hypercapnia and was found to have evidence of right heart strain and wedge perfusion defects on VQ scan with left femoral vein DVT.  Patient was put on IV heparin drip and transferred to [place] for a subsequent thrombectomy showing acute on chronic pulmonary emboli. Minimal clot was able to be removed during this thrombectomy. Further management was complicated by GI bleeding secondary to a bleeding duodenal ulcer which is now stable s/p EGD intervention and 1 unit pRBC. IVC filter placed by IR on 9/22. 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 Pulmonology was consulted for CT chest findings concerning for multilocular cavitary lesions in anterior left upper lobe and pulmonary opacification concerning for pulmonary infarction versus necrotizing PNA.     Imaging was reviewed including CT chest from 9/23. Patient's anterior left upper cavitary lesion most likely represents parenchymal necrosis and scarring secondary to chronic pulmonary emboli which reduced perfusion to the area. The patient's bilateral parenchymal opacification shows detail more consistent with fluid overload in the setting of emphysematous changes  and is not consistent with infection. Bilateral pleural effusions and interlobar septal thickening are noted which are consistent with fluid overload. CXR imaging from 9/24 compared to 9/20 shows interval improvement in bilateral opacification during a period of diuresis. At this time there are no definitive signs of infection. Patient has completed a 4 day course of ceftriaxone and 6 day course of doxycycline. They have not been on empiric antibiotic treatment for the last 2 days. No fever or leukocytosis. Sputum culture 9/19, viral respiratory panel 9/16, blood culture, MRSA nares are negative.    RECOMMENDATIONS:      ***    Pulmonary will follow daily    Patient seen and discussed with Dr. Jarett Patricio    Pulmonology Service         24H/SUBJECTIVE:    - ***     Physical Exam:   Pulse 117   Temp 99.5  F (37.5  C)   Resp 20   Wt 120.4 kg (265 lb 6.9 oz)   SpO2 90%   BMI 48.55 kg/m    - Gen: Aox3, NAD   - CV: RRR, no m/r/g  - Lung: ***  - Abd: NTND, +BS  - Ext: No BLE swelling  - Skin: No major rashes or lesions  - Neuro: CN grossly intact     Images/Studies:   CXR 9/25/24: Increased mixed interstitial and airspace opacities throughout the right lung and left lung base.    Labs:   ABG  Recent Labs   Lab 09/24/24  1227 09/24/24  0411 09/23/24  2258 09/23/24  1530   PH 7.47* 7.49* 7.47* 7.44   PCO2 65* 60* 61* 64*   PO2 75* 76* 79* 66*   HCO3 47* 46* 44* 43*     CBC  Recent Labs   Lab 09/25/24  0341 09/24/24  1600 09/24/24  0411 09/23/24  1931   WBC 10.5 8.0 8.4 8.1   HGB 7.0* 7.1* 7.5* 7.5*    147* 160 133*     BMP  Recent Labs   Lab 09/25/24  0800 09/25/24  0346 09/25/24  0341 09/24/24  2254 09/24/24  2250 09/24/24  1954 09/24/24  1600 09/24/24  0420 09/24/24  0411 09/23/24  1537 09/23/24  1527   NA  --   --  152*  --   --   --  152*  --  150*  --  149*   POTASSIUM  --   --  3.6  --  3.4  --  3.3*  --  3.8   < > 3.8  3.8   CHLORIDE  --   --  98  --   --   --  97*  --  97*  --  99   CO2   --   --  43*  --   --   --  42*  --  42*  --  39*   BUN  --   --  46.6*  --   --   --  45.9*  --  45.7*  --  51.9*   CR  --   --  1.11*  --   --   --  1.05*  --  1.00*  --  1.04*   * 138* 138* 120*  --    < > 148*   < > 159*   < > 147*    < > = values in this interval not displayed.     LFT  Recent Labs   Lab 09/25/24  0341 09/24/24  1600 09/24/24  0411 09/23/24  1527   AST 28 28 34 37   ALT 17 16 19 17   ALKPHOS 63 62 62 61   BILITOTAL 1.9* 2.0* 1.9* 1.5*   ALBUMIN 3.2* 3.2* 3.2* 3.1*     Coagulation Profile  No lab results found in last 7 days.    Medications:   Current Facility-Administered Medications   Medication Dose Route Frequency Provider Last Rate Last Admin    acetaZOLAMIDE (DIAMOX) injection 500 mg  500 mg Intravenous BID Travis Rosa MD   500 mg at 09/25/24 0857    bumetanide (BUMEX) 0.25 mg/mL infusion  2 mg/hr Intravenous Continuous Travis Rosa MD 8 mL/hr at 09/25/24 0803 2 mg/hr at 09/25/24 0803    chlorhexidine (PERIDEX) 0.12 % solution 15 mL  15 mL Swish & Spit BID Sven Lynn MD   15 mL at 09/25/24 0809    dexmedeTOMIDine (PRECEDEX) 4 mcg/mL in sodium chloride 0.9 % 100 mL infusion  0.1-1.2 mcg/kg/hr (Dosing Weight) Intravenous Continuous Sven Lynn MD   Stopped at 09/24/24 0841    dextrose 10% infusion   Intravenous Continuous PRN Travis Rosa MD        glucose gel 15-30 g  15-30 g Oral Q15 Min PRN Sven Lynn MD        Or    dextrose 50 % injection 25-50 mL  25-50 mL Intravenous Q15 Min PRN Sven Lynn MD        Or    glucagon injection 1 mg  1 mg Subcutaneous Q15 Min PRN Sven Lynn MD        EPINEPHrine (ADRENALIN) injection    PRN Lino Lazaro MD   3 mL at 09/23/24 1345    fentaNYL (SUBLIMAZE) 50 mcg/mL bolus from pump  25 mcg Intravenous Q1H PRN Sven Lynn MD        fentaNYL (SUBLIMAZE) infusion   mcg/hr Intravenous Continuous Sven Lynn MD 3 mL/hr at 09/25/24 0800 150  mcg/hr at 09/25/24 0800    heparin 25,000 units in 0.45% NaCl 250 mL ANTICOAGULANT infusion  0-5,000 Units/hr Intravenous Continuous Travis Rosa MD 21 mL/hr at 09/25/24 0800 2,100 Units/hr at 09/25/24 0800    insulin aspart (NovoLOG) injection (RAPID ACTING)  1-7 Units Subcutaneous Q4H Sven Lynn MD   1 Units at 09/24/24 0820    ipratropium - albuterol 0.5 mg/2.5 mg/3 mL (DUONEB) neb solution 3 mL  3 mL Nebulization 4x daily Travis Rosa MD   3 mL at 09/24/24 1942    propofol (DIPRIVAN) infusion  5-75 mcg/kg/min (Dosing Weight) Intravenous Continuous Sven Lynn MD   Stopped at 09/24/24 0820    And    propofol (DIPRIVAN) bolus from bag or syringe pump  10 mg Intravenous Q15 Min PRN Sven Lynn MD   10 mg at 09/24/24 0552    And    Medication Instruction   Does not apply Continuous PRN Sven Lynn MD        medication instruction   Does not apply Continuous PRN Travis Rosa MD        multivitamins w/minerals liquid 15 mL  15 mL Per Feeding Tube Daily Travis Rosa MD   15 mL at 09/25/24 0809    naloxone (NARCAN) injection 0.2 mg  0.2 mg Intravenous Q2 Min PRN Chalino De La Torre MD        Or    naloxone (NARCAN) injection 0.4 mg  0.4 mg Intravenous Q2 Min PRN Chalino De La Torre MD        Or    naloxone (NARCAN) injection 0.2 mg  0.2 mg Intramuscular Q2 Min PRN Chalino De La Torre MD        Or    naloxone (NARCAN) injection 0.4 mg  0.4 mg Intramuscular Q2 Min PRN Chalino De La Torre MD        norepinephrine (LEVOPHED) 16 mg in  mL infusion MAX CONC CENTRAL LINE  0.01-0.6 mcg/kg/min (Dosing Weight) Intravenous Continuous Sven Lynn MD 3.5 mL/hr at 09/25/24 0800 0.03 mcg/kg/min at 09/25/24 0800    ondansetron (ZOFRAN ODT) ODT tab 4 mg  4 mg Oral Q6H PRN Travis Rosa MD        Or    ondansetron (ZOFRAN) injection 4 mg  4 mg Intravenous Q6H PRN Travis Rosa MD        pantoprazole  (PROTONIX) 80 mg in sodium chloride 0.9 % 100 mL infusion  8 mg/hr Intravenous Continuous Travis Rosa MD 10 mL/hr at 09/25/24 0800 8 mg/hr at 09/25/24 0800    polyethylene glycol (MIRALAX) Packet 17 g  17 g Oral Daily PRN Sven Lynn MD        prochlorperazine (COMPAZINE) injection 10 mg  10 mg Intravenous Q6H PRN Travis Rosa MD        Or    prochlorperazine (COMPAZINE) tablet 10 mg  10 mg Oral Q6H PRN Travis Rosa MD        Or    prochlorperazine (COMPAZINE) suppository 25 mg  25 mg Rectal Q12H PRN Travis Rosa MD        Prosource TF20 ENfit Compatibl EN LIQD (PROSOURCE TF20) packet 60 mL  1 packet Per Feeding Tube TID Travis Rosa MD   60 mL at 09/25/24 0810    senna-docusate (SENOKOT-S/PERICOLACE) 8.6-50 MG per tablet 1 tablet  1 tablet Oral BID PRN Sven Lynn MD        Or    senna-docusate (SENOKOT-S/PERICOLACE) 8.6-50 MG per tablet 2 tablet  2 tablet Oral BID PRN Sven Lynn MD        sildenafil (REVATIO) suspension 20 mg  20 mg Oral or Feeding Tube Q8H Travis Rosa MD        vasopressin (VASOSTRICT) 20 Units in sodium chloride 0.9 % 100 mL standard conc infusion  1.8 Units/hr Intravenous Continuous Travis Rosa MD 9 mL/hr at 09/25/24 0800 1.8 Units/hr at 09/25/24 0800

## 2024-09-25 NOTE — PROGRESS NOTES
Brief CVTS Progress Note    A/P: Patient is a 59 year old female with PMH of asthma 40 pack year hx, HTN. 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 She was also treated with CTX and Doxy for CAP coverage x 5 days. VIGNESH thought to be 2/2 cardiogenic shock from CTEPH, improving. Family history of Factor 5 Leiden, hematology consulted.     - HD stable on low dose NE. Remains on NO, actively weaning. Sedated, on the vent, bumex drip.  - Primary team is concerned for sepsis given persistent hypotension. Patient now on broad spectrum IV antibiotics. ID consulted, considering pulmonary infectious sources due to upper lobe cavitary lesion found on imaging.   - UGIB, GI consulted and following:  - S/p EGD 9/23, duodenal ulcer found with clips placed. Watch for recurrent bleeding. Low intensity heparin was resumed 9/23 PM.  - Potential FVL, hematology consulted & following.  - Surgery team recommends holding off on warfarin/DOAC while working up for potential surgery.  - Pulmonology consulted for cavitary lesions in the lung:  - PA pressures almost systemic. RHC 9/23 awaiting results this AM. Please consult pulmonary hypertension specialists Dr. Otero or Estrella for PHTN optimization  - IVC filter placed in infrarenal IVC in IR at [facility] on 9/22  - Will eventually need coronary evaluation prior to surgical decision, likely after extubation per primary team.  - Carotid US unable to be completed due to presences of central lines. May need CT neck with contrast to evaluate carotid stenosis in unable to evaluate by ultrasound.   - Surgical plan pending above workup/patient optimization and surgeon/OR availability.  - Thank you for the opportunity to participate in the care of this patient. CVTS will continue to follow. Remainder of cares per primary team.    Yeimy Barbour PA-C  Cardiothoracic Surgery  Pager [phone]    12:44 PM September 25,  2024

## 2024-09-25 NOTE — PROGRESS NOTES
General Infectious Disease Service  [facility] Team  Patient:  Noelle Gomez  Date of birth 1965  Medical record number [id number]  Date of Admission: 9/23/2024  Date of Visit:  9/25/2024  Requesting Provider: Chalino De La Torre         Assessment and Recommendations     Problem List:  Acute on chronic hypoxic/hypercapic respiratory failure. On mechanical ventilation.   CT showing left upper lobe cavitary lesion  Diffuse bilateral pulmonary emboli and LE DVT- concerning for CTEPH. Originally on heparin but paused w/GI bleeding.   Recent GI bleed - paused heparin. S/p EGD showing duodenal ulcer. 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 CT pulmonary angiogram also shows patchy peripheral consolidative and groundglass opacities. There is is an irregular multiloculated cavitary lesion with thick irregular borders in the anterior left upper lobe.  Moderate right and small pleural effusions with adjacent bibasilar passive atelectasis. No pneumothorax. Prominent mediastinal lymph nodes.Enlarged left axillary lymph nodes, measuring up to 1.4 cm. patient also has LLE DVT, anemia, GIB , mild thrombocytopenia,     Infectious disease was consulted for concern that an infection could be causing this patient's thrombosis as well as a chest CT (9/23) that shows a significant left upper lobe cavitary lesion along with diffuse ground glass. This patient had a negative blood culture (9/16) and negative sputum culture (9/19). She was treated empirically with Doxycycline and Ceftriaxone from (9/16-9/22) before cultures had resulted. Pulmonology on 9/23  suggested the cavitary lesion and diffuse ground glass could be caused by emphysema which we agree is possible/likely.    Differential diagnosis for cavitary lesions include: bacteria/ fungal/ mycobacterium tuberculois/ non TB mycobacterium. septic emboli, malignancy, PE infarction. This patient is not immunosuppressed to our knowledge, she has not had an recent steroid courses and was had a negative HIV antigen 9/23. We would recommend at this point to get a bronchoscopy with BAL and send a full panel if this is possible per pulmonology considering her severe emphysema.   9/25 Quantiferon TB, aspergillus galactomannan, 1-2 B-D glucan, histo/blasto serum/urine antigen, and fungal serology are all pending.     We also recommend continuing the trend CRP and consider a IRMA to rule out endocarditis.     Plan:  - Follow up on labs drawn 9/25   - Trend CRP   - Repeat Bcx if fever >101.   - IRMA   - Consider bronchoscopy w/pulmonary    Recommendations discussed with supervising attending physician, Dr. Reich.    Thank you for this consult. The General ID team will continue to follow this patient. Please feel free to call with any questions.     KYREE Fam   [facility]   Infectious Disease Service    Attestation:  I saw and evaluated this patient.  I discussed this patient with KYREE Fam and agree with the findings and plan in this note. I have reviewed today's vital signs, medications, labs and imaging.    Key findings:  increased thick yellow tracheal secretions.  Increased mixed interstitial and airspace opacities throughout the  right lung and left lung base. Fever of 101F today.  agree with empiric Zosyn and IV Vancomycin to cover for aspiration pneumonia     Total time on day of visit including chart review, counseling, documentation, and  coordination of care: 50 min     Frederick Reich MD,M.Med.Sc.  Staff, Infectious Diseases  Pager: [phone]                    Antibiotics & Cultures, Consolidated     Cultures:   9/16 Bcx: negative  9/19 Respiratory aerobic bacterial culture with gram stain: no growth   9/24 Bcx: NGTD    Antibiotics:   Ceftriaxone (9/16-9/22)  Doxycycline (9/16-9/22)           Interval History   Patient is more alert this morning. She weaned of prpofol and dexmedetomidine yesterday. She has stable respiratory requirements at FiO2 of 80 and PEEP of 8. She was weaned of norepinepherine yesterday but had to come back on it today. She is on Vasopressin at a stable 1.8 U.            Physical Exam     Pulse 115   Temp 99.9  F (37.7  C)   Resp 19   Wt 120.4 kg (265 lb 6.9 oz)   SpO2 92%   BMI 48.55 kg/m       Exam:  GENERAL: Intubated in the ICU. Awake upon exam.   HEAD: Normocephalic and atraumatic  EYES:  Eyes grossly normal to inspection, conjunctivae and sclerae normal   NECK:  no asymmetry scars or lesions.   LUNGS:  Improved breath sounds from yesterday. Lungs with light diffuse upper airway sounds.   CARDIOVASCULAR:  Tachycardic w/regular rhythm. Well perfused.   ABDOMEN:  Soft, nontender, bowel sounds normal.   EXT: Extremities warm and edamatous.   MS: No gross musculoskeletal defects noted  SKIN:  erythema over dorsal foot.   NEUROLOGIC:  Intubated and sedated.

## 2024-09-25 NOTE — PROCEDURES
[facility]  CAPS NOTE  Date of Admission:  9/23/2024  Consult Requested by: ICU  Reason for Consult: diagnostic evaluation of pleural effusion and management of symptomatic pleural effusion    POC US Guide for Thorcentesis     Narrative  Limited point of care pleural/lung ultrasound to evaluate for thoracentesis.    Thoracentesis Indication:pleural effusion    Views/Acquisition: Bilateral pleural space(s): supine.    Findings/Interpretation: Very small fluid pocket visualized just above the diaphragm on the left. Small (though possibly moderate) fluid visualized on the right. Given patient mobility limitations there is insufficient window for safe bedside thoracentesis.    Agatha Renteria MD      History of Present Illness:   59-year-old woman admitted to ICU for respiratory failure requiring intubation, likely multifactorial related to sepsis and heart failure. Course has been complicated by GI bleed. CAPS consulted for evaluation for thoracentesis.   She is on a heparin drip which could be held for a procedure if indicated.     Physical Exam:  Vital Signs: Temp: (!) 100.6  F (38.1  C) Temp src: Esophageal   Pulse: 120   Resp: 21 SpO2: 96 % O2 Device: Mechanical Ventilator    Weight: 265 lbs 6.94 oz  General Appearance: Intubated, sedated, occasional movements of both arms.   Respiratory: Mechanical ventilation  Cardiovascular: Tachycardic with regular rhythm  Neuro: Sedated due to intubation    Medical Decision Making       25 MINUTES SPENT BY ME on the date of service doing chart review, history, exam, documentation & further activities per the note.    Assessment and Plan:  Requested procedure was not performed.  Insufficient fluid for safe bedside paracentesis. Conditions at bedside are not appropriate to perform the procedure at this time.  Minimal pleural fluid visualized laterally on the left.   Small amount of pleural fluid on the right as visualized  laterally. Based on recent CT imaging there is likely more fluid posteriorly but I am unable to assess that by ultrasound while she is laying supine.   Given her intubation, sedation, very limited mobility and body habitus I am not able to safely perform a bedside thoracentesis at this time.   - Consider IR re-assessment if right-sided thoracentesis is strongly desired by the primary team.     Agatha Renteria MD  [facility]  Securely message with [employer] (more info)  Text page via [facility] Paging/Directory   See signed in provider for up to date coverage information

## 2024-09-25 NOTE — PROGRESS NOTES
Hematology  Daily Progress Note   Date of Service: 09/25/2024    Patient: Noelle Gomez  MRN: [id number]  Admission Date: 9/23/2024  Hospital Day # Hospital Day: 3      Initial Reason for Consult:  Anticoagulation management.     Assessment & Plan:   Noelle Gomez is a 58 yo F with medical history remarkable for morbid obesity, prediabetes, 40 pack years smoking, lymphedema, lower extremity stasis, prior lower extremity ultrasounds on 10/2023 negative admitted on 9/16 with acute on chronic respiratory failure, course complicated by altered mental status requiring intubation.  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  She is s/p thrombectomy with mostly chronic clot and resection of small amount of clot and only mild decrease in PA pressures still requiring vasopressor and ventilator support and continues diuresis.  Her course has been further complicated by gastritis, duodenal ulcers on EGD 9/23 s/p clip. Currently on hepatin gtt     #Chronic thromboemboli bilateral lungs with pulmonary hypertension (CTEPH)   clearly has had multiple venous thromboemboli over time as well as evidence of acute thrombosis at least in the L leg and per CT scan also potentially in the lungs though on thrombectomy these seemed more chronic. There is likely acute on chronic thromboembolism with a fairly large clot burden. Based on the diagnosis of CTEPH she qualifies for indefinite long term anticoagulation. We are completing hypercoagulability work up to help decided long-term anticoagulation plan. If antiphospholipid syndrome  is present, outcomes would be better with warfarin as opposed to DOAC. Thus, cardiolipin and b2 glycoprotein  antibodies have returned negative. LA still in process. Otherwise testing for inherited thrombophilia such as Factor V Leiden, a gain of function mutation causing activated protein C resistance ( NOT factor V activity ), and similar with prothrombin [id number] mutation, protein C or protein S deficiency, or antithrombin deficiency. The last 3 can be falsely low with active thrombosis. Also no e/o malignancy on recent CT CAP. Otherwise for now given her acute setting with recent GIB s/p clip would recommend continuing management with heparin and monitor closely for rebleed. Since her hgb not coming up, will add on further hemolysis labs as well as look for LEONELA     Recommendations:   - Continue heparin gtt for now, increase to high intensity if tolerated   - Continue to monitor daily CBC with diff, reticulocyte count, Bilirubin direct and total   - We will look at peripheral smear, DATand add on LDH and haptoglobin for today   - We will order iron studies ( last transfused 9/22 )   - Will follow LA and decided on warfarin vs doac for long term plan. Favoring apixaban     Patient was seen and plan of care was discussed with attending physician Dr. Montalvo.    We will continue to follow this patient. Please don't hesitate to contact the Fellow On-Call with questions.    Kristen Mehta MD  Hematology and Medical Oncology Fellow, PGY-4  Pager:  [phone]   Attending  The patient was seen and examined by me separate from the resident/fellow provider.The note above reflects my assessment and plan. I have personally reviewed today's labs,vital and radiology results. The points of care that were added by me are:    Pt is tolerating low intensity heparin foe CTEPH. Also has a new LE DVT  Jose Montalvo M.D.          ________________________________________________________________    Subjective & Interval History:    No acute events noted overnight. Remains intubated, sedated on pressor therapy. No signs of  bleeding from chart review with hgb stable 7.1.       Physical Exam:    Pulse 115   Temp 99.9  F (37.7  C)   Resp 19   Wt 120.4 kg (265 lb 6.9 oz)   SpO2 92%   BMI 48.55 kg/m    Gen: Intubated and sedated   CV: Normal rate, regular rhythm. No m/r/g  Pulm: Coarse sounds bilateral.   Abd: Soft, nt/nd, no rebound/guarding  Ext: Warm and well perfused. No lower extremity edema    Labs & Studies: I personally reviewed the following studies:  ROUTINE LABS (Last four results):  CMP  Recent Labs   Lab 09/25/24  0904 09/25/24  0800 09/25/24  0346 09/25/24  0341 09/24/24  2254 09/24/24  2250 09/24/24  1954 09/24/24  1600 09/24/24  0420 09/24/24  0411 09/23/24  1537 09/23/24  1527   *  --   --  152*  --   --   --  152*  --  150*  --  149*   POTASSIUM 3.5  --   --  3.6  --  3.4  --  3.3*  --  3.8   < > 3.8  3.8   CHLORIDE 98  --   --  98  --   --   --  97*  --  97*  --  99   CO2 43*  --   --  43*  --   --   --  42*  --  42*  --  39*   ANIONGAP 12  --   --  11  --   --   --  13  --  11  --  11   * 114* 138* 138*   < >  --    < > 148*   < > 159*   < > 147*   BUN 48.3*  --   --  46.6*  --   --   --  45.9*  --  45.7*  --  51.9*   CR 1.18*  --   --  1.11*  --   --   --  1.05*  --  1.00*  --  1.04*   GFRESTIMATED 53*  --   --  57*  --   --   --  61  --  65  --  62   BEN 9.3  --   --  9.3  --   --   --  9.3  --  9.2  --  9.3   MAG  --   --   --  2.3  --  2.5*  --  1.9  --  2.2   < > 2.1   PHOS  --   --   --  3.7  --   --   --  3.5  --  3.3  --  3.0   PROTTOTAL 6.5  --   --  6.5  --   --   --  6.2*  --  6.2*  --  6.2*   ALBUMIN 3.1*  --   --  3.2*  --   --   --  3.2*  --  3.2*  --  3.1*   BILITOTAL 1.6*  --   --  1.9*  --   --   --  2.0*  --  1.9*  --  1.5*   ALKPHOS 65  --   --  63  --   --   --  62  --  62  --  61   AST 30  --   --  28  --   --   --  28  --  34  --  37   ALT 16  --   --  17  --   --   --  16  --  19  --  17    < > = values in this interval not displayed.     CBC  Recent Labs   Lab 09/25/24  0393  09/24/24  1600 09/24/24  0411 09/23/24  1931   WBC 10.5 8.0 8.4 8.1   RBC 2.03* 2.05* 2.19* 2.16*   HGB 7.0* 7.1* 7.5* 7.5*   HCT 24.5* 23.6* 25.3* 25.0*   * 115* 116* 116*   MCH 34.5* 34.6* 34.2* 34.7*   MCHC 28.6* 30.1* 29.6* 30.0*   RDW 19.3* 18.9* 19.6* 19.8*    147* 160 133*     INRNo lab results found in last 7 days.    Medications list for reference:  Current Facility-Administered Medications   Medication Dose Route Frequency Provider Last Rate Last Admin    acetaZOLAMIDE (DIAMOX) injection 500 mg  500 mg Intravenous BID Travis Rosa MD   500 mg at 09/25/24 0857    bumetanide (BUMEX) 0.25 mg/mL infusion  2 mg/hr Intravenous Continuous Travis Rosa MD 8 mL/hr at 09/25/24 0803 2 mg/hr at 09/25/24 0803    chlorhexidine (PERIDEX) 0.12 % solution 15 mL  15 mL Swish & Spit BID Sven Lynn MD   15 mL at 09/25/24 0809    chlorothiazide (DIURIL) injection 1,000 mg  1,000 mg Intravenous Once Travis Rosa MD        dexmedeTOMIDine (PRECEDEX) 4 mcg/mL in sodium chloride 0.9 % 100 mL infusion  0.1-1.2 mcg/kg/hr (Dosing Weight) Intravenous Continuous Sven Lynn MD 18.7 mL/hr at 09/25/24 1000 0.6 mcg/kg/hr at 09/25/24 1000    dextrose 10% infusion   Intravenous Continuous PRN Travis Rosa MD        glucose gel 15-30 g  15-30 g Oral Q15 Min PRN Sven Lynn MD        Or    dextrose 50 % injection 25-50 mL  25-50 mL Intravenous Q15 Min PRN Sven Lynn MD        Or    glucagon injection 1 mg  1 mg Subcutaneous Q15 Min PRN Sven Lynn MD        EPINEPHrine (ADRENALIN) injection    PRN Lino Lazaro MD   3 mL at 09/23/24 1345    fentaNYL (SUBLIMAZE) 50 mcg/mL bolus from pump  25 mcg Intravenous Q1H PRN Sven Lynn MD        fentaNYL (SUBLIMAZE) infusion   mcg/hr Intravenous Continuous Sven Lynn MD 3 mL/hr at 09/25/24 0800 150 mcg/hr at 09/25/24 0800    heparin 25,000 units in 0.45% NaCl 250 mL  ANTICOAGULANT infusion  0-5,000 Units/hr Intravenous Continuous Travis Rosa MD 21 mL/hr at 09/25/24 0800 2,100 Units/hr at 09/25/24 0800    insulin aspart (NovoLOG) injection (RAPID ACTING)  1-7 Units Subcutaneous Q4H Sven Lynn MD   1 Units at 09/24/24 0820    ipratropium - albuterol 0.5 mg/2.5 mg/3 mL (DUONEB) neb solution 3 mL  3 mL Nebulization 4x daily Travis Rosa MD   3 mL at 09/25/24 0913    propofol (DIPRIVAN) infusion  5-75 mcg/kg/min (Dosing Weight) Intravenous Continuous Sven Lynn MD   Stopped at 09/24/24 0820    And    propofol (DIPRIVAN) bolus from bag or syringe pump  10 mg Intravenous Q15 Min PRN Sven Lynn MD   10 mg at 09/24/24 0552    And    Medication Instruction   Does not apply Continuous PRN Sven Lynn MD        medication instruction   Does not apply Continuous PRN Travis Rosa MD        multivitamins w/minerals liquid 15 mL  15 mL Per Feeding Tube Daily Travis Rosa MD   15 mL at 09/25/24 0809    naloxone (NARCAN) injection 0.2 mg  0.2 mg Intravenous Q2 Min PRN Chalino De La Torre MD        Or    naloxone (NARCAN) injection 0.4 mg  0.4 mg Intravenous Q2 Min PRN Chalino De La Torre MD        Or    naloxone (NARCAN) injection 0.2 mg  0.2 mg Intramuscular Q2 Min PRN Chalino De La Torre MD        Or    naloxone (NARCAN) injection 0.4 mg  0.4 mg Intramuscular Q2 Min PRN Chalino De La Torre MD        norepinephrine (LEVOPHED) 16 mg in  mL infusion MAX CONC CENTRAL LINE  0.01-0.6 mcg/kg/min (Dosing Weight) Intravenous Continuous Sven Lynn MD 3.5 mL/hr at 09/25/24 0800 0.03 mcg/kg/min at 09/25/24 0800    ondansetron (ZOFRAN ODT) ODT tab 4 mg  4 mg Oral Q6H PRN Travis Rosa MD        Or    ondansetron (ZOFRAN) injection 4 mg  4 mg Intravenous Q6H PRN Travis Rosa MD        pantoprazole (PROTONIX) 80 mg in sodium chloride 0.9 % 100 mL infusion  8 mg/hr Intravenous  Continuous Travis Rosa MD 10 mL/hr at 09/25/24 0800 8 mg/hr at 09/25/24 0800    piperacillin-tazobactam (ZOSYN) 4.5 g vial to attach to  mL bag  3.375 g Intravenous Q6H Travis Rosa MD        polyethylene glycol (MIRALAX) Packet 17 g  17 g Oral Daily PRN Sven Lynn MD        prochlorperazine (COMPAZINE) injection 10 mg  10 mg Intravenous Q6H PRN Travis Rosa MD        Or    prochlorperazine (COMPAZINE) tablet 10 mg  10 mg Oral Q6H PRN Travis Rosa MD        Or    prochlorperazine (COMPAZINE) suppository 25 mg  25 mg Rectal Q12H PRN Travis Rosa MD        Prosource TF20 ENfit Compatibl EN LIQD (PROSOURCE TF20) packet 60 mL  1 packet Per Feeding Tube TID Travis Rosa MD   60 mL at 09/25/24 0810    senna-docusate (SENOKOT-S/PERICOLACE) 8.6-50 MG per tablet 1 tablet  1 tablet Oral BID PRN Sven Lynn MD        Or    senna-docusate (SENOKOT-S/PERICOLACE) 8.6-50 MG per tablet 2 tablet  2 tablet Oral BID PRN Sven Lynn MD        sildenafil (REVATIO) suspension 10 mg  10 mg Oral or Feeding Tube Q8H Travis Rosa MD        vancomycin (VANCOCIN) 2,000 mg in sodium chloride 0.9 % 250 mL intermittent infusion  2,000 mg (central catheter) Intravenous Once Chalino De La Torre MD        Followed by    [START ON 9/26/2024] vancomycin (VANCOCIN) 1,500 mg in 0.9% NaCl 250 mL intermittent infusion  1,500 mg Intravenous Q24H Chalino De La Torre MD        vasopressin (VASOSTRICT) 20 Units in sodium chloride 0.9 % 100 mL standard conc infusion  1.8 Units/hr Intravenous Continuous Travis Rosa MD 9 mL/hr at 09/25/24 0800 1.8 Units/hr at 09/25/24 0800

## 2024-09-25 NOTE — PHARMACY-ADMISSION MEDICATION HISTORY
Admission medication history completed at [facility]. Please see Pharmacist Admission Medication History note from 9/16/2024.   Patient has been admitted since that time.  See eMAR for last doses.      PTA Med List   Medication Sig Last Dose    losartan (COZAAR) 100 MG tablet TAKE 1 TABLET(100 MG) BY MOUTH EVERY DAY        Uma Parisi, CaylaD, BCPS

## 2024-09-25 NOTE — PROGRESS NOTES
Cardiology ICU Note    09/25/2024    Noelle Gomez MRN# [id number]   Age: 59 year old YOB: 1965          H&P:   Noelle Gomez is a 59 year old female who was admitted on 9/23/2024. She has a PMH of asthma, 40 pack year former smoker, HTN, who initially presented to [facility] on 9/16 for acute on chronic hypoxic/hypercapnic respiratory failure thought to be related to decompensated heart failure. She was intubated quickly after admission due to worsening hypercapnea and AMS. TTE at that time revealed severe RV volume and pressure overload. VQ scan obtained (due to VIGNESH) revealed multiple bilateral wedge shaped perfusion defects. She was then transferred to [facility] ICU for thrombectomy. She also was noted to have a LE DVT, subsequently started on a heparin drip. During thrombectomy, she was noted to have mainly chronic clot with only minimal acute clot that was extracted. PAP reduced from 77/33 (mean 47), to 68/19 (mean 34). 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 She unfortunately later developed a GI bleed with melena and a hemoglobin drop from 12->6.7 over 2 days now s/p 1u pRBC. She subsequently required pressor support with levo and vaso, but vaso was weaned off and she remains on low dose levophed. She was then transferred to [facility] for further evaluation and management.          Assessment and Plan:     09/25: Patient continues mechanical ventilated and with vasopressors support. Yesterday we started to wean the NO. After weaning to NO:1 CVP increase to 20. The goal today is to diurese as much as possible and completely discontinue the NO by tomorrow.     Changes/Updates  >Keeping NO at 1  >Bumetanide 2mg/h - CVP goal 10-12  > Diuril 1g once   > Diamox 500mg BID - for diuresis and metabolic alkalosis.    > Ordering ID/Pulmonology infections/ILD/Rheumatology etiologies work up.   >CICU team to  manage ventilator.   > Concerns for sepsis due to persistent hypotension and pulmonary findings - started on vancomycin and zozyn  > MRSA nares   > To discuss CORS (per CVTS), Bronchoscopy and IRMA In the future   > Planning to call procedure team to drain pericardial effusion.     Neurology   # Acute toxic metabolic encephalopathy  Fentanyl     Moves extramiteis does not follow commands     Plan:  Planning to use fentanyl and minimize propofol.   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 Course unfortunately complicated by GI bleed on heparin, so IVC filter was placed. CVP transduced off PICC on admission is 24     CVP PA PVR PCWP CO/CI   09/23 11 78/29/42 5.53 12 5.43/2.46   09/24 17 76/42/59   6.3/2.7   09/25 20 83/48   10.1/4.4     Volume: Hypervolemic   Contractility:   > RV: RV is getting overload after decreasing NO. Planning to diurese as much as possible.   > LV functions is preserved.     Meds:  Inotrope: None   Pulmonary vasodilators: NO: 1 + Sildenafil 10mg TID.   Vasopressors: Levophed 0.03 and Vasopressin 1.8  Diuresis: Bumetanide 2mg/h + Diuril 1g once + Diamox 500mg BID   Anticoagulation: Heparin for CTEPH.       Plan:  >Keeping NO at 10 - Bridging with sildenafil. The goal is to be off NO to facility extubation process, for pulmonary vasodilators we will use sildenafil. Keeping at 10 to help RV while we diurese.   >Bumetanide 1mg/h - CVP 17.   >CVTS on board working up for possible endarterectomy/thrombectomy.      Pulmonary    FiO2 (%): 80 %, Resp: 19, Vent Mode: CMV/AC, Resp Rate (Set): 16 breaths/min, Tidal Volume (Set,  mL): 450 mL, PEEP (cm H2O): 8 cmH2O, Resp Rate (Set): 16 breaths/min, Tidal Volume (Set, mL): 450 mL, PEEP (cm H2O): 8 cmH2O     # Acute Hypoxic and hypercapnic respiratory failiure 2/2  # CETPH  # LORA/OHS  # ?Previously treated CAP - Zozyn/Ceftriaxone/Doxy.  # Cavitary lesion on CT - Emphysema/Infarction    # Sepsis - per SOFA>2 secondary to Pneumonia   -Presented AoC hypoxic/hypercapnic respiratory failure thought to be due to HF, now found to be due to PH 2/2 CTEPH.   ===PLAN===  -Plan for CTEPH/RV overload as above  -Vent management per CICU team   > Ordering ID/Pulmonology infections/ILD/Rheumatology etiologies work up.         Gastrointestinal, Nutrition  #Upper GI bleed in the setting of heparin use  #S/P Clipping duodenal ulcer on 09/23    Plan:   Continue tube feedings     Renal, Electrolytes  # VIGNESH 2/2 cardiogenic shock from CTEPH  Plan:  Continue diuresis as above.     Infectious Disease  # Sepsis - per SOFA>2 secondary to Pneumonia   Per ID We would recommend at this point to get a bronchoscopy with BAL and send a full panel if this is possible per pulmonology considering her severe emphysema.   9/25 Quantiferon TB, aspergillus galactomannan, 1-2 B-D glucan, histo/blasto serum/urine antigen, and fungal serology are all pending  Plan:  Start vancomycin and zozyn   -treated with 5 day course of CTX/Doxycycline in the past. Short course of zozyn before.       Hematology  # Left femoral DVT  # Family history of Factor 5 Leiden  # CTEPH    -Patient presenting with hypoxia and found to have both a DVT and chronic bilateral pulmonary emboli. Her eldest son has history of factor 5 Leiden.     Plan:  Hematology working up antiphospholipid syndrome.   Continue heparin      Endocrinology  # Hyperglycemia  - insulin prn    ICU Checklist:  #Feeding: To be started after OG tube.   #Analgesia: fentanyl  #Sedation: precedex/propofol  #Thromboembolism ppx: mechanical (GI bleed)  #HOB: 30 degrees   #Ulcer ppx: PPI  BID  #Glucose: insulin PRN  #SBT/SAT: n/a  #Bowel reg: miralax/senna PRN  #Family: , 2 sons  #Dispo: CCU    Family update by me today: Yes     Code Status: Full    Discussed with Dr Chau attending physician,    Travis Rosa MD  Cardiology Fellow          Medications:            Past Medical History:     Past Medical History:   Diagnosis Date    Asthma 08/14/2023    Bilateral lower extremity edema 07/14/2021    Former smoker 06/23/2021    HTN (hypertension) 07/14/2021    Lymphedema 02/08/2022    Prediabetes 06/24/2021    Seasonal allergies 07/14/2021    UPJ (ureteropelvic junction) obstruction 03/04/2015    Vitamin D deficiency 06/23/2021            Family History:   Unable to obtain due to patients medical condition.         Social History:   Unable to obtain due to patients medical condition.         Past Surgical History:   Unable to obtain due to patients medical condition.         Review of Systems:   Unable to obtain due to patients medical condition.            Physical Exam:   Pulse (!) 125   Temp 99.9  F (37.7  C)   Resp 20   Wt 120.4 kg (265 lb 6.9 oz)   SpO2 97%   BMI 48.55 kg/m        GENERAL: Intubated, sedated. NAD.  HEENT: No icterus. ETT in place, OG tube in place.  CARDIOVASCULAR: RRR. Normal S1 and S2.  RESP: Coarse bilaterally. Mechanical ventilation.    GI Soft, bowel sounds hypoactive but present.  GENITOURINARY: Miller in place.  EXTREMITIES: edematous, warm and well perfused  NEURO: Sedated and intubated.  INTEGUMENTARY: No rashes. Cannula/Line sites CDI.      Resp: 20 SpO2: 97 % O2 Device: Mechanical Ventilator      Arterial Blood Gas:   Recent Labs   Lab 09/25/24  0746 09/25/24  0341 09/24/24  2250 09/24/24  1600 09/24/24  1227 09/24/24  0745 09/24/24  0411 09/23/24  2258 09/23/24  1931 09/23/24  1530   PH  --   --   --   --  7.47*  --  7.49* 7.47*  --  7.44   PCO2  --   --   --   --  65*  --  60* 61*  --  64*   PO2  --   --   --   --  75*  --  76* 79*  --  66*   HCO3  --   " --   --   --  47*  --  46* 44*  --  43*   O2PER 80 80 70 80 80   < > 80  80 80   < > 70    < > = values in this interval not displayed.        Vitals:    09/23/24 2155 09/24/24 0315 09/25/24 0020   Weight: 124.8 kg (275 lb 2.2 oz) 122.4 kg (269 lb 13.5 oz) 120.4 kg (265 lb 6.9 oz)   I/O last 3 completed shifts:  In: 2202.1 [I.V.:1757.1; NG/GT:250]  Out: 4900 [Urine:4900]  Recent Labs   Lab 09/25/24  0904 09/25/24  0800 09/25/24  0346 09/25/24  0341   *  --   --  152*   POTASSIUM 3.5  --   --  3.6   CHLORIDE 98  --   --  98   CO2 43*  --   --  43*   ANIONGAP 12  --   --  11   * 114*   < > 138*   BUN 48.3*  --   --  46.6*   CR 1.18*  --   --  1.11*   BEN 9.3  --   --  9.3    < > = values in this interval not displayed.     No components found for: \"URINE\"   Recent Labs   Lab 09/25/24  0904 09/25/24 0341 09/24/24  1600   AST 30 28 28   ALT 16 17 16   BILITOTAL 1.6* 1.9* 2.0*   ALBUMIN 3.1* 3.2* 3.2*   PROTTOTAL 6.5 6.5 6.2*   ALKPHOS 65 63 62     Temp: 99.9  F (37.7  C) Temp src: EsophagealTemp  Min: 98.1  F (36.7  C)  Max: 100.4  F (38  C)   Recent Labs   Lab 09/25/24  0341 09/24/24  1600 09/24/24  0411 09/23/24  1931 09/23/24  1527   WBC 10.5 8.0 8.4 8.1 9.0   HGB 7.0* 7.1* 7.5* 7.5* 7.9*   HCT 24.5* 23.6* 25.3* 25.0* 26.2*   * 115* 116* 116* 115*   RDW 19.3* 18.9* 19.6* 19.8* 20.1*    147* 160 133* 139*     No lab results found in last 7 days.    Recent Labs   Lab 09/25/24  0904 09/25/24  0800 09/25/24  0346 09/25/24  0341 09/24/24  2254   * 114* 138* 138* 120*     I have reviewed today's vital signs, notes, medications, labs and imaging.  I have also seen and examined the patient and agree with the findings and plan as outlined above. Pt is 58 yo WF with hx of tobacco abuse who presents following transfer with PAH, moderate RV dysfn, severe RV dilation, intubated and sedated with CXR significant for right lung opacificaiton concerning for aspiration pneumonia with pulmonary " edema.  Pt on levophed, vasopressen and low dose NO with CVP 20 and PA 83/48 with CI 4.4.  Pt also with hx of CTEPH and DVT with IVC filter placed.  Will add abx and continue diuresis.  Pt critically ill who was seen X3 for total critical care time 50 min.     Chalino De La Torre MD, PhD  Professor, Heart Failure and Cardiac Transplantation  [facility]

## 2024-09-25 NOTE — PLAN OF CARE
Major Shift Events:  Opens eyes spontaneously, moves all extremities, not following command.  HR 90-120s Sinus Tachy.  FICKs CVP 18-20 - Bumex increased to 2  PA 81/37   CI 4  SVO2 62    Vent on CMV  450/80%/8/16 SPO2 drops to 87 at 70% FIO2  Nitric Oxide at 1  TF at goal rate 35 with 30ml water flush Q4  BM0  UO form 200 to 600/hr    K+ replaced IV  Started Vanco and Zosyn IV    Seen by Medicine for Thoracentesis - done bedside ultrasound and the fluids is too small to drain bedside according to the provider.    Tmax 100.8 - reported to provider.   Precedex stopped then replaced with PRN Versed.    Plan:  Wean, Vent  For vital signs and complete assessments, please see documentation flowsheets.     Goal Outcome Evaluation:           Overall Patient Progress: no changeOverall Patient Progress: no change

## 2024-09-25 NOTE — PROGRESS NOTES
Brief CVTS Progress Note    A/P: Patient is a 59 year old female with PMH of asthma 40 pack year hx, HTN. 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 She was also treated with CTX and Doxy for CAP coverage x 5 days. VIGNESH thought to be 2/2 cardiogenic shock from CTEPH, improving. Family history of Factor 5 Leiden, hematology consulted.     - Primary team is concerned for sepsis given persistent hypotension. Patient now on broad spectrum IV antibiotics. ID consulted, considering pulmonary infectious sources due to upper lobe cavitary lesion found on imaging.   - UGIB, GI consulted and following:  - S/p EGD 9/23, duodenal ulcer found with clips placed. Watch for recurrent bleeding. Low intensity heparin was resumed 9/23 PM.  - Potential FVL, hematology consulted & following.  - Surgery team recommends holding off on warfarin/DOAC while working up for potential surgery.  - Pulmonology consulted for cavitary lesions in the lung:  - PA pressures almost systemic. RHC 9/23 awaiting results this AM. Pulmonary hypertension team aware of patient and advising treatment peripherally per surgeon.   - IVC filter placed in infrarenal IVC in IR at [facility] on 9/22  - If surgery were pursued, would need coronary evaluation  likely after extubation per primary team.  - Carotid US unable to be completed due to presences of central lines. May need CT neck with contrast to evaluate carotid stenosis in unable to evaluate by ultrasound.   - Surgical plan pending above workup/patient optimization and surgeon/OR availability. Ongoing multidisciplinary discussion NOT favoring surgery.   - Thank you for the opportunity to participate in the care of this patient. CVTS will continue to follow. Remainder of cares per primary team. Please do not hesitate to reach out with any questions.    Discussed with Dr. Cordova.    Yeimy Barbour PA-C  Cardiothoracic Surgery  Pager  [phone]    12:44 PM September 25, 2024      Agree with above. No plans at this point for surgery during this admission. Etiology of shock/resp failure appears multi-factorial. Cont medical management of CTEPH for now. Anticoagulate. Christopher.     JAYY Cordova MD  Cardiothoracic Surgery  Pager [phone]    9/25/24

## 2024-09-25 NOTE — PROGRESS NOTES
Cardiology ICU Progress Note    Brief HPI:  Noelle Gomez is a 59 year old female admitted on 9/23/2024. She has a PMH of asthma, 40 pack year former smoker, HTN, who initially presented to [facility] on 9/16 for acute on chronic hypoxic/hypercapnic respiratory failure thought to be related to decompensated heart failure. Pt required intubation on initial day of admission on 9/16/24. TTE was done revealing RV volume and pressure overload. VQ scan was obtained in the setting of VIGNEHS revealing multiple bilateral wedge shaped perfusion defects. Also found to LE DVT. Anticoagulation initiated. Patient underwent thrombectomy at [facility] and found to have mainly chronic clot with few acute emboli. PA pressures significantly elevated. Patient temporarily started on epoprostenol. After several days of anticoagulation hgb dropped. Patient started requiring vasopressors. Transferred to our facility for further management. EGD undergone on arrival revealing duodenal ulcer requiring 2 clips. RHC done also revealing pre-capillary pHTN and a ELIO of 5.         Assessment and plan by system:     Neurology   # Acute toxic metabolic encephalopathy    -Weaning sedation  -RAAS goal -2     Cardiovascular  #Cardiogenic shock  #RH failure  #Group IV pHTN CTEPH  #s/p IVC filter  #DVT  TTE 9/17/24: EF 65%, moderately reduced RV systolic function, RVSP >55mmHg, mod TR  RHC: RA 11, RV: 79/13, PA 78/29 (42), PCW: 12, CO/CI (shu): 5.43/2.46, PVR 5.53    - weaning inhaled nitric oxide, sildenafil being titrated up  - Vasopressor: vasopressin 1.8, levo as needed  - bumex drip 2.0mg/hr  - heparin gtt  - Monitor hemodynamic status.         Pulmonary  # Acute hypoxic respiratory failure  # Cavitary lesion  FiO2 80%, PEEP 8, RR 16, Vol 450    - Recommend diamox 500mg tid to improve pH in preporation for extubation  - wean FiO2 as able  - ID requesting bronchoscopy to review cavitary lesion, plan to perform today         Gastrointestinal, Nutrition  # Duodenal ulcer  # Acute GI bleed    - NPO, OG in place and receiving tube feedings  - Nutrition consulted, appreciate recommendations  - PPI per GI recommendation  - Bowel regimen: senna prn     Renal, Electrolytes  # VIGNESH - improving    - ICU electrolyte replacement protocol  - Strict I&Os  - continue monitoring closely    Infectious Disease  #CAP-previously treated  - Initated on broad spectrum antibiotics  - Cultures negative to date    Hematology  #Acute blood loss anemia    - Hgb goal > 7.0  - Hemoglobin stable.  - heparin gtt        Endocrinology  # Hyperglycemia    -Insulin medium dose correction q4h    Integumentary:  - No skin issues        Patient seen and discussed with staff physician.      Objective:  Most recent vital signs:  Pulse 107   Temp 99  F (37.2  C)   Resp 17   Wt 120.4 kg (265 lb 6.9 oz)   SpO2 93%   BMI 48.55 kg/m    Temp:  [98.1  F (36.7  C)-100.4  F (38  C)] 99  F (37.2  C)  Pulse:  [] 107  Resp:  [13-24] 17  MAP:  [59 mmHg-96 mmHg] 70 mmHg  Arterial Line BP: (100-150)/(43-72) 113/50  FiO2 (%):  [70 %-80 %] 80 %  SpO2:  [88 %-100 %] 93 %  Wt Readings from Last 2 Encounters:   09/25/24 120.4 kg (265 lb 6.9 oz)   09/22/24 124.8 kg (275 lb 1.6 oz)       Intake/Output Summary (Last 24 hours) at 9/24/2024 0657  Last data filed at 9/24/2024 0600  Gross per 24 hour   Intake 2756.78 ml   Output 7840 ml   Net -5083.22 ml     Physical exam:  General: In bed, in NAD  HEENT: PERRL, no scleral icterus or injection  CARDIAC: RRR, no m/r/g appreciated. Peripheral pulses dopplered  RESP: Mechanical ventilation; CTAB, no wheezes, rhonchi or crackles appreciated.  GI: soft, BS hypoactive  : Miller  EXTREMITIES: bilateral edema  SKIN: No acute lesions appreciated  NEURO: Intubated and sedated    Labs (Past three days):  CBC  Recent Labs   Lab 09/25/24  0341 09/24/24  1600 09/24/24  0411 09/23/24  1931   WBC 10.5 8.0 8.4 8.1   RBC 2.03* 2.05* 2.19* 2.16*   HGB 7.0*  7.1* 7.5* 7.5*   HCT 24.5* 23.6* 25.3* 25.0*   * 115* 116* 116*   MCH 34.5* 34.6* 34.2* 34.7*   MCHC 28.6* 30.1* 29.6* 30.0*   RDW 19.3* 18.9* 19.6* 19.8*    147* 160 133*     BMP  Recent Labs   Lab 09/25/24 0346 09/25/24 0341 09/24/24 2254 09/24/24 2250 09/24/24 1954 09/24/24 1600 09/24/24  0420 09/24/24 0411 09/23/24  1537 09/23/24  1527   NA  --  152*  --   --   --  152*  --  150*  --  149*   POTASSIUM  --  3.6  --  3.4  --  3.3*  --  3.8   < > 3.8  3.8   CHLORIDE  --  98  --   --   --  97*  --  97*  --  99   CO2  --  43*  --   --   --  42*  --  42*  --  39*   ANIONGAP  --  11  --   --   --  13  --  11  --  11   * 138* 120*  --  122* 148*   < > 159*   < > 147*   BUN  --  46.6*  --   --   --  45.9*  --  45.7*  --  51.9*   CR  --  1.11*  --   --   --  1.05*  --  1.00*  --  1.04*   GFRESTIMATED  --  57*  --   --   --  61  --  65  --  62   BEN  --  9.3  --   --   --  9.3  --  9.2  --  9.3   MAG  --  2.3  --  2.5*  --  1.9  --  2.2   < > 2.1   PHOS  --  3.7  --   --   --  3.5  --  3.3  --  3.0    < > = values in this interval not displayed.     Troponins:     INRNo lab results found in last 7 days.  Liver panel  Recent Labs   Lab 09/25/24 0341 09/24/24 1600 09/24/24 0411 09/23/24  1527   PROTTOTAL 6.5 6.2* 6.2* 6.2*   ALBUMIN 3.2* 3.2* 3.2* 3.1*   BILITOTAL 1.9* 2.0* 1.9* 1.5*   ALKPHOS 63 62 62 61   AST 28 28 34 37   ALT 17 16 19 17       Imaging/procedure results:  XR Chest Port 1 View  RESIDENT PRELIMINARY INTERPRETATION  IMPRESSION:   1. Increased mixed interstitial and airspace opacities throughout the  right lung and left lung base.  2. Grossly stable support devices.

## 2024-09-25 NOTE — PROGRESS NOTES
GASTROENTEROLOGY PROGRESS NOTE  PATIENT SUMMARY:  Noelle Gomez is a 59 year old female with a history of asthma, hypertension.  She initially presented to an outside hospital 9/16 for acute on chronic hypoxic/hypercapnic respiratory failure.  Initial clinical picture was concerning for decompensated heart failure.  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  She was placed on a heparin drip.  Developed dark, tarry stools with subsequent hemoglobin drop.  She was transferred to [facility] for further cares.     ASSESSMENT/RECOMMENDATIONS:  Acute blood loss anemia  Melena  Jared class IIa duodenal ulcer s/p Ovesco x2 clips  Visible vessel present on EGD now s/p 2 over the scope clips.  Reassuringly, low stool since her procedure with a stable hemoglobin and stable to improving hemodynamics, now nearly off of norepinephrine although she does remain on vasopressin.  With CT PE demonstrating numerous bilateral pulmonary emboli and concerns for right heart strain, we acknowledge current high risk for further thrombosis and complications from her pulmonary emboli so we agree that continuing heparin is warranted despite the GI bleed.  Would maintain the PPI infusion for 72 hours (to end 9/26) from her endoscopy.  Would then transition to IV twice daily until she is taking in p.o. at which time she can be transitioned to omeprazole 40 mg twice daily.  I think we have a good explanation for her ulcer with her critical illness so she does not require repeat endoscopy unless clinical change.  Of note, lesions treated with over the scope clips are quite difficult to intervene on again.  So if there is concern for significant bleeding 72 hours post would consider discussing with IR as endoscopic therapy would be difficult.  However, if bleeding occurs after 72 hours, then endoscopy would likely be best initial step.  - Maintain PPI continuous infusion x 72 hours  after procedure  - After 72 hours (9/26) transition to pantoprazole 40 mg IV twice daily until she is tolerating p.o.  - Once tolerating p.o., omeprazole 40 mg twice daily x 8 weeks - daily thereafter given need for anticoagulation  - Okay for enteral feeds from GI perspective      The patient was discussed and plan agreed upon with GI staff, Dr. Lazaro. GI will continue to follow.    Jesus Bhakta  Gastroenterology Fellow, PGY4   _______________________________________________________________  S: No acute overnight events.  Remains on low-dose norepinephrine and vasopressin.  Remains intubated and undergoing sedation.    O:  Pulse 105, temperature 98.8  F (37.1  C), resp. rate 17, weight 120.4 kg (265 lb 6.9 oz), SpO2 93%.    Constitutional: No acute distress  Eyes: Sclera anicteric  Ears/nose/mouth/throat: Hearing intact on gross exam.  Looks to voice  CV: Extremities wwp.   Respiratory: Ventilated  Abdomen: Nondistended, non-tender to palpation  Skin: warm, perfused, no jaundice  Neuro: Looks to voice and intermittently nodding/shaking to questions  MSK: In bed.  Moving arms spontaneously      LABS:  [facility]  Recent Labs   Lab 09/25/24  0346 09/25/24  0341 09/24/24  2254 09/24/24  2250 09/24/24  1954 09/24/24  1600 09/24/24  0420 09/24/24  0411 09/23/24  1537 09/23/24  1527   NA  --  152*  --   --   --  152*  --  150*  --  149*   POTASSIUM  --  3.6  --  3.4  --  3.3*  --  3.8   < > 3.8  3.8   CHLORIDE  --  98  --   --   --  97*  --  97*  --  99   BEN  --  9.3  --   --   --  9.3  --  9.2  --  9.3   CO2  --  43*  --   --   --  42*  --  42*  --  39*   BUN  --  46.6*  --   --   --  45.9*  --  45.7*  --  51.9*   CR  --  1.11*  --   --   --  1.05*  --  1.00*  --  1.04*   * 138* 120*  --  122* 148*   < > 159*   < > 147*    < > = values in this interval not displayed.     CBC  Recent Labs   Lab 09/25/24  0341 09/24/24  1600 09/24/24  0411 09/23/24  1931   WBC 10.5 8.0 8.4 8.1   RBC 2.03* 2.05* 2.19* 2.16*   HGB 7.0*  7.1* 7.5* 7.5*   HCT 24.5* 23.6* 25.3* 25.0*   * 115* 116* 116*   MCH 34.5* 34.6* 34.2* 34.7*   MCHC 28.6* 30.1* 29.6* 30.0*   RDW 19.3* 18.9* 19.6* 19.8*    147* 160 133*     INRNo lab results found in last 7 days.  LFTs  Recent Labs   Lab 09/25/24  0341 09/24/24  1600 09/24/24  0411 09/23/24  1527   ALKPHOS 63 62 62 61   AST 28 28 34 37   ALT 17 16 19 17   BILITOTAL 1.9* 2.0* 1.9* 1.5*   PROTTOTAL 6.5 6.2* 6.2* 6.2*   ALBUMIN 3.2* 3.2* 3.2* 3.1*      PANCNo lab results found in last 7 days.

## 2024-09-25 NOTE — PLAN OF CARE
Major Shift Events:  Pt opens eyes and track however doesn't follow commands. Fent gtt for pain. Off dex and propofol. Levo gtt for MAP >65. CVP 15-17, PA 90/33, CI 3.5, sv02 57%, SVR 500s. Rectal tube removed r/t no output in last 24 hours. Miller w/ UOP 200ml/hr on bumex drip. TF @ 25 ml/hr w/ SFWF. Goal to 35 ml/hr q8hr. Nitric @ 1PPM.     Plan: work towards extubation  For vital signs and complete assessments, please see documentation flowsheets.

## 2024-09-25 NOTE — PHARMACY-VANCOMYCIN DOSING SERVICE
"Pharmacy Vancomycin Initial Note  Date of Service 2024  Patient's  1965  59 year old, female    Indication: Sepsis    Current estimated CrCl = Estimated Creatinine Clearance: 67.4 mL/min (A) (based on SCr of 1.11 mg/dL (H)).    Creatinine for last 3 days  2024:  2:13 AM Creatinine 1.07 mg/dL;  3:27 PM Creatinine 1.04 mg/dL  2024:  4:11 AM Creatinine 1.00 mg/dL;  4:00 PM Creatinine 1.05 mg/dL  2024:  3:41 AM Creatinine 1.11 mg/dL    Recent Vancomycin Level(s) for last 3 days  No results found for requested labs within last 3 days.      Vancomycin IV Administrations (past 72 hours)        No vancomycin orders with administrations in past 72 hours.                    Nephrotoxins and other renal medications (From now, onward)      Start     Dose/Rate Route Frequency Ordered Stop    24 1030  vancomycin (VANCOCIN) 1,500 mg in 0.9% NaCl 250 mL intermittent infusion        Placed in \"Followed by\" Linked Group    1,500 mg  166.7 mL/hr over 90 Minutes Intravenous EVERY 24 HOURS 24 1014      24 1030  vancomycin (VANCOCIN) 2,000 mg in sodium chloride 0.9 % 250 mL intermittent infusion        Placed in \"Followed by\" Linked Group    2,000 mg (central catheter)  over 90 Minutes Intravenous ONCE 24 1014      24 1000  piperacillin-tazobactam (ZOSYN) 4.5 g vial to attach to  mL bag        Note to Pharmacy: For [facility], [facility] and [facility]: For Zosyn-naive patients, use the \"Zosyn initial dose + extended infusion\" order panel.    3.375 g  over 30 Minutes Intravenous EVERY 6 HOURS 24 0959      24 1730  vasopressin (VASOSTRICT) 20 Units in sodium chloride 0.9 % 100 mL standard conc infusion         1.8 Units/hr  9 mL/hr  Intravenous CONTINUOUS 24 1726      24 0130  bumetanide (BUMEX) 0.25 mg/mL infusion         2 mg/hr  8 mL/hr  Intravenous CONTINUOUS 24 0029      24 0100  norepinephrine (LEVOPHED) 16 mg in  mL infusion MAX CONC " CENTRAL LINE         0.01-0.6 mcg/kg/min × 124.8 kg (Dosing Weight)  1.2-70.2 mL/hr  Intravenous CONTINUOUS 09/23/24 0029              Contrast Orders - past 72 hours (72h ago, onward)      Start     Dose/Rate Route Frequency Stop    09/23/24 1300  iopamidol (ISOVUE-370) solution 77 mL         77 mL Intravenous ONCE 09/23/24 1253            Insight[employer] Prediction of Planned Initial Vancomycin Regimen  Regimen: 1500 mg IV every 24 hours.  Start time: 10:12 on 09/25/2024  Exposure target: AUC24 (range)400-600 mg/L.hr   AUC24,ss: 565 mg/L.hr  Probability of AUC24 > 400: 77 %  Ctrough,ss: 14.6 mg/L  Probability of Ctrough,ss > 20: 32 %  Probability of nephrotoxicity (Lodise MEREDITH 2009): 10 %          Plan:  Start vancomycin 2000 mg IV x 1 followed by 1500 mg IV q24h  Vancomycin monitoring method: AUC  Vancomycin therapeutic monitoring goal: 400-600 mg*h/L  Pharmacy will check vancomycin levels as appropriate in 1-3 Days.    Serum creatinine levels will be ordered daily for the first week of therapy and at least twice weekly for subsequent weeks.      Uma Parisi, PharmD, BCPS

## 2024-09-26 NOTE — PROGRESS NOTES
"Bronchoscopy Risk Assessment Guidelines      A. Patient symptoms to consider when assessing pulmonary TB risk are:    I. Cough greater than 3 weeks; and fever, hemoptysis, pleuritic chest    pain, weight loss greater than 10 lbs, night sweats, fatigue, infiltrates on    upper lobes or superior segments of lower lobes, cavitation on chest    x-ray.   B. Patient risk factors to consider when assessing pulmonary TB risk are:    I. Exposure to known TB case, foreign-born persons (within 5 years of    arrival to [place]), residence in a crowded setting (correctional facility,     long-term care center, etc.), persons with HIV or immunosuppression.    Patients with symptoms and risk factors should generally be considered \"suspect risk\" and bronchoscopies should be performed in airborne precautions.    This patient has NO KNOWN RISK of Tuberculosis (proceed with bronchoscopy)    Specimens sent: yes  Complications: None  Scope used: #[id number] Adult  Attending Physician: Dr. Mariano Christianson, RT on 9/26/2024 at 5:30 PM  "

## 2024-09-26 NOTE — PLAN OF CARE
Neuro: Sedated and intubated- RAAA 0/-1. Localizes and purposeful all extremities but not following commands, tracks, PEERLA. Tmax 38   CV: Sinus tachycardia . MAP >65, levo gtt/vaso @ 1.8 straight rate. CVP 15, PA 80/31, CO 7.9, CI3.5, , oxyhem 62  Pulm: ETT 23cm @ teeth cmv 80%, 450, 8, 20-increased RR for increasing co2 79  GI: OG 68cm TF 35 ml/hr and 150 FWF.      Intake/Output Summary (Last 24 hours) at 9/26/2024 0704  Last data filed at 9/26/2024 0700  Gross per 24 hour   Intake 4365.77 ml   Output 7145 ml   Net -2779.23 ml      : valerio adequate uop bumex drip 2  Skin: BLE venous ulcers, scattered rash and abrasions, PU coccyx area, scattered scabs. DTI R heel   Gtts:   Levo 0.02-0.04  Bumex 2  Fent 150  Protonix 8  Vaso 1.8  Labs:  K replace    PLAN: Bronch? Consent obtained. Hgb 6.8-1PRBC 9/25    Goal Outcome Evaluation:  Problem: Adult Inpatient Plan of Care  Goal: Absence of Hospital-Acquired Illness or Injury  Intervention: Identify and Manage Fall Risk  Recent Flowsheet Documentation  Taken 9/26/2024 0400 by Lawrence Tamez, RN  Safety Promotion/Fall Prevention:   clutter free environment maintained   increase visualization of patient   lighting adjusted

## 2024-09-26 NOTE — PROGRESS NOTES
Neuro: Intubated. Tracking w/ eyes, however not following commands. Moves all extremities, not to command. Febrile TMAX 38.2. PRN tylenol given. PERRLA. Intermittently restless, Versed push x2 this shift.   Cardiac: SR/ST 90-110s. NSVT 8 beats today, no intervention needed. No other ectopy noted. BP goal MAP >65. CVP goal 10-12. Pulses palpable in uppers, dopplering lowers. Edematous, generalized 2+- improving w/ diuretics.  LINN calculations Q8hr, next due at 0000. Fellow wedged at beside, PAWP=33.   Respiratory: ETT 20cm at teeth. CMV 80%/20/450/8. PIP 26-29 this shift. Lung sounds coars on L side, clear dim on R. Bronch completed today, secretions minimal since.  GI/: OG 68cm, TF's at goal. FWF 200mL Q4hr. Prosource packet 3x/day. Frequent stooling, FMS placed with minimal output since placement. Active bowel sounds. Miller changed today, UOP 2275mL since 0700.    ACCESS: R PICC triple lumen, RIJ [place] 47cm, L axillary Arterial line, L PIV & R PIV.     GTTS: Fent at 200mcg, Heparin at 2100, Levo at 0.5, Vaso at 1.8, Bumex at 1mg.     Nitric @1

## 2024-09-26 NOTE — CONSULTS
Nephrology Initial Consult  September 26, 2024      Noelle Gomez MRN:[id number] YOB: 1965  Date of Admission:9/23/2024  Primary care provider: No Ref-Primary, Physician  Requesting physician: Chalino De La Torre MD    Reason for consult: alkalosis     MEDICAL DECISION MAKING     RECOMMENDATIONS:  Increase minute ventilation on ventilator to reduce pCO2    Cont Diamox   Recommend holding further diuresis today    Increase free water flushes to 400mL q4h    Renally dose medications, avoid unnecessary nephrotoxins, avoid unnecessary radiographic contrast, daily renal panel, strict I/O, daily standing weights     ASSESSMENT:  Noelle Gomez is a 59 year old w h/o HTN who presented to outside hospital w AHFR, acute HF & PE. Transferred here for HF management. She has been aggressively diuresed and since developed metabolic derangements.  Nephrology consulted for metabolic derangements    Concurrent metabolic alkalosis and respiratory acidosis  On 9/26: ABG 7.42/77/67; bicarb 47  It appears she has COPD and likely has chronic hypercarbia  Her metabolic alkalosis is likely 2/2 aggressive diuresis.  She was given Diamox (9/24-->) which has not improved her serum bicarb. This is likely 2/2 her inability to self regulate her ventilation (blow off her CO2), because of this she has remained hypercarbic. Additionally, her kidneys may be trying to compensate for her hypercarbia by holding on to bicarb, thus making her metabolic alkalosis difficult to treat  Increasing her minute ventilation and removing more CO2 may allow the Diamox to be more effective in removing bicarb     VIGNESH  sCr on admit 1.3.  Peak sCr 1.56.  Baseline sCr 0.9  May represent hypercreatinemia in setting of aggressive diuresis, but may also be indication of overdiuresis  Her hypernatremia and physical exam findings are suggestive of overdiuresis, but her elevated CVP suggests she may have more intravascular fluid, her CVP measurement may  be confounded by the possibility of CTEPH but will defer this interpretation to cardiology  A diuretic holiday may be of benefit to see where her sCr trend goes     Hypernatremia   Likely 2/2 aggressive diuresis. As of 9/26 she has a 4.2L free water deficit     Recommendations were communicated to primary team via note & verbal    Seen and discussed with Dr. Jaciel Zeng MD  Division of Renal Disease and Hypertension  [facility]  [email or web address]  Vocera Web Console    SUBJECTIVE     HISTORY OF PRESENT ILLNESS:  Admitting provider and nursing notes reviewed  Noelle Gomez is a 59 year old w h/o HTN who presented to outside hospital w AHFR, acute HF & PE. Transferred here for HF management. She initially presented to [facility] on 9/16 for acute on chronic hypoxic/hypercapnic respiratory failure thought to be related to decompensated heart failure. She was intubated quickly after admission due to worsening hypercapnea and AMS. TTE at that time revealed severe RV volume and pressure overload. VQ scan obtained (due to VIGNESH) revealed multiple bilateral wedge shaped perfusion defects. She was then transferred to [facility] ICU for thrombectomy. She also was noted to have a LE DVT, subsequently started on a heparin drip. During thrombectomy, she was noted to have mainly chronic clot with only minimal acute clot that was extracted. 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 She unfortunately later developed a GI bleed with melena and a hemoglobin drop from 12->6.7 over 2 days. She was transferred to [facility] for further cardiac care and subsequently was diuresesed 12L.    REVIEW OF SYSTEMS:  A comprehensive review of systems was negative except as noted above.    PAST MEDICAL HISTORY:  Past Medical History:   Diagnosis Date    Asthma 08/14/2023    Bilateral lower extremity edema 07/14/2021    Former smoker 06/23/2021    HTN  (hypertension) 07/14/2021    Lymphedema 02/08/2022    Prediabetes 06/24/2021    Seasonal allergies 07/14/2021    UPJ (ureteropelvic junction) obstruction 03/04/2015    Vitamin D deficiency 06/23/2021       Past Surgical History:   Procedure Laterality Date    CV RIGHT HEART CATH MEASUREMENTS RECORDED N/A 9/23/2024    Procedure: Right Heart Catheterization;  Surgeon: Otf Emery MD;  Location:  HEART CARDIAC CATH LAB    IR IVC FILTER PLACEMENT  9/22/2024    IR PULMONARY ANGIOGRAM BILATERAL  9/17/2024    MIDLINE DOUBLE LUMEN PLACEMENT  9/16/2024    PICC TRIPLE LUMEN PLACEMENT  9/17/2024    PICC TRIPLE LUMEN PLACEMENT  9/20/2024        MEDICATIONS:  PTA Meds  Prior to Admission medications    Medication Sig Last Dose Taking? Auth Provider Long Term End Date   losartan (COZAAR) 100 MG tablet TAKE 1 TABLET(100 MG) BY MOUTH EVERY DAY  Yes Reported, Patient Yes       Current Meds  Current Facility-Administered Medications   Medication Dose Route Frequency Provider Last Rate Last Admin    acetaZOLAMIDE (DIAMOX) injection 1,000 mg  1,000 mg Intravenous Q8H Chalino De La Torre MD   1,000 mg at 09/26/24 1432    chlorhexidine (PERIDEX) 0.12 % solution 15 mL  15 mL Swish & Spit BID Sven Lynn MD   15 mL at 09/26/24 0806    insulin aspart (NovoLOG) injection (RAPID ACTING)  1-7 Units Subcutaneous Q4H Sven Lynn MD   1 Units at 09/26/24 1148    ipratropium - albuterol 0.5 mg/2.5 mg/3 mL (DUONEB) neb solution 3 mL  3 mL Nebulization 4x daily Travis Rosa MD   3 mL at 09/26/24 1246    multivitamins w/minerals liquid 15 mL  15 mL Per Feeding Tube Daily Travis Rosa MD   15 mL at 09/26/24 0820    pantoprazole (PROTONIX) IV push injection 40 mg  40 mg Intravenous BID Travis Rosa MD   40 mg at 09/26/24 1226    piperacillin-tazobactam (ZOSYN) 4.5 g in D5W 100 mL intermittent infusion  4.5 g Intravenous Q6H Chalino De La Torre MD        [facility] TF20 ENfit  Compatibl EN LIQD (PROSOURCE TF20) packet 60 mL  1 packet Per Feeding Tube TID Travis Rosa MD   60 mL at 09/26/24 1310    sildenafil (REVATIO) suspension 10 mg  10 mg Oral or Feeding Tube Q8H Travis Rosa MD   10 mg at 09/26/24 1158     Infusion Meds  Current Facility-Administered Medications   Medication Dose Route Frequency Provider Last Rate Last Admin    bumetanide (BUMEX) 0.25 mg/mL infusion  1 mg/hr Intravenous Continuous Travis Rosa MD 4 mL/hr at 09/26/24 1400 1 mg/hr at 09/26/24 1400    dextrose 10% infusion   Intravenous Continuous PRN Travis Rosa MD        fentaNYL (SUBLIMAZE) infusion   mcg/hr Intravenous Continuous Travis Rosa MD 4 mL/hr at 09/26/24 1400 200 mcg/hr at 09/26/24 1400    heparin 25,000 units in 0.45% NaCl 250 mL ANTICOAGULANT infusion  0-5,000 Units/hr Intravenous Continuous Travis Rosa MD 21 mL/hr at 09/26/24 1432 2,100 Units/hr at 09/26/24 1432    medication instruction   Does not apply Continuous PRN Travis Rosa MD        norepinephrine (LEVOPHED) 16 mg in  mL infusion MAX CONC CENTRAL LINE  0.01-0.6 mcg/kg/min (Dosing Weight) Intravenous Continuous Sven Lynn MD 8.2 mL/hr at 09/26/24 1400 0.07 mcg/kg/min at 09/26/24 1400    vasopressin (VASOSTRICT) 20 Units in sodium chloride 0.9 % 100 mL standard conc infusion  1.8 Units/hr Intravenous Continuous Chalino De La Torre MD 9 mL/hr at 09/26/24 1400 1.8 Units/hr at 09/26/24 1400    vasopressin 1 unit/mL MAX Conc (PITRESSIN) infusion  1.8 Units/hr Intravenous Continuous Chalino De La Torre MD           ALLERGIES:    Allergies   Allergen Reactions    Sulfa Antibiotics Unknown, Itching and Rash       SOCIAL HISTORY:   Social History     Socioeconomic History    Marital status:      Spouse name: Not on file    Number of children: Not on file    Years of education: Not on file    Highest education level: Not on file    Occupational History    Not on file   Tobacco Use    Smoking status: Former     Types: Cigarettes    Smokeless tobacco: Never   Substance and Sexual Activity    Alcohol use: Not on file    Drug use: Not on file    Sexual activity: Not on file   Other Topics Concern    Not on file   Social History Narrative    Not on file     Social Determinants of Health     Financial Resource Strain: Unknown (9/20/2024)    Financial Resource Strain     Within the past 12 months, have you or your family members you live with been unable to get utilities (heat, electricity) when it was really needed?: Patient unable to answer   Food Insecurity: Unknown (9/20/2024)    Food Insecurity     Within the past 12 months, did you worry that your food would run out before you got money to buy more?: Patient unable to answer     Within the past 12 months, did the food you bought just not last and you didn t have money to get more?: Patient unable to answer   Transportation Needs: Unknown (9/20/2024)    Transportation Needs     Within the past 12 months, has lack of transportation kept you from medical appointments, getting your medicines, non-medical meetings or appointments, work, or from getting things that you need?: Patient unable to answer   Physical Activity: Not on file   Stress: Not on file   Social Connections: Unknown (12/31/2021)    Received [facility]    Social Connections     Frequency of Communication with Friends and Family: Not on file   Interpersonal Safety: Unknown (9/20/2024)    Interpersonal Safety     Do you feel physically and emotionally safe where you currently live?: Patient unable to answer     Within the past 12 months, have you been hit, slapped, kicked or otherwise physically hurt by someone?: Patient unable to answer     Within the past 12 months, have you been humiliated or emotionally abused in other ways by your partner or  ex-partner?: Patient unable to answer   Housing Stability: Unknown (2024)    Housing Stability     Do you have housing? : Patient unable to answer     Are you worried about losing your housing?: Patient unable to answer       FAMILY MEDICAL HISTORY:   No family history on file.    OBJECTIVE     PHYSICAL EXAM:   Temp  Av.9  F (37.2  C)  Min: 95.5  F (35.3  C)  Max: 102.3  F (39.1  C)  Arterial Line BP  Min: 91/41  Max: 150/72  Arterial Line BP 2  Min: 81/45  Max: 202/119  Arterial Line MAP (mmHg)  Av.3 mmHg  Min: 57 mmHg  Max: 98 mmHg      Pulse  Av.1  Min: 63  Max: 136 Resp  Av.5  Min: 0  Max: 32  FiO2 (%)  Av.4 %  Min: 30 %  Max: 100 %  SpO2  Av.6 %  Min: 74 %  Max: 100 %    CVP (mmHg): 12 mmHg  Pulse 92   Temp 100.2  F (37.9  C)   Resp 20   Wt 115.3 kg (254 lb 3.1 oz)   SpO2 98%   BMI 46.49 kg/m     Date 24 0700 - 24 0659   Shift 1194-4573 5431-9737 5602-3427 24 Hour Total   INTAKE   I.V. 748.9   748.9   NG/   585   Enteral 280   280   Shift Total(mL/kg) 1613.9(14)   1613.9(14)   OUTPUT   Urine 1600   1600   Stool 0   0   Shift Total(mL/kg) 1600(13.88)   1600(13.88)   Weight (kg) 115.3 115.3 115.3 115.3      Admit Weight: 128.3 kg (282 lb 13.6 oz)     General:supine  HEENT: ET tube in place  Cardiac:rrr  Pulmonary:bilateral chest rise  Abdominal:nondistended  Extremities:extremely wrinkled LE  Neuro:sedated  Access:R PICC, R internal jugular CVC    LABS:   CMP  Recent Labs   Lab 24  1326 24  1147 24  0931 24  0756 24  0403 24  2341 24  2339 24  1956 24  1557 24  0346 24  03424  1600   NA  --   --  156*  --  154*  --  154*  --  154*   < > 152*  --   --   --  152*   POTASSIUM 3.5  --  3.3*  --  3.6  --  3.1*   < > 3.0*   < > 3.6  --  3.4  --  3.3*   CHLORIDE  --   --  97*  --  95*  --  95*  --  95*   < > 98  --   --   --  97*   CO2  --   --   47*  --  47*  --  46*  --  44*   < > 43*  --   --   --  42*   ANIONGAP  --   --  12  --  12  --  13  --  15   < > 11  --   --   --  13   GLC  --  143* 167* 142* 147*   < > 136*   < > 160*   < > 138*   < >  --    < > 148*   BUN  --   --  63.4*  --  61.6*  --  59.2*  --  53.0*   < > 46.6*  --   --   --  45.9*   CR  --   --  1.56*  --  1.50*  --  1.38*  --  1.29*   < > 1.11*  --   --   --  1.05*   GFRESTIMATED  --   --  38*  --  40*  --  44*  --  48*   < > 57*  --   --   --  61   BEN  --   --  9.5  --  9.7  --  9.5  --  9.6   < > 9.3  --   --   --  9.3   MAG  --   --   --   --  2.3  --   --   --  2.3  --  2.3  --  2.5*  --  1.9   PHOS  --   --   --   --  3.6  --   --   --  3.2  --  3.7  --   --   --  3.5   PROTTOTAL  --   --  6.7  --  6.8  --  6.9  --  6.8   < > 6.5  --   --   --  6.2*   ALBUMIN  --   --  3.3*  --  3.3*  --  3.3*  --  3.3*   < > 3.2*  --   --   --  3.2*   BILITOTAL  --   --  1.2  --  1.2  --  1.2  --  1.4*   < > 1.9*  --   --   --  2.0*   ALKPHOS  --   --  63  --  62  --  65  --  66   < > 63  --   --   --  62   AST  --   --  27  --  30  --  27  --  30   < > 28  --   --   --  28   ALT  --   --  17  --  15  --  16  --  19   < > 17  --   --   --  16    < > = values in this interval not displayed.     CBC  Recent Labs   Lab 09/26/24  0403 09/25/24  2020 09/25/24  1557 09/25/24  0341 09/24/24  1600   HGB 7.5* 7.6* 6.8* 7.0* 7.1*   WBC 11.5*  --  9.0 10.5 8.0   RBC 2.20*  --  1.96* 2.03* 2.05*   HCT 25.8*  --  23.8* 24.5* 23.6*   *  --  121* 121* 115*   MCH 34.1*  --  34.7* 34.5* 34.6*   MCHC 29.1*  --  28.6* 28.6* 30.1*   RDW 21.7*  --  19.2* 19.3* 18.9*     --  183 170 147*     INR  Recent Labs   Lab 09/23/24  1931   INR 1.23*     ABG  Recent Labs   Lab 09/26/24  1326 09/26/24  1052 09/26/24  0753 09/26/24  0654 09/26/24  0405   PH 7.42 7.43  --  7.46* 7.43   PCO2 77* 76*  --  73* 76*   PO2 67* 66*  --  104 96   HCO3 50* 51*  --  51* 51*   O2PER 70 60 80 80 80      URINE STUDIES  Recent Labs    Lab Test 09/16/24  1454   COLOR Yellow   APPEARANCE Turbid*   URINEGLC Negative   URINEBILI Negative   URINEKETONE Negative   SG 1.015   UBLD 0.06 mg/dL*   URINEPH 5.0   PROTEIN 50*   NITRITE Negative   LEUKEST 75 Burak/uL*   RBCU 8*   WBCU 7*     No lab results found.  PTH  No lab results found.  IRON STUDIES  Recent Labs   Lab Test 09/25/24  0904   IRON 91   *   IRONSAT 45   *       IMAGING:  All imaging studies reviewed by me.     Jey Zeng MD

## 2024-09-26 NOTE — PROGRESS NOTES
General Infectious Disease Service  [place] Team  Patient:  Noelle Gomez  Date of birth 1965  Medical record number [id number]  Date of Admission: 9/23/2024  Date of Visit:  9/26/2024  Requesting Provider: Chalino De La Torre         Assessment and Recommendations     Problem List:  Acute on chronic hypoxic/hypercapic respiratory failure. On mechanical ventilation.   CT showing left upper lobe cavitary lesion  CXR 9/25 w/increased interstitial and airspace opacities and left sided pleural effusion.   Diffuse bilateral pulmonary emboli and LE DVT- concerning for CTEPH. Originally on heparin but paused w/GI bleeding.   Recent GI bleed (duodenal ulcer) - paused heparin. S/p EGD showing duodenal ulcer. 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 CT pulmonary angiogram also shows patchy peripheral consolidative and groundglass opacities. There is is an irregular multiloculated cavitary lesion with thick irregular borders in the anterior left upper lobe.  Moderate right and small pleural effusions with adjacent bibasilar passive atelectasis. No pneumothorax. Prominent mediastinal lymph nodes.Enlarged left axillary lymph nodes, measuring up to 1.4 cm. patient also has LLE DVT, anemia, GIB due to a duodenal ulcer, and mild thrombocytopenia. On 9/25 patient had increased interstitial and airspace opacities on daily chest XR along with left sided pleural effusion. Primary team in conjunction with ID added Zosyn and Vancomycin to cover for possible aspiration pneumonia. She did not require increased respiratory support during this time and had  improved CXR on 9/26. We agree that it is possible this is aspiration pneumonia.     Infectious disease was consulted for concern that an infection could be causing this patient's thrombosis as well as a chest CT (9/23) that shows a significant left upper lobe cavitary lesion along with diffuse ground glass. This patient had a negative blood culture (9/16) (9/24 culture NGTD) and negative sputum culture (9/19). Pulmonology on 9/23 suggested the cavitary lesion and diffuse ground glass could be caused by emphysema which we agree is possible/likely. Differential diagnosis for cavitary lesions include: bacteria/ fungal/ mycobacterium tuberculois/ non TB mycobacterium. septic emboli, malignancy, PE infarction. This patient is not immunosuppressed to our knowledge, she has not had an recent steroid courses and was had a negative HIV antigen 9/23. We would recommend at this point to get a bronchoscopy with BAL and send a full panel to rule out infectious or malignant causes of the cavitary lesion if this is possible per pulmonology considering her severe emphysema. At this time the team along with pulmonology are considering whether or not to do a bronchoscopy. We would recommend adding a full viral panel if she fevers >101 again. 9/25 Quantiferon TB, aspergillus galactomannan, 1-2 B-D glucan, histo/blasto serum/urine antigen, and fungal serology are all pending. We also recommend continuing to trend CRP and consider a IRMA to rule out endocarditis.     Plan:  - Follow up on labs drawn 9/25   - Trend CRP   - Repeat Bcx if fever >101.   - Consider bronchoscopy w/pulmonary.  - Consider IRMA   - Continue Pip/Tazo 4.5g Q6 IV    Recommendations discussed with supervising attending physician, Dr. Reich.    Thank you for this consult. The General ID team will continue to follow this patient. Please feel free to call with any questions.     Laney Ley, MS4   [employer]   Infectious  Disease Service    Attestation:  I saw and evaluated this patient.  I discussed this patient with Laney Ley MS4 and agree with the findings and plan in this note. I have reviewed today's vital signs, medications, labs and imaging.    Key findings:  - patient with acute on chronic hypoxic and hypercarbic respiratory failure, VIGNESH, CTEPH and Left upper lobe multiloculated cavitary lesions   - had Bronchoscopy today , no endobronchial lesion seen. there were mild thick secretions and the mucosa was inflamed and edematous.   - will follow-up on cultures and infectious work up  - MRSA screen is negative, can stop Vancomycin . continue Zosyn     Total time on day of visit including chart review, counseling, documentation, and  coordination of care: 45 min     Frederick Reich MD,M.Med.Sc.  Staff, Infectious Diseases  Pager: [phone]                   Antibiotics & Cultures, Consolidated     Cultures:   9/16 Bcx: negative  9/19 Respiratory aerobic bacterial culture with gram stain: no growth   9/24 Bcx: NGTD    Antibiotics:   Ceftriaxone (9/16-9/22)  Doxycycline (9/16-9/22)  Pip/Tazo (9/25 - )   Vancomycin (9/25)           Interval History   Patient is still on 2 pressors vasopressin and norepinephrine. She was initiated on piptazo and vanc yesterday for concern of aspiration pneumonia based on daily chest XR findings. She had increased secretions yesterday but bedside nurse this morning reported less secretions this AM.            Physical Exam     Pulse 98   Temp 99.7  F (37.6  C)   Resp 20   Wt 115.3 kg (254 lb 3.1 oz)   SpO2 99%   BMI 46.49 kg/m       Exam:  GENERAL: Intubated in the ICU. Awake upon exam.   HEAD: Normocephalic and atraumatic  EYES:  Eyes grossly normal to inspection, conjunctivae and sclerae normal   NECK:  no asymmetry scars or lesions.   LUNGS:  Improved breath sounds from yesterday. Lungs with light diffuse upper airway sounds.   CARDIOVASCULAR:  Regular rate and rhythm. Well  perfused.   ABDOMEN:  Soft, nontender, bowel sounds normal.   EXT: Extremities warm and edematous - SCDs in place. Edematous 3+ to upper thigh.   MS: No gross musculoskeletal defects noted  SKIN:  erythema over dorsal foot.   NEUROLOGIC:  Intubated and sedated.

## 2024-09-26 NOTE — PROCEDURES
Cardiac ICU Procedure Note  Procedure:  Bronchoscopy  Indication:  L lung cavitary mass  Attending Provider:  Dr. Sae Qunitana  Medications:   5 ml 1% lidocaine, 1 mg versed, and 100 mcg fentanyl  Time Out:  Performed  Scope Used: diagnostic    The patient's medical record has been reviewed.  The necessary history and physical examination was performed.  The risks, benefits and alternatives of the procedure were discussed with the the patient's representative in detail and the patients respresentative had the opportunity to ask questions.  All questions were answered and verbal and written informed consent was obtained.  The proposed procedure and the patient's identification were verified prior to the procedure by the physician and the nurse and respiratory therapist.      The patient was assessed for the adequacy for the procedure and to receive medications.   Mental Status:  sedated  Airway examination: intubated  Pulmonary:  distant breath sounds  CV:  RRR  ASA rdGrdrrdarddrderd:rd rd3rd After clinical evaluation and reviewing the indication, risks, alternatives and benefits of the procedure the patient was deemed to be in satisfactory condition to undergo the procedure.  However, the patient was too unstable to move to a negative pressure room for the procedure.  The bronchoscopy was performed at the bedside.     Immediately before administration of medications the patient was re-assessed for adequacy to receive sedatives including the heart rate, respiratory rate, mental status, oxygen saturation, blood pressure and adequacy of pulmonary ventilation. These same parameters were continuously monitored throughout the procedure.     Maneuvers / Procedure:   The bronchoscope was inserted through the mouth via ETT.  The cords were anesthetized with lidocaine. Upper airway structures, vocal cords (anatomy and function) appear to be normal.  A complete airway examination was performed from the distal trachea to the subsegmental  level in each lobe of both lungs. There were no endobronchial lesion, there were mild thick secretions, and the mucosa was inflamed and edematous.      A BAL was performed in the [facility].  A total of 50 ml of fluid was instilled.   A total of 20 ml of fluid was returned.   Additional tests on fluid include: cultures and infectious evaluation     Sae Quintana MD, PhD  Interventional/Critical Care Cardiology  [phone]    September 26, 2024

## 2024-09-26 NOTE — PROGRESS NOTES
Cardiology ICU Progress Note    Brief HPI:  Noelle Gomez is a 59 year old female admitted on 9/23/2024. She has a PMH of asthma, 40 pack year former smoker, HTN, who initially presented to [facility] on 9/16 for acute on chronic hypoxic/hypercapnic respiratory failure thought to be related to decompensated heart failure. Pt required intubation on initial day of admission on 9/16/24. TTE was done revealing RV volume and pressure overload. VQ scan was obtained in the setting of VIGNESH revealing multiple bilateral wedge shaped perfusion defects. Also found to LE DVT. Anticoagulation initiated. Patient underwent thrombectomy at [facility] and found to have mainly chronic clot with few acute emboli. PA pressures significantly elevated. Patient temporarily started on epoprostenol. After several days of anticoagulation hgb dropped. Patient started requiring vasopressors. Transferred to our facility for further management. EGD undergone on arrival revealing duodenal ulcer requiring 2 clips. RHC done also revealing pre-capillary pHTN and a ELIO of 5.         Assessment and plan by system:     Neurology   # Acute toxic metabolic encephalopathy    -Weaning sedation  -RAAS goal -2     Cardiovascular  #Cardiogenic shock  #RH failure  #Group IV pHTN CTEPH  #s/p IVC filter  #DVT  TTE 9/17/24: EF 65%, moderately reduced RV systolic function, RVSP >55mmHg, mod TR  RHC: RA 11, RV: 79/13, PA 78/29 (42), PCW: 12, CO/CI (shu): 5.43/2.46, PVR 5.53    - weaning inhaled nitric oxide, sildenafil being titrated up  - Vasopressors: vasopressin, levo as needed  - bumex being reduced  - heparin gtt  - Monitor hemodynamic status.         Pulmonary  # Acute hypoxic respiratory failure  # Cavitary lesion  FiO2 70%, PEEP 8, RR 20, Vol 450    - Recommend diamox 1000mg tid  - wean FiO2 as able  - ID requesting bronchoscopy to review cavitary lesion, plan to perform   -Maintain increased RR due to elevated hypercapnia         Gastrointestinal, Nutrition  # Duodenal ulcer  # Acute GI bleed    - receiving tube feedings  - Nutrition consulted, appreciate recommendations  - PPI per GI recommendation  - Bowel regimen: senna prn     Renal, Electrolytes  # VIGNESH - improving    - ICU electrolyte replacement protocol  - Strict I&Os  - continue monitoring closely    Infectious Disease  #CAP-previously treated  - Currently on broad spectrum antibiotics  - Cultures negative to date    Hematology  #Acute blood loss anemia    - Hgb goal > 7.0  - Received 1u PRBC overnight  - heparin gtt        Endocrinology  # Hyperglycemia    -Insulin medium dose correction q4h    Integumentary:  - No skin issues        Patient seen and discussed with staff physician.      Objective:  Most recent vital signs:  Pulse 101   Temp 99.7  F (37.6  C)   Resp 12   Wt 115.3 kg (254 lb 3.1 oz)   SpO2 98%   BMI 46.49 kg/m    Temp:  [97  F (36.1  C)-101.1  F (38.4  C)] 99.7  F (37.6  C)  Pulse:  [] 101  Resp:  [12-29] 12  MAP:  [60 mmHg-98 mmHg] 87 mmHg  Arterial Line BP: (102-141)/(42-76) 122/76  FiO2 (%):  [80 %] 80 %  SpO2:  [90 %-100 %] 98 %  Wt Readings from Last 2 Encounters:   09/26/24 115.3 kg (254 lb 3.1 oz)   09/22/24 124.8 kg (275 lb 1.6 oz)       Intake/Output Summary (Last 24 hours) at 9/24/2024 0657  Last data filed at 9/24/2024 0600  Gross per 24 hour   Intake 2756.78 ml   Output 7840 ml   Net -5083.22 ml     Physical exam:  General: In bed, in NAD  HEENT: PERRL, no scleral icterus or injection  CARDIAC: RRR, no m/r/g appreciated. Peripheral pulses dopplered  RESP: Mechanical ventilation; CTAB, no wheezes, rhonchi or crackles appreciated.  GI: soft, BS hypoactive  : Miller  EXTREMITIES: bilateral edema  SKIN: No acute lesions appreciated  NEURO: Intubated and sedated    Labs (Past three days):  CBC  Recent Labs   Lab 09/26/24  0403 09/25/24  2020 09/25/24  1557 09/25/24  0341 09/24/24  1600   WBC 11.5*  --  9.0 10.5 8.0   RBC 2.20*  --  1.96* 2.03*  2.05*   HGB 7.5* 7.6* 6.8* 7.0* 7.1*   HCT 25.8*  --  23.8* 24.5* 23.6*   *  --  121* 121* 115*   MCH 34.1*  --  34.7* 34.5* 34.6*   MCHC 29.1*  --  28.6* 28.6* 30.1*   RDW 21.7*  --  19.2* 19.3* 18.9*     --  183 170 147*     [facility]  Recent Labs   Lab 09/26/24  0403 09/26/24  0402 09/25/24  2341 09/25/24  2339 09/25/24 2020 09/25/24 1956 09/25/24  1557 09/25/24  1230 09/25/24  0904 09/25/24  0346 09/25/24  0341 09/24/24 2254 09/24/24 2250 09/24/24 1954 09/24/24  1600   *  --   --  154*  --   --  154*  --  153*  --  152*  --   --   --  152*   POTASSIUM 3.6  --   --  3.1* 4.0  --  3.0*  --  3.5  --  3.6  --  3.4  --  3.3*   CHLORIDE 95*  --   --  95*  --   --  95*  --  98  --  98  --   --   --  97*   CO2 47*  --   --  46*  --   --  44*  --  43*  --  43*  --   --   --  42*   ANIONGAP 12  --   --  13  --   --  15  --  12  --  11  --   --   --  13   * 139* 128* 136*  --    < > 160*   < > 141*   < > 138*   < >  --    < > 148*   BUN 61.6*  --   --  59.2*  --   --  53.0*  --  48.3*  --  46.6*  --   --   --  45.9*   CR 1.50*  --   --  1.38*  --   --  1.29*  --  1.18*  --  1.11*  --   --   --  1.05*   GFRESTIMATED 40*  --   --  44*  --   --  48*  --  53*  --  57*  --   --   --  61   BEN 9.7  --   --  9.5  --   --  9.6  --  9.3  --  9.3  --   --   --  9.3   MAG 2.3  --   --   --   --   --  2.3  --   --   --  2.3  --  2.5*  --  1.9   PHOS 3.6  --   --   --   --   --  3.2  --   --   --  3.7  --   --   --  3.5    < > = values in this interval not displayed.     Troponins:     INR  Recent Labs   Lab 09/23/24  1931   INR 1.23*     Liver panel  Recent Labs   Lab 09/26/24  0403 09/25/24  2339 09/25/24  1557 09/25/24  0904   PROTTOTAL 6.8 6.9 6.8 6.5   ALBUMIN 3.3* 3.3* 3.3* 3.1*   BILITOTAL 1.2 1.2 1.4* 1.6*   ALKPHOS 62 65 66 65   AST 30 27 30 30   ALT 15 16 19 16       Imaging/procedure results:  XR Chest Port 1 View  RESIDENT PRELIMINARY INTERPRETATION  IMPRESSION:   1. Stable support devices.  2.  Significantly improved aeration throughout the right lung with  bilateral mixed interstitial and airspace opacities.  3. Decreased left pleural effusion.

## 2024-09-26 NOTE — INTERIM SUMMARY
Yadiel Priest M.D.  Cardiovascular Medicine        Problem List  Acute and chronic respiratory failure requiring mechanical ventilation  Hypercarbia  Elevated body weight  Pulmonary hypertension  Apparent CTEPH acute and chronic  Heparin anticoagulation while hypercoagulable evaluation in progress  Hypernatremia  Acute renal failure  Insulin resistance/diabetes  Macrocytosis  Elevated LDH  GI bleeding duodenal treated with clipping  Caval filter  13. Cavitary lesion    .    Objective  Pulse 101   Temp 99.7  F (37.6  C)   Resp 12   Wt 115.3 kg (254 lb 3.1 oz)   SpO2 98%   BMI 46.49 kg/m      Intake/Output Summary (Last 24 hours) at 9/26/2024 0739  Last data filed at 9/26/2024 0700  Gross per 24 hour   Intake 4421.97 ml   Output 7145 ml   Net -2723.03 ml     Wt Readings from Last 5 Encounters:   09/26/24 115.3 kg (254 lb 3.1 oz)   09/22/24 124.8 kg (275 lb 1.6 oz)   09/16/24 139.3 kg (307 lb)   10/09/23 127.5 kg (281 lb)   10/02/23 129.6 kg (285 lb 11.2 oz)       Meds  Current Facility-Administered Medications   Medication Dose Route Frequency Provider Last Rate Last Admin    acetaZOLAMIDE (DIAMOX) injection 500 mg  500 mg Intravenous BID Travis Rosa MD   500 mg at 09/25/24 2024    chlorhexidine (PERIDEX) 0.12 % solution 15 mL  15 mL Swish & Spit BID Sven Lynn MD   15 mL at 09/25/24 1948    insulin aspart (NovoLOG) injection (RAPID ACTING)  1-7 Units Subcutaneous Q4H Sven Lynn MD   1 Units at 09/25/24 1956    ipratropium - albuterol 0.5 mg/2.5 mg/3 mL (DUONEB) neb solution 3 mL  3 mL Nebulization 4x daily Travis Rosa MD   3 mL at 09/25/24 1622    multivitamins w/minerals liquid 15 mL  15 mL Per Feeding Tube Daily Travis Rosa MD   15 mL at 09/25/24 0809    piperacillin-tazobactam (ZOSYN) 4.5 g vial to attach to  mL bag  4.5 g Intravenous Q6H Chalino De La Torre MD   4.5 g at 09/26/24 0559    Voice Of TVourQCoefficient TF20 ENfit Compatibl EN LIQD (PROSOURCE  TF20) packet 60 mL  1 packet Per Feeding Tube TID Travis Rosa MD   60 mL at 09/25/24 1948    sildenafil (REVATIO) suspension 10 mg  10 mg Oral or Feeding Tube Q8H Travis Rosa MD   10 mg at 09/26/24 0349       Labs    Recent Labs   Lab 09/26/24  0403 09/26/24  0402 09/25/24  2341 09/25/24  2339 09/25/24 2020 09/25/24 1956 09/25/24  1557 09/25/24  1230 09/25/24  0904 09/25/24  0346 09/25/24 0341 09/24/24 2254 09/24/24 2250 09/24/24 1954 09/24/24  1600   *  --   --  154*  --   --  154*  --  153*  --  152*  --   --   --  152*   POTASSIUM 3.6  --   --  3.1* 4.0  --  3.0*  --  3.5  --  3.6  --  3.4  --  3.3*   CHLORIDE 95*  --   --  95*  --   --  95*  --  98  --  98  --   --   --  97*   CO2 47*  --   --  46*  --   --  44*  --  43*  --  43*  --   --   --  42*   ANIONGAP 12  --   --  13  --   --  15  --  12  --  11  --   --   --  13   * 139* 128* 136*  --    < > 160*   < > 141*   < > 138*   < >  --    < > 148*   BUN 61.6*  --   --  59.2*  --   --  53.0*  --  48.3*  --  46.6*  --   --   --  45.9*   CR 1.50*  --   --  1.38*  --   --  1.29*  --  1.18*  --  1.11*  --   --   --  1.05*   GFRESTIMATED 40*  --   --  44*  --   --  48*  --  53*  --  57*  --   --   --  61   BEN 9.7  --   --  9.5  --   --  9.6  --  9.3  --  9.3  --   --   --  9.3   MAG 2.3  --   --   --   --   --  2.3  --   --   --  2.3  --  2.5*  --  1.9   PHOS 3.6  --   --   --   --   --  3.2  --   --   --  3.7  --   --   --  3.5   PROTTOTAL 6.8  --   --  6.9  --   --  6.8  --  6.5  --  6.5  --   --   --  6.2*   ALBUMIN 3.3*  --   --  3.3*  --   --  3.3*  --  3.1*  --  3.2*  --   --   --  3.2*   BILITOTAL 1.2  --   --  1.2  --   --  1.4*  --  1.6*  --  1.9*  --   --   --  2.0*   ALKPHOS 62  --   --  65  --   --  66  --  65  --  63  --   --   --  62   AST 30  --   --  27  --   --  30  --  30  --  28  --   --   --  28   ALT 15  --   --  16  --   --  19  --  16  --  17  --   --   --  16    < > = values in this interval not  displayed.     CBC  Recent Labs   Lab 24  0403 24  2020 24  1557 24  0341 24  1600   WBC 11.5*  --  9.0 10.5 8.0   RBC 2.20*  --  1.96* 2.03* 2.05*   HGB 7.5* 7.6* 6.8* 7.0* 7.1*   HCT 25.8*  --  23.8* 24.5* 23.6*   *  --  121* 121* 115*   MCH 34.1*  --  34.7* 34.5* 34.6*   MCHC 29.1*  --  28.6* 28.6* 30.1*   RDW 21.7*  --  19.2* 19.3* 18.9*     --  183 170 147*     INR  Recent Labs   Lab 24  1931   INR 1.23*     Arterial Blood Gas  Recent Labs   Lab 24  0654 24  0405 24  0403 24  0120 24  1600 24  1227   PH 7.46* 7.43  --  7.42  --  7.47*   PCO2 73* 76*  --  79*  --  65*   PO2 104 96  --  94  --  75*   HCO3 51* 51*  --  51*  --  47*   O2PER 80 80 80 80   < > 80    < > = values in this interval not displayed.       Imaging   Name: BILLIE CRESPO  MRN: [id number]  : 1965  Study Date: 2024 10:08 AM  Age: 59 yrs  Gender: Female  Patient [facility]  Reason For Study: CHF  Ordering Physician: CLOVIS DARBY  Performed By: KS     BSA: 2.3 m2  Height: 62 in  Weight: 307 lb  HR: 82  BP: 141/71 mmHg  ______________________________________________________________________________  Procedure  Complete Portable Echo Adult. Definity ([facility] #61197-055) given intravenously.  ______________________________________________________________________________  Interpretation Summary     Flattened septum is consistent with RV pressure/volume overload.  Left ventricular size, wall motion and function are normal. The ejection  fraction is > 65%.  The right ventricle is severely dilated.  Moderately decreased right ventricular systolic function  Right ventricular systolic pressure is elevated, consistent with severe  pulmonary hypertension.  There is mild to moderate (1-2+) tricuspid regurgitation.  IVC diameter >2.1 cm collapsing <50% with sniff suggests a high RA pressure  estimated at 15 mmHg or greater.  Trivial pericardial  effusion  ______________________________________________________________________________  Left Ventricle  Left ventricular size, wall motion and function are normal. The ejection  fraction is > 65%. There is normal left ventricular wall thickness. Diastolic  Doppler findings (E/E' ratio and/or other parameters) suggest left ventricular  filling pressures are normal. Flattened septum is consistent with RV  pressure/volume overload.     Right Ventricle  The right ventricle is severely dilated. Moderately decreased right  ventricular systolic function. TAPSE is abnormal, which is consistent with  abnormal right ventricular systolic function.     Atria  The left atrium is mildly dilated. The right atrium is moderate to severely  dilated. There is no color Doppler evidence of an atrial shunt.     Mitral Valve  Mitral valve leaflets appear normal. There is no evidence of mitral stenosis  or clinically significant mitral regurgitation.     Tricuspid Valve  The tricuspid valve is not well visualized, but is grossly normal. There is  mild to moderate (1-2+) tricuspid regurgitation. The right ventricular  systolic pressure is approximated at 53.7 mmHg plus the right atrial pressure.  Right ventricular systolic pressure is elevated, consistent with severe  pulmonary hypertension. Severe (>55mmHg) pulmonary hypertension is present.     Aortic Valve  Aortic valve leaflets appear normal. There is no evidence of aortic stenosis  or clinically significant aortic regurgitation.     Pulmonic Valve  The pulmonic valve is not well seen, but is grossly normal. There is mild (1+)  pulmonic valvular regurgitation.     Vessels  The aorta root is normal. Normal size ascending aorta. IVC diameter >2.1 cm  collapsing <50% with sniff suggests a high RA pressure estimated at 15 mmHg or  greater.     Pericardium  Trivial pericardial effusion.       Catheteriztion    09/2024  RA 12/14/11  RV 78/14  PA 78/30/48  PCWP --/--/12  LINN 5.43/2.46  TD  7.6/3.44  PVR 4.7      [facility]  [facility]ATION: [facility]  DATE: 9/22/2024     PROCEDURE: IVC FILTER PLACEMENT     INTERVENTIONAL RADIOLOGIST: Lev Reyes MD.     INDICATION: Deep vein thrombosis with contraindications to anticoagulation. GI bleed. Pulmonary hypertension with limited cardiorespiratory reserve. DVT left leg. Patient intubated and sedated.     Air Kerma: 205 mGy  FLUOROSCOPIC TIME: 0.6 minutes   CONTRAST: 25 cc Omnipaque 350     COMPLICATIONS: No immediate complications.     STERILE BARRIER TECHNIQUE: Maximum sterile barrier technique was used. Cutaneous antisepsis was performed at the operative site with application of 2% chlorhexidine and large sterile drape. Prior to the procedure, the surgeon and assistant performed hand   hygiene and wore hat, mask, sterile gown, and sterile gloves during the entire procedure.     PROCEDURE:   Telephone consent obtained from santos Padron. The right neck  was prepped and draped in usual sterile fashion followed by local anesthesia with 1% Xylocaine. Ultrasound revealed a patent and compressible right internal jugular vein, and an ultrasound   image was obtained and placed in the patient's permanent medical record. Using real-time ultrasound for needle placement, a right internal jugular vein puncture was performed followed by placement of the infusion catheter and delivery sheath into the   caudal IVC. An inferior venacavogram was performed. A [employer] [place] retrievable IVC filter was then deployed into the infrarenal IVC. A followup venacavogram was performed. The right jugular vein access was removed with hemostasis obtained with manual   compression.     FINDINGS:   Normal inferior vena cava. Successful placement of a [employer] [place] retrievable IVC filter within the infrarenal IV  [place] [facility]  LOCATION: [facility]  DATE: 9/17/2024     PROCEDURE: BILATERAL PULMONARY ARTERIOGRAPHY WITH MECHANICAL  THROMBECTOMY OF THE BILATERAL PULMONARY ARTERIES  1.  Ultrasound-guided access of the right common femoral vein. A permanent image was stored.  2.  Digital subtraction selective main pulmonary diagnostic arteriography (nonselective).  3.  Superselective catheterization of the left lower lobe pulmonary artery (third order) with extirpation of matter from the left main and left lower lobe pulmonary arteries.  4.  Superselective catheterization of the left upper lobe pulmonary artery (third order) with extirpation of matter from the left upper lobe pulmonary artery.  5.  Superselective catheterization of the right lower lobe pulmonary artery (third order) with extirpation of matter from the right main and right lower lobe pulmonary arteries.  6.  Superselective catheterization of the right upper lobe pulmonary artery (third order) with extirpation of matter from the right upper lobe pulmonary artery.  7.  Post-thrombectomy digital subtraction right main pulmonary arteriography.  8.  Intra-arterial pressure measurements.  9.  Vascular closure device use.     INTERVENTIONAL RADIOLOGIST: Daniel Pleitez MD.     INDICATION: 59-year-old female with acute hypoxemic respiratory failure, acute renal failure, lower extremity deep venous thrombus and pneumonia. The patient was clinically unstable, intubated and started on vasopressor therapy. A stat echocardiogram was   obtained demonstrating cor pulmonale. A VQ scan was also obtained suggesting pulmonary emboli. The patient was transferred emergently for pulmonary arteriography with thrombectomy as indicated.     CONSENT: The procedure was performed under emergent indication as the patient is critically ill.     MODERATE SEDATION: None.     CONTRAST: 20 mL Omnipaque 350.  ANTIBIOTICS: None.  ADDITIONAL MEDICATIONS: Heparin 2500 units IV.     FLUOROSCOPIC TIME: 10.1 minutes.  RADIATION DOSE: Air Kerma: 432 mGy.     COMPLICATIONS: No immediate complications.     STERILE BARRIER  TECHNIQUE: Maximum sterile barrier technique was used. Cutaneous antisepsis was performed at the operative site with application of 2% chlorhexidine and large sterile drape. Prior to the procedure, the  and assistant performed   hand hygiene and wore hat, mask, sterile gown, and sterile gloves during the entire procedure.     PROCEDURE:    Using real-time ultrasound guidance, access was achieved into the right common femoral vein and conversion was made for a 7 Congolese vascular sheath.     Utilizing preclose technique, 2 Perclose suture devices were deployed at the right common femoral vein access site.     A 7 Congolese angled pigtail catheter was manipulated through the right heart and into the main pulmonary artery. ?A baseline intra-arterial pressure measurement was then obtained through the catheter.  Digital subtraction main pulmonary arteriography was   obtained. ?     Exchange was made for a Berenstein catheter which was manipulated into a distal left lower lobe segmental pulmonary artery. Over a stiff Amplatz wire the access sheath was exchanged for a 24 Congolese Inari FlowTriever sheath.     Through the sheath a 24F Inari FlowTriever device was advanced into the distal left pulmonary artery (second order). Mechanical suction thrombectomy was performed with attempted extirpation of matter from the distal left and main pulmonary arteries.  ?  The 24F FlowTriever device was advanced into the left lower lobe pulmonary artery (third order). Mechanical suction thrombectomy was performed with attempted extirpation of matter from the left lower lobe pulmonary arteries.  ?  Exchange was made for a Berenstein catheter which was manipulated into a distal left upper lobe segmental pulmonary artery. Over a stiff Amplatz wire the 24F FlowTriever device was advanced into the left upper lobe pulmonary artery (third order).   Mechanical suction thrombectomy was performed with attempted extirpation of matter from the left  upper lobe pulmonary arteries.  ??  Exchange was made for a Berenstein catheter which was manipulated into a distal right lower lobe segmental pulmonary artery. Over a stiff Amplatz wire the 24F Inari FlowTriever device was advanced into the distal right pulmonary artery (second order).   Mechanical suction thrombectomy was performed with extirpation of matter from the distal right and main pulmonary arteries.  ?  The 24F FlowTriever device was advanced into the right interlobar pulmonary artery (third order). Mechanical suction thrombectomy was performed with extirpation of matter from the right interlobar and right lower lobe pulmonary arteries.     Exchange was made for a Berenstein catheter which was manipulated into a distal right upper lobe segmental pulmonary artery. Over a stiff Amplatz wire the 24F FlowTriever device was advanced into the right upper lobe pulmonary artery (third order).   Mechanical suction thrombectomy was performed with extirpation of matter from the right upper lobe pulmonary arteries.  ?  Post-thrombectomy digital subtraction right pulmonary arteriography was performed through the the 24F FlowTriever device.  ?  The angled pigtail catheter was reinserted into the main pulmonary artery and a post thrombectomy completion intra-arterial pressure measurement was obtained from the main pulmonary artery.  ?  The catheters and sheath were removed with hemostasis achieved via the Perclose suture devices and additional manual compression.     FINDINGS:  Ultrasound demonstrates a patent and fully compressible right common femoral vein. A permanent image was stored.     The digital subtraction main pulmonary arteriography demonstrates small to moderate areas of peripheral hypoperfusion involving both lungs. There are no large central pulmonary emboli however there appear to be some peripheral filling defects within the   right upper lobar, interlobar and lower lobar pulmonary arterial branches  suggesting chronic thromboemboli. Small filling defects are seen involving the subsegmental branches to the left upper lobe and lateral segment of the left lower lobe.     Mechanical thrombectomy of the left main, left upper and left lower lobar pulmonary arteries yielded no thromboemboli.     Mechanical thrombectomy of the right main, right upper lobar, right interlobar and right lower lobar pulmonary arteries yielded successful aspiration a low volume (8 mL) of chronic appearing organized thrombus.     Following mechanical suction thrombectomy the completion right pulmonary arteriography shows no remaining central thromboemboli with improved perfusion of the right upper lung. There is still poor perfusion to the lateral right lower lung.                                                                      IMPRESSION:    1.  Baseline pulmonary arteriography revealing no large central pulmonary thromboemboli. There are nonocclusive filling defects within the right lobar branches suggesting chronic recanalized thrombus. There are more peripheral scattered areas of   pulmonary hypoperfusion likely reflecting evolving infarcts.  2.  Successful mechanical thrombectomy with removal of a low volume of chronic appearing thrombus from the right lung as characterized above. Mechanical thrombectomy of the central left pulmonary arteries was performed yielding no thrombus, not   surprising giving the angiographic findings.  3.  Markedly elevated pulmonary arterial pressures at baseline, slightly improved following thrombectomy.     ____________________________     PULMONARY ARTERIAL PRESSURES:  Pre-thrombectomy: 77/33 (mean 47 mm Hg)  Post-thrombectomy: 68/19 (mean 34 mm Hg)

## 2024-09-26 NOTE — PROGRESS NOTES
GASTROENTEROLOGY PROGRESS NOTE    ASSESSMENT/RECOMMENDATIONS:  Acute blood loss anemia  Melena  Jared class IIa duodenal ulcer s/p Ovesco x2 clips  Continued slow downtrend in her hemoglobin.  Reassuring against GI bleed however is her lack of ongoing melena/hematochezia.  Brown stool noted on 9/26.  Over the scope clips are typically difficult to perform repeat interventions on if they rebleed.  She is now far out enough from her procedure though that if she were to rebleed, would recommend reaching back out to GI as endoscopy would likely be the first line intervention at this point (as opposed to IR).  Given the location of her ulcer, duodenal, and her clear risk factors for peptic ulcer disease, she does not require repeat endoscopy to assess for healing and other potential etiologies.  - Transition PPI drip to pantoprazole 40 mg IV twice daily until she is tolerating p.o.  - Once tolerating p.o., omeprazole 40 mg twice daily x 8 weeks - daily thereafter given need for anticoagulation  - Okay for enteral feeds from GI perspective    The patient was discussed and plan agreed upon with GI staff.    Jesus Bhakta  Gastroenterology Fellow, PGY4     Inpatient GI consults service will sign off. No further recommendations at this time. If primary team has addition questions, please page consult fellow listed in Lyn.    Current GI Consult Staff: Tejas     Follow up recommendations:   No outpatient GI clinic follow-up indicated. Follow-up with primary care provider at timing determined by discharge physician.    _______________________________________________________________  S: No acute overnight events.  Remains intubated and undergoing sedation weans.  Slight leukocytosis this morning and was febrile to 1 yesterday.  Placed on Zosyn and vancomycin.    O:  Pulse 98, temperature 99.7  F (37.6  C), resp. rate 20, weight 115.3 kg (254 lb 3.1 oz), SpO2 99%.    Constitutional: No acute distress.  Intubated  with sedation being weaned when I examined her  Eyes: Sclera anicteric  Ears/nose/mouth/throat: Hearing intact on gross exam, looks to voice  CV: Extremities wwp  Respiratory: Unlabored breathing  Abdomen: Nondistended, no hepatosplenomegaly, mild wincing to deep palpation in the lower quadrants  Skin: warm, perfused, no jaundice  Neuro: Does not follow commands  Psych: Unable to assess  MSK: In bed. Moves upper extremities spontaneously    LABS:  BMP  Recent Labs   Lab 09/26/24  0756 09/26/24  0403 09/26/24  0402 09/25/24  2341 09/25/24  2339 09/25/24 2020 09/25/24  1956 09/25/24  1557 09/25/24  1230 09/25/24  0904   NA  --  154*  --   --  154*  --   --  154*  --  153*   POTASSIUM  --  3.6  --   --  3.1* 4.0  --  3.0*  --  3.5   CHLORIDE  --  95*  --   --  95*  --   --  95*  --  98   BEN  --  9.7  --   --  9.5  --   --  9.6  --  9.3   CO2  --  47*  --   --  46*  --   --  44*  --  43*   BUN  --  61.6*  --   --  59.2*  --   --  53.0*  --  48.3*   CR  --  1.50*  --   --  1.38*  --   --  1.29*  --  1.18*   * 147* 139* 128* 136*  --    < > 160*   < > 141*    < > = values in this interval not displayed.     CBC  Recent Labs   Lab 09/26/24  0403 09/25/24  2020 09/25/24  1557 09/25/24  0341 09/24/24  1600   WBC 11.5*  --  9.0 10.5 8.0   RBC 2.20*  --  1.96* 2.03* 2.05*   HGB 7.5*   < > 6.8* 7.0* 7.1*   HCT 25.8*  --  23.8* 24.5* 23.6*   *  --  121* 121* 115*   MCH 34.1*  --  34.7* 34.5* 34.6*   MCHC 29.1*  --  28.6* 28.6* 30.1*   RDW 21.7*  --  19.2* 19.3* 18.9*     --  183 170 147*    < > = values in this interval not displayed.     INR  Recent Labs   Lab 09/23/24  1931   INR 1.23*     LFTs  Recent Labs   Lab 09/26/24  0403 09/25/24  2339 09/25/24  1557 09/25/24  0904   ALKPHOS 62 65 66 65   AST 30 27 30 30   ALT 15 16 19 16   BILITOTAL 1.2 1.2 1.4* 1.6*   PROTTOTAL 6.8 6.9 6.8 6.5   ALBUMIN 3.3* 3.3* 3.3* 3.1*      PANCNo lab results found in last 7 days.

## 2024-09-26 NOTE — PROGRESS NOTES
Hematology  Daily Progress Note   Date of Service: 09/26/2024    Patient: Noelle Gomez  MRN: [id number]  Admission Date: 9/23/2024  Hospital Day # Hospital Day: 4      Initial Reason for Consult:  Anticoagulation management.     Assessment & Plan:   Noelle Gomez is a 60 yo F with medical history remarkable for morbid obesity, prediabetes, 40 pack years smoking, lymphedema, lower extremity stasis, prior lower extremity ultrasounds on 10/2023 negative admitted on 9/16 with acute on chronic respiratory failure, course complicated by altered mental status requiring intubation.  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  She is s/p thrombectomy with mostly chronic clot and resection of small amount of clot and only mild decrease in PA pressures still requiring vasopressor and ventilator support and continues diuresis.  Her course has been further complicated by gastritis, duodenal ulcers on EGD 9/23 s/p clip. Currently on hepatin gtt, without signs of rebleeding.     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 Clearly has had multiple venous thromboemboli events in the lungs with chronic appearance on thrombectomy as well as evidence of acute thrombosis at least in the L leg. Based on the diagnosis of CTEPH she qualifies for indefinite long term anticoagulation. We are completing hypercoagulability work up to help decided long-term anticoagulation plan. If antiphospholipid syndrome  is present, outcomes would be better with warfarin as opposed to DOAC. Thus far, cardiolipin and b2 glycoprotein antibodies have  returned negative. LA is positive however this is highly non specific with an active clot and receiving heparin. Right now this is NOT APS, as we would need to repeat this labs in 12 weeks and possibly hold anticoagulation before retesting. Otherwise testing for inherited thrombophilia such as Factor V Leiden, a gain of function mutation causing activated protein C resistance ( NOT factor V activity ), and similar with prothrombin [id number] mutation, protein C or protein S deficiency, or antithrombin deficiency. The last 3 can be falsely low with active thrombosis. Also no e/o malignancy on recent CT CAP. Otherwise for now given her acute setting with recent GIB s/p clip would recommend continuing management with heparin and monitor closely for rebleed. Ultimately she might have a mixed picture of commorbidities and genetic precision leading to thrombosis.     In terms of her anemia, no iron deficiency from her blood work and no hemolysis as seen on peripheral smear without schistocytes and nl haptoglobin and even though her LDH is elevated, it is non specific in her case with her severe illness and the mentioned work up negative.     Recommendations:   - Continue heparin gtt for now, increase to high intensity if tolerated   - Continue to monitor daily CBC with diff, reticulocyte count  - We will look at peripheral smear, DATand add on LDH and haptoglobin for today   - We will order iron studies ( last transfused 9/22 )   - Will need to repeat APS labs around January 2025 before we commit to APS diagnosis. In the mean time, might need to be managed with warfarin once medically status.     Patient was seen and plan of care was discussed with attending physician Dr. Montalvo.    We will continue to follow this patient. Please don't hesitate to contact the Fellow On-Call with questions.    Kristen Mehta MD  Hematology and Medical Oncology Fellow, PGY-4  Pager:  [phone]  Attending  The patient was seen  and examined by me separate from the resident/fellow provider.The note above reflects my assessment and plan. I have personally reviewed today's labs,vital and radiology results. The points of care that were added by me are:    Pt still on heparin and has IVC filter in place Discussed with Dr De LaT orre. APS still a possibility underlying the CTEPH  Will check hemolysis labs  Jose Montalvo M.D.   ________________________________________________________________    Subjective & Interval History:    No acute events noted overnight. Remains intubated, sedated on pressor therapy. No signs of bleeding from chart review with hgb stable 7.5 and continues to receive diuresis       Physical Exam:    Pulse 105   Temp 100.4  F (38  C)   Resp 20   Wt 115.3 kg (254 lb 3.1 oz)   SpO2 92%   BMI 46.49 kg/m      Gen: Intubated and sedated   CV: Normal rate, regular rhythm. No m/r/g  Pulm: Coarse sounds bilateral.   Abd: Soft, nt/nd, no rebound/guarding  Ext: Warm and well perfused. No lower extremity edema    Labs & Studies: I personally reviewed the following studies:  ROUTINE LABS (Last four results):  CMP  Recent Labs   Lab 09/26/24  1147 09/26/24  0931 09/26/24  0756 09/26/24  0403 09/25/24  2341 09/25/24  2339 09/25/24 2020 09/25/24  1956 09/25/24  1557 09/25/24  0346 09/25/24  0341 09/24/24  2254 09/24/24  2250 09/24/24  1954 09/24/24  1600   NA  --  156*  --  154*  --  154*  --   --  154*   < > 152*  --   --   --  152*   POTASSIUM  --  3.3*  --  3.6  --  3.1* 4.0  --  3.0*   < > 3.6  --  3.4  --  3.3*   CHLORIDE  --  97*  --  95*  --  95*  --   --  95*   < > 98  --   --   --  97*   CO2  --  47*  --  47*  --  46*  --   --  44*   < > 43*  --   --   --  42*   ANIONGAP  --  12  --  12  --  13  --   --  15   < > 11  --   --   --  13   * 167* 142* 147*   < > 136*  --    < > 160*   < > 138*   < >  --    < > 148*   BUN  --  63.4*  --  61.6*  --  59.2*  --   --  53.0*   < > 46.6*  --   --   --  45.9*   CR  --  1.56*   --  1.50*  --  1.38*  --   --  1.29*   < > 1.11*  --   --   --  1.05*   GFRESTIMATED  --  38*  --  40*  --  44*  --   --  48*   < > 57*  --   --   --  61   BEN  --  9.5  --  9.7  --  9.5  --   --  9.6   < > 9.3  --   --   --  9.3   MAG  --   --   --  2.3  --   --   --   --  2.3  --  2.3  --  2.5*  --  1.9   PHOS  --   --   --  3.6  --   --   --   --  3.2  --  3.7  --   --   --  3.5   PROTTOTAL  --  6.7  --  6.8  --  6.9  --   --  6.8   < > 6.5  --   --   --  6.2*   ALBUMIN  --  3.3*  --  3.3*  --  3.3*  --   --  3.3*   < > 3.2*  --   --   --  3.2*   BILITOTAL  --  1.2  --  1.2  --  1.2  --   --  1.4*   < > 1.9*  --   --   --  2.0*   ALKPHOS  --  63  --  62  --  65  --   --  66   < > 63  --   --   --  62   AST  --  27  --  30  --  27  --   --  30   < > 28  --   --   --  28   ALT  --  17  --  15  --  16  --   --  19   < > 17  --   --   --  16    < > = values in this interval not displayed.     CBC  Recent Labs   Lab 09/26/24  0403 09/25/24 2020 09/25/24  1557 09/25/24  0341 09/24/24  1600   WBC 11.5*  --  9.0 10.5 8.0   RBC 2.20*  --  1.96* 2.03* 2.05*   HGB 7.5* 7.6* 6.8* 7.0* 7.1*   HCT 25.8*  --  23.8* 24.5* 23.6*   *  --  121* 121* 115*   MCH 34.1*  --  34.7* 34.5* 34.6*   MCHC 29.1*  --  28.6* 28.6* 30.1*   RDW 21.7*  --  19.2* 19.3* 18.9*     --  183 170 147*     INR  Recent Labs   Lab 09/23/24 1931   INR 1.23*

## 2024-09-26 NOTE — PROGRESS NOTES
Cardiology ICU Note    09/26/2024    Noelle Gomez MRN# [id number]   Age: 59 year old YOB: 1965          H&P:   Noelle Gomez is a 59 year old female who was admitted on 9/23/2024. She has a PMH of asthma, 40 pack year former smoker, HTN, who initially presented to [facility] on 9/16 for acute on chronic hypoxic/hypercapnic respiratory failure thought to be related to decompensated heart failure. She was intubated quickly after admission due to worsening hypercapnea and AMS. TTE at that time revealed severe RV volume and pressure overload. VQ scan obtained (due to VIGNESH) revealed multiple bilateral wedge shaped perfusion defects. She was then transferred to [facility] ICU for thrombectomy. She also was noted to have a LE DVT, subsequently started on a heparin drip. During thrombectomy, she was noted to have mainly chronic clot with only minimal acute clot that was extracted. PAP reduced from 77/33 (mean 47), to 68/19 (mean 34). 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 She unfortunately later developed a GI bleed with melena and a hemoglobin drop from 12->6.7 over 2 days now s/p 1u pRBC. She subsequently required pressor support with levo and vaso, but vaso was weaned off and she remains on low dose levophed. She was then transferred to [facility] for further evaluation and management.          Assessment and Plan:     09/26: Patient continues mechanical ventilated and with vasopressors support.   At this point we are facing Metabolic alkalosis appropriately compensated by respiratory acidosis, pH is WNL. The most likely trigger of the metabolic alkalosis (on top of her chronic compensating metabolic alkalosis due to COPD/lung disease) is the loop diuretics. Diamox has not been successful in fixing the alkalosis probably because the trigger still running (diuresis). On the other hand, patient needs to continue  on certain diuretic agent to help the RV overload.   I do not know how useful adjust the vent is going to be at this point, knowing that the pH is WNL. If we try to increase RR or TV the pH will increase.   Right now we are at NO1: CVP ranging 15-18.     Changes/Updates  >Bumetanide 1mg/h  > Continue diamox 500mg BID - for metabolic alkalosis  > Consult nephrology for acid base balance/VIGNESH/diuretic recommendations.   > Ordering ID/Pulmonology infections/ILD/Rheumatology etiologies work up.   >CICU team to manage ventilator.   > Continue zozyn, vancomycin discontinued due to MRSA nares negative.   > CICU team to perform bronchoscopy at some point.       Neurology   # Acute toxic metabolic encephalopathy  Fentanyl + Verzed bolus as needed     Follow commands intermittently.     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 Course unfortunately complicated by GI bleed on heparin, so IVC filter was placed. CVP transduced off PICC on admission is 24     CVP PA PVR PCWP CO/CI   09/23 11 78/29/42 5.53 12 5.43/2.46   09/24 17 76/42/59   6.3/2.7   09/25 20 83/48   10.1/4.4   09/26 12 75/34   7.7/3.4     Volume: Hypervolemic   Contractility:   > RV: RV is getting overload after decreasing NO. Planning to diurese as much as possible.   > LV functions is preserved.     Meds:  Inotrope: None   Pulmonary vasodilators: NO: 1 + Sildenafil 10mg TID.   Vasopressors: Levophed 0.03 and Vasopressin 1.8  Diuresis: Bumetanide 1 + Diamox 500mg BID  Anticoagulation: Heparin for CTEPH.        Plan:  >Bumetanide 1mg/h  >Continue diamox 500mg BID - for metabolic alkalosis     Pulmonary    FiO2 (%): 60 %, Resp: 20, Vent Mode: CMV/AC, Resp Rate (Set): 20 breaths/min, Tidal Volume (Set, mL): 450 mL, PEEP (cm H2O): 8 cmH2O, Resp Rate (Set): 20 breaths/min, Tidal Volume (Set, mL): 450 mL, PEEP (cm H2O): 8 cmH2O     # Acute Hypoxic and hypercapnic respiratory failiure 2/2  # CETPH  # LORA/OHS  # ?Previously treated CAP - Zozyn/Ceftriaxone/Doxy.  # Cavitary lesion on CT - Emphysema/Infarction    # Sepsis - per SOFA>2 secondary to Pneumonia   -Presented AoC hypoxic/hypercapnic respiratory failure thought to be due to HF, now found to be due to PH 2/2 CTEPH.   ===PLAN===  -Plan for CTEPH/RV overload as above  -Vent management per CICU team   > Ordering ID/Pulmonology infections/ILD/Rheumatology etiologies work up.         Gastrointestinal, Nutrition  #Upper GI bleed in the setting of heparin use  #S/P Clipping duodenal ulcer on 09/23  Completed 72h of PPI on 09/26 - Starting 40mg BID after GI recommendations.     Plan:   Continue tube feedings  Pantoprazole 40mg  IV BID       Renal, Electrolytes  # Hypokalemia   # VIGNESH 2/2 cardiogenic shock from CTEPH  # Metabolic alkalosis with appropriate respiratory acidosis.     At this point we are facing Metabolic alkalosis appropriately compensated by respiratory acidosis, pH is WNL. The most likely trigger of the metabolic alkalosis (on top of her chronic compensating metabolic alkalosis due to COPD/lung disease) is the loop diuretics. Diamox has not been successful in fixing the alkalosis probably because the trigger still running (diuresis).      Plan:  Continue diuresis as above.   We might adjust the diuresis plan in the future.     Infectious Disease  # Sepsis - per SOFA>2 secondary to Pneumonia   Per ID We would recommend at this point to get a bronchoscopy with BAL and send a full panel if this is possible per pulmonology considering her severe emphysema.   9/25  Quantiferon TB, aspergillus galactomannan, 1-2 B-D glucan, histo/blasto serum/urine antigen, and fungal serology are all pending    09/26: Cultures negative so far.     Plan:  Continuze zozyn (09/25-      Hematology  # Left femoral DVT  # Family history of Factor 5 Leiden  # CTEPH  #SLE anticoagulant positive     -Patient presenting with hypoxia and found to have both a DVT and chronic bilateral pulmonary emboli. Her eldest son has history of factor 5 Leiden.     Plan:  Hematology working up antiphospholipid syndrome.   Continue heparin      Endocrinology  # Hyperglycemia  - insulin prn    ICU Checklist:  #Feeding: To be started after OG tube.   #Analgesia: fentanyl  #Sedation: precedex/propofol  #Thromboembolism ppx: mechanical (GI bleed)  #HOB: 30 degrees   #Ulcer ppx: PPI BID  #Glucose: insulin PRN  #SBT/SAT: n/a  #Bowel reg: miralax/senna PRN  #Family: , 2 sons  #Dispo: CCU    Code Status: Full    Discussed with Dr Chau attending physician,    Travis Rosa MD  Cardiology Fellow          Medications:            Past Medical History:     Past Medical History:   Diagnosis Date    Asthma 08/14/2023    Bilateral lower extremity edema 07/14/2021    Former smoker 06/23/2021    HTN (hypertension) 07/14/2021    Lymphedema 02/08/2022    Prediabetes 06/24/2021    Seasonal allergies 07/14/2021    UPJ (ureteropelvic junction) obstruction 03/04/2015    Vitamin D deficiency 06/23/2021            Family History:   Unable to obtain due to patients medical condition.         Social History:   Unable to obtain due to patients medical condition.         Past Surgical History:   Unable to obtain due to patients medical condition.         Review of Systems:   Unable to obtain due to patients medical condition.            Physical Exam:   Pulse 101   Temp 100.2  F (37.9  C)   Resp 20   Wt 115.3 kg (254 lb 3.1 oz)   SpO2 94%   BMI 46.49 kg/m        GENERAL: Intubated, sedated. NAD.  HEENT: No icterus. ETT in place,  "OG tube in place.  CARDIOVASCULAR: RRR. Normal S1 and S2.  RESP: Coarse bilaterally. Mechanical ventilation.    GI Soft, bowel sounds hypoactive but present.  GENITOURINARY: Miller in place.  EXTREMITIES: edematous, warm and well perfused  NEURO: Sedated and intubated.  INTEGUMENTARY: No rashes. Cannula/Line sites CDI.      Resp: 20 SpO2: 94 % O2 Device: Mechanical Ventilator      Arterial Blood Gas:   Recent Labs   Lab 09/26/24  1052 09/26/24  0753 09/26/24  0654 09/26/24  0405 09/26/24  0403 09/26/24  0120   PH 7.43  --  7.46* 7.43  --  7.42   PCO2 76*  --  73* 76*  --  79*   PO2 66*  --  104 96  --  94   HCO3 51*  --  51* 51*  --  51*   O2PER 60 80 80 80   < > 80    < > = values in this interval not displayed.        Vitals:    09/24/24 0315 09/25/24 0020 09/26/24 0400   Weight: 122.4 kg (269 lb 13.5 oz) 120.4 kg (265 lb 6.9 oz) 115.3 kg (254 lb 3.1 oz)   I/O last 3 completed shifts:  In: 4406.27 [I.V.:2342.27; NG/GT:950]  Out: 7365 [Urine:7365]  Recent Labs   Lab 09/26/24  0931 09/26/24  0756 09/26/24  0403   *  --  154*   POTASSIUM 3.3*  --  3.6   CHLORIDE 97*  --  95*   CO2 47*  --  47*   ANIONGAP 12  --  12   * 142* 147*   BUN 63.4*  --  61.6*   CR 1.56*  --  1.50*   BEN 9.5  --  9.7     No components found for: \"URINE\"   Recent Labs   Lab 09/26/24  0931 09/26/24  0403 09/25/24  2339   AST 27 30 27   ALT 17 15 16   BILITOTAL 1.2 1.2 1.2   ALBUMIN 3.3* 3.3* 3.3*   PROTTOTAL 6.7 6.8 6.9   ALKPHOS 63 62 65     Temp: 100.2  F (37.9  C) Temp src: EsophagealTemp  Min: 97.9  F (36.6  C)  Max: 101.1  F (38.4  C)   Recent Labs   Lab 09/26/24  0403 09/25/24 2020 09/25/24  1557 09/25/24  0341 09/24/24  1600 09/24/24  0411   WBC 11.5*  --  9.0 10.5 8.0 8.4   HGB 7.5* 7.6* 6.8* 7.0* 7.1* 7.5*   HCT 25.8*  --  23.8* 24.5* 23.6* 25.3*   *  --  121* 121* 115* 116*   RDW 21.7*  --  19.2* 19.3* 18.9* 19.6*     --  183 170 147* 160     Recent Labs   Lab 09/23/24  1931   INR 1.23*       Recent Labs "   Lab 09/26/24  0931 09/26/24  0756 09/26/24  0403 09/26/24  0402 09/25/24  2341   * 142* 147* 139* 128*     I have reviewed today's vital signs, notes, medications, labs and imaging.  I have also seen and examined the patient and agree with the findings and plan as outlined above. Pt is 60 yo WF with hx of tobacco abuse who presents following transfer with PAH, moderate RV dysfn, severe RV dilation, intubated and sedated with CXR significant for right lung opacificaiton concerning for aspiration pneumonia with pulmonary edema.  Pt on vasopressen and low dose NO with CVP 20 and PA 83/48 with CI 4.4.  Pt also with hx of CTEPH and DVT with IVC filter placed.  Will add abx and continue diuresis with diamox.  Pt critically ill who was seen X3 for total critical care time 50 min.     Chalino De La Torre MD, PhD  Professor, Heart Failure and Cardiac Transplantation  [facility]

## 2024-09-26 NOTE — PROGRESS NOTES
Brief CVTS Progress Note    A/P: Patient is a 59 year old female with PMH of asthma 40 pack year hx, HTN. 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 She was also treated with CTX and Doxy for CAP coverage x 5 days. VIGNESH thought to be 2/2 cardiogenic shock from CTEPH, improving. Family history of Factor 5 Leiden. Heme consult.      - HD stable on low dose NE. Sedated, on the vent, bumex drip.  - UGIB getting EGD 9/23, duodenal ulcer found with clips placed. GI following, watch for recurrent bleeding, low intensity heparin was resumed 9/23 PM.  - Appreciate heme consult for potential FVL, following.  - Pulmonology consult for cavitary lesions in the lung  - Pulmonary hypertension specialists following  - IVC filter placed in infrarenal IVC in IR at [facility] on 9/22  - Will need coronary evaluation prior to surgical decision.  - Carotid US unable to be completed due to presences of central lines. May need CT neck with contrast to evaluate carotid stenosis in unable to evaluate by ultrasound.   - Per surgeon, no plans for surgery during this admission. Cont medical management of CTEPH for now. Anticoagulate. Diurese. CVTS will sign off, please call/re-consult with any further surgical needs.  - Thank you for the opportunity to participate in the care of this patient. Rest per primary team.    Discussed with Dr. Cordova.    Domitila Fernandez PA-C  Cardiothoracic Surgery  Pager [phone]  2:52 PM on 9/26/2024

## 2024-09-27 NOTE — PLAN OF CARE
Major Shift Events:  Neuro: RASS -1. Does not follow commands. Localizes to pain. Arouses to voice. MAJOR. PERR. Fent at 200.   CV: Sinus/ST 80s-100s. MAP goal >65 met with levo and vaso titration. Tmax 37.9, PRN tylenol given x1.   Resp: ETT CMV 80%/450/20/Peep8. Lungs coarse, krissy left.   GI/: Rectal tube, 200 mL loose stool overnight . Miller in place, scheduled diamox given, 500mL out over 2hrs in response. OG w/ TF @ 35 mL/hr, 200 mL q4h FWF - Na 152.  Gtts:   Levo 0.06  Heparin 2000  Bumex 1.0  Fent 200  Vaso 1.8   Skin: +2 edema legs, abdomen. No acute skin changes overnight.   LDAs: [place] @ 47  Labs: Mg replaced x1, K replaced x1, Bicarb and CO2 critical - MD aware.    Plan: Wean vent as tolerated. Will continue plan of care and notify the CARDS 2 team w/ any concerns or changes.  For vital signs and complete assessments, please see documentation flowsheets.    Goal Outcome Evaluation:    Plan of Care Reviewed With: patient  Overall Patient Progress: no change

## 2024-09-27 NOTE — PROGRESS NOTES
Nephrology Progress Note  09/27/2024           MEDICAL DECISION MAKING     RECOMMENDATIONS:  Increase minute ventilation on ventilator to reduce pCO2     Cont Diamox   Recommend holding further diuresis today     Increase free water flushes to 400mL q4h     Renally dose medications, avoid unnecessary nephrotoxins, avoid unnecessary radiographic contrast, daily renal panel, strict I/O, daily standing weights      ASSESSMENT:  Noelle Gomez is a 59 year old w h/o HTN who presented to outside hospital w AHFR, acute HF & PE. Transferred here for HF management. She has been aggressively diuresed and since developed metabolic derangements.  Nephrology consulted for metabolic derangements     Concurrent metabolic alkalosis and respiratory acidosis  On 9/26: ABG 7.42/77/67; bicarb 47  It appears she has COPD and likely has chronic hypercarbia  Her metabolic alkalosis is likely 2/2 aggressive diuresis.  She was given Diamox (9/24-->) which has not improved her serum bicarb. This is likely 2/2 her inability to self regulate her ventilation (blow off her CO2), because of this she has remained hypercarbic. Additionally, her kidneys may be trying to compensate for her hypercarbia by holding on to bicarb, thus making her metabolic alkalosis difficult to treat  Increasing her minute ventilation and removing more CO2 may allow the Diamox to be more effective in removing bicarb      VIGNESH  sCr on admit 1.3.  Peak sCr 1.56.  Baseline sCr 0.9  May represent hypercreatinemia in setting of aggressive diuresis, but may also be indication of overdiuresis  Her hypernatremia and physical exam findings are suggestive of overdiuresis, but her elevated CVP suggests she may have more intravascular fluid, her CVP measurement may be confounded by the possibility of CTEPH but will defer this interpretation to cardiology  A diuretic holiday may be of benefit to see where her sCr trend goes      Hypernatremia   Likely 2/2 aggressive diuresis. As  of 9/27 she has a 2.8L free water deficit     Recommendations were communicated to primary team via note     Seen and discussed with Dr. Jaciel Zeng MD   Division of Renal Disease and Hypertension  [facility]  [email or web address]  Vocera Web Console    SUBJECTIVE     Interval History :   Nursing and provider notes from last 24 hours reviewed.  In the last 24 hours Noelle Gomez   Continues to have high pCO2. Continued diuresis, but less net negative than prior    A comprehensive review of systems was negative except as noted above.    OBJECTIVE     Physical Exam:   I/O last 3 completed shifts:  In: 4696.47 [I.V.:2111.47; NG/GT:1745]  Out: 4965 [Urine:4765; Stool:200]   Pulse 95   Temp 98.8  F (37.1  C)   Resp 22   Wt 115.3 kg (254 lb 3.1 oz)   SpO2 99%   BMI 46.49 kg/m       General:supine  HEENT: ET tube in place  Cardiac:rrr  Pulmonary:bilateral chest rise  Abdominal:nondistended  Extremities:no LE edema, very wrinkled skin  Neuro:sedated  Access:R PICC, R internal jugular CVC    Labs:   All labs reviewed by me  Electrolytes/Renal -   Recent Labs   Lab Test 09/27/24  0942 09/27/24  0809 09/27/24  0808 09/27/24  0515 09/27/24  0454 09/27/24  0059 09/26/24  2313 09/26/24  2114 09/26/24  1602   *  --   --   --  152*  --  152*  --  154*   POTASSIUM 3.7  --  2.3*  --  3.6  --  3.3*  --  3.2*  3.2*   CHLORIDE 96*  --   --   --  95*  --  97*  --  95*   CO2 44*  --   --   --  43*  --  44*  --  44*   BUN 76.6*  --   --   --  76.4*  --  70.7*  --  70.9*   CR 1.83*  --   --   --  1.83*  --  1.67*  --  1.70*   * 104*  --  134* 138*   < > 140*   < > 143*   BEN 9.2  --   --   --  9.3  --  8.8  --  9.3   MAG  --   --   --   --  2.2  --  2.0  --  2.2   PHOS 4.2  --   --   --  4.2  --  3.8  --  3.7    < > = values in this interval not displayed.       CBC -   Recent Labs   Lab Test 09/27/24  0454 09/26/24  1602 09/26/24  0403   WBC 11.0 11.7* 11.5*   HGB 7.2* 7.3* 7.5*    194 204       LFTs -   Recent  Labs   Lab Test 09/27/24  0942 09/27/24  0454 09/26/24  2313   ALKPHOS 55 54 50   BILITOTAL 0.9 0.9 0.9   ALT 18 17 14   AST 32 30 27   PROTTOTAL 7.0 6.8 6.3*   ALBUMIN 3.3* 3.2* 3.1*       Iron Panel -   Recent Labs   Lab Test 09/25/24  0904   IRON 91   IRONSAT 45   *     Current Medications:  Current Facility-Administered Medications   Medication Dose Route Frequency Provider Last Rate Last Admin    acetaZOLAMIDE (DIAMOX) injection 1,000 mg  1,000 mg Intravenous Q8H Chalino De La Torre MD   1,000 mg at 09/27/24 0659    chlorhexidine (PERIDEX) 0.12 % solution 15 mL  15 mL Swish & Spit BID Sven Lynn MD   15 mL at 09/27/24 0853    insulin aspart (NovoLOG) injection (RAPID ACTING)  1-7 Units Subcutaneous Q4H Sven Lynn MD   1 Units at 09/27/24 0101    ipratropium - albuterol 0.5 mg/2.5 mg/3 mL (DUONEB) neb solution 3 mL  3 mL Nebulization 4x daily Travis Rosa MD   3 mL at 09/27/24 1146    multivitamins w/minerals liquid 15 mL  15 mL Per Feeding Tube Daily Travis Rosa MD   15 mL at 09/27/24 0853    pantoprazole (PROTONIX) IV push injection 40 mg  40 mg Intravenous BID Travis Rosa MD   40 mg at 09/27/24 0853    piperacillin-tazobactam (ZOSYN) 4.5 g in D5W 100 mL intermittent infusion  4.5 g Intravenous Q6H Chalino De La Torre  mL/hr at 09/27/24 1028 4.5 g at 09/27/24 1028    Prosource TF20 ENfit Compatibl EN LIQD (PROSOURCE TF20) packet 60 mL  1 packet Per Feeding Tube TID Travis Rosa MD   60 mL at 09/27/24 0854    sildenafil (REVATIO) suspension 20 mg  20 mg Oral or Feeding Tube Q8H Travis Rosa MD         Current Facility-Administered Medications   Medication Dose Route Frequency Provider Last Rate Last Admin    bumetanide (BUMEX) 0.25 mg/mL infusion  1 mg/hr Intravenous Continuous Lev Maharaj MD 6 mL/hr at 09/27/24 1000 1.5 mg/hr at 09/27/24 1000    dextrose 10% infusion   Intravenous Continuous PRN [place]  Travis Prabhakar MD        fentaNYL (SUBLIMAZE) infusion   mcg/hr Intravenous Continuous Travis Rosa MD 4 mL/hr at 09/27/24 1000 200 mcg/hr at 09/27/24 1000    heparin 25,000 units in 0.45% NaCl 250 mL ANTICOAGULANT infusion  0-5,000 Units/hr Intravenous Continuous Travis Rosa MD 20 mL/hr at 09/27/24 1000 2,000 Units/hr at 09/27/24 1000    medication instruction   Does not apply Continuous PRN Travis Rosa MD        norepinephrine (LEVOPHED) 16 mg in  mL infusion MAX CONC CENTRAL LINE  0.01-0.6 mcg/kg/min (Dosing Weight) Intravenous Continuous Sven Lynn MD 8.2 mL/hr at 09/27/24 1000 0.07 mcg/kg/min at 09/27/24 1000    vasopressin 1 unit/mL MAX Conc (PITRESSIN) infusion  0.5-4 Units/hr Intravenous Continuous Chalino De La Torre MD 1.8 mL/hr at 09/27/24 1000 1.8 Units/hr at 09/27/24 1000     Jey Zeng MD

## 2024-09-27 NOTE — PROGRESS NOTES
Cardiology ICU Note    09/27/2024    Nolele Gomez MRN# [id number]   Age: 59 year old    Critical Care  Yadiel Priest    I personally saw and examined this patient  with fellowand agree with the findings, assessment, and plan as outlined by the housestaff this day.  The patient remains in the ICU secondary to advanced circulatory disease characterized by shock, the need for parenteral  inotropic agents and/or vasopressors to maintain blood pressure and organ perfusion., the careful assessment of renal function in response to low cardiac output state and need for substantial volumes of fluid for drug administration.  I personally assessed.  the logic and continuity of care plan over the preceding twenty four hours, assessed the continuing need for parenteral and oral medications and their appropriate dosing in the context of circulatory status and renal function.  I reviewed all interim laboratory and imaging.  I coordinated care with nursing staff and consultants. I disucssed the patient's status with the patient and or family.   I personally coordinated care with hospital infection control, microbiology laboratory YOB: 1965          H&P:   Noelle Gomez is a 59 year old female who was admitted on 9/23/2024. She has a PMH of asthma, 40 pack year former smoker, HTN, who initially presented to [facility] on 9/16 for acute on chronic hypoxic/hypercapnic respiratory failure thought to be related to decompensated heart failure. She was intubated quickly after admission due to worsening hypercapnea and AMS. TTE at that time revealed severe RV volume and pressure overload. VQ scan obtained (due to VIGNESH) revealed multiple bilateral wedge shaped perfusion defects. She was then transferred to [facility] ICU for thrombectomy. She also was noted to have a LE DVT, subsequently started on a heparin drip. During thrombectomy, she was noted to have mainly chronic clot with only minimal acute  clot that was extracted. PAP reduced from 77/33 (mean 47), to 68/19 (mean 34). 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 She unfortunately later developed a GI bleed with melena and a hemoglobin drop from 12->6.7 over 2 days now s/p 1u pRBC. She subsequently required pressor support with levo and vaso, but vaso was weaned off and she remains on low dose levophed. She was then transferred to [facility] for further evaluation and management.          Assessment and Plan:     09/27: Patient continues mechanical ventilated and with vasopressors support. We continue facing the acid base disorder on top of volume overload. Today we are planning to continue diuresing and trying to tackle the metabolic alkalosis with Diamox. I personally called family to give update, planning to proceed with care conference in the next couple of days. Quantiferon is indeterminate, infectious control is on board.     Changes/Updates  >Bumetanide 1.5 mg/h  > Continue diamox 1g TID - for metabolic alkalosis  > Continue empiric ATB coverage  > Sputum AFB stain per infection control protocol. 3 negative in a range of 8-24h.   > ID on board  > Discontinue NO, increasing sildenafil to 20mg TID    Neurology   # Acute toxic metabolic encephalopathy  Fentanyl + Verzed bolus as needed     Follow commands intermittently.     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 Course unfortunately complicated by GI bleed on heparin, so IVC filter was  placed. CVP transduced off PICC on admission is 24     CVP PA PVR PCWP CO/CI   09/23 11 78/29/42 5.53 12 5.43/2.46   09/24 17 76/42/59   6.3/2.7   09/25 20 83/48   10.1/4.4   09/26 12 75/34   7.7/3.4   09/27 13 77/32/54  28 9.9/4.4     Volume: Hypervolemic   Contractility:   > RV: RV is getting overload after decreasing NO. Planning to diurese as much as possible.   > LV functions is preserved.     Meds:  Inotrope: None   Pulmonary vasodilators: Sildenafil 20mg TID.   Vasopressors: Levophed 0.03 and Vasopressin 1.8  Diuresis: Bumetanide 1.5 + Diamox 1000mg TID  Anticoagulation: Heparin for CTEPH.       Plan:  >Bumetanide 1.5 mg/h  >Sildenafil 20mg TID.      Pulmonary    FiO2 (%): 70 %, Resp: 22, Vent Mode: CMV/AC, Resp Rate (Set): 22 breaths/min, Tidal Volume (Set, mL): 450 mL, PEEP (cm H2O): 8 cmH2O, Resp Rate (Set): 22 breaths/min, Tidal Volume (Set, mL): 450 mL, PEEP (cm H2O): 8 cmH2O     # Acute Hypoxic and hypercapnic respiratory failiure 2/2  # CETPH  # LORA/OHS  # ?Previously treated CAP - Zozyn/Ceftriaxone/Doxy.  # Cavitary lesion on CT - Emphysema/Infarction    # Sepsis - per SOFA>2 secondary to Pneumonia   # TB rule out   -Presented AoC hypoxic/hypercapnic respiratory failure thought to be due to HF, now found to be due to PH 2/2 CTEPH.   ===PLAN===  -Plan for CTEPH/RV overload as above  -Vent management per CICU team   > Ordering ID/Pulmonology infections/ILD/Rheumatology etiologies work up.         Gastrointestinal, Nutrition  #Upper GI bleed in the setting of heparin use  #S/P Clipping duodenal ulcer on 09/23  Completed 72h of PPI on 09/26 - Starting 40mg BID after GI recommendations.     Plan:   Continue tube feedings  Pantoprazole 40mg  IV BID       Renal, Electrolytes  # Hypokalemia   # VIGNESH 2/2 cardiogenic shock from CTEPH  # Metabolic alkalosis with appropriate respiratory acidosis.     At this point we are facing Metabolic alkalosis appropriately compensated by respiratory acidosis, pH is WNL. The  most likely trigger of the metabolic alkalosis (on top of her chronic compensating metabolic alkalosis due to COPD/lung disease) is the loop diuretics. Diamox has not been successful in fixing the alkalosis probably because the trigger still running (diuresis).      Plan:  Continue diuresis as above.   We might adjust the diuresis plan in the future.     Infectious Disease  # Sepsis - per SOFA>2 secondary to Pneumonia   # TB rule out - currently on isolation   Per ID We would recommend at this point to get a bronchoscopy with BAL and send a full panel if this is possible per pulmonology considering her severe emphysema.     09/27: Quantiferon TB indeterminate. Sputum AFB stain per infection control protocol. 3 negative in a range of 8-24h.     Plan:  Continuze zozyn (09/25-  Sputum AFB stain per infection control protocol      Hematology  # Left femoral DVT  # Family history of Factor 5 Leiden  # CTEPH  #SLE anticoagulant positive     -Patient presenting with hypoxia and found to have both a DVT and chronic bilateral pulmonary emboli. Her eldest son has history of factor 5 Leiden.     Plan:  Hematology working up antiphospholipid syndrome.   Continue heparin      Endocrinology  # Hyperglycemia  - insulin prn    ICU Checklist:  #Feeding: To be started after OG tube.   #Analgesia: fentanyl  #Sedation: precedex/propofol  #Thromboembolism ppx: mechanical (GI bleed)  #HOB: 30 degrees   #Ulcer ppx: PPI BID  #Glucose: insulin PRN  #SBT/SAT: n/a  #Bowel reg: miralax/senna PRN  #Family: , 2 sons  #Dispo: CCU    Code Status: Full    Discussed with Dr Priest attending physician,    Travis Rosa MD  Cardiology Fellow          Medications:            Past Medical History:     Past Medical History:   Diagnosis Date    Asthma 08/14/2023    Bilateral lower extremity edema 07/14/2021    Former smoker 06/23/2021    HTN (hypertension) 07/14/2021    Lymphedema 02/08/2022    Prediabetes 06/24/2021    Seasonal allergies  07/14/2021    UPJ (ureteropelvic junction) obstruction 03/04/2015    Vitamin D deficiency 06/23/2021            Family History:   Unable to obtain due to patients medical condition.         Social History:   Unable to obtain due to patients medical condition.         Past Surgical History:   Unable to obtain due to patients medical condition.         Review of Systems:   Unable to obtain due to patients medical condition.            Physical Exam:   Pulse 88   Temp 99.1  F (37.3  C)   Resp 22   Wt 115.3 kg (254 lb 3.1 oz)   SpO2 95%   BMI 46.49 kg/m        GENERAL: Intubated, sedated. NAD.  HEENT: No icterus. ETT in place, OG tube in place.  CARDIOVASCULAR: RRR. Normal S1 and S2.  RESP: Coarse bilaterally. Mechanical ventilation.    GI Soft, bowel sounds hypoactive but present.  GENITOURINARY: Miller in place.  EXTREMITIES: edematous, warm and well perfused  NEURO: Sedated and intubated.  INTEGUMENTARY: No rashes. Cannula/Line sites CDI.      Resp: 22 SpO2: 95 % O2 Device: Mechanical Ventilator      Arterial Blood Gas:   Recent Labs   Lab 09/27/24  0947 09/27/24  0808 09/27/24  0454 09/26/24  2313 09/26/24  1432 09/26/24  1326 09/26/24  1052   PH 7.40 7.39  --   --   --  7.42 7.43   PCO2 79* 79*  --   --   --  77* 76*   PO2 117* 92  --   --   --  67* 66*   HCO3 49* 48*  --   --   --  50* 51*   O2PER 60 70  80 80 80   < > 70 60    < > = values in this interval not displayed.        Vitals:    09/24/24 0315 09/25/24 0020 09/26/24 0400   Weight: 122.4 kg (269 lb 13.5 oz) 120.4 kg (265 lb 6.9 oz) 115.3 kg (254 lb 3.1 oz)   I/O last 3 completed shifts:  In: 4696.47 [I.V.:2111.47; NG/GT:1745]  Out: 4965 [Urine:4765; Stool:200]  Recent Labs   Lab 09/27/24  0942 09/27/24  0809 09/27/24  0808 09/27/24  0515 09/27/24  0454   *  --   --   --  152*   POTASSIUM 3.7  --  2.3*  --  3.6   CHLORIDE 96*  --   --   --  95*   CO2 44*  --   --   --  43*   ANIONGAP 12  --   --   --  14   * 104*  --    < > 138*   BUN  "76.6*  --   --   --  76.4*   CR 1.83*  --   --   --  1.83*   BEN 9.2  --   --   --  9.3    < > = values in this interval not displayed.     No components found for: \"URINE\"   Recent Labs   Lab 09/27/24  0942 09/27/24  0454 09/26/24  2313   AST 32 30 27   ALT 18 17 14   BILITOTAL 0.9 0.9 0.9   ALBUMIN 3.3* 3.2* 3.1*   PROTTOTAL 7.0 6.8 6.3*   ALKPHOS 55 54 50     Temp: 99.1  F (37.3  C) Temp src: EsophagealTemp  Min: 97.3  F (36.3  C)  Max: 100.6  F (38.1  C)   Recent Labs   Lab 09/27/24  0454 09/26/24  1602 09/26/24  0403 09/25/24  2020 09/25/24  1557 09/25/24  0341   WBC 11.0 11.7* 11.5*  --  9.0 10.5   HGB 7.2* 7.3* 7.5* 7.6* 6.8* 7.0*   HCT 25.8* 25.4* 25.8*  --  23.8* 24.5*   * 119* 117*  --  121* 121*   RDW 20.4* 21.2* 21.7*  --  19.2* 19.3*    194 204  --  183 170     Recent Labs   Lab 09/23/24  1931   INR 1.23*       Recent Labs   Lab 09/27/24  0942 09/27/24  0809 09/27/24  0515 09/27/24  0454 09/27/24  0059   * 104* 134* 138* 140*     I have reviewed today's vital signs, notes, medications, labs and imaging.  I have also seen and examined the patient and agree with the findings and plan as outlined above. Pt is 58 yo WF with hx of tobacco abuse who presents following transfer with PAH, moderate RV dysfn, severe RV dilation, intubated and sedated with CXR significant for right lung opacificaiton concerning for aspiration pneumonia with pulmonary edema.  Pt on vasopressen and low dose NO with CVP 20 and PA 83/48 with CI 4.4.  Pt also with hx of CTEPH and DVT with IVC filter placed.  Will add abx and continue diuresis with diamox.  Pt critically ill who was seen X3 for total critical care time 50 min.     Chalino De La Torre MD, PhD  Professor, Heart Failure and Cardiac Transplantation  [facility]       "

## 2024-09-27 NOTE — PROGRESS NOTES
Cardiology ICU Progress Note    Brief HPI:  Noelle Gomez is a 59 year old female admitted on 9/23/2024. She has a PMH of asthma, 40 pack year former smoker, HTN, who initially presented to [facility] on 9/16 for acute on chronic hypoxic/hypercapnic respiratory failure thought to be related to decompensated heart failure. Pt required intubation on initial day of admission on 9/16/24. TTE was done revealing RV volume and pressure overload. VQ scan was obtained in the setting of VIGNESH revealing multiple bilateral wedge shaped perfusion defects. Also found to LE DVT. Anticoagulation initiated. Patient underwent thrombectomy at [facility] and found to have mainly chronic clot with few acute emboli. PA pressures significantly elevated. Patient temporarily started on epoprostenol. After several days of anticoagulation hgb dropped. Patient started requiring vasopressors. Transferred to our facility for further management. EGD undergone on arrival revealing duodenal ulcer requiring 2 clips. RHC done also revealing pre-capillary pHTN and a ELIO of 5.         Assessment and plan by system:     Neurology   # Acute toxic metabolic encephalopathy    -Weaning sedation as able  -RAAS goal -2     Cardiovascular  #Cardiogenic shock  #RH failure  #Group IV pHTN CTEPH  #s/p IVC filter  #DVT  TTE 9/17/24: EF 65%, moderately reduced RV systolic function, RVSP >55mmHg, mod TR  RHC: RA 11, RV: 79/13, PA 78/29 (42), PCW: 12, CO/CI (shu): 5.43/2.46, PVR 5.53    - discontinue Nitric oxide, sildenafil 10mg tid  - Vasopressors: vasopressin, levo as needed  - bumex gtt 1mg/hr  - heparin gtt  - Monitor hemodynamic status.         Pulmonary  # Acute hypoxic respiratory failure  # Cavitary lesion  FiO2 70%, PEEP 8, RR 20, Vol 450    - Recommend diamox 1000mg tid  - Plan to wean PEEP and FiO2 for possible extubation tomorrow, will be high risk  -Bronchoscopy performed 9/26, culture results pending        Gastrointestinal,  Nutrition  # Duodenal ulcer  # Acute GI bleed    - receiving tube feedings as able  - Nutrition consulted, appreciate recommendations  - PPI per GI recommendation  - Bowel regimen: senna prn     Renal, Electrolytes  # VIGNESH - improving    - ICU electrolyte replacement protocol  - Strict I&Os  - continue monitoring closely    Infectious Disease  #CAP-previously treated  - Currently on broad spectrum antibiotics  - Cultures from bronchoscopy 9/26 pending    Hematology  #Acute blood loss anemia    - Hgb goal > 7.0  - Received 1u PRBC overnight  - heparin gtt        Endocrinology  # Hyperglycemia    -Insulin medium dose correction q4h    Integumentary:  - No skin issues        Patient seen and discussed with staff physician.      Objective:  Most recent vital signs:  Pulse 86   Temp 100  F (37.8  C) (Esophageal)   Resp 20   Wt 115.3 kg (254 lb 3.1 oz)   SpO2 98%   BMI 46.49 kg/m    Temp:  [97.3  F (36.3  C)-100.6  F (38.1  C)] 100  F (37.8  C)  Pulse:  [] 86  Resp:  [17-28] 20  MAP:  [58 mmHg-84 mmHg] 73 mmHg  Arterial Line BP: ()/(40-62) 115/50  FiO2 (%):  [60 %-80 %] 80 %  SpO2:  [90 %-100 %] 98 %  Wt Readings from Last 2 Encounters:   09/26/24 115.3 kg (254 lb 3.1 oz)   09/22/24 124.8 kg (275 lb 1.6 oz)       Intake/Output Summary (Last 24 hours) at 9/24/2024 0657  Last data filed at 9/24/2024 0600  Gross per 24 hour   Intake 2756.78 ml   Output 7840 ml   Net -5083.22 ml     Physical exam:  General: In bed, in NAD  HEENT: PERRL, no scleral icterus or injection  CARDIAC: RRR, no m/r/g appreciated. Peripheral pulses dopplered  RESP: Mechanical ventilation; CTAB, no wheezes, rhonchi or crackles appreciated.  GI: soft, BS hypoactive  : Miller  EXTREMITIES: bilateral edema  SKIN: No acute lesions appreciated  NEURO: Intubated and sedated    Labs (Past three days):  CBC  Recent Labs   Lab 09/27/24  0454 09/26/24  1602 09/26/24  0403 09/25/24 2020 09/25/24  1557   WBC 11.0 11.7* 11.5*  --  9.0   RBC 2.13*  2.13* 2.20*  --  1.96*   HGB 7.2* 7.3* 7.5* 7.6* 6.8*   HCT 25.8* 25.4* 25.8*  --  23.8*   * 119* 117*  --  121*   MCH 33.8* 34.3* 34.1*  --  34.7*   MCHC 27.9* 28.7* 29.1*  --  28.6*   RDW 20.4* 21.2* 21.7*  --  19.2*    194 204  --  183     BMP  Recent Labs   Lab 09/27/24  0454 09/27/24  0059 09/26/24  2313 09/26/24  2114 09/26/24  1602 09/26/24  1553 09/26/24  1326 09/26/24  1147 09/26/24  0931 09/26/24  0756 09/26/24  0403   *  --  152*  --  154*  --   --   --  156*  --  154*   POTASSIUM 3.6  --  3.3*  --  3.2*  3.2*  --  3.5  --  3.3*  --  3.6   CHLORIDE 95*  --  97*  --  95*  --   --   --  97*  --  95*   CO2 43*  --  44*  --  44*  --   --   --  47*  --  47*   ANIONGAP 14  --  11  --  15  --   --   --  12  --  12   * 140* 140* 141* 143*   < >  --    < > 167*   < > 147*   BUN 76.4*  --  70.7*  --  70.9*  --   --   --  63.4*  --  61.6*   CR 1.83*  --  1.67*  --  1.70*  --   --   --  1.56*  --  1.50*   GFRESTIMATED 31*  --  35*  --  34*  --   --   --  38*  --  40*   BEN 9.3  --  8.8  --  9.3  --   --   --  9.5  --  9.7   MAG 2.2  --  2.0  --  2.2  --   --   --   --   --  2.3   PHOS 4.2  --  3.8  --  3.7  --   --   --   --   --  3.6    < > = values in this interval not displayed.     Troponins:     INR  Recent Labs   Lab 09/23/24  1931   INR 1.23*     Liver panel  Recent Labs   Lab 09/27/24  0454 09/26/24  2313 09/26/24  1602 09/26/24  0931   PROTTOTAL 6.8 6.3* 6.7 6.7   ALBUMIN 3.2* 3.1* 3.2* 3.3*   BILITOTAL 0.9 0.9 1.0 1.2   ALKPHOS 54 50 57 63   AST 30 27 25 27   ALT 17 14 15 17       Imaging/procedure results:  XR Chest Port 1 View  RESIDENT PRELIMINARY INTERPRETATION  Impression:   1. Stable support devices.  2. Similar bilateral mixed interstitial and airspace opacities, with  increased opacities in the left lung base.  3. Unchanged left pleural effusion.

## 2024-09-27 NOTE — PROGRESS NOTES
Care Management Follow Up    Length of Stay (days): 4    Expected Discharge Date: 09/29/2024     Concerns to be Addressed:  Care conference     Patient plan of care discussed at interdisciplinary rounds: Yes    Anticipated Discharge Disposition:  (TBD)      Anticipated Discharge Services:  (TBD)  Anticipated Discharge DME:  (TBD)    Additional Information:  Cards 2 team requested care conf. to be arranged with the family to provide update and discuss the plan of care.    RNCC called pt Scar perez # [phone] who is the primary contacted.  Scar stated the team just called him and updated him.  Scar stated they all are working today but will ask when they will be available to meet in person or by phone.  Scar agreed to call back RNCC once he talk to his family.    Next Steps:   Need to arrange care conf.  Awaiting a call back from pt sonScar with their availability.  RNCC will cont to follow the plan of care.      Fausto Naranjo RN, PHN, BSN  4A and 4E/ ICU  Care Coordinator  Phone: [phone]  Pager: [phone]      SEARCHABLE in [facility] - search CARE COORDINATOR       [place] & [place] ([phone]) Saturday & Sunday; ([phone]) [facility] Recognized Holidays     Units: 5A Onc Vocera & 5C Vocera Pager: [phone]    Units: 6B Vocera & 6C Vocera  Pager: [phone]    Units: 7A SOT RNCC Vocera, 7B Med Surg Vocera, & 7C Med Surg Vocera  Pager: [phone]    Units: 6A Vocera & 4A CVICU Vocera, 4C MICU Vocera, and 4E SICU Vocera   Pager: [phone]    Units: 5 Ortho Vocera & 5 Med Surg Vocera  Pager: [phone]    Units: 6 Med Surg Vocera & 8 Med Surg Vocera  Pager [phone]

## 2024-09-27 NOTE — PROGRESS NOTES
Hematology  Daily Progress Note   Date of Service: 09/27/2024    Patient: Noelle Gomez  MRN: [id number]  Admission Date: 9/23/2024  Hospital Day # Hospital Day: 5      Initial Reason for Consult:  Anticoagulation management.     Assessment & Plan:   Noelle Gomez is a 60 yo F with medical history remarkable for morbid obesity, prediabetes, 40 pack years smoking, lymphedema, lower extremity stasis, prior lower extremity ultrasounds on 10/2023 negative admitted on 9/16 with acute on chronic respiratory failure, course complicated by altered mental status requiring intubation.  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  She is s/p thrombectomy with mostly chronic clot and resection of small amount of clot and only mild decrease in PA pressures still requiring vasopressor and ventilator support and continues diuresis.  Her course has been further complicated by gastritis, duodenal ulcers on EGD 9/23 s/p clip. Currently on hepatin gtt, without signs of rebleeding.     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 Clearly has had multiple venous thromboemboli events in the lungs with chronic appearance on thrombectomy as well as evidence of acute thrombosis at least in the L leg. Based on the diagnosis of CTEPH she qualifies for indefinite long term anticoagulation. We are completing hypercoagulability work up to help decided long-term anticoagulation plan. If antiphospholipid syndrome  is present, outcomes would be better with warfarin as opposed to DOAC.     Thus far, cardiolipin and b2 glycoprotein antibodies have  returned negative. LA is positive however this is highly non specific with an active clot and receiving heparin. Right now this is NOT APS, as we would need to repeat this labs in 12 weeks and possibly hold anticoagulation before retesting. Otherwise testing for inherited thrombophilia such as Factor V Leiden, a gain of function mutation causing activated protein C resistance ( NOT factor V activity ), and similar with prothrombin [id number] mutation, protein C or protein S deficiency, or antithrombin deficiency. The last 3 can be falsely low with active thrombosis. Also no e/o malignancy on recent CT CAP. Otherwise for now given her acute setting with recent GIB s/p clip would recommend continuing management with heparin and monitor closely for rebleed. Ultimately she might have a mixed picture of commorbidities and genetic precision leading to thrombosis.     In terms of her anemia, no iron deficiency from her blood work and no hemolysis as seen on peripheral smear without schistocytes and nl haptoglobin and even though her LDH is elevated, it is non specific in her case with her severe illness and the mentioned work up negative.     Recommendations:   - Continue heparin gtt for now, use  high intensity if tolerated   - Continue to monitor daily CBC with diff, reticulocyte count  - Will need to repeat APS labs around January 2025 before we commit to APS diagnosis. In the mean time, might need to be managed with warfarin once medically status is stable.     Patient was seen and plan of care was discussed with attending physician Dr. Montalvo.    We will continue to follow this patient. Please don't hesitate to contact the Fellow On-Call with questions.    Kristen Mehta MD  Hematology and Medical Oncology Fellow, PGY-4  Pager:  [phone]  Attending  The patient was seen and examined by me separate from the resident/fellow provider.The note above reflects my assessment and plan. I have personally reviewed  today's labs,vital and radiology results. The points of care that were added by me are:   No significant changes today  Family conference proposed regarding pt status  At this time we will sign off  but beaailable for any questions    Jose Montalvo M.D.   ________________________________________________________________    Subjective & Interval History:    No acute events noted overnight. On aggressive diuresis now developing hypernatremia, might get  diuresis holiday. On broad spectrum AB     Physical Exam:    Pulse 95   Temp 98.8  F (37.1  C)   Resp 22   Wt 115.3 kg (254 lb 3.1 oz)   SpO2 99%   BMI 46.49 kg/m      Gen: Intubated and sedated   CV: Normal rate, regular rhythm.     Labs & Studies: I personally reviewed the following studies:  ROUTINE LABS (Last four results):  CMP  Recent Labs   Lab 09/27/24  1230 09/27/24  1215 09/27/24  0942 09/27/24  0809 09/27/24  0808 09/27/24  0515 09/27/24  0454 09/27/24  0059 09/26/24  2313 09/26/24  2114 09/26/24  1602 09/26/24  0756 09/26/24  0403   NA  --   --  152*  --   --   --  152*  --  152*  --  154*   < > 154*   POTASSIUM  --  2.0* 3.7  --  2.3*  --  3.6  --  3.3*  --  3.2*  3.2*   < > 3.6   CHLORIDE  --   --  96*  --   --   --  95*  --  97*  --  95*   < > 95*   CO2  --   --  44*  --   --   --  43*  --  44*  --  44*   < > 47*   ANIONGAP  --   --  12  --   --   --  14  --  11  --  15   < > 12   *  --  159* 104*  --  134* 138*   < > 140*   < > 143*   < > 147*   BUN  --   --  76.6*  --   --   --  76.4*  --  70.7*  --  70.9*   < > 61.6*   CR  --   --  1.83*  --   --   --  1.83*  --  1.67*  --  1.70*   < > 1.50*   GFRESTIMATED  --   --  31*  --   --   --  31*  --  35*  --  34*   < > 40*   BEN  --   --  9.2  --   --   --  9.3  --  8.8  --  9.3   < > 9.7   MAG  --   --   --   --   --   --  2.2  --  2.0  --  2.2  --  2.3   PHOS  --   --  4.2  --   --   --  4.2  --  3.8  --  3.7  --  3.6   PROTTOTAL  --   --  7.0  --   --   --  6.8  --  6.3*  --  6.7   <  > 6.8   ALBUMIN  --   --  3.3*  --   --   --  3.2*  --  3.1*  --  3.2*   < > 3.3*   BILITOTAL  --   --  0.9  --   --   --  0.9  --  0.9  --  1.0   < > 1.2   ALKPHOS  --   --  55  --   --   --  54  --  50  --  57   < > 62   AST  --   --  32  --   --   --  30  --  27  --  25   < > 30   ALT  --   --  18  --   --   --  17  --  14  --  15   < > 15    < > = values in this interval not displayed.     CBC  Recent Labs   Lab 09/27/24  0454 09/26/24  1602 09/26/24  0403 09/25/24 2020 09/25/24  1557   WBC 11.0 11.7* 11.5*  --  9.0   RBC 2.13* 2.13* 2.20*  --  1.96*   HGB 7.2* 7.3* 7.5* 7.6* 6.8*   HCT 25.8* 25.4* 25.8*  --  23.8*   * 119* 117*  --  121*   MCH 33.8* 34.3* 34.1*  --  34.7*   MCHC 27.9* 28.7* 29.1*  --  28.6*   RDW 20.4* 21.2* 21.7*  --  19.2*    194 204  --  183     INR  Recent Labs   Lab 09/23/24  1931   INR 1.23*

## 2024-09-28 NOTE — PROGRESS NOTES
Cardiology ICU Progress Note    Brief HPI:  Noelle Gomez is a 59 year old female admitted on 9/23/2024. She has a PMH of asthma, 40 pack year former smoker, HTN, who initially presented to [facility] on 9/16 for acute on chronic hypoxic/hypercapnic respiratory failure thought to be related to decompensated heart failure. Pt required intubation on initial day of admission on 9/16/24. TTE was done revealing RV volume and pressure overload. VQ scan was obtained in the setting of VIGNESH revealing multiple bilateral wedge shaped perfusion defects. Also found to LE DVT. Anticoagulation initiated. Patient underwent thrombectomy at [facility] and found to have mainly chronic clot with few acute emboli. PA pressures significantly elevated. Patient temporarily started on epoprostenol. After several days of anticoagulation hgb dropped. Patient started requiring vasopressors. Transferred to our facility for further management. EGD undergone on arrival revealing duodenal ulcer requiring 2 clips. RHC done also revealing pre-capillary pHTN and a ELIO of 5.         Assessment and plan by system:     Neurology   # Acute toxic metabolic encephalopathy  -Recommend use of either ketamine or versed gtt along with fentanyl to assist with sedation. Weaning from opioids will help with patient's hypercarbia   -Weaning sedation as able  -RAAS goal -2     Cardiovascular  #Cardiogenic shock  #RH failure  #Group IV pHTN CTEPH  #s/p IVC filter  #DVT  TTE 9/17/24: EF 65%, moderately reduced RV systolic function, RVSP >55mmHg, mod TR  RHC: RA 11, RV: 79/13, PA 78/29 (42), PCW: 12, CO/CI (shu): 5.43/2.46, PVR 5.53  - discontinue Nitric oxide, sildenafil 10mg tid  - Vasopressors: vasopressin, levo as needed  - bumex gtt per primary team  - heparin gtt  - Monitor hemodynamic status.         Pulmonary  # Acute hypoxic respiratory failure  # Cavitary lesion  FiO2 70%, PEEP 8, RR 20, Vol 450  - Continue diamox 1000mg tid  - Plan to  wean PEEP and FiO2 for possible extubation tomorrow, will be high risk  -Bronchoscopy performed 9/26, culture results pending        Gastrointestinal, Nutrition  # Duodenal ulcer  # Acute GI bleed  S/p repeat EGD with addl clips and hemostasis somewhat achieved. Fibrotic lesion that may rebleed- if this is the case, GI is recommending IR INVOLVMENT  - receiving tube feedings as able  - Nutrition consulted, appreciate recommendations  - PPI per GI recommendation  - Bowel regimen: senna prn     Renal, Electrolytes  # VIGNESH, stable  - ICU electrolyte replacement protocol  - Strict I&Os  - continue monitoring closely    Infectious Disease  #CAP-previously treated  - Currently on broad spectrum antibiotics  - Cultures from bronchoscopy 9/26 pending    Hematology  #Acute blood loss anemia  - Hgb goal > 7.0  - Received 1u PRBC overnight  - heparin gtt        Endocrinology  # Hyperglycemia  -Insulin medium dose correction q4h    Integumentary:  - No skin issues      Patient seen and discussed with staff physician.    Ethan Doe MD  Cardiology Fellow, PGY-4  Pager: [phone]        Objective:  Most recent vital signs:  Pulse 85   Temp 97.2  F (36.2  C)   Resp 22   Wt 116.3 kg (256 lb 6.3 oz)   SpO2 100%   BMI 46.90 kg/m    Temp:  [94.6  F (34.8  C)-100.2  F (37.9  C)] 97.2  F (36.2  C)  Pulse:  [] 85  Resp:  [15-27] 22  MAP:  [57 mmHg-94 mmHg] 77 mmHg  Arterial Line BP: ()/(40-74) 114/52  FiO2 (%):  [60 %-70 %] 70 %  SpO2:  [92 %-100 %] 100 %  Wt Readings from Last 2 Encounters:   09/28/24 116.3 kg (256 lb 6.3 oz)   09/22/24 124.8 kg (275 lb 1.6 oz)       Intake/Output Summary (Last 24 hours) at 9/24/2024 0657  Last data filed at 9/24/2024 0600  Gross per 24 hour   Intake 2756.78 ml   Output 7840 ml   Net -5083.22 ml     Physical exam:  General: In bed, in NAD  HEENT: PERRL, no scleral icterus or injection  CARDIAC: RRR, no m/r/g appreciated. Peripheral pulses dopplered  RESP: Mechanical ventilation;  CTAB, no wheezes, rhonchi or crackles appreciated.  GI: soft, BS hypoactive  : Miller  EXTREMITIES: bilateral edema  SKIN: No acute lesions appreciated  NEURO: Intubated and sedated    Labs (Past three days):  CBC  Recent Labs   Lab 09/28/24  0411 09/28/24  0240 09/27/24  2340 09/27/24  1650 09/27/24  0454   WBC 13.3* 19.4*  --  10.9 11.0   RBC 1.78* 2.54*  --  2.17* 2.13*   HGB 5.6* 8.1* 6.6* 7.3* 7.2*   HCT 18.9* 27.3*  --  26.0* 25.8*   * 108*  --  120* 121*   MCH 31.5 31.9  --  33.6* 33.8*   MCHC 29.6* 29.7*  --  28.1* 27.9*   RDW 26.9* 26.7*  --  19.9* 20.4*   * 195  --  175 191     BMP  Recent Labs   Lab 09/28/24  0420 09/28/24  0411 09/27/24  2301 09/27/24  2249 09/27/24  1656 09/27/24  1650 09/27/24  1433 09/27/24  1215 09/27/24  0942 09/27/24  0515 09/27/24  0454 09/27/24  0059 09/26/24  2313   NA  --  149*  --  152*  --  153*  --   --  152*  --  152*  --  152*   POTASSIUM  --  3.7  --  3.5  3.5  --  3.4 3.0*   < > 3.7   < > 3.6  --  3.3*   CHLORIDE  --  102  --  98  --  95*  --   --  96*  --  95*  --  97*   CO2  --  38*  --  42*  --  43*  --   --  44*  --  43*  --  44*   ANIONGAP  --  9  --  12  --  15  --   --  12  --  14  --  11   * 200* 151* 149*   < > 155*  --    < > 159*   < > 138*   < > 140*   BUN  --  87.6*  --  84.0*  --  81.3*  --   --  76.6*  --  76.4*  --  70.7*   CR  --  1.85*  --  1.70*  --  1.80*  --   --  1.83*  --  1.83*  --  1.67*   GFRESTIMATED  --  31*  --  34*  --  32*  --   --  31*  --  31*  --  35*   BEN  --  7.6*  --  9.3  --  9.6  --   --  9.2  --  9.3  --  8.8   MAG  --  2.2  --   --   --  2.7*  --   --   --   --  2.2  --  2.0   PHOS  --  4.9*  --   --   --  4.2  --   --  4.2  --  4.2  --  3.8    < > = values in this interval not displayed.     Troponins:     INR  Recent Labs   Lab 09/23/24  1931   INR 1.23*     Liver panel  Recent Labs   Lab 09/28/24  0411 09/27/24  2249 09/27/24  1650 09/27/24  0942   PROTTOTAL 4.3* 6.7 7.2 7.0   ALBUMIN 2.1* 3.2* 3.5 3.3*    BILITOTAL 0.9 0.8 0.9 0.9   ALKPHOS 35* 53 58 55   AST 21 30 33 32   ALT 11 16 19 18       Imaging/procedure results:  CTA Abdomen Pelvis with Contrast  RESIDENT PRELIMINARY INTERPRETATION  Impression:  1. No evidence of acute GI bleed.   2. Moderate right and small left pleural effusion with associated  compressive atelectasis.  3. Right greater than left basilar groundglass opacities, as well as  consolidative opacities within the right middle lobe and left lower  lobes, suspicious for infectious/inflammatory etiology.  4. Cholelithiasis without evidence of acute cholecystitis.  XR Chest Port 1 View  RESIDENT PRELIMINARY INTERPRETATION  Impression:   1. Stable support devices.  2. Similar bilateral mixed interstitial and airspace opacities, most  pronounced in the right lung and left lung base.  3. Small left and trace right pleural effusions.

## 2024-09-28 NOTE — PROGRESS NOTES
Cardiology ICU Note    09/28/2024    Noelle Gomez MRN# [id number]   Age: 59 year old    I personally saw and examined this patient with resident and fellow  on multiple occasions throughout the dayand supervised the administration of critical care for acute and chronic ventilator dependent respiratory failure, GI bleeding necessitating transfusion, care planning in according with patient wishes as expressed to  and son.  I agree with assessment and plan outlined below.  75 minutes   YOB: 1965          H&P:   Noelle Gomez is a 59 year old female who was admitted on 9/23/2024. She has a PMH of asthma, 40 pack year former smoker, HTN, who initially presented to [facility] on 9/16 for acute on chronic hypoxic/hypercapnic respiratory failure thought to be related to decompensated heart failure. She was intubated quickly after admission due to worsening hypercapnea and AMS. TTE at that time revealed severe RV volume and pressure overload. VQ scan obtained (due to VIGNESH) revealed multiple bilateral wedge shaped perfusion defects. She was then transferred to [facility] ICU for thrombectomy. She also was noted to have a LE DVT, subsequently started on a heparin drip. During thrombectomy, she was noted to have mainly chronic clot with only minimal acute clot that was extracted. PAP reduced from 77/33 (mean 47), to 68/19 (mean 34). 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 She unfortunately later developed a GI bleed with melena and a hemoglobin drop from 12->6.7 over 2 days now s/p 1u pRBC. She subsequently required pressor support with levo and vaso, but vaso was weaned off and she remains on low dose levophed. She was then transferred to [facility] for further evaluation and management.          Assessment and Plan:     09/28: Patient continues mechanical ventilated and with vasopressors support. Yesterday we  were able to wean her completely from NO, and working on weaning ventilator support. Still dealing with acid base disorders. Last night she started to have GI bleeding, received 4 units of PRBC, 1 PLT and 1 plasma. Hg dropped to 5.8 and most recent >7. GI to perform EGD this morning.  This morning swan marko showed normal right and left side filling pressures. CO/CI elevated, correlating with hyperdynamic state, hypovolemic shock. On levohed 0.08 and Vaso 3.     *Family meeting was done with  and son, they understood how sick Noelle Gomez is. They decided to make the patient DNR/DNI.     Changes/Updates  > Holding on diuresis - After stopping the bumetanide HCO3 decreasing.   > Adjust vent and acid base disorders   > Following GI recommendations     Neurology   # Acute toxic metabolic encephalopathy  Fentanyl + Verzed bolus as needed     Follow commands intermittently.     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 Course unfortunately complicated by GI bleed on heparin, so IVC filter was placed. CVP transduced off PICC on admission is 24     CVP PA PVR PCWP CO/CI   09/23 11 78/29/42 5.53 12 5.43/2.46   09/24 17 76/42/59   6.3/2.7   09/25 20 83/48   10.1/4.4   09/26 12 75/34   7.7/3.4   09/27 13 77/32/54  28 9.9/4.4   09/28 9 49/30  13 15.5/6.8     Volume: Hypovolemic  Contractility:   > RV: RV is getting overload after decreasing NO. Planning to diurese as much as possible.   > LV functions is preserved and probably hyperdynamic     Meds:  Inotrope: None   Pulmonary  vasodilators: Sildenafil 20mg TID.   Vasopressors: Levophed 0.08 and Vasopressin 3  Diuresis: Holding   Anticoagulation: Heparin for CTEPH.       Plan:  >Holding diuresis today      Pulmonary    FiO2 (%): 50 %, Resp: 24, Vent Mode: CMV/AC, Resp Rate (Set): (S) 24 breaths/min, Tidal Volume (Set, mL): 450 mL, PEEP (cm H2O): 5 cmH2O, Resp Rate (Set): (S) 24 breaths/min, Tidal Volume (Set, mL): 450 mL, PEEP (cm H2O): 5 cmH2O     # Acute Hypoxic and hypercapnic respiratory failiure 2/2  # CETPH  # LORA/OHS  # ?Previously treated CAP - Zozyn/Ceftriaxone/Doxy.  # Cavitary lesion on CT - Emphysema/Infarction    # Sepsis - per SOFA>2 secondary to Pneumonia   # TB rule out   -Presented AoC hypoxic/hypercapnic respiratory failure thought to be due to HF, now found to be due to PH 2/2 CTEPH.   ===PLAN===  -Plan for CTEPH/RV overload as above  -Vent management per CICU team   > Ordering ID/Pulmonology infections/ILD/Rheumatology etiologies work up.         Gastrointestinal, Nutrition  #Upper GI bleed in the setting of heparin use  #S/P Clipping duodenal ulcer on 09/23  Completed 72h of PPI on 09/26 - Starting 40mg BID after GI recommendations.     09/28 - Worsening GI bleeding, GI to perform EGD today     Plan:   Continues PPI   Following GI recommendations        Renal, Electrolytes  # Hypokalemia   # VIGNESH 2/2 cardiogenic shock from CTEPH  # Metabolic alkalosis with appropriate respiratory acidosis.     At this point we are facing Metabolic alkalosis appropriately compensated by respiratory acidosis, pH is WNL. The most likely trigger of the metabolic alkalosis (on top of her chronic compensating metabolic alkalosis due to COPD/lung disease) is the loop diuretics. Diamox has not been successful in fixing the alkalosis probably because the trigger still running (diuresis).      Plan:  Holding diuresis   We might adjust the diuresis plan in the future.     Infectious Disease  # Sepsis - per SOFA>2 secondary to Pneumonia   # TB rule  out - currently on isolation   Per ID We would recommend at this point to get a bronchoscopy with BAL and send a full panel if this is possible per pulmonology considering her severe emphysema.     09/27: Quantiferon TB indeterminate. Sputum AFB stain per infection control protocol. 3 negative in a range of 8-24h.     Plan:  Continuze zozyn (09/25-  Sputum AFB stain per infection control protocol      Hematology  # Left femoral DVT  # Family history of Factor 5 Leiden  # CTEPH  #SLE anticoagulant positive     -Patient presenting with hypoxia and found to have both a DVT and chronic bilateral pulmonary emboli. Her eldest son has history of factor 5 Leiden.     Plan:  Hematology working up antiphospholipid syndrome.   Continue heparin      Endocrinology  # Hyperglycemia  - insulin prn    #SKIN:  Right Heel Deep Tissue Pressure Injury POA     ICU Checklist:  #Feeding: To be started after OG tube.   #Analgesia: fentanyl  #Sedation: precedex/propofol  #Thromboembolism ppx: mechanical (GI bleed)  #HOB: 30 degrees   #Ulcer ppx: PPI BID  #Glucose: insulin PRN  #SBT/SAT: n/a  #Bowel reg: miralax/senna PRN  #Family: , 2 sons  #Dispo: CCU    Code Status: Full    Discussed with Dr Priest attending physician,    Travis Rosa MD  Cardiology Fellow          Medications:            Past Medical History:     Past Medical History:   Diagnosis Date    Asthma 08/14/2023    Bilateral lower extremity edema 07/14/2021    Former smoker 06/23/2021    HTN (hypertension) 07/14/2021    Lymphedema 02/08/2022    Prediabetes 06/24/2021    Seasonal allergies 07/14/2021    UPJ (ureteropelvic junction) obstruction 03/04/2015    Vitamin D deficiency 06/23/2021            Family History:   Unable to obtain due to patients medical condition.         Social History:   Unable to obtain due to patients medical condition.         Past Surgical History:   Unable to obtain due to patients medical condition.         Review of Systems:  "  Unable to obtain due to patients medical condition.            Physical Exam:   Pulse 81   Temp 97.9  F (36.6  C)   Resp 24   Wt 116.3 kg (256 lb 6.3 oz)   SpO2 94%   BMI 46.90 kg/m        GENERAL: Intubated, sedated. NAD.  HEENT: No icterus. ETT in place, OG tube in place.  CARDIOVASCULAR: RRR. Normal S1 and S2.  RESP: Coarse bilaterally. Mechanical ventilation.    GI Soft, bowel sounds hypoactive but present.  GENITOURINARY: Miller in place.  EXTREMITIES: edematous, warm and well perfused  NEURO: Sedated and intubated.  INTEGUMENTARY: No rashes. Cannula/Line sites CDI.      Resp: 24 SpO2: 94 % O2 Device: Mechanical Ventilator      Arterial Blood Gas:   Recent Labs   Lab 09/28/24  0937 09/28/24  0818 09/28/24  0817 09/28/24  0531 09/28/24  0411 09/27/24  2101   PH 7.33* 7.32*  --  7.31*  --  7.35   PCO2 77* 78*  --  81*  --  83*   PO2 59* 77*  --  113*  --  79*   HCO3 41* 40*  --  41*  --  45*   O2PER 50 70 70 70   < > 70    < > = values in this interval not displayed.        Vitals:    09/25/24 0020 09/26/24 0400 09/28/24 0215   Weight: 120.4 kg (265 lb 6.9 oz) 115.3 kg (254 lb 3.1 oz) 116.3 kg (256 lb 6.3 oz)   I/O last 3 completed shifts:  In: 6659.8 [I.V.:1697.8; NG/GT:1360; IV Piggyback:1500]  Out: 4810 [Urine:3310; Emesis/NG output:600; Stool:900]  Recent Labs   Lab 09/28/24  0936 09/28/24  0816 09/28/24  0420 09/28/24  0411   *  --   --  149*   POTASSIUM 3.9  --   --  3.7   CHLORIDE 102  --   --  102   CO2 38*  --   --  38*   ANIONGAP 11  --   --  9   * 161*   < > 200*   BUN 85.3*  --   --  87.6*   CR 1.76*  --   --  1.85*   BEN 8.5*  --   --  7.6*    < > = values in this interval not displayed.     No components found for: \"URINE\"   Recent Labs   Lab 09/28/24  0936 09/28/24  0411 09/27/24  2249   AST 26 21 30   ALT 16 11 16   BILITOTAL 1.1 0.9 0.8   ALBUMIN 3.1* 2.1* 3.2*   PROTTOTAL 5.7* 4.3* 6.7   ALKPHOS 43 35* 53     Temp: 97.9  F (36.6  C) Temp src: EsophagealTemp  Min: 94.6  F (34.8 "  C)  Max: 98.8  F (37.1  C)   Recent Labs   Lab 09/28/24  0816 09/28/24  0411 09/28/24  0240 09/27/24  2340 09/27/24  1650 09/27/24  0454 09/26/24  1602   WBC  --  13.3* 19.4*  --  10.9 11.0 11.7*   HGB 9.6* 5.6* 8.1* 6.6* 7.3* 7.2* 7.3*   HCT  --  18.9* 27.3*  --  26.0* 25.8* 25.4*   MCV  --  106* 108*  --  120* 121* 119*   RDW  --  26.9* 26.7*  --  19.9* 20.4* 21.2*   PLT  --  138* 195  --  175 191 194     Recent Labs   Lab 09/28/24  0944 09/23/24  1931   INR 1.17* 1.23*   PTT 24  --        Recent Labs   Lab 09/28/24  0936 09/28/24  0816 09/28/24  0420 09/28/24  0411 09/27/24  2301   * 161* 192* 200* 151*     I have reviewed today's vital signs, notes, medications, labs and imaging.  I have also seen and examined the patient and agree with the findings and plan as outlined above. Pt is 58 yo WF with hx of tobacco abuse who presents following transfer with PAH, moderate RV dysfn, severe RV dilation, intubated and sedated with CXR significant for right lung opacificaiton concerning for aspiration pneumonia with pulmonary edema.  Pt on vasopressen and low dose NO with CVP 20 and PA 83/48 with CI 4.4.  Pt also with hx of CTEPH and DVT with IVC filter placed.  Will add abx and continue diuresis with diamox.  Pt critically ill who was seen X3 for total critical care time 50 min.     Chalino De La Torre MD, PhD  Professor, Heart Failure and Cardiac Transplantation  [facility]

## 2024-09-28 NOTE — CONSULTS
Initial consult completed 9/23. Please see op note for updated recommendations.    Jesus Bhakta  Gastroenterology Fellow, PGY4

## 2024-09-28 NOTE — PLAN OF CARE
Major Shift Events: Neuro unchanged. Pt hemodynamically unstable. BP MAP goal >65 maintained with levo and vasopressor. Large bloody emesis and bloody stool, hemoglobin of 5.6, total of 5 units of RBC and 3 units of FFP. CT of abdomen completed.      Plan: monitor labs and hemodynamics.     For vital signs and complete assessments, please see documentation flowsheets.       Problem: Adult Inpatient Plan of Care  Goal: Optimal Comfort and Wellbeing  Intervention: Monitor Pain and Promote Comfort  Recent Flowsheet Documentation  Taken 9/28/2024 0626 by Josue Sorensen RN  Pain Management Interventions: medication (see MAR)  Plan of care reviewed with pt.

## 2024-09-28 NOTE — PLAN OF CARE
Large hematemesis overnight, dark red stool. Received a total of 5 PRBC and 3 FFP. EGD in a.m. with new duodenal ulcer; prior clips intact, unable to clip - hemospray applied.  Made DNR/DNI    Neuro: Sedated and intubated- RASS -1. Localizes and purposeful all extremities but not following commands seems to be tracking PEERLA. Afebrile    CV: Sinus/ST . MAP >65 levo and vaso titration. CVP 14 PA 70's/30's, CI ~3, -700, SvO2 62    Pulm: ETT 20cm @ teeth, CMV 60%/400/28/5, CO2 down to 60's. Small amount of ruth/thin secretions. PIP 30-35.    GI: OG 63 cm NPO. No longer having any bloody drainage out of OG since EGD. Continues to have dark red loose stool via rectal tube. ~400 stool from 2175-6155 with two large occurrences around tube.    : Miller w/ AUO, diuretic holiday    Skin: BLE venous ulcers, scattered rash and abrasions, PU coccyx area, scattered scabs. DTI R heel      Gtts:   Levo 0.05-0.08  Fent 75  Vaso 2    Labs: Na 149-151 - 200 q4hr FWF  K+ 3.5 replaced with 20 mEq po  Hgb 8.9  ABG 7.39/63/64/38

## 2024-09-28 NOTE — BRIEF OP NOTE
Gastroenterology Endoscopy Suite Brief Operative Note    Procedure:  Upper endoscopy   Post-operative diagnosis:  Duodenal ulcer   Staff Physician:  Dr. Bull   Fellow/Assistant(s):  Jesus Bhakta    Specimens:  Please see final procedure note for further details.   Findings:  - Large cratered duodenal ulcer with visible vessel separate from prior ulcerations. 2 clips placed and hemospray used.   Complications:  None.   Condition:  Stable   Recommendations  Diet:  Hold tube feeds for 24 hours.  PPI:  Continue PPI infusion  Anti-coagulants/platelets:  Restart anticoagulation in 6 hours  Octreotide:  N/A  Recommendations:  - Very difficult location and fibrotic ulcerations. At this point, we do not think further endoscopy would be helpful if she rebleeds. If she rebleeds, recommend IR as first call.  - Continue PPI infusion x72 hours and then IV BID until taking PO then PO BID  - Okay to use OG for pills. Hold feeds for 24 hours

## 2024-09-28 NOTE — PLAN OF CARE
Major Shift Events:    Nitric turned off- pt tolerated well. Vent settings changed as tolerated.   Team requesting care conference with family to determine plan.     Plan: wean vent fiO2 as tolerated and Pressure support when able.     For vital signs and complete assessments, please see documentation flowsheets.

## 2024-09-28 NOTE — OR NURSING
Procedure: EGD with hemorrhage control, clips x2, hemospray  Sedation: Conscious sedation administered by bedside RN  Specimens: N/A    Patient tolerated procedure. Patient in care of ICU RN post procedure.

## 2024-09-28 NOTE — PROGRESS NOTES
General Infectious Disease Service  [place] Team  Patient:  Noelle Gomez  Date of birth 1965  Medical record number [id number]  Date of Admission: 9/23/2024  Date of Visit:  9/27/2024  Requesting Provider: Chalino De La Torre         Assessment and Recommendations     Problem List:  Acute on chronic hypoxic/hypercapic respiratory failure. On mechanical ventilation.   - s/p Bronchoscopy 9/26/24- no endobronchial lesion seen. there were mild thick secretions and the mucosa was inflamed and edematous.   Multiloculated cavitary lesions in the anterior left upper lobe - CT chest 9/23/24   CXR 9/25 w/increased interstitial and airspace opacities and left sided pleural effusion.   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 Originally on heparin but paused w/GI bleeding.   Recent GI bleed (duodenal ulcer) - S/p EGD showing duodenal ulcer. S/p clips   MRSA screen neg -9/25/24  Quantiferon TB gold 9/25/24 - indeterminate   BMI 46    Discussion:  Noelle Gomez is a 60 yo female asthma, former 40py tobacco smoker), HTN, bilateral leg edema, prediabetes,stasis dermatitis,obesity BMI 49, Vitamin D deficiency, with acute on chronic hypoxic, hypercapnic respiratory failure found to have  diffuse bilateral segmental and subsegmental PE s/p thrombectomy and noted chronic clots, concerning for CTEPF chronic thromboembolic pulmonary hypertension) . CT pulmonary angiogram also shows patchy peripheral consolidative and groundglass opacities. There is is an irregular multiloculated cavitary lesion with thick irregular borders in the anterior left upper lobe.  Moderate right and small pleural effusions with adjacent bibasilar passive atelectasis. No pneumothorax. Prominent mediastinal lymph nodes.Enlarged left axillary lymph nodes, measuring up to 1.4 cm. patient also has LLE DVT, anemia, GIB due to a duodenal ulcer, and mild thrombocytopenia. On 9/25 patient had increased interstitial and airspace opacities  on daily chest XR along with left sided pleural effusion. Primary team in conjunction with ID added Zosyn and Vancomycin to cover for possible aspiration pneumonia. She did not require increased respiratory support during this time and had improved CXR on 9/26. We agree that it is possible this is aspiration pneumonia.     Infectious disease was consulted for concern that an infection could be causing this patient's thrombosis as well as a chest CT (9/23) that shows a significant left upper lobe cavitary lesion along with diffuse ground glass. This patient had a negative blood culture (9/16) (9/24 culture NGTD) and negative sputum culture (9/19). Pulmonology on 9/23 suggested the cavitary lesion and diffuse ground glass could be caused by emphysema which we agree is possible/likely. Differential diagnosis for cavitary lesions include: bacteria/ fungal/ mycobacterium tuberculois/ non TB mycobacterium. septic emboli, malignancy, PE infarction. This patient is not immunosuppressed to our knowledge, she has not had an recent steroid courses and was had a negative HIV antigen 9/23. We would recommend at this point to get a bronchoscopy with BAL and send a full panel to rule out infectious or malignant causes of the cavitary lesion if this is possible per pulmonology considering her severe emphysema. At this time the team along with pulmonology are considering whether or not to do a bronchoscopy. We would recommend adding a full viral panel if she fevers >101 again. 9/25 Quantiferon TB, aspergillus galactomannan, 1-2 B-D glucan, histo/blasto serum/urine antigen, and fungal serology are all pending. We also recommend continuing to trend CRP and consider a IRMA to rule out endocarditis.     Recommendations:  - continue Zosyn  - unable to obtain history from patient. with a multiloculated cavitary lesion. agree with TB airborne isolation precaution for now. will need to rule out TB, with 3 sputum AFB stain and cultures.       - follow-up BAL cultures     ID will continue to follow. Dr Jorgensen will be covering General ID service this weekend     Frederick Reich MD,M.Med.Sc.  Staff, Infectious Diseases  Pager: [phone]           Antibiotics & Cultures, Consolidated     Cultures:   9/16 Bcx: negative  9/19 Respiratory aerobic bacterial culture with gram stain: no growth   9/24 Bcx: NGTD  9/26 BAL - pending     aspergillus galactomannan- neg   BD glucan - indeterminate  SarsCoV2, Influenza A/B/ RSV - neg   HIV ag/ab - neg   Hep B c ab,B s ab abd Bs ag , hep c - neg      Antibiotics:   Ceftriaxone (9/16-9/22)  Doxycycline (9/16-9/22)  Pip/Tazo (9/25 - )   Vancomycin (9/25)           Interval History     afebrile later today, normal WBC, CRP down to 80. on norepi and vasopressin , on TB airborne precaution            Physical Exam     Pulse 104   Temp 97.5  F (36.4  C)   Resp 21   Wt 115.3 kg (254 lb 3.1 oz)   SpO2 100%   BMI 46.49 kg/m     Exam:  GENERAL: Intubated in the ICU. Awake but not responding to questions/ commands, pale   HEAD: Normocephalic and atraumatic  EYES:  Eyes grossly normal to inspection,   NECK:  no asymmetry scars or lesions.   LUNGS:  Improved breath sounds from yesterday.   CARDIOVASCULAR:  Regular rate and rhythm.,ild tachycardic.   ABDOMEN:  Soft, nontender, bowel sounds normal. obese   EXT: Extremities warm and less edematous    MS: No gross musculoskeletal defects noted  SKIN:  no acute rashes   NEUROLOGIC:  Intubated and sedated.

## 2024-09-28 NOTE — PROGRESS NOTES
Nephrology Progress Note  09/28/2024           MEDICAL DECISION MAKING     RECOMMENDATIONS:  Agree w increase in minute ventilation on ventilator to reduce pCO2     Cont Diamox, would not increase any further  Agree w holding further diuresis today     Increase free water flushes to 400mL q4h     Renally dose medications, avoid unnecessary nephrotoxins, avoid unnecessary radiographic contrast, daily renal panel, strict I/O, daily standing weights      ASSESSMENT:  Noelle Gomez is a 59 year old w h/o HTN who presented to outside hospital w AHFR, acute HF & PE. Transferred here for HF management. She has been aggressively diuresed and since developed metabolic derangements.  Nephrology consulted for metabolic derangements     Concurrent metabolic alkalosis and respiratory acidosis  On 9/26: ABG 7.42/77/67; bicarb 47  It appears she has COPD and likely has chronic hypercarbia  Her metabolic alkalosis is likely 2/2 aggressive diuresis.  She was given Diamox (9/24-->) which has not improved her serum bicarb. This is likely 2/2 her inability to self regulate her ventilation (blow off her CO2), because of this she has remained hypercarbic. Additionally, her kidneys may be trying to compensate for her hypercarbia by holding on to bicarb, thus making her metabolic alkalosis difficult to treat  Increasing her minute ventilation and removing more CO2 may allow the Diamox to be more effective in removing bicarb      VIGNESH  sCr on admit 1.3.  Peak sCr 1.56.  Baseline sCr 0.9  May represent hypercreatinemia in setting of aggressive diuresis, but may also be indication of overdiuresis  Her hypernatremia and physical exam findings are suggestive of overdiuresis, but her elevated CVP suggests she may have more intravascular fluid, her CVP measurement may be confounded by the possibility of CTEPH but will defer this interpretation to cardiology  A diuretic holiday may be of benefit to see where her sCr trend goes       Hypernatremia   Likely 2/2 aggressive diuresis. As of 9/27 she has a 2.8L free water deficit     Azotemia  Multifactorial in setting of GI bleed, tube feeds & VIGNESH    Recommendations were communicated to primary team via note     Seen and discussed with Dr. Jocy Zeng MD   Division of Renal Disease and Hypertension  [facility]  [email or web address]  Vocera Web Console    SUBJECTIVE     Interval History :   Nursing and provider notes from last 24 hours reviewed.  In the last 24 hours Noelle Gomez   Continues to have high pCO2.   Bumex gtt paused last night  Likely has GI bleed this AM    A comprehensive review of systems was negative except as noted above.    OBJECTIVE     Physical Exam:   I/O last 3 completed shifts:  In: 6659.8 [I.V.:1697.8; NG/GT:1360; IV Piggyback:1500]  Out: 4810 [Urine:3310; Emesis/NG output:600; Stool:900]   Pulse 81   Temp 97.9  F (36.6  C)   Resp 24   Wt 116.3 kg (256 lb 6.3 oz)   SpO2 94%   BMI 46.90 kg/m       General:supine  HEENT: ET tube in place  Cardiac:rrr  Pulmonary:bilateral chest rise  Abdominal:nondistended  Extremities:no LE edema, very wrinkled skin  Neuro:sedated  Access:R PICC, R internal jugular CVC    Labs:   All labs reviewed by me  Electrolytes/Renal -   Recent Labs   Lab Test 09/28/24  0936 09/28/24  0816 09/28/24  0420 09/28/24  0411 09/27/24  2301 09/27/24  2249 09/27/24  1656 09/27/24  1650 09/27/24  1215 09/27/24  0942 09/27/24  0515 09/27/24  0454   *  --   --  149*  --  152*  --  153*  --  152*  --  152*   POTASSIUM 3.9  --   --  3.7  --  3.5  3.5  --  3.4   < > 3.7   < > 3.6   CHLORIDE 102  --   --  102  --  98  --  95*  --  96*  --  95*   CO2 38*  --   --  38*  --  42*  --  43*  --  44*  --  43*   BUN 85.3*  --   --  87.6*  --  84.0*  --  81.3*  --  76.6*  --  76.4*   CR 1.76*  --   --  1.85*  --  1.70*  --  1.80*  --  1.83*  --  1.83*   * 161* 192* 200*   < > 149*   < > 155*   < > 159*   < > 138*   BEN 8.5*  --   --  7.6*  --  9.3  --   9.6  --  9.2  --  9.3   MAG  --   --   --  2.2  --   --   --  2.7*  --   --   --  2.2   PHOS  --   --   --  4.9*  --   --   --  4.2  --  4.2  --  4.2    < > = values in this interval not displayed.       CBC -   Recent Labs   Lab Test 09/28/24  0816 09/28/24  0411 09/28/24  0240 09/27/24  2340 09/27/24  1650   WBC  --  13.3* 19.4*  --  10.9   HGB 9.6* 5.6* 8.1*   < > 7.3*   PLT  --  138* 195  --  175    < > = values in this interval not displayed.       LFTs -   Recent Labs   Lab Test 09/28/24  0936 09/28/24  0411 09/27/24  2249   ALKPHOS 43 35* 53   BILITOTAL 1.1 0.9 0.8   ALT 16 11 16   AST 26 21 30   PROTTOTAL 5.7* 4.3* 6.7   ALBUMIN 3.1* 2.1* 3.2*       Iron Panel -   Recent Labs   Lab Test 09/25/24  0904   IRON 91   IRONSAT 45   *     Current Medications:  Current Facility-Administered Medications   Medication Dose Route Frequency Provider Last Rate Last Admin    [Held by provider] acetaZOLAMIDE (DIAMOX) injection 1,000 mg  1,000 mg Intravenous Q8H Chalino De La Torre MD   1,000 mg at 09/27/24 2250    chlorhexidine (PERIDEX) 0.12 % solution 15 mL  15 mL Mouth/Throat BID Chalino De La Torre MD   15 mL at 09/28/24 0859    insulin aspart (NovoLOG) injection (RAPID ACTING)  1-7 Units Subcutaneous Q4H Sven Lynn MD   1 Units at 09/28/24 0859    ipratropium - albuterol 0.5 mg/2.5 mg/3 mL (DUONEB) neb solution 3 mL  3 mL Nebulization 4x daily Travis Rosa MD   3 mL at 09/28/24 0831    multivitamins w/minerals liquid 15 mL  15 mL Per Feeding Tube Daily Travis Rosa MD   15 mL at 09/27/24 0853    piperacillin-tazobactam (ZOSYN) 3.375 g vial to attach to  mL bag  3.375 g Intravenous Q6H Chalino De La Torre MD        Prosource TF20 ENfit Compatibl EN LIQD (PROSOURCE TF20) packet 60 mL  1 packet Per Feeding Tube TID Travis Rosa MD   60 mL at 09/27/24 1948    sildenafil (REVATIO) suspension 20 mg  20 mg Oral or Feeding Tube Q8H Travis Rosa MD    20 mg at 09/27/24 1947     Current Facility-Administered Medications   Medication Dose Route Frequency Provider Last Rate Last Admin    dextrose 10% infusion   Intravenous Continuous PRN Travis Rosa MD        fentaNYL (SUBLIMAZE) infusion   mcg/hr Intravenous Continuous Travis Rosa MD 3 mL/hr at 09/28/24 1000 150 mcg/hr at 09/28/24 1000    heparin 25,000 units in 0.45% NaCl 250 mL ANTICOAGULANT infusion  0-5,000 Units/hr Intravenous Continuous Travis Rosa MD   Stopped at 09/27/24 2339    medication instruction   Does not apply Continuous PRN Travis Rosa MD        norepinephrine (LEVOPHED) 16 mg in  mL infusion MAX CONC CENTRAL LINE  0.01-0.6 mcg/kg/min (Dosing Weight) Intravenous Continuous Sven Lynn MD 7 mL/hr at 09/28/24 1000 0.06 mcg/kg/min at 09/28/24 1000    pantoprazole (PROTONIX) 80 mg in sodium chloride 0.9 % 100 mL infusion  8 mg/hr Intravenous Continuous Sven Lynn MD 10 mL/hr at 09/28/24 1000 8 mg/hr at 09/28/24 1000    vasopressin 1 unit/mL MAX Conc (PITRESSIN) infusion  0.5-4 Units/hr Intravenous Continuous Chalino De La Torre MD 3 mL/hr at 09/28/24 1015 3 Units/hr at 09/28/24 1015     Jey Zeng MD

## 2024-09-28 NOTE — PROGRESS NOTES
Brief GI Note    GI contacted overnight regarding large volume hematemesis and bright red blood per NGT. 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 CVICU fellow states that prior to episode of vomiting, patient was already intubated and on small dose of levophed. Hemodynamics did not change after episode of hematemesis. Primary team stopped hep gtt.    Given that patient's hemodynamics are unchanged, do not recommend aggressive re-intervention as patient already has two duodenal clips placed making repeat endoscopic intervention of the area difficult. Further, patient may be able to resolve bleed now that heparin gtt is being held.    - Agree with holding hep gtt  - Start PPI gtt  - Keep NPO with plan for possible EGD in the AM  - Trend Hgb, transfuse to keep Hgb >7    Recommendations discussed with CVICU fellow.    Angelina Ramachandran MD MPH  GI Fellow  Division of Gastroenterology, Hepatology, and Nutrition  [facility]    This patient was discussed with attending: Dr. Bull.     no

## 2024-09-29 NOTE — PROGRESS NOTES
Nephrology Progress Note  09/29/2024           MEDICAL DECISION MAKING     RECOMMENDATIONS:  Agree w increase in minute ventilation on ventilator to reduce pCO2     Would recommend net even goal today  OK to continue diamox per primary team     Increase free water flushes to 300mL q4h     Renally dose medications, avoid unnecessary nephrotoxins, avoid unnecessary radiographic contrast, daily renal panel, strict I/O, daily weights      ASSESSMENT:  Noelle Gomez is a 59 year old w h/o HTN who presented to outside hospital w AHFR, acute HF & PE. Transferred here for HF management. She has been aggressively diuresed and since developed metabolic derangements.  Nephrology consulted for metabolic derangements     Concurrent metabolic alkalosis and respiratory acidosis  On 9/26: ABG 7.42/77/67; bicarb 47  It appears she has COPD and likely has chronic hypercarbia  Her metabolic alkalosis is likely 2/2 aggressive diuresis.  She was given Diamox (9/24-->) which has not improved her serum bicarb. This is likely 2/2 her inability to self regulate her ventilation (blow off her CO2), because of this she has remained hypercarbic. Additionally, her kidneys may be trying to compensate for her hypercarbia by holding on to bicarb, thus making her metabolic alkalosis difficult to treat  Increasing her minute ventilation and removing more CO2 may allow the Diamox to be more effective in removing bicarb   Serum bicarb coming down nicely w Diamox and reducing pCO2     VIGNESH  sCr on admit 1.3.  Peak sCr 1.56.  Baseline sCr 0.9  May represent hypercreatinemia in setting of aggressive diuresis, but may also be indication of overdiuresis  Her hypernatremia and physical exam findings are suggestive of overdiuresis, but her elevated CVP suggests she may have more intravascular fluid, her CVP measurement may be confounded by the possibility of CTEPH but will defer this interpretation to cardiology  sCr improved modestly w diuretic holiday       Hypernatremia   Likely 2/2 aggressive diuresis. As of 9/29 she has a 1.7L free water deficit     Azotemia  Multifactorial in setting of GI bleed, tube feeds & VIGNESH    Recommendations were communicated to primary team via note & verbal    Seen and discussed with Dr. Jocy Zeng MD   Division of Renal Disease and Hypertension  [facility]  jaya  Vocera Web Console    SUBJECTIVE     Interval History :   Nursing and provider notes from last 24 hours reviewed.  In the last 24 hours Noelle YASSINE Gomez   EGD yesterday found large duodenal ulcer  Following commands this AM  Diuretic holiday yesterday    A comprehensive review of systems was negative except as noted above.    OBJECTIVE     Physical Exam:   I/O last 3 completed shifts:  In: 3258.2 [I.V.:1298.2; NG/GT:1060]  Out: 3575 [Urine:2850; Emesis/NG output:175; Stool:550]   Pulse 98   Temp 99.1  F (37.3  C)   Resp 28   Wt 120.4 kg (265 lb 6.9 oz)   SpO2 97%   BMI 48.55 kg/m       General:supine  HEENT: ET tube in place  Cardiac:rrr  Pulmonary:bilateral chest rise  Abdominal:nondistended  Extremities:no LE edema, very wrinkled skin  Neuro: following commands  Access:R PICC, R internal jugular CVC    Labs:   All labs reviewed by me  Electrolytes/Renal -   Recent Labs   Lab Test 09/29/24  0907 09/29/24  0812 09/29/24  0406 09/29/24  0348 09/28/24  2341 09/28/24  2203 09/28/24  1958 09/28/24  1605 09/28/24  0420 09/28/24  0411   *  --   --  150*  --  149*  --  148*   < > 149*   POTASSIUM 3.6  3.6  --   --  3.4  --  3.2*  --  3.5   < > 3.7   CHLORIDE 105  --   --  103  --  101  --  100   < > 102   CO2 31*  --   --  33*  --  34*  --  35*   < > 38*   BUN 76.3*  --   --  79.9*  --  81.9*  --  85.2*   < > 87.6*   CR 1.56*  --   --  1.56*  --  1.57*  --  1.66*   < > 1.85*   * 130* 123* 124*   < > 138*   < > 154*   < > 200*   BEN 8.7*  --   --  8.8  --  8.7*  --  8.4*   < > 7.6*   MAG  --   --   --  2.2  --   --   --  2.2  --  2.2   PHOS  --   --    --  2.9  --   --   --  3.8  --  4.9*    < > = values in this interval not displayed.       CBC -   Recent Labs   Lab Test 09/29/24  0811 09/29/24  0348 09/28/24  2334 09/28/24 2001 09/28/24  1605 09/28/24  0816 09/28/24  0411   WBC  --  11.1*  --   --  12.7*  --  13.3*   HGB 8.6* 8.4* 8.4*   < > 9.1*   < > 5.6*   PLT  --  105*  --   --  110*  --  138*    < > = values in this interval not displayed.       LFTs -   Recent Labs   Lab Test 09/29/24  0907 09/29/24  0348 09/28/24  2203   ALKPHOS 45 45 45   BILITOTAL 0.8 0.8 0.8   ALT 20 17 16   AST 33 28 26   PROTTOTAL 6.0* 5.9* 5.9*   ALBUMIN 3.2* 3.2* 3.1*       Iron Panel -   Recent Labs   Lab Test 09/25/24  0904   IRON 91   IRONSAT 45   *     Current Medications:  Current Facility-Administered Medications   Medication Dose Route Frequency Provider Last Rate Last Admin    acetaZOLAMIDE (DIAMOX) injection 500 mg  500 mg Intravenous Q12H Lev Maharaj MD   500 mg at 09/29/24 1026    chlorhexidine (PERIDEX) 0.12 % solution 15 mL  15 mL Mouth/Throat BID Chalino De La Torre MD   15 mL at 09/29/24 0814    insulin aspart (NovoLOG) injection (RAPID ACTING)  1-7 Units Subcutaneous Q4H Sven Lynn MD   1 Units at 09/28/24 1612    ipratropium - albuterol 0.5 mg/2.5 mg/3 mL (DUONEB) neb solution 3 mL  3 mL Nebulization 4x daily Travis Rosa MD   3 mL at 09/29/24 0718    multivitamins w/minerals liquid 15 mL  15 mL Per Feeding Tube Daily Travis Rosa MD   15 mL at 09/29/24 0814    piperacillin-tazobactam (ZOSYN) 4.5 g vial to attach to  mL bag  4.5 g Intravenous Q6H Chalino De La Torre MD   4.5 g at 09/29/24 0920    Prosource TF20 ENfit Compatibl EN LIQD (PROSOURCE TF20) packet 60 mL  1 packet Per Feeding Tube TID Travis Rosa MD   60 mL at 09/29/24 0814    sildenafil (REVATIO) suspension 20 mg  20 mg Oral or Feeding Tube Q8H Travis Rosa MD   20 mg at 09/29/24 0435     Current Facility-Administered  Medications   Medication Dose Route Frequency Provider Last Rate Last Admin    bumetanide (BUMEX) 0.25 mg/mL infusion  2 mg/hr Intravenous Continuous Lev Maharaj MD 8 mL/hr at 09/29/24 1100 2 mg/hr at 09/29/24 1100    dextrose 10% infusion   Intravenous Continuous PRN Travis Rosa MD        fentaNYL (SUBLIMAZE) infusion   mcg/hr Intravenous Continuous Travis Rosa MD 1 mL/hr at 09/29/24 1100 50 mcg/hr at 09/29/24 1100    [Held by provider] heparin 25,000 units in 0.45% NaCl 250 mL ANTICOAGULANT infusion  0-5,000 Units/hr Intravenous Continuous Travis Rosa MD   Stopped at 09/27/24 2339    medication instruction   Does not apply Continuous PRN Travis Rosa MD        midazolam (VERSED) 100 mg/100 mL NS infusion - ADULT  1-8 mg/hr Intravenous Continuous Travis Rosa MD   Stopped at 09/29/24 0745    norepinephrine (LEVOPHED) 16 mg in  mL infusion MAX CONC CENTRAL LINE  0.01-0.6 mcg/kg/min (Dosing Weight) Intravenous Continuous Sven Lynn MD 8.2 mL/hr at 09/29/24 1100 0.07 mcg/kg/min at 09/29/24 1100    pantoprazole (PROTONIX) 80 mg in sodium chloride 0.9 % 100 mL infusion  8 mg/hr Intravenous Continuous Sven Lynn MD 10 mL/hr at 09/29/24 1100 8 mg/hr at 09/29/24 1100    vasopressin 1 unit/mL MAX Conc (PITRESSIN) infusion  0.5-4 Units/hr Intravenous Continuous Chalino De La Torre MD 2 mL/hr at 09/29/24 1100 2 Units/hr at 09/29/24 1100     Jey Zeng MD

## 2024-09-29 NOTE — PROVIDER NOTIFICATION
09/29/24 1601   Weaning Assessment   Pulse 103   Spontaneous Respiratory Rate 28 breaths/min   Spontaneous Tidal Volume (mL) 373 mL   Spontaneous Minute Ventilation 8.7 mL   Wean Start Time  1301   RSBI 75.07   Wean End Time  1601   Wean Time Calculated (min) 180   Weaning trial discontinued due to: (S)  End of designated trial.  (OXYGEN SATURATIONS RANGED FROM 88% TO 92%)     Pt placed on PS trial 5/+5 50%. Please see flowsheets for further information.

## 2024-09-29 NOTE — PLAN OF CARE
Update from 1815: Pt had been given meds via OG ~1700 and left clamped until 1815.  When hooking OG back to LIS she had ~100 ml of bright red blood via OG.  Heparin stopped, team updated, CBC drawn, stat CTA abd/pelvis ordered.      Neuro: RASS -1. Following commands, MAJOR. Off all sedation. Mitts for hands on lines.    CV: Sinus/ST . MAP >65 levo and vaso titration. CVP 11 PA 75s/28, CI  4.2- SVR ~500, SvO2 64 2 mg/hr of bumex started along with 4 mg bolus/500 mg diamox BID. ~2L negative  Pulm: ETT 22 cm @ teeth, CMV 60%/400/28/5, CO2 61. PS for 3 hours 5/5, rate 28-30 with ~400 volumes. Pa02 dropped from 72 to 62 with PS trial.  Small amount of thick ruth/red streaked secretions    GI: OG @ 62 cm, NPO. Bile out of OG. Minimal amount of residual dark stool via rectal tube    : Miller w/ AUO    Skin: BLE venous ulcers, scattered rash and abrasions, PU coccyx area, scattered scabs. DTI R heel      Gtts:   Levo 0.06  Vaso 2  Protonix 8mg  Bumex 2 mg  Heparin 1800    Labs: Na 149 200mL FWF q4h  K+ 3.3 replaced with 20 mEq IV x2  Hgb 8.5

## 2024-09-29 NOTE — PROGRESS NOTES
Care Management Follow Up    Length of Stay (days): 6    Expected Discharge Date: TBD     Concerns to be Addressed: Plan of care      Patient plan of care discussed at interdisciplinary rounds: Yes    Anticipated Discharge Disposition:  (TBD)     Anticipated Discharge Services:  (TBD)  Anticipated Discharge DME:  (TBD)    Education Provided on the Discharge Plan: N/A   Patient/Family in Agreement with the Plan:  N/A    Additional Information:  Per chart review and discussion with the team, pt family were here yesterday and the team provided update and discussed the goal of care.  Pt code status have been changed to DNR/DNI.  No care conf. need to be arranged.  RNCC will cont to follow the plan of care.    Next Steps:   Discharge needs are not known at this time.  RNCC will cont to follow the plan of care.      Fausto Naranjo RN, PHN, BSN  4A and 4E/ ICU  Care Coordinator  Phone: [phone]  Pager: [phone]      SEARCHABLE in [facility] - search CARE COORDINATOR       [place] [address] [place] ([phone]) Saturday & Sunday; ([phone]) [facility] Recognized Holidays     Units: 5A Onc Vocera & 5C Vocera Pager: [phone]    Units: 6B Vocera & 6C Vocera  Pager: [phone]    Units: 7A SOT RNCC Vocera, 7B Med Surg Vocera, & 7C Med Surg Vocera  Pager: [phone]    Units: 6A Vocera & 4A CVICU Vocera, 4C MICU Vocera, and 4E SICU Vocera   Pager: [phone]    Units: 5 Ortho Vocera & 5 Med Surg Vocera  Pager: [phone]    Units: 6 Med Surg Vocera & 8 Med Surg Vocera  Pager [phone]

## 2024-09-29 NOTE — PROGRESS NOTES
Cardiology ICU Progress Note    Brief HPI:  Noelle Gomez is a 59 year old female admitted on 9/23/2024. She has a PMH of asthma, 40 pack year former smoker, HTN, who initially presented to [facility] on 9/16 for acute on chronic hypoxic/hypercapnic respiratory failure thought to be related to decompensated heart failure. Pt required intubation on initial day of admission on 9/16/24. TTE was done revealing RV volume and pressure overload. VQ scan was obtained in the setting of VIGNESH revealing multiple bilateral wedge shaped perfusion defects. Also found to LE DVT. Anticoagulation initiated. Patient underwent thrombectomy at [facility] and found to have mainly chronic clot with few acute emboli. PA pressures significantly elevated. Patient temporarily started on epoprostenol. After several days of anticoagulation hgb dropped. Patient started requiring vasopressors. Transferred to our facility for further management. EGD undergone on arrival revealing duodenal ulcer requiring 2 clips. RHC done also revealing pre-capillary pHTN and a ELIO of 5. Night of 9/27 had significant GI bleed from same ulcer requiring transfusion.        Assessment and plan by system:     Neurology   # Acute toxic metabolic encephalopathy  -Recommend use of either ketamine or versed gtt along with fentanyl to assist with sedation. Weaning from opioids will help with patient's hypercarbia   -Weaning sedation as able  -RAAS goal -2     Cardiovascular  #Cardiogenic shock  #RH failure  #Group IV pHTN CTEPH  #s/p IVC filter  #DVT  TTE 9/17/24: EF 65%, moderately reduced RV systolic function, RVSP >55mmHg, mod TR  RHC: RA 11, RV: 79/13, PA 78/29 (42), PCW: 12, CO/CI (shu): 5.43/2.46, PVR 5.53  - sildenafil 10mg tid  - Vasopressors: vasopressin, levo as needed  - bumex gtt per primary team  - heparin gtt per primary team in setting of GI bleed  - Monitor hemodynamic status.         Pulmonary  # Acute hypoxic respiratory failure  #  Cavitary lesion  FiO2 60%, PEEP 5, RR 28, Vol 400    - Pressure weaning trial today  - diamox 1000mg tid held  - Plan to wean PEEP and FiO2 for possible extubation, will be high risk  - Bronchoscopy performed 9/26, culture results pending        Gastrointestinal, Nutrition  # Duodenal ulcer  # Acute GI bleed  #S/p repeat EGD with addl clips and hemostasis somewhat achieved 9/28. Fibrotic lesion that may rebleed- if this is the case, GI is recommending IR INVOLVMENT    - receiving tube feedings as able  - Nutrition consulted, appreciate recommendations  - PPI per GI recommendation  - Bowel regimen: senna prn     Renal, Electrolytes  # VIGNESH, stable  - ICU electrolyte replacement protocol  - Strict I&Os  - continue monitoring closely    Infectious Disease  #CAP-previously treated  - Currently on broad spectrum antibiotics  - Cultures from bronchoscopy 9/26 pending    Hematology  #Acute blood loss anemia  - Hgb goal > 7.0  - Received 1u PRBC overnight  - heparin gtt        Endocrinology  # Hyperglycemia  -Insulin medium dose correction q4h    Integumentary:  - No skin issues      Patient seen and discussed with staff physician.    Ethan Doe MD  Cardiology Fellow, PGY-4  Pager: [phone]        Objective:  Most recent vital signs:  Pulse 78   Temp 98.8  F (37.1  C)   Resp 28   Wt 120.4 kg (265 lb 6.9 oz)   SpO2 96%   BMI 48.55 kg/m    Temp:  [96.3  F (35.7  C)-99.7  F (37.6  C)] 98.8  F (37.1  C)  Pulse:  [] 78  Resp:  [15-28] 28  MAP:  [60 mmHg-93 mmHg] 73 mmHg  Arterial Line BP: ()/(40-80) 114/48  FiO2 (%):  [40 %-70 %] 60 %  SpO2:  [90 %-100 %] 96 %  Wt Readings from Last 2 Encounters:   09/29/24 120.4 kg (265 lb 6.9 oz)   09/22/24 124.8 kg (275 lb 1.6 oz)       Intake/Output Summary (Last 24 hours) at 9/24/2024 0657  Last data filed at 9/24/2024 0600  Gross per 24 hour   Intake 2756.78 ml   Output 7840 ml   Net -5083.22 ml     Physical exam:  General: In bed, in NAD  HEENT: PERRL, no scleral  icterus or injection  CARDIAC: RRR, no m/r/g appreciated. Peripheral pulses dopplered  RESP: Mechanical ventilation; CTAB, no wheezes, rhonchi or crackles appreciated.  GI: soft, BS hypoactive  : Miller  EXTREMITIES: bilateral edema  SKIN: No acute lesions appreciated  NEURO: Intubated and sedated    Labs (Past three days):  CBC  Recent Labs   Lab 09/29/24  0348 09/28/24  2334 09/28/24 2001 09/28/24  1605 09/28/24  0816 09/28/24  0411 09/28/24  0240   WBC 11.1*  --   --  12.7*  --  13.3* 19.4*   RBC 2.71*  --   --  2.95*  --  1.78* 2.54*   HGB 8.4* 8.4* 8.9* 9.1*   < > 5.6* 8.1*   HCT 26.7*  --   --  28.5*  --  18.9* 27.3*   MCV 99  --   --  97  --  106* 108*   MCH 31.0  --   --  30.8  --  31.5 31.9   MCHC 31.5  --   --  31.9  --  29.6* 29.7*   RDW 21.8*  --   --  21.9*  --  26.9* 26.7*   *  --   --  110*  --  138* 195    < > = values in this interval not displayed.     BMP  Recent Labs   Lab 09/29/24  0406 09/29/24  0348 09/28/24  2341 09/28/24 2203 09/28/24 1958 09/28/24  1605 09/28/24  1238 09/28/24  0936 09/28/24  0420 09/28/24  0411 09/27/24  1656 09/27/24  1650   NA  --  150*  --  149*  --  148*  --  151*  --  149*   < > 153*   POTASSIUM  --  3.4  --  3.2*  --  3.5  --  3.9  --  3.7   < > 3.4   CHLORIDE  --  103  --  101  --  100  --  102  --  102   < > 95*   CO2  --  33*  --  34*  --  35*  --  38*  --  38*   < > 43*   ANIONGAP  --  14  --  14  --  13  --  11  --  9   < > 15   * 124* 132* 138*   < > 154*   < > 165*   < > 200*   < > 155*   BUN  --  79.9*  --  81.9*  --  85.2*  --  85.3*  --  87.6*   < > 81.3*   CR  --  1.56*  --  1.57*  --  1.66*  --  1.76*  --  1.85*   < > 1.80*   GFRESTIMATED  --  38*  --  38*  --  35*  --  33*  --  31*   < > 32*   BEN  --  8.8  --  8.7*  --  8.4*  --  8.5*  --  7.6*   < > 9.6   MAG  --  2.2  --   --   --  2.2  --   --   --  2.2  --  2.7*   PHOS  --  2.9  --   --   --  3.8  --   --   --  4.9*  --  4.2    < > = values in this interval not displayed.      Troponins:     INR  Recent Labs   Lab 09/28/24  0944 09/23/24  1931   INR 1.17* 1.23*     Liver panel  Recent Labs   Lab 09/29/24  0348 09/28/24  2203 09/28/24  1605 09/28/24  0936   PROTTOTAL 5.9* 5.9* 6.0* 5.7*   ALBUMIN 3.2* 3.1* 3.2* 3.1*   BILITOTAL 0.8 0.8 1.0 1.1   ALKPHOS 45 45 45 43   AST 28 26 27 26   ALT 17 16 16 16       Imaging/procedure results:  XR Chest Port 1 View  RESIDENT PRELIMINARY INTERPRETATION  Impression: Endotracheal tube terminates approximately 8.3 cm from the  wilder, recommend slight advancement. Similar mixed interstitial and  airspace opacities throughout the right lung and left lung base. Small  left and trace right pleural effusions.

## 2024-09-29 NOTE — PROGRESS NOTES
Cardiology ICU Note    09/28/2024    Noelle Gomez MRN# [id number]   Age: 59 year old    [facility] Care ICU Note - Cardiology 80 minutes  Yadiel Priest M.D.        The patient remains unstable in the ICU with on-going need for ventilator support, parenteral medications for the adjustment of blood pressure and cardiac output and maintenance of renal function.      The patient is seen for prolonged ventilator management requiring oxygen and/or pressure modulation; shock requiring vasopressor and or inotropic agents; low cardiac output necessitating  inotropic agents, vasopressors and afterload reducing agents; limited mechanical supI personally reviewed:    Arterial and venous blood gases to assess acid base balance, oxygenation, and ventilator settings.      Ventilatory settings and/or supplement oxygen needs in order to obtain optimal oxygenation and electrolyte balance at low possible pressure support and inspired oxygen tension    Volume status, renal function and nutritional support as judged by intake, output, daily weight and appropriate laboratories.    I reviewed individual and serial imaging studies including echocardiogram, chest x-ray, CT scan    I personally supervised the prescription of parenteral fluids, inotropes, vasodilators , and vasopressors in order to correct cardiac output, maintain urine output and renal function.    I personally met with patient or family to discuss current and future diagnostic and therapeutic strategies    I directed a family meeting to discuss current and on-going decision making in light of current condition  And patients known or expressed wishes made prior to this critical juncture.  We discussed prospects for recovery, barriers to recovery, resuscitation status. Potential for convalescent care and the the use of palliative care    80 minutes      YOB: 1965          Assessment and Plan:     59 year old female, history of tobacco use and hypertension  who presented to an OSH on 9/16 for acute on chronic hypoxic/hypercapnic respiratory failure requiring prompt intubation with subsequent work-up that identified moderate RV dysfunction in the setting of segmental and subsegmental pulmonary emboli for which she underwent thrombectomy. 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 However, she had GI bleed prior to transfer to [facility] requiring 1u pRBC. Since arrival at [facility], she has undergone swan-guided therapies but her hospital course has been complicated by mixed respiratory acidosis and metabolic alkalosis, difficulty weaning from the ventilator, pneumonia s/p zosyn treatment, and large GI bleed due to duodenal ulcer requiring 5u PRBC and repeat EGD with clip x2 on 09/28.     Summary of Today's plan:  - Resume heparin gtt today for patient's pulmonary emboli/CTEPH given no further evidence of recurrent GI bleed today.   - Increase diuretics to Bumex gtt 2mg/hr, Diamox 500mg BID    Neurology   # Acute toxic metabolic encephalopathy   In setting of critical illness and sedating medications.   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   Vasopressors: Levophed and  Vasopressin  Diuresis:  Aggressive diuresis with Bumex 2mg/hr and Diamox 500mg IV BID  Anticoagulation: Heparin for CTEPH (will resume 9/29 - was held previously in the setting of GI bleed)     Pulmonary    Ventilator Settings  FiO2 (%): 60 %, Resp: 28, Vent Mode: CMV/AC, Resp Rate (Set): 28 breaths/min, Tidal Volume (Set, mL): 400 mL, PEEP (cm H2O): 5 cmH2O, Resp Rate (Set): 28 breaths/min, Tidal Volume (Set, mL): 400 mL, PEEP (cm H2O): 5 cmH2O     # Acute Hypoxic and Hypercapnic Respiratory Failure  # CTEPH  # LORA/OHS  # HAP/VAP - on Zosyn   # Cavitary lesion on CT - Emphysema/Infarction    # TB rule out   - S/P Bronchoscopy 9/26/24 with no endobronchial lesion. There were mild thick secretions and the mucosa was inflamed and edematous.   - CT Chest 9/23/24 with multiloculated cavitary lesions of the anterior left upper lobe  - Quantiferon TB gold 9/25/24 - indeterminate  ===PLAN===  -Plan for CTEPH/RV overload as above  -Vent management per CICU team   - ID following: need 3 sputum AFB stain/cultures and follow-up BAL cultures      Gastrointestinal, Nutrition  # Upper GI Bleed 2/2 Large Duodenal Ulcer  - Patient experienced brisk GI bleed 9/27 - 9/28 requiring several units of pRBC.  - EGD 9/28 with large cratered duodenal ulcer with vissible vessel. 2 clips placed and hemospray used. Post-procedurally recommend 72 hours PPI infusion and holding feeds for 24 hours.   - Protonix infusion x72 hours with transition to IV PPI BID  - GI following, appreciate recs.       Renal, Electrolytes  # Hypokalemia   # VIGNESH 2/2 cardiogenic shock from CTEPH  # Mixed Metabolic alkalosis and respiratory acidosis.   - Nephrology following  - Etiology likely contraction alkalosis from diuretics, with additional contributing factors from her chronic lung disease as well  - Diuretics as above     Infectious Disease  # HAP/VAP - may represent aspiration pneumonia  # TB rule out - currently on isolation   - S/P Bronchoscopy 9/26/24 with  no endobronchial lesion. There were mild thick secretions and the mucosa was inflamed and edematous.   - CT Chest 9/23/24 with multiloculated cavitary lesions of the anterior left upper lobe  -  Quantiferon TB indeterminate. Sputum AFB stain per infection control protocol. 3 sputum AFB in process    Plan:  Continuze zozyn (09/25-  Follow-up sputum AFBs  ID following, appreciate recommendations    Hematology  # Left femoral DVT  # Family history of Factor 5 Leiden  # CTEPH  # SLE anticoagulant positive   Plan:  Hematology working up antiphospholipid syndrome. (Will need repeat APS labs January 2025)  AC with heparin     Endocrinology  # Hyperglycemia  - insulin prn    ICU Checklist:  #Feeding: currently held in the setting of GI bleed  #Analgesia: wean fentanyl to minimize respiratory suppression, transition to versed  #Sedation: versed  #Thromboembolism ppx: mechanical (GI bleed)  #HOB: 30 degrees   #Ulcer ppx: PPI  gtt  #Glucose: insulin PRN  #SBT/SAT: n/a  #Bowel reg: miralax/senna PRN  #Family: , 2 sons  #Dispo: CCU    Code Status: No CPR- Do NOT Intubate     Discussed with Dr Priest attending physician,    Lev Maharaj MD  Cardiology Fellow         Medications:            Past Medical History:     Past Medical History:   Diagnosis Date    Asthma 08/14/2023    Bilateral lower extremity edema 07/14/2021    Former smoker 06/23/2021    HTN (hypertension) 07/14/2021    Lymphedema 02/08/2022    Prediabetes 06/24/2021    Seasonal allergies 07/14/2021    UPJ (ureteropelvic junction) obstruction 03/04/2015    Vitamin D deficiency 06/23/2021            Family History:   Unable to obtain due to patients medical condition.         Social History:   Unable to obtain due to patients medical condition.         Past Surgical History:   Unable to obtain due to patients medical condition.         Review of Systems:   Unable to obtain due to patients medical condition.            Physical Exam:   Pulse 83   Temp 99  F  "(37.2  C)   Resp 28   Wt 116.3 kg (256 lb 6.3 oz)   SpO2 95%   BMI 46.90 kg/m        GENERAL: Intubated, sedated. NAD.  HEENT: No icterus. ETT in place, OG tube in place.  CARDIOVASCULAR: RRR. Normal S1 and S2.  RESP: Coarse bilaterally. Mechanical ventilation.    GI Soft, bowel sounds hypoactive but present.  GENITOURINARY: Miller in place.  EXTREMITIES: edematous, warm and well perfused  NEURO: Sedated and intubated.  INTEGUMENTARY: No rashes. Cannula/Line sites CDI.      Resp: 28 SpO2: 95 % O2 Device: Mechanical Ventilator      Arterial Blood Gas:   Recent Labs   Lab 09/28/24 2009 09/28/24 1959 09/28/24  1605 09/28/24  1342 09/28/24  0937   PH  --  7.39 7.39 7.35 7.33*   PCO2  --  63* 63* 70* 77*   PO2  --  70* 64* 64* 59*   HCO3  --  38* 38* 39* 41*   O2PER 60 60 60 60  60 50        Vitals:    09/25/24 0020 09/26/24 0400 09/28/24 0215   Weight: 120.4 kg (265 lb 6.9 oz) 115.3 kg (254 lb 3.1 oz) 116.3 kg (256 lb 6.3 oz)   I/O last 3 completed shifts:  In: 6787.69 [I.V.:1605.69; NG/GT:960; IV Piggyback:1500]  Out: 4375 [Urine:2600; Emesis/NG output:650; Stool:1125]  Recent Labs   Lab 09/28/24 1958 09/28/24  1605 09/28/24  1238 09/28/24  0936   NA  --  148*  --  151*   POTASSIUM  --  3.5  --  3.9   CHLORIDE  --  100  --  102   CO2  --  35*  --  38*   ANIONGAP  --  13  --  11   * 154*   < > 165*   BUN  --  85.2*  --  85.3*   CR  --  1.66*  --  1.76*   BEN  --  8.4*  --  8.5*    < > = values in this interval not displayed.     No components found for: \"URINE\"   Recent Labs   Lab 09/28/24  1605 09/28/24  0936 09/28/24  0411   AST 27 26 21   ALT 16 16 11   BILITOTAL 1.0 1.1 0.9   ALBUMIN 3.2* 3.1* 2.1*   PROTTOTAL 6.0* 5.7* 4.3*   ALKPHOS 45 43 35*     Temp: 99  F (37.2  C) Temp src: EsophagealTemp  Min: 95  F (35  C)  Max: 99.1  F (37.3  C)   Recent Labs   Lab 09/28/24 2001 09/28/24  1605 09/28/24  1237 09/28/24  0816 09/28/24  0411 09/28/24  0240 09/27/24  2340 09/27/24  1650 09/27/24  0454   WBC  --  " 12.7*  --   --  13.3* 19.4*  --  10.9 11.0   HGB 8.9* 9.1* 8.9* 9.6* 5.6* 8.1*   < > 7.3* 7.2*   HCT  --  28.5*  --   --  18.9* 27.3*  --  26.0* 25.8*   MCV  --  97  --   --  106* 108*  --  120* 121*   RDW  --  21.9*  --   --  26.9* 26.7*  --  19.9* 20.4*   PLT  --  110*  --   --  138* 195  --  175 191    < > = values in this interval not displayed.     Recent Labs   Lab 09/28/24  0944 09/23/24  1931   INR 1.17* 1.23*   PTT 24  --        Recent Labs   Lab 09/28/24  1958 09/28/24  1605 09/28/24  1604 09/28/24  1238 09/28/24  0936   * 154* 157* 125* 165*

## 2024-09-29 NOTE — PLAN OF CARE
ICU End of Shift Summary. See flowsheets for vital signs and detailed assessment. Handoff report given to oncoming RN.     Neuro: Sedated and intubatedm RASS of -1. Agitated and restless at times, soft wrist restraints in place. Moves extremities but not to command. PERRL.  CV: Afebrile. Sinus/Sinus tach 70s -100, occasional PVCs. Maintained MAP>65 with levo & vaso titration. CVP 15, PA 70s / 20s, CI  3-4, SVR 400s, SvO2 60s  Resp: ETT 20cm at teeth. CMV FiO2 60%, Tv 400, Rate 28, PEEP 5, PIP 32-41  GI: OG tube @ 62 cm to LIS.  Dark red loose stool via rectal tube   : Miller with AUO  Skin: no new changes  Activity: A2, Lift  Gtts: Fent @ 75, Versed @1, levo @ 0.05-0.08, Vaso @ 2.5. Pantoprazole @ 10  Labs: K+ 3.4, replaced. Hgb 8.4. Na 150, Free water flush 200mL q4h    Plan: Tube feeds to be held for 24 hrs. PPI infusion for 72 hrs.       Problem: Adult Inpatient Plan of Care  Goal: Absence of Hospital-Acquired Illness or Injury  Intervention: Prevent and Manage VTE (Venous Thromboembolism) Risk  Recent Flowsheet Documentation  Taken 9/29/2024 0000 by Josue Sorensen RN  VTE Prevention/Management: SCDs on (sequential compression devices)  Taken 9/28/2024 2000 by Josue Sorensen RN  VTE Prevention/Management: SCDs on (sequential compression devices)

## 2024-09-29 NOTE — PROGRESS NOTES
GASTROENTEROLOGY PROGRESS NOTE    ASSESSMENT/RECOMMENDATIONS:  Acute blood loss anemia  Melena  Jared class IIa duodenal ulcer s/p Ovesco x2 clips  Jared class IIa duodenal ulcer s/p endoclips x2 and hemospray  Her hemodynamics and hemoglobin have both remained stable since her second endoscopy.  Her ulcers are in a very difficult location to endoscopically act on and they are quite fibrotic which made placing clips difficult and they may not hold as well. If she were to rebleed, would recommend consulting IR as I'm worried we do no have further endoscopic therapy to offer.  - Transition PPI drip to pantoprazole 40 mg IV twice daily after completing 72 hours of drip from time of endoscopy  - Once tolerating p.o., omeprazole 40 mg twice daily x 8 weeks - daily thereafter given need for anticoagulation  - Okay for enteral feeds from GI perspective  - Okay to continue anticoagulation given clear indication for it    The patient was discussed and plan agreed upon with GI staff.    Jesus Bhakta  Gastroenterology Fellow, PGY4     Inpatient GI consults service will sign off. No further recommendations at this time. If primary team has addition questions, please page consult fellow listed in Lyn.    Current GI Consult Staff: Ml    Follow up recommendations:   No outpatient GI clinic follow-up indicated. Follow-up with primary care provider at timing determined by discharge physician.    _______________________________________________________________  S: No acute overnight events.  Remains intubated and sedated. Scant dark stool in rectal tube    O:  Pulse 98, temperature 99.1  F (37.3  C), resp. rate 28, weight 120.4 kg (265 lb 6.9 oz), SpO2 97%.    Constitutional: No acute distress.  Intubated   Eyes: Sclera anicteric  Ears/nose/mouth/throat: Does not look to voice  CV: Extremities wwp  Respiratory: Ventilated  Abdomen: Nondistended  Skin: warm, perfused, no jaundice  Neuro: Does not follow  commands  Psych: Unable to assess  MSK: In bed. Moves upper extremities spontaneously    LABS:  BMP  Recent Labs   Lab 09/29/24  0907 09/29/24  0812 09/29/24  0406 09/29/24  0348 09/28/24  2341 09/28/24  2203 09/28/24 1958 09/28/24  1605   *  --   --  150*  --  149*  --  148*   POTASSIUM 3.6  3.6  --   --  3.4  --  3.2*  --  3.5   CHLORIDE 105  --   --  103  --  101  --  100   BEN 8.7*  --   --  8.8  --  8.7*  --  8.4*   CO2 31*  --   --  33*  --  34*  --  35*   BUN 76.3*  --   --  79.9*  --  81.9*  --  85.2*   CR 1.56*  --   --  1.56*  --  1.57*  --  1.66*   * 130* 123* 124*   < > 138*   < > 154*    < > = values in this interval not displayed.     CBC  Recent Labs   Lab 09/29/24  0811 09/29/24  0348 09/28/24 2001 09/28/24  1605 09/28/24  0816 09/28/24  0411 09/28/24  0240   WBC  --  11.1*  --  12.7*  --  13.3* 19.4*   RBC  --  2.71*  --  2.95*  --  1.78* 2.54*   HGB 8.6* 8.4*   < > 9.1*   < > 5.6* 8.1*   HCT  --  26.7*  --  28.5*  --  18.9* 27.3*   MCV  --  99  --  97  --  106* 108*   MCH  --  31.0  --  30.8  --  31.5 31.9   MCHC  --  31.5  --  31.9  --  29.6* 29.7*   RDW  --  21.8*  --  21.9*  --  26.9* 26.7*   PLT  --  105*  --  110*  --  138* 195    < > = values in this interval not displayed.     INR  Recent Labs   Lab 09/28/24  0944 09/23/24  1931   INR 1.17* 1.23*     LFTs  Recent Labs   Lab 09/29/24  0907 09/29/24  0348 09/28/24  2203 09/28/24  1605   ALKPHOS 45 45 45 45   AST 33 28 26 27   ALT 20 17 16 16   BILITOTAL 0.8 0.8 0.8 1.0   PROTTOTAL 6.0* 5.9* 5.9* 6.0*   ALBUMIN 3.2* 3.2* 3.1* 3.2*      PANCNo lab results found in last 7 days.

## 2024-09-30 NOTE — CONSULTS
"INTERVENTIONAL RADIOLOGY CONSULT NOTE    SUBJECTIVE:  Noelle Gomez was referred by the Cardiology service for endovascular embolization of a briskly bleeding duodenal ulcer. She is a 59-year-old female with a history of tobacco use and hypertension. She initially presented to an outside hospital on 9/16/2024 with acute on chronic hypoxic/hypercapnic respiratory failure requiring prompt intubation. Subsequent work-up identified moderate right ventricular dysfunction in the setting of segmental and subsegmental pulmonary emboli, for which she underwent thrombectomy.    The patient had a prior GI bleed before transfer to [facility], requiring 1 unit of pRBCs. For hemostasis, two hemostatic clips were successfully placed by gastroenterology, and hemostatic spray was applied and there was no active bleeding at the end of the procedure. The GI team recommended an IR consult in case of rebleeding, as they do not believe further endoscopic intervention would be helpful.    The patient is currently on vasopressors: Levophed 0.05 mcg/kg/min and Vasopressin 1.8 units/hr. She has received 2 units of pRBCs and FFP. The patient is intubated and sedated with fentanyl and versed. Her heparin drip was stopped around 1800, and anticoagulation was reversed with 15 mg of protamine injection.    OBJECTIVE:    Vital signs:  Temp: 99.7  F (37.6  C) Temp src: Esophageal   Pulse: 105   Resp: 28 SpO2: 100 % O2 Device: Mechanical Ventilator     Weight: 120.4 kg (265 lb 6.9 oz)  Estimated body mass index is 48.55 kg/m  as calculated from the following:    Height as of 9/16/24: 1.575 m (5' 2\").    Weight as of this encounter: 120.4 kg (265 lb 6.9 oz).       Imaging:  CTA Abdomen/Pelvis with Contrast (9/29/2024):  Extravasation of contrast within the second portion of the duodenum adjacent to the duodenal clips, increasing on delayed imaging, consistent with active bleeding.  Similar moderate right and small left pleural effusions with compressive " atelectasis compared to 9/28/2024.  Similar bibasilar groundglass and consolidative opacities, which may represent evolving pulmonary infarction versus infectious/inflammatory etiology. Known pulmonary emboli partially visualized.  Partially imaged occlusive thrombus in the left common femoral vein, extending into the left femoral and deep femoral veins.      Lab results:  Hemoglobin: 8.2 g/dL (Low) - 9/29/2024 21:26  Platelet Count: 124 x 10 /uL (Low) - 9/29/2024 21:26  INR: 1.42 (High) - 9/29/2024 21:26    ASSESSMENT:  Noelle Gomez was referred to IR for embolization of a duodenal ulcer with brisk bleeding that could not be adequately controlled with endoscopic intervention. Currently, the patient is actively bleeding, and endovascular embolization is being pursued to control the hemorrhage. Given the patient's tenuous hemodynamic status, including the use of two vasopressors and sedation, anesthesia nursing will be consulted for involvement in the case.    PLAN:   Proceed with endovascular embolization to control the duodenal ulcer hemorrhage.   Ensure the patient is NPO.   Continue holding anticoagulation.   Consent will be completed with the designated surrogate decision-maker prior to the procedure.    Case discussed with Dr. Briana Lo.    Scar Cabrera MD  Interventional Radiology Resident PGY-4  IR pass pager: [phone]

## 2024-09-30 NOTE — PROGRESS NOTES
Nephrology Progress Note  09/30/2024           MEDICAL DECISION MAKING     RECOMMENDATIONS:    Would recommend net even goal today     Increase free water flushes to 300mL q4h, can increase to 400ML q4h if still hyperNa     Renally dose medications, avoid unnecessary nephrotoxins, avoid unnecessary radiographic contrast, daily renal panel, strict I/O, daily weights     Defer diuresis management to cardiology     Nephrology team will sign off     ASSESSMENT:  Noelle Gomez is a 59 year old w h/o HTN who presented to outside hospital w AHFR, acute HF & PE. Transferred here for HF management. She has been aggressively diuresed and since developed metabolic derangements.  Nephrology consulted for metabolic derangements     Concurrent metabolic alkalosis and respiratory acidosis  On 9/26: ABG 7.42/77/67; bicarb 47  It appears she has COPD and likely has chronic hypercarbia  Her metabolic alkalosis is likely 2/2 aggressive diuresis.  She was given Diamox (9/24-->) which has not improved her serum bicarb. This is likely 2/2 her inability to self regulate her ventilation (blow off her CO2), because of this she has remained hypercarbic. Additionally, her kidneys may be trying to compensate for her hypercarbia by holding on to bicarb, thus making her metabolic alkalosis difficult to treat  Increasing her minute ventilation and removing more CO2 may allow the Diamox to be more effective in removing bicarb   Serum bicarb coming down nicely w Diamox and reducing pCO2     VIGNESH  sCr on admit 1.3.  Peak sCr 1.56.  Baseline sCr 0.9  May represent hypercreatinemia in setting of aggressive diuresis, but may also be indication of overdiuresis  Her hypernatremia and physical exam findings are suggestive of overdiuresis, but her elevated CVP suggests she may have more intravascular fluid, her CVP measurement may be confounded by the possibility of CTEPH but will defer this interpretation to cardiology  sCr improved modestly w diuretic  holiday      Hypernatremia   Likely 2/2 aggressive diuresis.   Increase free water flushes to 300mL q4h  can increase to 400ML q4h if still hyperNa      Azotemia  Multifactorial in setting of GI bleed, tube feeds & VIGNESH    Recommendations were communicated to primary team via note & verbal      Bo Self MD   Division of Renal Disease and Hypertension  [facility]  jaya  Vocera Web Console    SUBJECTIVE     Interval History :   Nursing and provider notes from last 24 hours reviewed.  In the last 24 hours Noelle Gomez   EGD yesterday found large duodenal ulcer on  ppi   Following commands this AM  Diuretic holiday yesterday and today     A comprehensive review of systems was negative except as noted above.    OBJECTIVE     Physical Exam:   I/O last 3 completed shifts:  In: 6866.98 [I.V.:2826.98; NG/GT:670]  Out: 8265 [Urine:4725; Emesis/NG output:525; Stool:3000; Blood:15]   /53   Pulse 100   Temp 99.1  F (37.3  C) (Esophageal)   Resp 28   Wt 115.2 kg (253 lb 15.5 oz)   SpO2 99%   BMI 46.45 kg/m       General:supine  HEENT: ET tube in place  Cardiac:rrr  Pulmonary:bilateral chest rise  Abdominal:nondistended  Extremities:no LE edema, very wrinkled skin  Neuro: following commands  Access:R PICC, R internal jugular CVC    Labs:   All labs reviewed by me  Electrolytes/Renal -   Recent Labs   Lab Test 09/30/24  1226 09/30/24  1009 09/30/24  1005 09/30/24  0853 09/30/24  0255 09/30/24  0252 09/30/24  0023 09/29/24  2317 09/29/24  2129 09/29/24  2026 09/29/24  1645 09/29/24  1222   NA  --   --   --  154*  --  152* 151*   < > 150*   < > 149*  --    POTASSIUM  --   --   --  3.9  --  4.1 3.8   < > 4.0  4.0   < > 3.3* 3.5   CHLORIDE  --   --   --  114*  --  112*  --   --  107   < > 103  --    CO2  --   --   --  28  --  29  --   --  30*   < > 31*  --    BUN  --   --   --  70.0*  --  68.5*  --   --  75.1*   < > 77.3*  --    CR  --   --   --  1.60*  --  1.49*  --   --  1.53*   < > 1.60*  --    * 153*  146* 164*   < > 180* 190*   < > 179*   < > 128*  130*  --    BEN  --   --   --  8.6*  --  8.4*  --   --  8.4*   < > 9.2  --    MAG  --   --   --   --   --  2.6*  --   --   --   --  2.7* 2.1   PHOS  --   --   --   --   --  4.5  --   --  4.0  --  3.2  --     < > = values in this interval not displayed.       CBC -   Recent Labs   Lab Test 09/30/24  1223 09/30/24  0853 09/30/24  0252 09/29/24 2317 09/29/24 2126 09/29/24 2026   WBC  --   --  16.3*  --  12.7* 16.0*   HGB 9.0* 9.0* 9.6*   < > 8.2* 7.2*  7.2*   PLT  --   --  95*  --  124* 123*    < > = values in this interval not displayed.       LFTs -   Recent Labs   Lab Test 09/30/24  0853 09/30/24 0252 09/29/24 2129   ALKPHOS 39* 38* 40   BILITOTAL 1.2 1.2 0.8   ALT 22 21 19   AST 31 34 32   PROTTOTAL 4.8* 4.6* 5.0*   ALBUMIN 2.7* 2.6* 2.7*       Iron Panel -   Recent Labs   Lab Test 09/25/24  0904   IRON 91   IRONSAT 45   *     Current Medications:  Current Facility-Administered Medications   Medication Dose Route Frequency Provider Last Rate Last Admin    [Held by provider] acetaZOLAMIDE (DIAMOX) injection 500 mg  500 mg Intravenous Q12H Lev Maharaj MD   500 mg at 09/29/24 1026    chlorhexidine (PERIDEX) 0.12 % solution 15 mL  15 mL Mouth/Throat BID Chalino De La Torre MD   15 mL at 09/30/24 0904    insulin aspart (NovoLOG) injection (RAPID ACTING)  1-7 Units Subcutaneous Q4H Sven Lynn MD   1 Units at 09/30/24 1227    ipratropium - albuterol 0.5 mg/2.5 mg/3 mL (DUONEB) neb solution 3 mL  3 mL Nebulization 4x daily Travis Rosa MD   3 mL at 09/30/24 1205    multivitamins w/minerals liquid 15 mL  15 mL Per Feeding Tube Daily Travis Rosa MD   15 mL at 09/29/24 0814    piperacillin-tazobactam (ZOSYN) 4.5 g in D5W 100 mL intermittent infusion  4.5 g Intravenous Q6H Chalino De La Torre MD        Prosource TF20 ENfit Compatibl EN LIQD (PROSOURCE TF20) packet 60 mL  1 packet Per Feeding Tube TID [place] Pertuz,  Travis CORONA MD   60 mL at 09/29/24 1720    sildenafil (REVATIO) suspension 20 mg  20 mg Oral or Feeding Tube Q8H Travis Rosa MD   20 mg at 09/30/24 1328     Current Facility-Administered Medications   Medication Dose Route Frequency Provider Last Rate Last Admin    bumetanide (BUMEX) 0.25 mg/mL infusion  2 mg/hr Intravenous Continuous Lev Maharaj MD 8 mL/hr at 09/30/24 1300 2 mg/hr at 09/30/24 1300    dextrose 10% infusion   Intravenous Continuous PRN Travis Rosa MD        fentaNYL (SUBLIMAZE) infusion   mcg/hr Intravenous Continuous Travis Rosa MD 2 mL/hr at 09/30/24 1300 100 mcg/hr at 09/30/24 1300    [Held by provider] heparin 25,000 units in 0.45% NaCl 250 mL ANTICOAGULANT infusion  0-5,000 Units/hr Intravenous Continuous Lev Maharaj MD   Stopped at 09/29/24 1815    medication instruction   Does not apply Continuous PRN Travis Rosa MD        midazolam (VERSED) 100 mg/100 mL NS infusion - ADULT  1-8 mg/hr Intravenous Continuous Travis Rosa MD 1 mL/hr at 09/30/24 1015 1 mg/hr at 09/30/24 1015    norepinephrine (LEVOPHED) 16 mg in  mL infusion MAX CONC CENTRAL LINE  0.01-0.6 mcg/kg/min (Dosing Weight) Intravenous Continuous Sven Lynn MD 9.4 mL/hr at 09/30/24 1300 0.08 mcg/kg/min at 09/30/24 1300    pantoprazole (PROTONIX) 80 mg in sodium chloride 0.9 % 100 mL infusion  8 mg/hr Intravenous Continuous Sven Lynn MD 10 mL/hr at 09/30/24 1300 8 mg/hr at 09/30/24 1300    vasopressin 1 unit/mL MAX Conc (PITRESSIN) infusion  0.5-4 Units/hr Intravenous Continuous Chalino De La Torre MD 2 mL/hr at 09/30/24 1300 2 Units/hr at 09/30/24 1300     Bo Self MD

## 2024-09-30 NOTE — PROGRESS NOTES
General Infectious Disease Service  [place] Team  Patient:  Noelle Gomez  Date of birth 1965  Medical record number [id number]  Date of Admission: 9/23/2024  Date of Visit:  9/30/2024  Requesting Provider: Chalino De La Torre         Assessment and Recommendations     Problem List:    Acute on chronic hypoxic/hypercapic respiratory failure. On mechanical ventilation.   - s/p Bronchoscopy 9/26/24- no endobronchial lesion seen. there were mild thick secretions and the mucosa was inflamed and edematous.   Multiloculated cavitary lesions in the anterior left upper lobe - CT chest 9/23/24   CXR 9/25 w/increased interstitial and airspace opacities and left sided pleural effusion.   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 Originally on heparin but paused w/GI bleeding.   Recent GI bleed (duodenal ulcer) - S/p EGD showing duodenal ulcer. S/p clips   MRSA screen neg -9/25/24  Quantiferon TB gold 9/25/24 - indeterminate   BMI 46    Discussion:    Noelle Gomez is a 59-year-old female with a history of asthma, 40-pack-year tobacco use, HTN, obesity (BMI 49), and recent GI bleeding secondary to a duodenal ulcer. She was admitted with acute on chronic hypoxic/hypercapnic respiratory failure, now on mechanical ventilation. CT chest from 09/23 revealed an irregular, multiloculated cavitary lesion in the left upper lobe, patchy consolidations, ground-glass opacities, and pleural effusions. She also has diffuse bilateral pulmonary emboli with left lower extremity DVT, concerning for CTEPH.    ID was consulted for concerns of infection contributing to her cavitary lesion and thrombosis. Blood and sputum cultures have shown no growth. Initial acid-fast bacilli (AFB) stains from bronchial washings and BAL are negative, with >3 negative AFB stains reported. Quantiferon TB Gold was indeterminate due to low mitogen response. Fungal cultures, including for Nocardia and Actinomyces, are pending with no  growth so far. Staphylococcus warneri grew in bronchial washings sample. The 1-3 beta-D-glucan is indeterminate, and all other fungal markers (aspergillus galactomannan, histoplasma, blastomyces antigens) are negative. Imaging findings are most likely consistent with emphysema rather than infection.    The patient remains intubated, afebrile, with improving inflammatory markers. Given the absence of AFB, we recommend holding the course with Zosyn for presumed aspiration pneumonia. Although TB is unlikely with negative AFB stains, recommend discussing TB precautions with infection control prior to taking the patient off those.    Plan:    - continue zosyn   - AFB stain negative x 3, MTB PCR not detected. Low risk for active TB     Recommendations discussed with supervising attending physician, Dr. Reich.    Thank you for this consult. The General ID team will continue to follow this patient. Please feel free to call with any questions.     Anthony Hubbard MD   Infectious Disease Fellow   Date of Service: 9/30/2024  Available on ComHear    Attestation:  I saw and evaluated this patient.  I discussed this patient with ID fellow Dr Hubbard  and agree with the findings and plan in this note. I have reviewed today's vital signs, medications, labs and imaging.    Total time on day of visit including chart review, counseling, documentation, and  coordination of care: 45 min     Frederick Reich MD,M.Med.Sc.  Staff, Infectious Diseases  Pager: [phone]                   Antibiotics & Cultures, Consolidated     Cultures:   9/16 Bcx: negative  9/19 Respiratory aerobic bacterial culture with gram stain: no growth   9/24 Bcx: NGTD  9/26 BAL - pending      aspergillus galactomannan- neg   BD glucan - indeterminate  SarsCoV2, Influenza A/B/ RSV - neg   HIV ag/ab - neg   Hep B c ab,B s ab abd Bs ag , hep c - neg       Antibiotics:   Ceftriaxone (9/16-9/22)  Doxycycline (9/16-9/22)  Pip/Tazo (9/25 - )   Vancomycin  (9/25)        Interval History     Remains stable. Afebrile.          Physical Exam     /53   Pulse 103   Temp 99.1  F (37.3  C) (Esophageal)   Resp 28   Wt 115.2 kg (253 lb 15.5 oz)   SpO2 94%   BMI 46.45 kg/m       Exam:  GENERAL: Intubated in the ICU. Awake but not responding to questions/ commands, pale   HEAD: Normocephalic and atraumatic  EYES:  Eyes grossly normal to inspection,   NECK:  no asymmetry scars or lesions.   LUNGS:  Improved breath sounds from yesterday.   CARDIOVASCULAR:  Regular rate and rhythm.,ild tachycardic.   ABDOMEN:  Soft, nontender, bowel sounds normal. obese   EXT: Extremities warm and less edematous    MS: No gross musculoskeletal defects noted  SKIN:  no acute rashes   NEUROLOGIC:  Intubated and sedated.

## 2024-09-30 NOTE — PROGRESS NOTES
Brief progress note:     Ms. Gomez started having significant red blood in her OG tube this afternoon, as well as dark melenic fluid coming out around her rectal tube.  She went down for a stat CTA to evaluate for source of GI luminal bleeding.  Upon her arrival back to the room, her maps were in the 50s and she was tachycardic with rates in the 120s.  I activated massive transfusion protocol.  Left femoral central venous access was obtained for more rapid transfusion. 15 mg IV protamine was given for anticoagulation reversal. Please see MTP notes and blood transfusion notes for total blood products given.  I discussed the case with interventional radiologist on-call who reviewed the images and is planning to do an embolization to stop the bleeding this evening.    I called her son Scar and discussed the situation with him.      Discussed with Dr. Cem Zaman MD  Cardiology Fellow  Pager: [phone]

## 2024-09-30 NOTE — PLAN OF CARE
Goal Outcome Evaluation:       Sedation vacation performed, pt opens eyes, follows commands, MAJOR's, shakes head yes-no questions. Pt Pressure Supported from 1145 to 1600 on 60%, 12/5, tolerated well, then placed on PS 60%   10/5 from    9429-3918, tolerated well. Cards 2 team spoke w/ pt's family via phone call and plan on extubating tomorrow. HGM followed every 4 hrs, orders to tx HGM less than 7. SWAN catheter and introducer d/c'd as ordered, site CDI, no bleeding no hematoma. Pressors weaned down as tolerated, see MAR, MAP greater than 65. Pt restarted on 200 ml FWF every 4 hours for elevated Na+, will monitor. Pt restarted on Bumex drip, K+ abnormal to 3.2, replaced per protocol, recheck at 2000 pm, MD Cardiac ICU team notified. See flow sheet for assessment and vs taken and recorded,

## 2024-09-30 NOTE — PROGRESS NOTES
CLINICAL NUTRITION SERVICES - REASSESSMENT NOTE   Nutrition Prescription    RECOMMENDATIONS FOR MDs/PROVIDERS TO ORDER:  Trial restart EN support pending GI bleed vs TPN in next 24-48 hours. TF off since 9/28 at 0000.    Malnutrition Status:    Moderate malnutrition in the context of acute illness    Recommendations already ordered by Registered Dietitian (RD):  No new - TF on hold d/t GI bleed    Future/Additional Recommendations:  Monitor nutrition POC.       TPN recs if needed:  Dosing weight:  50 kg  Access: PICC    Initial parameters (per day)  Volume:  Minimal or per PharmD discretion  Dextrose: 100 g (GIR 1.4)  AA: 125 g  Lipids: 250 mL 20%, 3 days per week (MWF)    Dextrose titration:   Monitor lytes and if within acceptable parameters (Mg++ > or = 1.5, K+ is > or = 3, and PO4 > or = 1.9), increase dextrose by 20 g/day to goal of 120 g dextrose.    Additives: 10 mL Infuvite + 1 mL MTE-4 daily [+ 100 mg thiamine daily x 5 days to reduce refeeding risk]    **DELETE BELOW PORTION FOR TPN CHANGE ORDER    Goal PN provides 120 g dextrose, 125 g AA, and 250 mL 20% lipids 3 days per week for total provision of 1122 Kcals (22 Kcals/kg), 2.5 g/kg protein, GIR 1.67 mg/kg/minute, and 19% fat kcals on average daily.        EVALUATION OF THE PROGRESS TOWARD GOALS   Diet: NPO    Enteral Access: OGT    FWF: 200 mL q4h    Nutrition Support: EN  9/24-Current: Vital AF 1.2 @ 35 ml/hr + 3 pkts prosource TF20 provides 840 ml volume, 1248 kcals (25 kcal/kg), 123 g pro (2.5 g/kg), 45 g Fat, 93 g CHO, 4 g fiber, 681 ml free water.     Enteral Intake: TF off since 9/28 - ongoing GI bleed.   -->7-day average enteral nutrition infusions: 346 mL TF + 1.86 ProSource protein packets = 564 kcals (11 kcal/kg, or 51% minimum assessed kcal needs) and 63 g protein (1.3 g protein/kg, or 50% minimum assessed protein needs).    NEW FINDINGS   -Per visit with pt today (9/30): Attempted to visit with pt, but busy with RN cares with curtains  pulled.     -Wt trends: Will continue to use IBW for dosing wt.  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 Pt continues with red/bloody output from OGT and melena per RN flowsheets.  Rectal tube with 0-1750 mL stool output daily over past week per I/Os - uptrended over past few days.    -Renal: VIGNESH - Nephrology following     -Respiratory: Intubated on mechanical ventilation. TB rule out pending.    -Skin: Per most recent WOCN note on 9/30 (today) - IAD/MASD to intergluteal area (improving minimally); DTPI to R heel (evolving).     -Meds & Vitamin/Mineral Supplementation: Reviewed, notable for:   Medium dose sliding scale insulin  Certavite  Vaso gtt  Norepi gtt  Bumex gtt    MALNUTRITION  % Intake: < 75% for > 7 days (moderate)  % Weight Loss: Weight loss does not meet criteria - suspect largely fluid-related, on Bumex gtt  Subcutaneous Fat Loss: None observed**  Muscle Loss: None observed**  Fluid Accumulation/Edema: Mild (1-2+ edema per RN flowsheets)  Malnutrition Diagnosis: Moderate malnutrition in the context of acute illness  **Per previous RD note on 9/23 (pt busy with RN cares)     Previous Goals   Initiate nutrition support in 2 to 3 days   Evaluation: Met    Previous Nutrition Diagnosis  Inadequate protein-energy  intake related to NPO status as evidenced by EN not yet resumed due to concern for GI bleeds   Evaluation: No change    CURRENT NUTRITION DIAGNOSIS  Inadequate protein-energy intake related to NPO status as evidenced by EN on hold since 9/28 due to concern for GI bleeds.     INTERVENTIONS  Implementation  Enteral Nutrition - resume once ok per primary team    Goals  Diet adv v nutrition support within 2-3 days.    Monitoring/Evaluation  Progress toward goals will be monitored and evaluated per protocol.     Deedee Neal RD, LD  Available on Vocera - can search by name or unit Dietitian  **Clinical Nutrition is no longer available via pager

## 2024-09-30 NOTE — PROCEDURES
[facility]    Procedure: IR Procedure Note    Date/Time: 9/30/2024 1:54 AM    Performed by: Milad Perdomo MD  Authorized by: Milad Perdomo MD  IR Fellow Physician: Milad Perdomo  Other(s) attending procedure: Briana Lo    Pre Procedure Diagnosis: bleeding duodenal ulcer  Post Procedure Diagnosis: same    UNIVERSAL PROTOCOL   Site Marked: NA  Prior Images Obtained and Reviewed:  Yes  Required items: Required blood products, implants, devices and special equipment available    Patient identity confirmed:  Verbally with patient, arm band, provided demographic data and hospital-assigned identification number  Patient was reevaluated immediately before administering moderate or deep sedation or anesthesia  Confirmation Checklist:  Patient's identity using two indicators, relevant allergies, procedure was appropriate and matched the consent or emergent situation and correct equipment/implants were available  Time out: Immediately prior to the procedure a time out was called    Universal Protocol: the Joint Commission Universal Protocol was followed    Preparation: Patient was prepped and draped in usual sterile fashion       ANESTHESIA    Anesthesia:  Local infiltration  Local Anesthetic:  Lidocaine 1% without epinephrine      SEDATION    Patient Sedated: No    See dictated procedure note for full details.  Findings: GDA embolization    Specimens: none    Procedural Complications: None    Condition: Stable    Plan: 2 hours bedrest right leg straight  Hold heparin for at least 6 hours for purposes of procedure but would recommend two stable hemoglobins prior to initation as well.      PROCEDURE  Describe Procedure: GDA embolization  Patient Tolerance:  Patient tolerated the procedure well with no immediate complications  Length of time physician/provider present for 1:1 monitoring during sedation:  0 min (see anesthesia provider documentation)

## 2024-09-30 NOTE — ANESTHESIA POSTPROCEDURE EVALUATION
Patient: Noelle Gomez    Procedure: Procedure(s):  IR       Anesthesia Type:  General    Note:  Disposition: Inpatient   Postop Pain Control: Uneventful            Sign Out: Well controlled pain   PONV: No   Neuro/Psych: Uneventful            Sign Out: Acceptable/Baseline neuro status   Airway/Respiratory: Uneventful            Sign Out: AIRWAY IN SITU/Resp. Support               Airway in situ/Resp. Support: ETT                 Reason: Planned Pre-op   CV/Hemodynamics: Uneventful            Sign Out: Acceptable CV status; No obvious hypovolemia; No obvious fluid overload   Other NRE: NONE   DID A NON-ROUTINE EVENT OCCUR? No           Last vitals:  Vitals:    09/30/24 0445 09/30/24 0500 09/30/24 0515   BP:      Pulse: 97 96 98   Resp:      Temp: 37.1  C (98.8  F) 37  C (98.6  F) 37  C (98.6  F)   SpO2: 98% 98% 97%       Electronically Signed By: Jim Quintanilla MD  September 30, 2024  5:31 AM

## 2024-09-30 NOTE — PROCEDURES
Cardiac ICU Procedure Note  CVC Line Placement   Patient's Name: Noelle Gomez  Service performing procedure: Cardiac ICU  Attending Physician: Dr. Priest  Indication for procedure:Access     I was personally present during this procedure    Acuity: emergent   Procedure date: 09/29/24     A Time Out was performed immediately prior to the procedure to confirm correct patient, procedure, and site (site marked if applicable): Yes     Consent was not obtained given massive GI bleeding and need for emergent vascular access for transfusion.     Preparation for Procedure:   Hand hygiene performed prior to insertion: yes   Barrier precautions used (large sterile drape, gown, mask, sterile gloves, cap): yes   Skin preparation with chlorhexidine gluconate: yes   Skin preparation agent was allowed to dry: yes   Anesthesia used? Local     CVC line was placed.   Side: left   Site: common femoral vein   Ultrasound used? Yes    Type of catheter placed: 9 Georgian MAC    Insertion depth (cm): 12 cm   Post-venous cannulation confirmed by:Appropriate venous blood return, ultrasound visualization of wire in IJ  Number of attempts:1     The patient tolerated the procedure well except for complications as noted. Immediate complications:NONE     Yasmani Zaman MD   Cardiology Fellow   September 29, 2024

## 2024-09-30 NOTE — ANESTHESIA CARE TRANSFER NOTE
Patient: Noelle Gomez    Procedure: Procedure(s):  IR       Diagnosis: Upper GI bleed [K92.2]  Diagnosis Additional Information: No value filed.    Anesthesia Type:   General     Note:    Oropharynx: ventilatory support and endotracheal tube in place  Level of Consciousness: unresponsive and iatrogenic sedation    Level of Supplemental Oxygen (L/min / FiO2): 80  Independent Airway: airway patency not satisfactory and stable  Dentition: dentition changed  Vital Signs Stable: post-procedure vital signs reviewed and stable  Report to RN Given: handoff report given  Patient transferred to: ICU  Comments: ICU team came to IR1 and transported the patient back to ICU with monitors in place. Pressors and sedation infusing. Reversal dosed.   ICU Handoff: Call for PAUSE to initiate/utilize ICU HANDOFF, Identified Patient, Identified Responsible Provider, Reviewed the Pertinent Medical History, Discussed Surgical Course, Reviewed Intra-OP Anesthesia Management and Issues during Anesthesia, Set Expectations for Post Procedure Period and Allowed Opportunity for Questions and Acknowledgement of Understanding      Vitals:  Vitals Value Taken Time   BP     Temp     Pulse     Resp     SpO2         Electronically Signed By: LEONIDAS Cruz CRNA  September 30, 2024  2:06 AM

## 2024-09-30 NOTE — PROGRESS NOTES
[facility] Nurse Inpatient Assessment     Consulted for: right heel, sacral    Summary: RN reports generalized rash that Providers are aware of and lymph consult. RN reported finding pressure injury to right heel, POA, upon skin assessemnt, [facility] requested updated consult per Provider, this was obtained.     Patient History (according to provider note(s):      Noelle Gomez is a 59 year old female who was admitted on 9/23/2024. She has a PMH of asthma, 40 pack year former smoker, HTN, who initially presented to [facility] on 9/16 for acute on chronic hypoxic/hypercapnic respiratory failure thought to be related to decompensated heart failure. She was intubated quickly after admission due to worsening hypercapnea and AMS. TTE at that time revealed severe RV volume and pressure overload. VQ scan obtained (due to VIGNESH) revealed multiple bilateral wedge shaped perfusion defects. She was then transferred to [facility] ICU for thrombectomy. She also was noted to have a LE DVT, subsequently started on a heparin drip. During thrombectomy, she was noted to have mainly chronic clot with only minimal acute clot that was extracted. PAP reduced from 77/33 (mean 47), to 68/19 (mean 34). 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 She unfortunately later developed a GI bleed with melena and a hemoglobin drop from 12->6.7 over 2 days now s/p 1u pRBC. She subsequently required pressor support with levo and vaso, but vaso was weaned off and she remains on low dose levophed. She was then transferred to [facility] for further evaluation and management.     Assessment:      Areas visualized during today's visit: Focused:, Sacrum/coccyx, Heel, and Right    Skin Injury Location: intergluteal fold, perianal      Last photo: 9/23/2024, unable to obtain photo 9/30 due to inability to maintain side  lying  Skin injury due to: Incontinence associated dermatitis (IAD) and Moisture associated skin damage (MASD)  Skin history and plan of care:  minimal improvement to  blanchable redness in linear/mirror like fashion to buttocks where there is skin to skin contact. Moist, tacky skin. FMS in use, with leaking around tubing, melena and large clots. RN reports Gastroenterology is aware. Isolibrium support surface with JENNY pump for moisture management in use.      Skin assessment: Denudement and Erythema     Measurements (length x width x depth, in cm)  see media     Color: red     Temperature  normal      Drainage: none .      Color: none      Odor: none  Pain: unable to assess due to  sedation , none  Pain interventions prior to dressing change: patient tolerated well and slow and gentle cares   Treatment goal: Heal , Infection control/prevention, Decrease moisture, Maintain (prevention of deterioration), Protection, and Simplify plan of care  STATUS: improving minimally  Supplies ordered: gathered, supplies stored on unit, discussed with RN, and discussed with patient      Pressure Injury Location: right heel       Last photo: 9/30/2024  Wound type: Pressure Injury     Pressure Injury Stage: Deep Tissue Pressure Injury (DTPI), present on admission       Wound history/plan of care:   Blister forming with boggy center and pale yellow discoloration. Deep purple to perimeter of blister formation  Skin intact and dry., cracked skin to heel.   Evolving Deep tissue pressure injury, POA.     Wound base: 100 % Non-blanchable, Purple, Epidermis, and Dry, 5 % Blister, Dry, Yellow, and Pale     Palpation of the wound bed: boggy      Drainage: none     Description of drainage: none     Measurements (length x width x depth, in cm) 1.6  x 1.4  x  0 cm      Periwound skin: Dry/scaly      Color: pink and red      Temperature: normal   Odor: none  Pain: unable to assess due to  sedation , none  Pain intervention prior to dressing  "change: patient tolerated well and slow and gentle cares   Treatment goal: Heal , Infection control/prevention, Maintain (prevention of deterioration), and Protection  STATUS: evolving  Supplies ordered: gathered, supplies stored on unit, discussed with RN, and discussed with patient     My PI Risk Assessment     Sensory Perception: 2 - Very Limited     Moisture: 2 - Very moist      Activity: 1 - Bedfast      Mobility: 1 - Completely immobile      Nutrition: 2 - Probably inadequate      Friction/Shear: 2 - Potential problem      TOTAL: 10     Treatment Plan:     Intergluteal/perianal:  BID  Cleanse gently with Danuta clenase and protect and a soft disposable cloth.   Apply Criticaid moisture barrier in a thin layer  Montior for skin breakdown near FMS tubing, reposition tubing often when turning patient to remove prolonged pressure to perianal area.    Right heel: Every three days  Cleanse the area with NS and pat dry.  Apply No sting film barrier to periwound skin.  Cover wound with Mepilex 4x4   Turn and reposition Q 2hrs side to side only.  Ensure pt has Branden-cushion while sitting up in the chair.  FYI- If pt has constant incontinent loose stools needing dressing changes Q shift please discontinue the Mepilex dressing and apply criticaid barrier paste BID and PRN.      Pressure Injury Prevention (PIP) Plan:  If patient is declining pressure injury prevention interventions: Explore reason why and address patient's concerns, Educate on pressure injury risk and prevention intervention(s), If patient is still declining, document \"informed refusal\" , and Ensure Care team is aware ( provider, charge nurse, etc)  Mattress: Follow bed algorithm, add Low Air Loss (Air+) mattress pump if skin is very moist or constantly moist.   HOB: Maintain at or below 30 degrees, unless contraindicated  Repositioning in bed: Every 1-2 hours , Left/right positioning; avoid supine, Raise foot of bed prior to raising head of bed, to reduce " patient sliding down (shear), and Frequent microturns using positioning wedges, as patient tolerates  Heels: Keep elevated off mattress, Pillows under calves, and Heel lift boots  Protective Dressing: Sacral Mepilex for prevention (#465097),  especially for the agitated patient   Positioning Equipment:None  Chair positioning: Assist patient to reposition hourly and Do NOT use a donut for sitting (this increases pressure to smaller area and creates a higher potential for injury)    If patient has a buttock pressure injury, or high risk for PI use chair cushion or SPS.  Moisture Management: Perineal cleansing /protection: Follow Incontinence Protocol, Avoid brief in bed, Clean and dry skin folds with bathing , Consider InterDry (#621610) between folds, and Moisturize dry skin  Under Devices: Inspect skin under all medical devices during skin inspection , Ensure tubes are stabilized without tension, and Ensure patient is not lying on medical devices or equipment when repositioned  Ask provider to discontinue device when no longer needed.      Orders: Reviewed    RECOMMEND PRIMARY TEAM ORDER: None, at this time  Education provided: importance of repositioning, plan of care, wound progress, Infection prevention , Moisture management, Hygiene, and Off-loading pressure  Discussed plan of care with: Patient and Nurse  WOC nurse follow-up plan: weekly  Notify WOC if wound(s) deteriorate.  Nursing to notify the Provider(s) and re-consult the WOC Nurse if new skin concern.    DATA:     Current support surface: Standard  Low air loss (JENNY pump, Isolibrium, Pulsate)  Containment of urine/stool: Incontinence Protocol, Incontinent pad in bed, Indwelling catheter, and Internal fecal management  BMI: Body mass index is 46.45 kg/m .   Active diet order: Orders Placed This Encounter      NPO for Medical/Clinical Reasons Except for: No Exceptions     Output: I/O last 3 completed shifts:  In: 6866.98 [I.V.:2826.98; NG/GT:670]  Out: 2058  [Urine:4725; Emesis/NG output:525; Stool:3000; Blood:15]     Labs:   Recent Labs   Lab 09/30/24  0256 09/30/24  0252 09/23/24  1527 09/23/24  1158   ALBUMIN  --  2.6*   < >  --    PREALB  --   --   --  6.8*   HGB  --  9.6*   < >  --    INR 1.40*  --    < >  --    WBC  --  16.3*   < >  --     < > = values in this interval not displayed.     Pressure injury risk assessment:   Sensory Perception: 2-->very limited  Moisture: 3-->occasionally moist  Activity: 1-->bedfast  Mobility: 2-->very limited  Nutrition: 2-->probably inadequate  Friction and Shear: 2-->potential problem  Jose Ramon Score: 12    Claritza Maharaj RN, BSN, CWOCN   Pager no longer is use, please contact through for[MD]   [facility]: [facility] Nurse [place]  Dept. Office Number: [phone]

## 2024-09-30 NOTE — PROGRESS NOTES
Cardiology ICU Progress Note    Brief HPI:  Noelle Gomez is a 59 year old female admitted on 9/23/2024. She has a PMH of asthma, 40 pack year former smoker, HTN, who initially presented to [facility] on 9/16 for acute on chronic hypoxic/hypercapnic respiratory failure thought to be related to decompensated heart failure. Pt required intubation on initial day of admission on 9/16/24. TTE was done revealing RV volume and pressure overload. VQ scan was obtained in the setting of VIGNESH revealing multiple bilateral wedge shaped perfusion defects. Also found to LE DVT. Anticoagulation initiated. Patient underwent thrombectomy at [facility] and found to have mainly chronic clot with few acute emboli. PA pressures significantly elevated. Patient temporarily started on epoprostenol. After several days of anticoagulation hgb dropped. Patient started requiring vasopressors. Transferred to our facility for further management. EGD undergone on arrival revealing duodenal ulcer requiring 2 clips. RHC done also revealing pre-capillary pHTN and a ELIO of 5. Night of 9/27 had significant GI bleed from same ulcer requiring transfusion. Patient had another massive bleed on night of 9/29 showing extravasation on CT and subsequently underwent embolization by IR.        Assessment and plan by system:     Neurology   # Acute toxic metabolic encephalopathy  -Recommend use of either ketamine or versed gtt along with fentanyl to assist with sedation. Weaning from opioids will help with patient's hypercarbia   -Weaning sedation as able  -RAAS goal -2     Cardiovascular  #Cardiogenic shock  #RH failure  #Group IV pHTN CTEPH  #s/p IVC filter  #DVT  TTE 9/17/24: EF 65%, moderately reduced RV systolic function, RVSP >55mmHg, mod TR  RHC: RA 11, RV: 79/13, PA 78/29 (42), PCW: 12, CO/CI (shu): 5.43/2.46, PVR 5.53  - sildenafil 10mg tid  - Vasopressors: vasopressin, levo as needed  - bumex gtt per primary team  - heparin gtt per  primary team in setting of GI bleed  - Monitor hemodynamic status.         Pulmonary  # Acute hypoxic respiratory failure  # Cavitary lesion    - Pressure weaning trial   - Receiving diamox 500mg bid  - Plan to wean PEEP and FiO2 for possible extubation, will be high risk  - Bronchoscopy performed 9/26, culture results pending        Gastrointestinal, Nutrition  # Duodenal ulcer s/p clips and embolization  # Acute GI bleed     - receiving tube feedings as able  - Nutrition consulted, appreciate recommendations  - PPI per GI recommendation  - Bowel regimen: senna prn     Renal, Electrolytes  # VIGNESH, stable  - ICU electrolyte replacement protocol  - Strict I&Os  - continue monitoring closely    Infectious Disease  #CAP-previously treated  - Currently on zosyn  - Cultures from bronchoscopy 9/26 pending, TB resulting as indeterminant    Hematology  #Acute blood loss anemia   - Hgb goal > 7.0  - holding heparin gtt        Endocrinology  # Hyperglycemia  -Insulin medium dose correction q4h    Integumentary:  - No skin issues      Patient seen and discussed with staff physician.          Objective:  Most recent vital signs:  /53   Pulse 98   Temp 98.8  F (37.1  C)   Resp 28   Wt 115.2 kg (253 lb 15.5 oz)   SpO2 96%   BMI 46.45 kg/m    Temp:  [98.6  F (37  C)-100.2  F (37.9  C)] 98.8  F (37.1  C)  Pulse:  [] 98  Resp:  [27-29] 28  BP: (107-141)/(53-58) 107/53  MAP:  [62 mmHg-85 mmHg] 70 mmHg  Arterial Line BP: (100-131)/(40-58) 112/53  FiO2 (%):  [50 %-100 %] 60 %  SpO2:  [90 %-100 %] 96 %  Wt Readings from Last 2 Encounters:   09/30/24 115.2 kg (253 lb 15.5 oz)   09/22/24 124.8 kg (275 lb 1.6 oz)       Intake/Output Summary (Last 24 hours) at 9/24/2024 0657  Last data filed at 9/24/2024 0600  Gross per 24 hour   Intake 2756.78 ml   Output 7840 ml   Net -5083.22 ml     Physical exam:  General: In bed, in NAD  HEENT: PERRL, no scleral icterus or injection  CARDIAC: RRR, no m/r/g appreciated. Peripheral  pulses dopplered  RESP: Mechanical ventilation; CTAB, no wheezes, rhonchi or crackles appreciated.  GI: soft, BS hypoactive  : Miller  EXTREMITIES: bilateral edema  SKIN: No acute lesions appreciated  NEURO: Intubated and sedated    Labs (Past three days):  CBC  Recent Labs   Lab 09/30/24 0252 09/30/24 0023 09/29/24 2317 09/29/24 2126 09/29/24 2026 09/29/24  1837   WBC 16.3*  --   --  12.7* 16.0* 10.9   RBC 3.10*  --   --  2.70* 2.27* 2.65*   HGB 9.6* 7.9* 10.3* 8.2* 7.2*  7.2* 8.3*   HCT 29.3*  --   --  25.6* 23.1* 26.7*   MCV 95  --   --  95 102* 101*   MCH 31.0  --   --  30.4 31.7 31.3   MCHC 32.8  --   --  32.0 31.2* 31.1*   RDW 17.2*  --   --  20.0* 22.3* 21.9*   PLT 95*  --   --  124* 123* 107*     BMP  Recent Labs   Lab 09/30/24 0255 09/30/24 0252 09/30/24 0023 09/29/24 2317 09/29/24 2129 09/29/24 2125 09/29/24 2026 09/29/24  1645 09/29/24  1222 09/29/24  0406 09/29/24  0348   NA  --  152* 151* 151* 150*  --  150* 149*  --    < > 150*   POTASSIUM  --  4.1 3.8 4.0 4.0  4.0  --  3.5 3.3* 3.5   < > 3.4   CHLORIDE  --  112*  --   --  107  --  105 103  --    < > 103   CO2  --  29  --   --  30*  --  33* 31*  --    < > 33*   ANIONGAP  --  11  --   --  13  --  12 15  --    < > 14   * 180* 190* 190* 179*   < > 140* 128*  130*  --    < > 124*   BUN  --  68.5*  --   --  75.1*  --  78.7* 77.3*  --    < > 79.9*   CR  --  1.49*  --   --  1.53*  --  1.65* 1.60*  --    < > 1.56*   GFRESTIMATED  --  40*  --   --  39*  --  35* 37*  --    < > 38*   BEN  --  8.4*  --   --  8.4*  --  8.7* 9.2  --    < > 8.8   MAG  --  2.6*  --   --   --   --   --  2.7* 2.1  --  2.2   PHOS  --  4.5  --   --  4.0  --   --  3.2  --   --  2.9    < > = values in this interval not displayed.     Troponins:     INR  Recent Labs   Lab 09/30/24 0256 09/29/24 2126 09/29/24 2026 09/28/24  0944   INR 1.40* 1.42* 1.35* 1.17*     Liver panel  Recent Labs   Lab 09/30/24 0252 09/29/24 2129 09/29/24 2026 09/29/24  1645   PROTTOTAL  4.6* 5.0* 5.7* 6.4   ALBUMIN 2.6* 2.7* 3.1* 3.4*   BILITOTAL 1.2 0.8 0.8 0.8   ALKPHOS 38* 40 45 49   AST 34 32 37 40   ALT 21 19 23 23       Imaging/procedure results:  CTA Abdomen Pelvis with Contrast  Narrative: Exam: Computed tomographic angiography of the abdomen and pelvis  without and with contrast, including 3D reformations dated 9/29/2024  7:43 PM    Clinical information:  Known duodenal ulcer, s/p EGD with clips and  hemospray with recurrent GI bleed. Evaluate for arterial source of  bleeding    Technique: Helical scans through the abdomen and pelvis obtained  before the administration of intravenous contrast media and following  the injection of contrast media  in the arterial phase. Source images  reviewed as well as 3D and multi-planar reconstructions.    Contrast: 135ml isovue 370    DLP: 2512 mGy*cm    Comparison study: CT abdomen/pelvis 9/28/2024, CT chest/abdomen/pelvis  9/22/2024    Findings:      Lower thorax: Compared to CT 9/28/2024, similar moderate right and  small left pleural effusions with compressive atelectasis. Largely  unchanged right greater than left basilar groundglass opacities and  right middle and left lower lobe consolidations. Redemonstration of  bibasilar segmental and subsegmental filling defects corresponding to  known pulmonary emboli.    The abdominal aorta is normal in caliber. Scattered atherosclerotic  calcifications.    The celiac axis, SMA and GLORIA are patent. The renal arteries are patent  with mild atherosclerotic plaque at the origins bilaterally.     The splenic vein, portal vein, SMV and renal veins are  patent.  Infrarenal IVC filter in place.   The hepatic veins and IVC are patent.  Partially imaged occlusive  thrombus of the left common femoral vein with extension into the  femoral and deep femoral veins.    Abdomen and pelvis:    Liver: Stable hypodensities throughout the liver, which likely  represent benign simple cysts. No intrahepatic biliary  "ductal  dilation.      Biliary System: Cholelithiasis without evidence of cholecystitis.    Pancreas: Stable subcentimeter cystic pancreatic lesions (series 10,  image 174, 10/111). No pancreatic ductal dilation.     Spleen: Within normal limits.     Adrenal glands: Unchanged bilateral adrenal nodular thickening.    Kidneys: Stable subcentimeter hypodensities in the right kidney are  too small to characterize and likely represent simple cysts.  Extrarenal pelvis bilaterally, left greater than right, which are more  prominent on today's exam compared to CT 9/28/2024.    Pelvis: Urinary bladder is within normal limits.  Miller catheter in  place.    Gastrointestinal tract: On late arterial phase, there is extravasated  contrast extending from the second to third portions of the duodenum  which increases on delayed phase imaging into the transverse duodenum,  consistent with active bleeding. Duodenal ulcer clips. Normal caliber  small and large bowel.  Gastric tube terminates in the stomach. Rectal  tube in place. Dense material in the distal sigmoid and rectum on the  noncontrast phase may represent old blood products from prior episodes  of bleeding. \"Over the scope\" clip from endoscopy demonstrated in the  rectum just below the rectal tube.    Mesentery/peritoneum/retroperitoneum: No free air or fluid.    Lymph nodes: No significant lymphadenopathy.    Bones and soft tissues: No acute osseous abnormality. Small  fat-containing umbilical hernia. Mild body wall edema.  Impression: Impression:  1.  Extravasation of contrast within the second portion of the  duodenum adjacent to the duodenal clips which increases on delayed  imaging, consistent with active bleeding.  2.  Compared to 9/28/2024, similar moderate right and small left  pleural effusions with compressive atelectasis.  3.  Similar bibasilar groundglass and consolidative opacities, which  may represent evolving pulmonary infarction " versus  infectious/inflammatory etiology. Known pulmonary emboli partially  visualized.  4.  Partially imaged occlusive thrombus in the left common femoral  vein, which appears to extend into the left femoral and deep femoral  veins.    [Critical Result: Active bleeding in the second portion of the  duodenum]    Finding was identified on 9/29/2024 8:05 PM.     Cardiology Fellow, was contacted by me on 9/29/2024 8:20 PM and  verbalized understanding of the critical result.     I have personally reviewed the examination and initial interpretation  and I agree with the findings.    KARAN KLEIN MD         SYSTEM ID:  [id number]  XR Chest Port 1 View  Narrative: Exam: XR CHEST PORT 1 VIEW, 9/29/2024 10:17 AM    Indication: ETT placement    Comparison: Same day    Findings:   Endotracheal tube tip at the mid thoracic trachea, 5.1 cm above the  wilder. Enteric tube courses into the stomach and below the field of  view. Right internal jugular Du Bois-Sonu catheter tip at the main  pulmonary artery. Right upper shoulder depicted at the low SVC. Stable  cardiac mediastinal silhouette. The pulmonary vasculature is  indistinct. No significant change in right greater than left mixed  interstitial and patchy airspace opacities. Probable small bilateral  pleural effusions. No pneumothorax.  Impression: Impression: Endotracheal tube tip at the mid thoracic trachea.    ERUM LOPEZ DO         SYSTEM ID:  [id number]  XR Chest Port 1 View  Narrative: Exam: TEMPORARY, 9/29/2024 1:33 AM    Indication: Intubated patient with hypoxia and CTEPH    Comparison: 9/28/2024    Findings:   Single view of the chest. Endotracheal tube terminates in the upper  thoracic trachea approximately 8.3 cm from the wilder. Right IJ  Du Bois-Sonu catheter terminates in the proximal right pulmonary artery.  Right upper extremity PICC tip terminates in the mid SVC. Enteric  tube. Trachea is midline. Cardiac silhouette is stable. Similar  bilateral mixed  interstitial and airspace opacities throughout the  right lung and left lung base. Small left and trace right pleural  effusions. No pneumothorax.  Impression: Impression: Endotracheal tube terminates approximately 8.3 cm from the  wilder, recommend slight advancement. Similar mixed interstitial and  airspace opacities throughout the right lung and left lung base. Small  left and trace right pleural effusions.     I have personally reviewed the examination and initial interpretation  and I agree with the findings.    ERUM LOPEZ DO         SYSTEM ID:  [id number]

## 2024-09-30 NOTE — ANESTHESIA PREPROCEDURE EVALUATION
Anesthesia Pre-Procedure Evaluation    Patient: Noelle Gomez   MRN: [id number] : 1965        Procedure : Procedure(s):  IR          Past Medical History:   Diagnosis Date    Asthma 2023    Bilateral lower extremity edema 2021    Former smoker 2021    HTN (hypertension) 2021    Lymphedema 2022    Prediabetes 2021    Seasonal allergies 2021    UPJ (ureteropelvic junction) obstruction 2015    Vitamin D deficiency 2021      Past Surgical History:   Procedure Laterality Date    CV RIGHT HEART CATH MEASUREMENTS RECORDED N/A 2024    Procedure: Right Heart Catheterization;  Surgeon: Otf Emery MD;  Location:  HEART CARDIAC CATH LAB    IR IVC FILTER PLACEMENT  2024    IR PULMONARY ANGIOGRAM BILATERAL  2024    MIDLINE DOUBLE LUMEN PLACEMENT  2024    PICC TRIPLE LUMEN PLACEMENT  2024    PICC TRIPLE LUMEN PLACEMENT  2024      Allergies   Allergen Reactions    Sulfa Antibiotics Unknown, Itching and Rash      Social History     Tobacco Use    Smoking status: Former     Types: Cigarettes    Smokeless tobacco: Never   Substance Use Topics    Alcohol use: Not on file      Wt Readings from Last 1 Encounters:   24 120.4 kg (265 lb 6.9 oz)        Anesthesia Evaluation            ROS/MED HX  ENT/Pulmonary:     (+)                      asthma                  Neurologic:       Cardiovascular:     (+)  hypertension- -   -  - -                                pulmonary hypertension,      METS/Exercise Tolerance:     Hematologic:       Musculoskeletal:       GI/Hepatic:       Renal/Genitourinary:     (+) renal disease, type: ARF,            Endo:     (+)               Obesity,       Psychiatric/Substance Use:       Infectious Disease:       Malignancy:       Other:            Physical Exam    Airway   unable to assess          Respiratory Devices and Support    ETT:      Dental    unable to assess        Cardiovascular    "       Rhythm and rate: tachycardia     Pulmonary       Comment: B/l breath sounds            OUTSIDE LABS:  CBC:   Lab Results   Component Value Date    WBC 12.7 (H) 09/29/2024    WBC 16.0 (H) 09/29/2024    HGB 8.2 (L) 09/29/2024    HGB 7.2 (L) 09/29/2024    HGB 7.2 (L) 09/29/2024    HCT 25.6 (L) 09/29/2024    HCT 23.1 (L) 09/29/2024     (L) 09/29/2024     (L) 09/29/2024     BMP:   Lab Results   Component Value Date     (H) 09/29/2024     (H) 09/29/2024    POTASSIUM 3.5 09/29/2024    POTASSIUM 3.3 (L) 09/29/2024    CHLORIDE 105 09/29/2024    CHLORIDE 103 09/29/2024    CO2 33 (H) 09/29/2024    CO2 31 (H) 09/29/2024    BUN 78.7 (H) 09/29/2024    BUN 77.3 (H) 09/29/2024    CR 1.65 (H) 09/29/2024    CR 1.60 (H) 09/29/2024     (H) 09/29/2024     (H) 09/29/2024     COAGS:   Lab Results   Component Value Date    PTT 25 09/29/2024    INR 1.42 (H) 09/29/2024    FIBR 238 09/29/2024     POC: No results found for: \"BGM\", \"HCG\", \"HCGS\"  HEPATIC:   Lab Results   Component Value Date    ALBUMIN 3.1 (L) 09/29/2024    PROTTOTAL 5.7 (L) 09/29/2024    ALT 23 09/29/2024    AST 37 09/29/2024    ALKPHOS 45 09/29/2024    BILITOTAL 0.8 09/29/2024     OTHER:   Lab Results   Component Value Date    PH 7.37 09/29/2024    LACT 1.3 09/29/2024    A1C 5.8 (H) 09/16/2024    BEN 8.7 (L) 09/29/2024    PHOS 3.2 09/29/2024    MAG 2.7 (H) 09/29/2024    CRP 2.2 (H) 08/14/2023     (H) 09/24/2024       Anesthesia Plan    ASA Status:  5, emergent       Anesthesia Type: General.     - Airway: ETT   Induction: Inhalation.   Maintenance: Balanced.   Techniques and Equipment:     - Lines/Monitors: Arterial Line, Central Line     - Blood: T&C, Blood in Room, PRBC, FFP, PLT, Cryo (Active MTP\)     - Drips/Meds: Norepi, Vasopressin     Consents            Postoperative Care    Pain management: IV analgesics.        Comments:               Jim Quintanilla MD    I have reviewed the pertinent notes and labs in the chart " from the past 30 days and (re)examined the patient.  Any updates or changes from those notes are reflected in this note.

## 2024-09-30 NOTE — PROGRESS NOTES
Cardiology ICU Note    09/30/2024    Noelle Gomez MRN# [id number]   Age: 59 year old    Critical Care ICU Note - Cardiology 80 minutes  Yadiel Priest M.D.        The patient remains unstable in the ICU with on-going need for ventilator support, parenteral medications for the adjustment of blood pressure and cardiac output and maintenance of renal function.      The patient is seen with resident and fellow for for prolonged ventilator management requiring oxygen and/or pressure modulation; shock requiring vasopressor and or inotropic agents; low cardiac output necessitating  inotropic agents, vasopressors and afterload reducing agents; limited mechanical supI personally reviewed:    Arterial and venous blood gases to assess acid base balance, oxygenation, and ventilator settings.      Hemodynamic parameters obtained by central hemodynamic monitoring including RAP, estimated LVEDP, pulmonary artery pressure, cardiac output and vascular resistances in order to adjust fluids and infused medications for blood pressure and cardiac output maintenance.    Ventilatory settings and/or supplement oxygen needs in order to obtain optimal oxygenation and electrolyte balance at low possible pressure support and inspired oxygen tension    Volume status, renal function and nutritional support as judged by intake, output, daily weight and appropriate laboratories.    I reviewed individual and serial imaging studies including echocardiogram, chest x-ray, CT scan    I personally supervised the prescription of parenteral fluids, inotropes, vasodilators , and vasopressors in order to correct cardiac output, maintain urine output and renal function.    I personally met with patient or family to discuss current and future diagnostic and therapeutic strategies    I directed a family meeting to discuss current and on-going decision making in light of current condition  And patients known or expressed wishes made prior to this  critical juncture.  We discussed prospects for recovery, barriers to recovery, resuscitation status. Potential for convalescent care and the the use of palliative care      YOB: 1965          Assessment and Plan:     59 year old female, history of tobacco use and hypertension who presented to an OSH on 9/16 for acute on chronic hypoxic/hypercapnic respiratory failure requiring prompt intubation with subsequent work-up that identified moderate RV dysfunction in the setting of segmental and subsegmental pulmonary emboli for which she underwent thrombectomy. 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 However, she had GI bleed prior to transfer to [facility] requiring 1u pRBC. Since arrival at [facility], she has undergone swan-guided therapies but her hospital course has been complicated by mixed respiratory acidosis and metabolic alkalosis, difficulty weaning from the ventilator, pneumonia s/p zosyn treatment, and large GI bleed due to duodenal ulcer requiring 5u PRBC and repeat EGD with clip x2 on 09/28.     Today:  - GI bleed overnight s/p GDA embolization with IR  - Continue holding heparin, continue PPI gtt  - Pressor need increased from 0.06 to 0.1 norepi, still with at vaso 2  - Bumex gtt held in setting of bleed, resume today  - Remove [place], clotted  - Hold diamox given resolution of metabolic alkalosis  - ID recommend continuing TB precautions despite negative smear x 3 given suspicious cavitary lesion  - Pressure support trial per CICU team  - FWF for hypernatremia (per GI ok to use OG)  - May need to discuss trach if still intubated in 1 week    Neurology   # Pain and sedation  In setting of critical illness and sedating medications.   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   Vasopressors: Levophed and Vasopressin  Diuresis:  Aggressive diuresis with Bumex 2mg/hr. Hold diamox given resolution of metabolic alkalosis  Anticoagulation: HOLD Heparin for CTEPH (was resumed 9/29 with subsequent GDA bleed requiring embolization with IR). Now s/p 3 GI bleeds, likely unable to tolerate any future anticoagulation  Lines: [place] removed 9/30, clotted off    Pulmonary    Ventilator Settings  FiO2 (%): 60 %, Resp: 28, Vent Mode: CMV/AC, Resp Rate (Set): 28 breaths/min, Tidal Volume (Set, mL): 400 mL, PEEP (cm H2O): 5 cmH2O, Pressure Support (cm H2O): 5 cmH2O, Resp Rate (Set): 28 breaths/min, Tidal Volume (Set, mL): 400 mL, PEEP (cm H2O): 5 cmH2O     # Acute Hypoxic and Hypercapnic Respiratory Failure  # CTEPH  # LORA/OHS  # HAP/VAP - on Zosyn   # COPD   # Cavitary lesion on CT - Emphysema/Infarction    # TB rule out   - S/P Bronchoscopy 9/26/24 with no endobronchial lesion. There were mild thick secretions and the mucosa was inflamed and edematous.   - CT Chest 9/23/24 with multiloculated cavitary lesions of the anterior left upper lobe  - Quantiferon TB gold 9/25/24 - indeterminate  ===PLAN===  -Plan for CTEPH/RV overload as above  -Vent management per CICU team    Pressure support trial 9/30  -ID:   Await bronchoscopy cultures   Continue Zosyn   3 AFB stains negative- await cultures prior to lifting TB precautions  -  Need to discuss trach if still intubated week of 10/7    Gastrointestinal, Nutrition  # Upper GI Bleed 2/2 Large Duodenal Ulcer  # Bleed of gastroduodenal artery s/p embolization 9/29  - Patient experienced brisk GI bleed 9/27 - 9/28 requiring several units of pRBC.  - EGD 9/28 with large cratered duodenal ulcer with vissible vessel. 2 clips placed and hemospray used. Post-procedurally recommend 72 hours PPI infusion and holding feeds for 24 hours.   - Heparin resumed 9/29 with subsequent bleeding from gastroduodenal artery requiring IR embolization  - HOLD heparin- likely cannot tolerate indefinitely  - GI following  - Protonix infusion x72 hours from 9/29 bleed with transition to IV PPI BID    Renal, Electrolytes  # Hypokalemia   # VIGNESH 2/2 cardiogenic shock from CTEPH  # Mixed Metabolic alkalosis and respiratory acidosis. - improved  # Hypernatremia  - Nephrology following   Hold diamox given resolution of alkalosis  - Etiology likely contraction alkalosis from diuretics, with additional contributing factors from her chronic lung disease as well  - Diuretics as above  - FWD  4.9L; given need for diuresis will start with FWF at 200cc Q4H    Infectious Disease  # HAP/VAP - may represent aspiration pneumonia  # TB rule out - currently on isolation   - S/P Bronchoscopy 9/26/24 with no endobronchial lesion. There were mild thick secretions and the mucosa was inflamed and edematous.   - CT Chest 9/23/24 with multiloculated cavitary lesions of the anterior left upper lobe  - Quantiferon TB indeterminate. AFB smear negative x 3, cultures pending.  - ID following   Continue TB precautions pending cultures   Continue Zosyn (9/25-*)   Follow up bronch infectious studies    Hematology  # Left femoral DVT  # Family history of Factor 5 Leiden  # CTEPH  # SLE anticoagulant positive   Plan:  Hematology working up antiphospholipid syndrome. (Will need repeat APS labs January 2025)  HOLD heparin given repeat GI bleeds      Endocrinology  # Hyperglycemia  - insulin prn    ICU Checklist:  #Feeding: currently held in the setting of GI bleed  #Analgesia: wean fentanyl to minimize respiratory suppression  #Sedation: versed  #Thromboembolism ppx: mechanical (GI bleed)  #HOB: 30 degrees   #Ulcer ppx: PPI  gtt  #Glucose: insulin PRN  #SBT/SAT: try   #Bowel reg: miralax/senna PRN  #Family: , 2 sons  #Dispo: CCU    Code Status: No CPR- Do NOT Intubate     Discussed with Dr Priest attending physician,    Demetra Gomez MD  Internal Medicine PGY3    Cardiology 2 Service         Interval Events:     Large bleed. IR-> GA embolization. Bumex held. MTP. 6 pRBC, 6 plasma, 2 plts, 2 whole blood  [place] clotted off.     Waking up following commands this morning. Rectal tube with about 100/hr of melena.    VS: Norepi up from 0.06 to 0.1, vaso still at 2, MAPs 70s. Pulse 100 - stable. CVP 10- but swan clotted.     Vent: 28/400/5/60%    Labs: Na 152, CO2 29, Cr 1.49 - slightly down from yesterday. CRP down from 50 to 24. LA 1.4. Hgb 7.2-> 9.6, plt 95 from 124. INR 1.4. VB.31/60/37/30     CXR:   Impression:   1. Endotracheal tube tip projects over the mid thoracic trachea.  Additional support devices as described.  2. Slightly decreased right greater than left mixed pulmonary  opacities with possible small bilateral pleural effusions.            Physical Exam:   /53   Pulse 98   Temp 98.8  F (37.1  C)   Resp 28   Wt 115.2 kg (253 lb 15.5 oz)   SpO2 96%   BMI 46.45 kg/m      GENERAL: Intubated, sedated. NAD. Opens eyes on command  HEENT: No icterus. ETT in place, OG tube in place.  CARDIOVASCULAR: Tachycardic, regular rate  RESP: Coarse bilaterally. Mechanical ventilation.    GI Soft, nondistended. Rectal tube with 800 cc melena.   GENITOURINARY: Miller in place.  EXTREMITIES: edematous, warm and well perfused  NEURO: Sedated and intubated.  INTEGUMENTARY: No rashes.    Resp: 28 SpO2: 96 % O2 Device: Mechanical Ventilator   "    Arterial Blood Gas:   Recent Labs   Lab 09/30/24  0338 09/30/24 0252 09/30/24 0023 09/29/24 2317 09/29/24 2126   PH  --  7.32* 7.36 7.34* 7.37   PCO2  --  58* 56* 59* 58*   PO2  --  73* 76* 100 214*   HCO3  --  30* 31* 31* 33*   O2PER 70 70 70.0 100.0 100        Vitals:    09/28/24 0215 09/29/24 0000 09/30/24 0300   Weight: 116.3 kg (256 lb 6.3 oz) 120.4 kg (265 lb 6.9 oz) 115.2 kg (253 lb 15.5 oz)   I/O last 3 completed shifts:  In: 6866.98 [I.V.:2826.98; NG/GT:670]  Out: 8265 [Urine:4725; Emesis/NG output:525; Stool:3000; Blood:15]  Recent Labs   Lab 09/30/24 0255 09/30/24 0252 09/30/24 0023 09/29/24 2317 09/29/24 2129   NA  --  152* 151*   < > 150*   POTASSIUM  --  4.1 3.8   < > 4.0  4.0   CHLORIDE  --  112*  --   --  107   CO2  --  29  --   --  30*   ANIONGAP  --  11  --   --  13   * 180* 190*   < > 179*   BUN  --  68.5*  --   --  75.1*   CR  --  1.49*  --   --  1.53*   BEN  --  8.4*  --   --  8.4*    < > = values in this interval not displayed.     No components found for: \"URINE\"   Recent Labs   Lab 09/30/24 0252 09/29/24 2129 09/29/24 2026   AST 34 32 37   ALT 21 19 23   BILITOTAL 1.2 0.8 0.8   ALBUMIN 2.6* 2.7* 3.1*   PROTTOTAL 4.6* 5.0* 5.7*   ALKPHOS 38* 40 45     Temp: 98.8  F (37.1  C) Temp src: EsophagealTemp  Min: 98.6  F (37  C)  Max: 100.2  F (37.9  C)   Recent Labs   Lab 09/30/24 0252 09/30/24 0023 09/29/24 2317 09/29/24 2126 09/29/24 2026 09/29/24 1837 09/29/24  1645   WBC 16.3*  --   --  12.7* 16.0* 10.9 11.1*   HGB 9.6* 7.9* 10.3* 8.2* 7.2*  7.2* 8.3* 8.5*   HCT 29.3*  --   --  25.6* 23.1* 26.7* 27.3*   MCV 95  --   --  95 102* 101* 100   RDW 17.2*  --   --  20.0* 22.3* 21.9* 22.0*   PLT 95*  --   --  124* 123* 107* 110*     Recent Labs   Lab 09/30/24 0256 09/29/24 2126 09/29/24 2026 09/28/24  0944 09/23/24  1931   INR 1.40* 1.42* 1.35* 1.17* 1.23*   PTT 25 25 26 24  --        Recent Labs   Lab 09/30/24 0255 09/30/24 0252 09/30/24 0023 09/29/24 2317 " 09/29/24  2129   [facility] 169* 180* 190* 190* 179*

## 2024-09-30 NOTE — PLAN OF CARE
Major Shift Events:  MTP activated after copious amount of bleeding from OGT and rectum. Sent to IR. Total 7 PRBC's, 3 FFP, and 1 platelet given. Protamine given as ordered. Groin site WDL.  Neuro: Sedated on fentanyl and versed. PERRL.  CV: ST. MAP>65 maintained with vaso and levo. Tmax 100. PA pressures elevated, line now clotted, team aware. CO 6.9 and CI 3.1. CVP 10. QTC prolonged, team notified and mag and K given. Doppler pulses.  Pulm: CMV settings. PIP slightly elevated.  GI/: OGT to LIS w/ bloody OP. Team aware that no meds could be given enterally. Rectal tube in place with large amount of leakage and bloody/maroon OP. Miller in place with adequate urine output. Bumex gtt stopped and diamox on hold per team d/t bleeding.  Plan: Continue to monitor pt for s/s of bleeding.   For vital signs and complete assessments, please see documentation flowsheets.     Problem: Adult Inpatient Plan of Care  Goal: Optimal Comfort and Wellbeing  Outcome: Not Progressing  Intervention: Monitor Pain and Promote Comfort  Recent Flowsheet Documentation  Taken 9/29/2024 2100 by Mamie Bryant, RN  Pain Management Interventions: around-the-clock dosing utilized  Intervention: Provide Person-Centered Care  Recent Flowsheet Documentation  Taken 9/29/2024 2100 by Mamie Bryant, RN  Trust Relationship/Rapport:   care explained   emotional support provided   reassurance provided   Goal Outcome Evaluation:

## 2024-09-30 NOTE — PROGRESS NOTES
Brief IR note    Patient seen at the bedside. Intubated and sedated. Per nursing and ICU fellow patient seems improved from bleeding perspective. No long mass transfusion most recent hemoglobin up trending. Right groin puncture C/D/I. Right dorsalis pedis and posterior tibial pulses doppler able.    - Ir to sign off please reach out if we can be of further assistance    Milad Perdomo MD  Group pager [phone]

## 2024-09-30 NOTE — IR NOTE
Patient arrived from  ICU with anesthesia. Right groin prepped and time out complete at 2330. FRA 0, consent signed.     Patient Name: Noelle Gomez  Medical Record Number: [id number]  Today's Date: 9/29/2024    Procedure: Visceral Embolization for GI Bleed  - 2 coils    Proceduralist: MD Leanne Lo MD    Procedure Start: 2330  Procedure end: 0200  Sedation medications administered: GA   CRNA assumed care 2330    5 FR SHEATH REMOVED FROM RFA     Flat Bedrest from [id number]    Report given to: 4A, RN    Other Notes: Pt arrived to IR room 1 from . Consent reviewed. Pt denies any questions or concerns regarding procedure. Pt positioned supine and monitored per protocol. Pt tolerated procedure without any noted complications. Pt transferred back to .

## 2024-10-01 NOTE — CONSULTS
Palliative Care Consultation Note  [facility]      Patient: Noelle Gomez  Date of Admission:  9/23/2024      Reason for consult: Goals of care  Decisional support       Recommendations & Counseling     GOALS OF CARE:   Cards 2 team spoke with Noelle's family yesterday and family felt strongly that Noelle would not have wanted this aggressive ICU care that she is currently receiving, especially knowing that the chance of making a meaningful recovery and being able to live independently at home was minimal.  The family felt strongly that Noelle would want to be taken off the ventilator and transition to comfort care and be allowed a peaceful death.  Family is planning to come in today to meet with the cardiology team and likely transition patient to comfort and remove the ventilator.  Family should be aware that patient could pass away within minutes to hours to days after extubation.  Palliative care is available for family support.    ADVANCE CARE PLANNING:  No health care directive on file. Per system policy, Surrogate Decision-makers for Patients With Diminished Decision-making Capacity offers guidance on possible decision-makers. Vito Gomez (spouse), Scar & Rell Gomez (sons)  has been identified as a surrogate decision maker.   There is no POLST form on file, defer to patient and/or next of kin for decisions   Code status: No CPR- Do NOT Intubate    MEDICAL MANAGEMENT:   Recommendations for terminal extubation and transition to comfort cares was discussed with the cardiology team today and my recommendations are listed below:     -Stop all medications not contributing to comfort including antibiotics and bumex drip  -Use comfort care order set for the ICU for patient's planning on terminal extubation.  -Continue current Fentanyl drip 100mcg/hr and titrate up as needed to maintain patient comfort  -Use Fentanyl bolus doses for air hunger and recommend  "bolus dose of   Fentanyl 100-200mcg q10 minutes PRN  - Versed as per comfort care order set  -Recommend premedicating with both fentanyl and Versed prior to extubation and then would work with respiratory therapy to wean ventilator support while increasing fentanyl and Versed as needed to assure patient remains comfortable, without struggling to breathe, throughout extubation process.  -Recommend tapering pressor support off just prior to extubation.    PSYCHOSOCIAL/SPIRITUAL SUPPORT:  Family Spose: Vito; Sons:Scar and Rell    Thank you for the consult and allowing us to aid in the care of Noelle Gomez.    These recommendations have been discussed with Cards 2 team.    Leonardo Jerome MD  [facility], Palliative Care  Securely message with the Vocera Web Console (learn more here) or  Text page via [email or web address]     Thank you for the opportunity to continue to participate in the care of this patient and family.  Please feel free to contact on-call palliative provider with any emergent needs.  We can be reached via Securely message with the Vocera Web Console (learn more here) or Text page via [employer]    If you are not sure who specifically to contact -- please text the \"Palliative Care [facility]\" voice group in [place].   Physician Attestation:   I, Radha Quintero MD, saw this patient and agree with Dr. Jerome's findings and plan of care as documented in the note.   Radha Quintero MD / Palliative Medicine   Medical Decision Making               Assessment      59-year-old smoker with a history of HTN who presented to [facility] on 9/16 for respiratory distress.  She was identified as being in acute on chronic hypoxic/hypercapnic respiratory failure and was promptly intubated in the emergency room.  Workup showed segmental and subsegmental PEs and underwent thrombectomy.  Thrombectomy procedure did reveal that the clots were primarily chronic in nature and there was " "significantly elevated pulmonary artery pressure.  Patient was started on anticoagulation, but subsequently showed evidence for a GI bleed.  After the second trial on anticoagulation, patient required 5 units PVR cc and a repeat endoscopy with clip/cauterization.  Patient has been on the ventilator for about 2 weeks and has had difficulty being weaned, and is also being treated for pneumonia.  She is on TB precautions due to a cavitary lesion though workup has shown no clear evidence of tuberculosis.    On 9/30, patient again failed pressure support trial.  Currently is not receiving heparin, and has no further evidence of GI bleeding.      The primary team had family conference yesterday and discussed that Noelle was critically ill with a grim prognosis.  Noelle's sons shared that Noelle would not have wanted aggressive care in the intensive care unit and did not want to come into the hospital at all and valued her independence and they felt strongly that she would want the breathing tube removed and be allowed to transition to comfort care and pass away peacefully.        Palliative care was consulted for goals of care and support    Today, the patient was seen for:   Respiratory failure  Multiple pulmonary emboli  GI bleeding  Goals of care  decisional support  Palliative care encounter      Met with patient briefly.  She does open her eyes to her name.  She shakes her head no when I ask \"do you have any pain?\".  When I asked-twice- if she knew why she was in the hospital, she closed her eyes without responding yes or no. Pt able to follow simple commands and can respond to simple yes / no questions but does not have capacity for complex decision making.           Patient's decision-making capacity today for complex decisions: Questionable              Medications:  Reviewed this patient's medication profile and medications from this hospitalization. Notable medications:  Fentanyl " infusion  Insulin  Versed  Norepinephrine drip  Protonix infusion  Sildenafil  Vasopressin infusion  Zosyn  Bumex    ROS:  Comprehensive ROS is reviewed and is negative except as here & per HPI:     Physical Exam   Vital Signs with Ranges  Temp:  [94.6  F (34.8  C)-100  F (37.8  C)] 99.5  F (37.5  C)  Pulse:  [] 97  Resp:  [17-32] 28  MAP:  [64 mmHg-130 mmHg] 77 mmHg  Arterial Line BP: ()/(42-79) 129/50  FiO2 (%):  [60 %-65 %] 65 %  SpO2:  [81 %-100 %] 96 %  Wt Readings from Last 10 Encounters:   10/01/24 116.9 kg (257 lb 11.5 oz)   09/22/24 124.8 kg (275 lb 1.6 oz)   09/16/24 139.3 kg (307 lb)   10/09/23 127.5 kg (281 lb)   10/02/23 129.6 kg (285 lb 11.2 oz)     257 lbs 11.48 oz    PHYSICAL EXAM:  Endotracheal ventilation in place.  Patient in no acute distress.  Somnolent appearing opens eyes to name.  Nods head yes or no to some questions.  HEENT: Nonicteric sclera, moist mucous membranes, no facial asymmetry  Lungs: Coarse equal breath sounds bilaterally  Lower extremities edematous  Neuro: Sedated, opens eyes and moves upper extremities to command. Pt able to follow simple commands and can respond to simple yes / no questions but does not have capacity for complex decision making.     Data reviewed:  Results for orders placed or performed during the hospital encounter of 09/23/24 (from the past 24 hour(s))   Potassium   Result Value Ref Range    Potassium 3.3 (L) 3.4 - 5.3 mmol/L   Magnesium   Result Value Ref Range    Magnesium 2.5 (H) 1.7 - 2.3 mg/dL   Glucose by meter   Result Value Ref Range    GLUCOSE BY METER POCT 151 (H) 70 - 99 mg/dL   CBC with Platelets & Differential    Narrative    The following orders were created for panel order CBC with Platelets & Differential.  Procedure                               Abnormality         Status                     ---------                               -----------         ------                     CBC with platelets and d...[[id number]]  Abnormal             Final result                 Please view results for these tests on the individual orders.   Lactic acid whole blood   Result Value Ref Range    Lactic Acid 1.4 0.7 - 2.0 mmol/L   Blood gas venous   Result Value Ref Range    pH Venous 7.33 7.32 - 7.43    pCO2 Venous 62 (H) 40 - 50 mm Hg    pO2 Venous 37 25 - 47 mm Hg    Bicarbonate Venous 33 (H) 21 - 28 mmol/L    Base Excess/Deficit Venous 5.6 (H) -3.0 - 3.0 mmol/L    FIO2 60     Oxyhemoglobin Venous 66 (L) 70 - 75 %    O2 Sat, Venous 67.7 (L) 70.0 - 75.0 %    Narrative    In healthy individuals, oxyhemoglobin (O2Hb) and oxygen saturation (SO2) are approximately equal. In the presence of dyshemoglobins, oxyhemoglobin can be considerably lower than oxygen saturation.   Magnesium   Result Value Ref Range    Magnesium 2.5 (H) 1.7 - 2.3 mg/dL   Phosphorus   Result Value Ref Range    Phosphorus 3.9 2.5 - 4.5 mg/dL   CRP inflammation   Result Value Ref Range    CRP Inflammation 31.40 (H) <5.00 mg/L   Comprehensive metabolic panel   Result Value Ref Range    Sodium 153 (H) 135 - 145 mmol/L    Potassium 3.2 (L) 3.4 - 5.3 mmol/L    Carbon Dioxide (CO2) 29 22 - 29 mmol/L    Anion Gap 12 7 - 15 mmol/L    Urea Nitrogen 69.5 (H) 8.0 - 23.0 mg/dL    Creatinine 1.67 (H) 0.51 - 0.95 mg/dL    GFR Estimate 35 (L) >60 mL/min/1.73m2    Calcium 8.8 8.8 - 10.4 mg/dL    Chloride 112 (H) 98 - 107 mmol/L    Glucose 136 (H) 70 - 99 mg/dL    Alkaline Phosphatase 40 40 - 150 U/L    AST 29 0 - 45 U/L    ALT 22 0 - 50 U/L    Protein Total 5.2 (L) 6.4 - 8.3 g/dL    Albumin 2.9 (L) 3.5 - 5.2 g/dL    Bilirubin Total 1.1 <=1.2 mg/dL   Blood gas arterial   Result Value Ref Range    pH Arterial 7.35 7.35 - 7.45    pCO2 Arterial 58 (H) 35 - 45 mm Hg    pO2 Arterial 56 (L) 80 - 105 mm Hg    FIO2 60     Bicarbonate Arterial 32 (H) 21 - 28 mmol/L    Base Excess/Deficit Arterial 5.5 (H) -3.0 - 3.0 mmol/L    Esteban's Test Artline     Oxyhemoglobin Arterial 87 (L) 92 - 100 %    O2 Sat, Arterial 89.5  (L) 96.0 - 97.0 %    Narrative    In healthy individuals, oxyhemoglobin (O2Hb) and oxygen saturation (SO2) are approximately equal. In the presence of dyshemoglobins, oxyhemoglobin can be considerably lower than oxygen saturation.   CBC with platelets and differential   Result Value Ref Range    WBC Count 14.0 (H) 4.0 - 11.0 10e3/uL    RBC Count 2.74 (L) 3.80 - 5.20 10e6/uL    Hemoglobin 8.8 (L) 11.7 - 15.7 g/dL    Hematocrit 25.9 (L) 35.0 - 47.0 %    MCV 95 78 - 100 fL    MCH 32.1 26.5 - 33.0 pg    MCHC 34.0 31.5 - 36.5 g/dL    RDW 18.2 (H) 10.0 - 15.0 %    Platelet Count 79 (L) 150 - 450 10e3/uL    % Neutrophils 77 %    % Lymphocytes 11 %    % Monocytes 6 %    % Eosinophils 2 %    % Basophils 0 %    % Immature Granulocytes 3 %    NRBCs per 100 WBC 4 (H) <1 /100    Absolute Neutrophils 10.8 (H) 1.6 - 8.3 10e3/uL    Absolute Lymphocytes 1.6 0.8 - 5.3 10e3/uL    Absolute Monocytes 0.8 0.0 - 1.3 10e3/uL    Absolute Eosinophils 0.3 0.0 - 0.7 10e3/uL    Absolute Basophils 0.1 0.0 - 0.2 10e3/uL    Absolute Immature Granulocytes 0.5 (H) <=0.4 10e3/uL    Absolute NRBCs 0.5 10e3/uL   Glucose by meter   Result Value Ref Range    GLUCOSE BY METER POCT 131 (H) 70 - 99 mg/dL   Hemoglobin   Result Value Ref Range    Hemoglobin 8.3 (L) 11.7 - 15.7 g/dL   Comprehensive metabolic panel   Result Value Ref Range    Sodium 154 (H) 135 - 145 mmol/L    Potassium 3.7 3.4 - 5.3 mmol/L    Carbon Dioxide (CO2) 29 22 - 29 mmol/L    Anion Gap 13 7 - 15 mmol/L    Urea Nitrogen 68.0 (H) 8.0 - 23.0 mg/dL    Creatinine 1.70 (H) 0.51 - 0.95 mg/dL    GFR Estimate 34 (L) >60 mL/min/1.73m2    Calcium 8.7 (L) 8.8 - 10.4 mg/dL    Chloride 112 (H) 98 - 107 mmol/L    Glucose 149 (H) 70 - 99 mg/dL    Alkaline Phosphatase 43 40 - 150 U/L    AST 29 0 - 45 U/L    ALT 21 0 - 50 U/L    Protein Total 5.2 (L) 6.4 - 8.3 g/dL    Albumin 3.0 (L) 3.5 - 5.2 g/dL    Bilirubin Total 1.2 <=1.2 mg/dL   Glucose by meter   Result Value Ref Range    GLUCOSE BY METER POCT  153 (H) 70 - 99 mg/dL   Hemoglobin   Result Value Ref Range    Hemoglobin 8.4 (L) 11.7 - 15.7 g/dL   Glucose by meter   Result Value Ref Range    GLUCOSE BY METER POCT 159 (H) 70 - 99 mg/dL   XR Chest Port 1 View    Narrative    Exam: XR CHEST PORT 1 VIEW, 10/1/2024 1:55 AM    Comparison: Radiograph 9/30/2024, 9/29/2024    History: money    Findings:  Portable AP view of the chest. Endotracheal tube tip projects over the  upper thoracic trachea. Right upper extremity PICC line tip projects  in stable position, likely in the azygos vein. Removal of the  [place] catheter. Enteric tube extends beyond the field of view.  Esophageal temperature probe in place. Cardiomediastinal silhouette is  partially obscured. Increased mixed interstitial and airspace  opacities throughout the lungs, especially in the left lower lobe.  Small bilateral pleural effusions. No pneumothorax.      Impression    Impression:   1. Interval removal of the [place] catheter. Stable positioning  right PICC line with distal tip likely in the azygos vein. Additional  support devices as described.  2. Increased mixed interstitial and airspace opacities throughout the  lungs, especially within the left lower lobe.  3. Small bilateral pleural effusions.    I have personally reviewed the examination and initial interpretation  and I agree with the findings.    GERARD NORIEGA MD         SYSTEM ID:  [id number]   CBC with Platelets & Differential    Narrative    The following orders were created for panel order CBC with Platelets & Differential.  Procedure                               Abnormality         Status                     ---------                               -----------         ------                     CBC with platelets and d...[[id number]]  Abnormal            Final result                 Please view results for these tests on the individual orders.   Lactic acid whole blood   Result Value Ref Range    Lactic Acid 1.0 0.7 - 2.0 mmol/L    Blood gas venous   Result Value Ref Range    pH Venous 7.40 7.32 - 7.43    pCO2 Venous 52 (H) 40 - 50 mm Hg    pO2 Venous 40 25 - 47 mm Hg    Bicarbonate Venous 32 (H) 21 - 28 mmol/L    Base Excess/Deficit Venous 6.2 (H) -3.0 - 3.0 mmol/L    FIO2 65     Oxyhemoglobin Venous 73 70 - 75 %    O2 Sat, Venous 75.3 (H) 70.0 - 75.0 %    Narrative    In healthy individuals, oxyhemoglobin (O2Hb) and oxygen saturation (SO2) are approximately equal. In the presence of dyshemoglobins, oxyhemoglobin can be considerably lower than oxygen saturation.   Magnesium   Result Value Ref Range    Magnesium 2.4 (H) 1.7 - 2.3 mg/dL   Phosphorus   Result Value Ref Range    Phosphorus 3.3 2.5 - 4.5 mg/dL   CRP inflammation   Result Value Ref Range    CRP Inflammation 39.00 (H) <5.00 mg/L   Comprehensive metabolic panel   Result Value Ref Range    Sodium 155 (H) 135 - 145 mmol/L    Potassium 3.3 (L) 3.4 - 5.3 mmol/L    Carbon Dioxide (CO2) 30 (H) 22 - 29 mmol/L    Anion Gap 14 7 - 15 mmol/L    Urea Nitrogen 70.9 (H) 8.0 - 23.0 mg/dL    Creatinine 1.75 (H) 0.51 - 0.95 mg/dL    GFR Estimate 33 (L) >60 mL/min/1.73m2    Calcium 8.7 (L) 8.8 - 10.4 mg/dL    Chloride 111 (H) 98 - 107 mmol/L    Glucose 145 (H) 70 - 99 mg/dL    Alkaline Phosphatase 44 40 - 150 U/L    AST 29 0 - 45 U/L    ALT 22 0 - 50 U/L    Protein Total 5.5 (L) 6.4 - 8.3 g/dL    Albumin 3.2 (L) 3.5 - 5.2 g/dL    Bilirubin Total 1.1 <=1.2 mg/dL   Blood gas arterial   Result Value Ref Range    pH Arterial 7.43 7.35 - 7.45    pCO2 Arterial 49 (H) 35 - 45 mm Hg    pO2 Arterial 64 (L) 80 - 105 mm Hg    FIO2 65     Bicarbonate Arterial 33 (H) 21 - 28 mmol/L    Base Excess/Deficit Arterial 7.5 (H) -3.0 - 3.0 mmol/L    Esteban's Test Artline     Oxyhemoglobin Arterial 92 92 - 100 %    O2 Sat, Arterial 94.7 (L) 96.0 - 97.0 %    Peep 5 cm H2O    Narrative    In healthy individuals, oxyhemoglobin (O2Hb) and oxygen saturation (SO2) are approximately equal. In the presence of dyshemoglobins,  oxyhemoglobin can be considerably lower than oxygen saturation.   CBC with platelets and differential   Result Value Ref Range    WBC Count 14.2 (H) 4.0 - 11.0 10e3/uL    RBC Count 2.59 (L) 3.80 - 5.20 10e6/uL    Hemoglobin 8.2 (L) 11.7 - 15.7 g/dL    Hematocrit 25.1 (L) 35.0 - 47.0 %    MCV 97 78 - 100 fL    MCH 31.7 26.5 - 33.0 pg    MCHC 32.7 31.5 - 36.5 g/dL    RDW 19.3 (H) 10.0 - 15.0 %    Platelet Count 95 (L) 150 - 450 10e3/uL    % Neutrophils 74 %    % Lymphocytes 14 %    % Monocytes 6 %    % Eosinophils 3 %    % Basophils 0 %    % Immature Granulocytes 3 %    NRBCs per 100 WBC 4 (H) <1 /100    Absolute Neutrophils 10.6 (H) 1.6 - 8.3 10e3/uL    Absolute Lymphocytes 2.0 0.8 - 5.3 10e3/uL    Absolute Monocytes 0.8 0.0 - 1.3 10e3/uL    Absolute Eosinophils 0.4 0.0 - 0.7 10e3/uL    Absolute Basophils 0.1 0.0 - 0.2 10e3/uL    Absolute Immature Granulocytes 0.4 <=0.4 10e3/uL    Absolute NRBCs 0.5 10e3/uL   Glucose by meter   Result Value Ref Range    GLUCOSE BY METER POCT 149 (H) 70 - 99 mg/dL   Ionized Calcium   Result Value Ref Range    Calcium Ionized Whole Blood 4.7 4.4 - 5.2 mg/dL   Hemoglobin   Result Value Ref Range    Hemoglobin 7.7 (L) 11.7 - 15.7 g/dL   Comprehensive metabolic panel   Result Value Ref Range    Sodium 153 (H) 135 - 145 mmol/L    Potassium 3.3 (L) 3.4 - 5.3 mmol/L    Carbon Dioxide (CO2) 30 (H) 22 - 29 mmol/L    Anion Gap 14 7 - 15 mmol/L    Urea Nitrogen 68.7 (H) 8.0 - 23.0 mg/dL    Creatinine 1.88 (H) 0.51 - 0.95 mg/dL    GFR Estimate 30 (L) >60 mL/min/1.73m2    Calcium 8.8 8.8 - 10.4 mg/dL    Chloride 109 (H) 98 - 107 mmol/L    Glucose 175 (H) 70 - 99 mg/dL    Alkaline Phosphatase 46 40 - 150 U/L    AST 31 0 - 45 U/L    ALT 22 0 - 50 U/L    Protein Total 5.7 (L) 6.4 - 8.3 g/dL    Albumin 3.1 (L) 3.5 - 5.2 g/dL    Bilirubin Total 1.1 <=1.2 mg/dL   Potassium   Result Value Ref Range    Potassium 3.3 (L) 3.4 - 5.3 mmol/L   Glucose by meter   Result Value Ref Range    GLUCOSE BY METER POCT  174 (H) 70 - 99 mg/dL   Blood gas venous   Result Value Ref Range    pH Venous 7.38 7.32 - 7.43    pCO2 Venous 58 (H) 40 - 50 mm Hg    pO2 Venous 39 25 - 47 mm Hg    Bicarbonate Venous 35 (H) 21 - 28 mmol/L    Base Excess/Deficit Venous 8.3 (H) -3.0 - 3.0 mmol/L    FIO2 65     Oxyhemoglobin Venous 71 70 - 75 %    O2 Sat, Venous 72.5 70.0 - 75.0 %    Narrative    In healthy individuals, oxyhemoglobin (O2Hb) and oxygen saturation (SO2) are approximately equal. In the presence of dyshemoglobins, oxyhemoglobin can be considerably lower than oxygen saturation.   Glucose by meter   Result Value Ref Range    GLUCOSE BY METER POCT 184 (H) 70 - 99 mg/dL

## 2024-10-01 NOTE — PROGRESS NOTES
Major Shift Events: PICC clogged at shift change with alteplase in line. Tried x2 doses. All lumens were sluggish or would not flush or draw back blood. PICC removed per team. Pt. Follows commands, nods to yes/no questions.Fentanyl for patient comfort. Versed given x1. Qtc elevated. Potassium given x2 doses. Rectal tube in place for GI bleed. 100cc from rectal tube. Na increased to 155. FWF increased to 300q4h.  Plan: Extubate per patient and family wishes    For vital signs and complete assessments, please see documentation flowsheets.

## 2024-10-01 NOTE — PROGRESS NOTES
Cardiology ICU Progress Note    Brief HPI:  Noelle Gomez is a 59 year old female admitted on 9/23/2024. She has a PMH of asthma, 40 pack year former smoker, HTN, who initially presented to [facility] on 9/16 for acute on chronic hypoxic/hypercapnic respiratory failure thought to be related to decompensated heart failure. Pt required intubation on initial day of admission on 9/16/24. TTE was done revealing RV volume and pressure overload. VQ scan was obtained in the setting of VIGNESH revealing multiple bilateral wedge shaped perfusion defects. Also found to LE DVT. Anticoagulation initiated. Patient underwent thrombectomy at [facility] and found to have mainly chronic clot with few acute emboli. PA pressures significantly elevated. Patient temporarily started on epoprostenol. After several days of anticoagulation hgb dropped. Patient started requiring vasopressors. Transferred to our facility for further management. EGD undergone on arrival revealing duodenal ulcer requiring 2 clips. RHC done also revealing pre-capillary pHTN and a ELIO of 5. Night of 9/27 had significant GI bleed from same ulcer requiring transfusion. Patient had another massive bleed on night of 9/29 showing extravasation on CT and subsequently underwent embolization by IR.        Assessment and plan by system:     Neurology   # Acute toxic metabolic encephalopathy - improving  -family discuss with palliative  -Weaning sedation as able  -RAAS goal -2     Cardiovascular  #Cardiogenic shock  #RH failure  #Group IV pHTN CTEPH  #s/p IVC filter  #DVT  TTE 9/17/24: EF 65%, moderately reduced RV systolic function, RVSP >55mmHg, mod TR  RHC: RA 11, RV: 79/13, PA 78/29 (42), PCW: 12, CO/CI (shu): 5.43/2.46, PVR 5.53  - sildenafil per primary team  - Vasopressors: vasopressin, levo as needed  - bumex gtt per primary team  - heparin gtt per primary team in setting of GI bleed  - Monitor hemodynamic status.         Pulmonary  # Acute hypoxic  respiratory failure  # Cavitary lesion    - Pressure weaning trial, will wean vent as able in preparation for possible   -  diamox per primary        Gastrointestinal, Nutrition  # Duodenal ulcer s/p clips and embolization  # Acute GI bleed     - receiving tube feedings as able  - Nutrition consulted, appreciate recommendations  - PPI per GI recommendation  - Bowel regimen: senna prn     Renal, Electrolytes  # VIGNESH, stable  - ICU electrolyte replacement protocol  - Strict I&Os  - continue monitoring closely    Infectious Disease  #CAP-previously treated  - Currently on zosyn  - Cultures from bronchoscopy 9/26 pending, TB resulting as indeterminant.    Hematology  #Acute blood loss anemia   - Hgb goal > 7.0  - holding heparin gtt        Endocrinology  # Hyperglycemia  -Insulin medium dose correction q4h    Integumentary:  - No skin issues      Patient seen and discussed with staff physician.          Objective:  Most recent vital signs:  /53   Pulse 95   Temp 99.5  F (37.5  C)   Resp 28   Wt 116.9 kg (257 lb 11.5 oz)   SpO2 94%   BMI 47.14 kg/m    Temp:  [94.6  F (34.8  C)-100  F (37.8  C)] 99.5  F (37.5  C)  Pulse:  [] 95  Resp:  [19-32] 28  MAP:  [63 mmHg-130 mmHg] 72 mmHg  Arterial Line BP: ()/(42-79) 107/50  FiO2 (%):  [60 %-65 %] 65 %  SpO2:  [81 %-100 %] 94 %  Wt Readings from Last 2 Encounters:   10/01/24 116.9 kg (257 lb 11.5 oz)   09/22/24 124.8 kg (275 lb 1.6 oz)       Intake/Output Summary (Last 24 hours) at 9/24/2024 0657  Last data filed at 9/24/2024 0600  Gross per 24 hour   Intake 2756.78 ml   Output 7840 ml   Net -5083.22 ml     Physical exam:  General: In bed, in NAD  HEENT: PERRL, no scleral icterus or injection  CARDIAC: RRR, no m/r/g appreciated. Peripheral pulses dopplered  RESP: Mechanical ventilation; CTAB, no wheezes, rhonchi or crackles appreciated.  GI: soft, BS hypoactive  : Miller  EXTREMITIES: bilateral edema  SKIN: No acute lesions appreciated  NEURO: Intubated  and sedated    Labs (Past three days):  CBC  Recent Labs   Lab 10/01/24  0313 10/01/24  0014 09/30/24 2032 09/30/24  1548 09/30/24  0853 09/30/24 0252 09/29/24 2317 09/29/24 2126   WBC 14.2*  --   --  14.0*  --  16.3*  --  12.7*   RBC 2.59*  --   --  2.74*  --  3.10*  --  2.70*   HGB 8.2* 8.4* 8.3* 8.8*   < > 9.6*   < > 8.2*   HCT 25.1*  --   --  25.9*  --  29.3*  --  25.6*   MCV 97  --   --  95  --  95  --  95   MCH 31.7  --   --  32.1  --  31.0  --  30.4   MCHC 32.7  --   --  34.0  --  32.8  --  32.0   RDW 19.3*  --   --  18.2*  --  17.2*  --  20.0*   PLT 95*  --   --  79*  --  95*  --  124*    < > = values in this interval not displayed.     BMP  Recent Labs   Lab 10/01/24  0317 10/01/24  0313 10/01/24  0019 09/30/24 2042 09/30/24 2032 09/30/24  1557 09/30/24  1548 09/30/24  1445 09/30/24  1441 09/30/24  1005 09/30/24  0853 09/30/24 0255 09/30/24 0252 09/29/24 2317 09/29/24 2129   NA  --  155*  --   --  154*  --  153*  --   --   --  154*  --  152*   < > 150*   POTASSIUM  --  3.3*  --   --  3.7  --  3.2*  --  3.3*  --  3.9  --  4.1   < > 4.0  4.0   CHLORIDE  --  111*  --   --  112*  --  112*  --   --   --  114*  --  112*  --  107   CO2  --  30*  --   --  29  --  29  --   --   --  28  --  29  --  30*   ANIONGAP  --  14  --   --  13  --  12  --   --   --  12  --  11  --  13   * 145* 159* 153* 149*   < > 136*   < >  --    < > 164*   < > 180*   < > 179*   BUN  --  70.9*  --   --  68.0*  --  69.5*  --   --   --  70.0*  --  68.5*  --  75.1*   CR  --  1.75*  --   --  1.70*  --  1.67*  --   --   --  1.60*  --  1.49*  --  1.53*   GFRESTIMATED  --  33*  --   --  34*  --  35*  --   --   --  37*  --  40*  --  39*   BEN  --  8.7*  --   --  8.7*  --  8.8  --   --   --  8.6*  --  8.4*  --  8.4*   MAG  --  2.4*  --   --   --   --  2.5*  --  2.5*  --   --   --  2.6*  --   --    PHOS  --  3.3  --   --   --   --  3.9  --   --   --   --   --  4.5  --  4.0    < > = values in this interval not displayed.      Troponins:     INR  Recent Labs   Lab 09/30/24  0256 09/29/24  2126 09/29/24 2026 09/28/24  0944   INR 1.40* 1.42* 1.35* 1.17*     Liver panel  Recent Labs   Lab 10/01/24  0313 09/30/24 2032 09/30/24  1548 09/30/24  0853   PROTTOTAL 5.5* 5.2* 5.2* 4.8*   ALBUMIN 3.2* 3.0* 2.9* 2.7*   BILITOTAL 1.1 1.2 1.1 1.2   ALKPHOS 44 43 40 39*   AST 29 29 29 31   ALT 22 21 22 22       Imaging/procedure results:  XR Chest Port 1 View  RESIDENT PRELIMINARY INTERPRETATION  Impression:   1. Interval removal of the [place]-Sonu catheter. Additional support  devices as described.  2. Increased mixed interstitial and airspace opacities throughout the  lungs, especially within the left lower lobe.  3. Small bilateral pleural effusions.

## 2024-10-01 NOTE — PROGRESS NOTES
General Infectious Disease Service  Royal Team  Patient:  Noelle Gomez  Date of birth 1965  Medical record number [id number]  Date of Admission: 9/23/2024  Date of Visit:  10/1/2024  Requesting Provider: Chalino De La Torre         Assessment and Recommendations     Problem List:    Acute on chronic hypoxic/hypercapic respiratory failure. On mechanical ventilation.   - s/p Bronchoscopy 9/26/24- no endobronchial lesion seen. there were mild thick secretions and the mucosa was inflamed and edematous.   Multiloculated cavitary lesions in the anterior left upper lobe - CT chest 9/23/24   CXR 9/25 w/increased interstitial and airspace opacities and left sided pleural effusion.   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 Originally on heparin but paused w/GI bleeding.   Recent GI bleed (duodenal ulcer) - S/p EGD showing duodenal ulcer. S/p clips   MRSA screen neg -9/25/24  Quantiferon TB gold 9/25/24 - indeterminate   BMI 46    Discussion:    Noelle Gomez is a 59-year-old female with a history of asthma, tobacco use, HTN, obesity (BMI 49), and recent GI bleeding secondary to a duodenal ulcer. She was admitted with acute on chronic hypoxic/hypercapnic respiratory failure, now on mechanical ventilation. CT chest from 09/23 revealed an irregular, multiloculated cavitary lesion in the left upper lobe, patchy consolidations, ground-glass opacities, and pleural effusions. She also has diffuse bilateral pulmonary emboli with left lower extremity DVT, concerning for CTEPH.    Ongoing discussion with patient's family and primary team about goals of care in this complex situation.  Overall, her AFB smears x 3 and MTB C are negative making tuberculosis unlikely.  Discussed with infection prevention and will discontinue airborne precautions at this time.  Other infectious etiologies are certainly less likely with negative cultures thus far for nocardia and actinomyces.  Histo, blasto antigens  negative with other negative fungal markers such as Aspergillus galactomannan and intermediate beta D glucan.    At this time, pending further discussions with family, will continue Zosyn but if comfort measures are taken would discontinue antibiotic as it is not likely improving her situation.    Plan:    - Continue Zosyn for now until decision from family conference  -Discussed with infection prevention, will take off airborne isolation at this time      Recommendations discussed with supervising attending physician, Dr. Reich.    Thank you for this consult. The General ID team will continue to follow this patient, if comfort care is pursued would discontinue antibiotic and ID will sign off.    Jatinder Uriostegui MD  Infectious Disease Fellow   Available on [employer]        Antibiotics & Cultures, Consolidated     Cultures:   9/16 Bcx: negative  9/19 Respiratory aerobic bacterial culture with gram stain: no growth   9/24 Bcx: NGTD  9/26 BAL - pending      aspergillus galactomannan- neg   BD glucan - indeterminate  SarsCoV2, Influenza A/B/ RSV - neg   HIV ag/ab - neg   Hep B c ab,B s ab abd Bs ag , hep c - neg       Antibiotics:   Ceftriaxone (9/16-9/22)  Doxycycline (9/16-9/22)  Vancomycin (9/25)    Pip/Tazo (9/25 - )         Interval History     Vitally stable and afebrile, ongoing severe electrolyte abnormalities with hyponatremia and hypokalemia on a Bumex drip.  No change in secretions per nursing.  Currently on day 7 of Zosyn         Physical Exam     /53   Pulse 97   Temp 99  F (37.2  C)   Resp 29   Wt 116.9 kg (257 lb 11.5 oz)   SpO2 99%   BMI 47.14 kg/m       Exam:  GENERAL: Intubated in the ICU. Awake but not responding to questions/ commands, pale   EYES:  Eyes grossly normal to inspection, does not track speaker  NECK:  no asymmetry scars or lesions.   LUNGS: Coarse bilateral breath sounds, small volumes  CARDIOVASCULAR:  Regular rate and rhythm.,  Borderline tachycardic.    ABDOMEN: Soft, nondistended, tender to palpation with wincing elicited  EXT: Extremities warm and less edematous    MS: No gross musculoskeletal defects noted  SKIN:  no acute rashes   NEUROLOGIC:  Intubated and sedated.

## 2024-10-01 NOTE — PROGRESS NOTES
Cardiology ICU Note    10/01/2024      Critical Care  Yadiel Priest    I personally saw and examined this patient with resident and agree with the findings, assessment, and plan as outlined by the housestaff this day.  The patient remains in the ICU secondary to advanced cardiopulmonary  disease characterized by shock, the need for parenteral  inotropic agents and/or vasopressors to maintain blood pressure and organ perfusion., the careful assessment of renal function in response to low cardiac output state and need for substantial volumes of fluid for drug administration.    The  patient remainds ventilator dependent secondary to hypoxia and chronic hypercarbia.  Patient's course has been complicated by recurrent GI bleeding necessitating endoscopy, transusion and interventionI personally assessed.  the logic and continuity of care plan over the preceding twenty four hours, assessed the continuing need for parenteral and oral medications and their appropriate dosing in the context of circulatory status and renal function.  I reviewed all interim laboratory and imaging.  I coordinated care with nursing staff and consultants. I disucssed the patient's status with the p family who believe that given probable outcomes of chronic respiratory failure, prolonged hospitalization, little likely of returning home tht patient would want therapies compassionately withdrawn.  Compassionate extubation discussed in detail with  and sons   95 minutes    Noelle Gomez MRN# [id number]   Age: 59 year old   YOB: 1965          Assessment and Plan:     59 year old female, history of tobacco use and hypertension who presented to an OSH on 9/16 for acute on chronic hypoxic/hypercapnic respiratory failure requiring prompt intubation with subsequent work-up that identified moderate RV dysfunction in the setting of segmental and subsegmental pulmonary emboli for which she underwent thrombectomy. During  thrombectomy, she was found to have predominantly chronic clot and elevated PA pressures (pre-thrombectomy 77/33 (47) and post-thrombectomy 68/19 (34) leading to diagnosis of CTEPH and the initiation of systemic anticoagulation with heparin. However, she had GI bleed prior to transfer to [facility] requiring 1u pRBC. Since arrival at [facility], she has undergone swan-guided therapies but her hospital course has been complicated by mixed respiratory acidosis and metabolic alkalosis, difficulty weaning from the ventilator, pneumonia s/p zosyn treatment, and large GI bleed due to duodenal ulcer requiring 5u PRBC and repeat EGD with clip x2 on 09/28.     Today:  - Failed SBT 9/30  - Heparin paused, no additional bleeding  - Pressor needs stable from yesterday  - Awake and following commands  - Family planning to visit around 4pm  - [place] had discussed with family yesterday possibility of extubation/comfort cares. Now that patient is more awake (although intubated) and possibly able to participate in conversation, consulting palliative care for help with goals of care conversation  - Adjusting FWF for hypernatremia    Neurology   # Pain and sedation  In setting of critical illness and sedating medications.   - Midazolam gtt  - Fentanyl gtt  - Wean sedation as able  - RAAS goal of 0 -1      # Goals of care  Per son, she does not like being in hospitals and would not have wanted to be hospitalized this long.  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   Vasopressors: Levophed and Vasopressin  Diuresis:  Aggressive diuresis with Bumex 2mg/hr. Hold diamox given resolution of metabolic alkalosis  Anticoagulation: HOLD Heparin for CTEPH (was resumed 9/29 with subsequent GDA bleed requiring embolization with IR). Now s/p 3 GI bleeds, likely unable to tolerate any future anticoagulation  Lines: [place] removed 9/30, clotted off    Pulmonary    Ventilator Settings  FiO2 (%): 65 %, Resp: 28, Vent Mode: CMV/AC, Resp Rate (Set): 28 breaths/min, Tidal Volume (Set, mL): 400 mL, PEEP (cm H2O): 5 cmH2O, Pressure Support (cm H2O): 10 cmH2O, Resp Rate (Set): 28 breaths/min, Tidal Volume (Set, mL): 400 mL, PEEP (cm H2O): 5 cmH2O     # Acute Hypoxic and Hypercapnic Respiratory Failure  # CTEPH  # LORA/OHS  # HAP/VAP - on Zosyn   # COPD   # Cavitary lesion on CT - Emphysema/Infarction    # TB rule out   - S/P Bronchoscopy 9/26/24 with no endobronchial lesion. There were mild thick secretions and the mucosa was inflamed and edematous.   - CT Chest 9/23/24 with multiloculated cavitary lesions of the anterior left upper lobe  - Quantiferon TB gold 9/25/24 - indeterminate  ===PLAN===  -Plan for CTEPH/RV overload as above  -Vent management per CICU team    Pressure support trial 9/30  -ID:   Bronch culture with 1+ staph warneri   Continue Zosyn   3 AFB stains negative- await cultures prior to lifting TB precautions given suspicious pulmonary lesion  - Need to discuss trach if still intubated week of 10/7    - Palliative care consulted    Gastrointestinal, Nutrition  # Upper GI Bleed 2/2 Large Duodenal Ulcer  # Bleed of gastroduodenal artery s/p embolization 9/29  - Patient experienced brisk GI bleed 9/27 - 9/28 requiring several units of pRBC.  - EGD 9/28 with large cratered duodenal ulcer with vissible vessel. 2 clips placed and hemospray used. Post-procedurally recommend 72 hours PPI infusion and holding  feeds for 24 hours.   - Heparin resumed 9/29 with subsequent bleeding from gastroduodenal artery requiring IR embolization  - HOLD heparin- likely cannot tolerate indefinitely  - GI following  - Protonix infusion x72 hours from 9/29 bleed with transition to IV PPI BID    Renal, Electrolytes  # Hypokalemia   # VIGNESH 2/2 cardiogenic shock from CTEPH  # Mixed Metabolic alkalosis and respiratory acidosis. - improved  # Hypernatremia  - Nephrology following   Hold diamox given resolution of alkalosis  - Etiology likely contraction alkalosis from diuretics, with additional contributing factors from her chronic lung disease as well  - Diuretics as above  - FWF at 300 q4h   Na q6h, titrate FWF PRN    Infectious Disease  # HAP/VAP - may represent aspiration pneumonia  # TB rule out - currently on isolation   - S/P Bronchoscopy 9/26/24 with no endobronchial lesion. There were mild thick secretions and the mucosa was inflamed and edematous.   - CT Chest 9/23/24 with multiloculated cavitary lesions of the anterior left upper lobe  - Quantiferon TB indeterminate. AFB smear negative x 3, cultures pending.  - ID following   Continue TB precautions pending cultures given suspicious pulmonary lesion   Continue Zosyn (9/25-*)   Follow up bronch infectious studies    Hematology  # Left femoral DVT  # Family history of Factor 5 Leiden  # CTEPH  # SLE anticoagulant positive   Plan:  Hematology working up antiphospholipid syndrome. (Would need repeat APS labs January 2025)  HOLD heparin given repeat GI bleeds     Endocrinology  # Hyperglycemia  - insulin prn    ICU Checklist:  #Feeding: Consider resuming pending GOC convo  #Analgesia: wean fentanyl to minimize respiratory suppression  #Sedation: versed, wean as able  #Thromboembolism ppx: mechanical (GI bleed)  #HOB: 30 degrees   #Ulcer ppx: PPI  gtt  #Glucose: insulin PRN  #SBT/SAT: try 9/30  #Bowel reg: miralax/senna PRN  #Family: , 2 sons  #Dispo: CCU    Code Status: No CPR- Do  "NOT Intubate     Discussed with Dr Priest attending physician,    Demetra Gomez MD  Internal Medicine PGY3    Cardiology 2 Service         Interval Events:     PICC clogged, removed. FWF increased to 300 qH. No additional bleeding. This morning, she is awake and alert. Following commands, endorsing pain in her abdomen.             Physical Exam:   /53   Pulse 95   Temp 99.5  F (37.5  C)   Resp 28   Wt 116.9 kg (257 lb 11.5 oz)   SpO2 94%   BMI 47.14 kg/m      HEENT: No icterus. ETT in place, OG tube in place.  CARDIOVASCULAR: Tachycardic, regular rate  RESP: Coarse bilaterally. Mechanical ventilation.    GI Soft, nondistended. Tender to palpation in lower quadrants  GENITOURINARY: Miller in place.  EXTREMITIES: edematous, warm and well perfused  NEURO: Awake, following commands, calm  INTEGUMENTARY: No rashes.    Resp: 28 SpO2: 94 % O2 Device: Mechanical Ventilator      Arterial Blood Gas:   Recent Labs   Lab 10/01/24  0313 09/30/24  1548 09/30/24  1005 09/30/24  0338 09/30/24  0252 09/30/24  0023   PH 7.43 7.35  --   --  7.32* 7.36   PCO2 49* 58*  --   --  58* 56*   PO2 64* 56*  --   --  73* 76*   HCO3 33* 32*  --   --  30* 31*   O2PER 65  65 60  60 60 70 70 70.0        Vitals:    09/29/24 0000 09/30/24 0300 10/01/24 0600   Weight: 120.4 kg (265 lb 6.9 oz) 115.2 kg (253 lb 15.5 oz) 116.9 kg (257 lb 11.5 oz)   I/O last 3 completed shifts:  In: 2685.61 [I.V.:1695.61; NG/GT:990]  Out: 4200 [Urine:3950; Stool:250]  Recent Labs   Lab 10/01/24  0317 10/01/24  0313 09/30/24  2042 09/30/24  2032   NA  --  155*  --  154*   POTASSIUM  --  3.3*  --  3.7   CHLORIDE  --  111*  --  112*   CO2  --  30*  --  29   ANIONGAP  --  14  --  13   * 145*   < > 149*   BUN  --  70.9*  --  68.0*   CR  --  1.75*  --  1.70*   BEN  --  8.7*  --  8.7*    < > = values in this interval not displayed.     No components found for: \"URINE\"   Recent Labs   Lab 10/01/24  0313 09/30/24 2032 09/30/24  1548   AST 29 29 29   ALT 22 21 " 22   BILITOTAL 1.1 1.2 1.1   ALBUMIN 3.2* 3.0* 2.9*   PROTTOTAL 5.5* 5.2* 5.2*   ALKPHOS 44 43 40     Temp: 99.5  F (37.5  C) Temp src: EsophagealTemp  Min: 94.6  F (34.8  C)  Max: 100  F (37.8  C)   Recent Labs   Lab 10/01/24  0313 10/01/24  0014 09/30/24  2032 09/30/24  1548 09/30/24  1223 09/30/24  0853 09/30/24 0252 09/29/24 2317 09/29/24 2126 09/29/24 2026   WBC 14.2*  --   --  14.0*  --   --  16.3*  --  12.7* 16.0*   HGB 8.2* 8.4* 8.3* 8.8* 9.0*   < > 9.6*   < > 8.2* 7.2*  7.2*   HCT 25.1*  --   --  25.9*  --   --  29.3*  --  25.6* 23.1*   MCV 97  --   --  95  --   --  95  --  95 102*   RDW 19.3*  --   --  18.2*  --   --  17.2*  --  20.0* 22.3*   PLT 95*  --   --  79*  --   --  95*  --  124* 123*    < > = values in this interval not displayed.     Recent Labs   Lab 09/30/24 0256 09/29/24 2126 09/29/24 2026 09/28/24  0944   INR 1.40* 1.42* 1.35* 1.17*   PTT 25 25 26 24       Recent Labs   Lab 10/01/24  0317 10/01/24  0313 10/01/24  0019 09/30/24  2042 09/30/24  2032   * 145* 159* 153* 149*

## 2024-10-02 NOTE — PLAN OF CARE
Goal Outcome Evaluation:    Plan of Care Reviewed with: patient and family    Overall Patient Progress: declining    Outcome Evaluation: DNR/I, family care conference today    Major Shift Events:  Nods Y/N and following simple commands, but sedated on Fent w/ RASS -1 to -2. Mitts on. SR with minimal ectopy. Levo and Vaso titrated for MAP. CMV vent, unchanged. Thick ruth secretions. Miller UOP ~200/hr, scheduled diuril & bumex drip. Protonix drip. Meds via OGT. FWF 300q4h. Minimal insulin. Skin, see flowsheets. K replaced. Family at bedside this afternoon interacting with pt and care conference completed with  V[place] and sons present. Agreed to transition to comfort cares and extubate tomorrow Wed 10/2 @ ~noon (only son Rell plans to be present).     Plan: Keep comfortable overnight.    For vital signs and complete assessments, please see documentation flowsheets.

## 2024-10-02 NOTE — PROGRESS NOTES
"CLINICAL NUTRITION SERVICES    Informed decision made to change pt s status to comfort care. Nutrition interventions discontinued and no further interventions planned at this time. RD can be consulted if needed.    RD signing off on 10/2/2024.    Laureen Freeman, RD, LD, CNSC  \"4E Clinical Dietitian\" on Vocera  Weekend/Holiday RD - \"Weekend Clinical Dietitian\" on vocera    "

## 2024-10-02 NOTE — PLAN OF CARE
Patient extubated at 1323, family present.  Patient passed at 1457.  [facility] contacted.

## 2024-10-02 NOTE — DISCHARGE SUMMARY
[facility]  Cardiology 2 Service / Advanced Heart Failure  Discharge Summary       Date of Admission:  9/23/2024   Date of Discharge:  10/2/2024    I personally was present for end of life event with resident, fellow and palliative care.  Patient with advanced hypoxic respiratory failure associated with chronic, thromboembolic lung disease, underlying COLD, alveolar hypoventilation with chronic hypoxic lung disease.  In discussions with  and sons, there was unanimous consensus that patient  clearly had expressed desired not to be ventilator dependent, not to live outside home, and without desire for chronic respiratory insufficiency with oxygen dependence.   Options for end of life care were discussed with all in detail and repetitively over days and family requested compassionate extubation under sedation.  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 She has a PMH of asthma, 40 pack year former smoker, HTN, who initially presented to [facility] on 9/16 for acute on chronic hypoxic/hypercapnic respiratory failure thought to be related to decompensated heart failure. She was intubated quickly after admission due to worsening hypercapnea and AMS. TTE at that time revealed severe RV volume and pressure overload. VQ scan obtained (due to VIGNESH) revealed multiple bilateral wedge shaped perfusion defects. She was then  transferred to [facility] ICU for thrombectomy. She also was noted to have a LE DVT, subsequently started on a heparin drip. During thrombectomy, she was noted to have mainly chronic clot with only minimal acute clot that was extracted. PAP reduced from 77/33 (mean 47), to 68/19 (mean 34). 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 She unfortunately later developed a GI bleed with melena and a hemoglobin drop from 12->6.7 over 2 days now s/p 1u pRBC. She subsequently required pressor support with levo and vaso. She was then transferred to [facility] for further evaluation and management.     # Acute Hypoxic and Hypercapnic Respiratory Failure  # CTEPH  # Right heart failure  # WHO Group IV/III Pulmonary hypertension 2/2 CTEPH + underlying ILD/COPD/LORA  # Cardiogenic shock, SCAI D  # LORA/OHS  # HAP/VAP  # Cavitary lesion on CT  # Upper GI Bleed 2/2 Large Duodenal Ulcer  # Bleed of gastroduodenal artery s/p embolization 9/29   # Hypokalemia   # VIGNESH 2/2 cardiogenic shock from CTEPH  # Mixed Metabolic alkalosis and respiratory acidosis  # Hypernatremia  At [facility], patient continued on levophed and vasopressin for cardiogenic shock. Was started on low dose NO with continued elevated PA pressures. Found to have pulmonary cavitary lesions on CT and so was placed on TB precautions; 3 AFB smears negative. On heparin for CTEPH, had bleed of gastroduodenal artery requiring massive transfusion protocol and embolization with IR. Had significant metabolic alkalosis and compensatory respiratory acidosis, likely secondary to diuresis. Also had ongoing hypoxic respiratory failure unable to wean from ventilator over 16 days. Discussion had with family (, son) and palliative care. They report that Noelle did not even want to come to the hospital in the first place and would not want to remain on life support any longer due to her personal  preferences. We explained that even if she were to recover, she likely would have to spend many months in LTACH or nursing home. They report that she would never want that for her life, and decision was made to transition to comfort care 10/2/24. Patient passed at 2:57 pm.     The patient was discussed on day of discharge with Dr. Preist.    Demetra Gomez MD   Internal Medicine PGY-3  Cardiology 2 Service    Death Exam   Physical Exam: Unresponsive to noxious stimuli, Spontaneous respirations absent, Breath sounds absent, Carotid pulse absent, Heart sounds absent, Pupillary light reflex absent, and Corneal blink reflex absent     Consultations this Admission   NURSING TO CONSULT FOR VASCULAR ACCESS CARE IP CONSULT  GI LUMINAL ADULT IP CONSULT  HEMATOLOGY ADULT IP CONSULT  WOUND OSTOMY CONTINENCE NURSE  IP CONSULT  LYMPHEDEMA THERAPY IP CONSULT  CARDIOLOGY ICU (CICU) ADULT IP CONSULT  CARDIOTHORACIC SURGERY ADULT IP CONSULT  PULMONARY GENERAL ADULT IP CONSULT  INFECTIOUS DISEASE GENERAL ADULT IP CONSULT  WOUND OSTOMY CONTINENCE NURSE  IP CONSULT  PHARMACY IP CONSULT  NURSING TO CONSULT FOR VASCULAR ACCESS CARE IP CONSULT  CARE MANAGEMENT / SOCIAL WORK IP CONSULT  NUTRITION SERVICES ADULT IP CONSULT  PHARMACY IP CONSULT  PHARMACY TO DOSE VANCO  INTERNAL MEDICINE PROCEDURE TEAM ADULT IP CONSULT [facility] - THORACENTESIS  NEPHROLOGY GENERAL ADULT IP CONSULT  INFECTION PREVENTION IP CONSULT  NURSING TO CONSULT FOR VASCULAR ACCESS CARE IP CONSULT  GI LUMINAL ADULT IP CONSULT  NURSING TO CONSULT FOR VASCULAR ACCESS CARE IP CONSULT  NURSING TO CONSULT FOR VASCULAR ACCESS CARE IP CONSULT  INTERVENTIONAL RADIOLOGY ADULT/PEDS IP CONSULT  PALLIATIVE CARE ADULT IP CONSULT    Additional Issues this hospitalization:  Right Heel Deep Tissue Pressure Injury POA  Pressure Injury Location: right heel   Wound type: Pressure Injury     Pressure Injury Stage: Deep Tissue Pressure Injury (DTPI), present on admission   Wound history/plan  of care:   RN reported finding pressure injury to right heel upon skin assessment. Deep purple and red non blanchable discoloration. Skin intact and dry., cracked skin to heel.  Deep tissue pressure injury, POA  Treatment Plan:  Right heel: Every three days  Cleanse the area with NS and pat dry.  Apply No sting film barrier to periwound skin.  Cover wound with Mepilex 4x4   Turn and reposition Q 2hrs side to side only.  Ensure pt has Branden-cushion while sitting up in the chair.

## 2024-10-02 NOTE — PROGRESS NOTES
PALLIATIVE CARE PROGRESS NOTE  [facility]     Patient Name: Noelle Gomez  Date of Admission: 9/23/2024        Recommendations & Counseling       GOALS OF CARE:   Comfort focused   Cards 2 team spoke with Noelle's family yesterday and today. They know that Noelle would not have wanted this aggressive ICU care that she is currently receiving, especially knowing that the chance of making a meaningful recovery and being able to live independently at home was minimal.  The family felt strongly that Noelle would want to be taken off the ventilator and transition to comfort care and be allowed a peaceful death.  Met with Rell (son) today.  Though he holds out remote hope that his mom will be able to breathe on her own, he has a pretty good understanding of the poor prognosis that she is currently facing.  He again confirms that mom's wishes would be for comfort care at this point.      ADVANCE CARE PLANNING:  No health care directive on file. Per system policy, Surrogate Decision-makers for Patients With Diminished Decision-making Capacity offers guidance on possible decision-makers. Vito Jason (spouse), Scar & Rell Gomez (sons)  has been identified as a surrogate decision maker.   There is no POLST form on file, defer to patient and/or next of kin for decisions   Code status: No CPR- Do NOT Intubate    MEDICAL MANAGEMENT:   Discussed with cards to team prior to terminal extubation.  Stayed with primary team and bedside RN and respiratory therapist.   Recommendations below:    Premedicating with 150 of IV fentanyl, along with 2 mg of Versed IV, Continue with fentanyl drip 150mcg/hr with bolus supply of both, ready for emergence of symptoms of air hunger and/or anxiety that may arise following extubation.  After short trial of spontaneous breathing (result: apnea), patient was premedicated and extubated by respiratory therapy. IV Pressor medications were  "discontinued as well.  Patient had initial apnea/bradypnea, but soon developed shallow tachypnea.  She showed no real signs of distress, anxiety, or agitation.  At 14:57, patient was pronounced dead with her son Rell by her bedside.       Leonardo Jerome MD  [facility], Palliative Care  Securely message with the [employer] Web Console (learn more here) or  Text page via [email or web address]     Palliative Care will continue to follow. Thank you for the consult and allowing us to aid in the care of Noelle Gomez.    These recommendations have been discussed with cardiology team, bedside RN.    Radha Quintero MD  Securely message with [employer] (more info)  Text page via [email or web address]   If you are not sure who specifically to contact -- please text the \"Palliative Care [facility]\" voice group in [employer].      Thank you for the opportunity to continue to participate in the care of this patient and family.  Please feel free to contact on-call palliative provider with any emergent needs.  We can be reached via Securely message with the Vocera Web Console (learn more here) or Text page via [employer][email or web address]    If you are not sure who specifically to contact -- please text the \"Palliative Care [facility]\" voice group in [employer].   Physician Attestation:   I, Radha Quintero MD, saw this patient and agree with Dr. Jerome's findings and plan of care as documented in the note.   Radha Quintero MD / Palliative Medicine   Medical Decision Making           Assessment:           59-year-old smoker with a history of HTN who presented to [facility] on 9/16 for respiratory distress.  She was identified as being in acute on chronic hypoxic/hypercapnic respiratory failure and was promptly intubated in the emergency room.  Workup showed segmental and subsegmental PEs and underwent thrombectomy.  Thrombectomy procedure did reveal that the clots were primarily chronic in nature and there was significantly " elevated pulmonary artery pressure.  Patient was started on anticoagulation, but subsequently showed evidence for a GI bleed.  After the second trial on anticoagulation, patient required 5 units PVR cc and a repeat endoscopy with clip/cauterization.  Patient has been on the ventilator for about 2 weeks and has had difficulty being weaned, and is also being treated for pneumonia.  She is on TB precautions due to a cavitary lesion though workup has shown no clear evidence of tuberculosis.     On 9/30, patient again failed pressure support trial.  Discussion with Cards 2 team and pt's  and son and decision made to transition to comfort cares and extubate patient on 10/2.          Today, Patient was seen for:   Respiratory failure  Multiple pulmonary emboli  GI bleeding  Symptom management at end of life  Dyspnea   Goals of care  decisional support  Palliative care encounter                 Physical Exam:   Temp:  [93.9  F (34.4  C)-99.1  F (37.3  C)] 98.9  F (37.2  C)  Pulse:  [] 108  Resp:  [16-29] 25  MAP:  [51 mmHg-100 mmHg] 67 mmHg  Arterial Line BP: ()/(36-69) 109/47  FiO2 (%):  [65 %] 65 %  SpO2:  [54 %-100 %] 54 %  261 lbs 7.45 oz    Gen: intubated initially, appears chronically ill, in NAD, opens eyes to voice and then immediately closes them, not able to respond to yes/no questions, sensorium not intact.                  Current Problem List:   Principal Problem:    CTEPH (chronic thromboembolic pulmonary hypertension) (H)      Allergies   Allergen Reactions    Sulfa Antibiotics Unknown, Itching and Rash            Data Reviewed:     Reviewed recent labs and pertinent imaging  ROUTINE ICU LABS (Last four results)  CMP  Recent Labs   Lab 10/02/24  1025 10/02/24  0832 10/02/24  0258 10/02/24  0256 10/02/24  0008 10/02/24  0007 10/01/24  2112 10/01/24  1848 10/01/24  1714 10/01/24  1709 10/01/24  0317 10/01/24  0313 09/30/24  1557 09/30/24  1548   *  --   --  150*  --  151*  --   --   --   154*   < > 155*   < > 153*   POTASSIUM 3.3*  3.3*  --   --  3.0*  --  3.3*  --  2.9*  --  3.1*   < > 3.3*   < > 3.2*   CHLORIDE 101  --   --  102  --  103  --   --   --  107   < > 111*   < > 112*   CO2 32*  --   --  31*  --  31*  --   --   --  32*   < > 30*   < > 29   ANIONGAP 15  --   --  17*  --  17*  --   --   --  15   < > 14   < > 12   * 107* 114* 118*   < > 119*   < >  --    < > 140*   < > 145*   < > 136*   BUN 82.5*  --   --  79.9*  --  78.8*  --   --   --  75.2*   < > 70.9*   < > 69.5*   CR 2.15*  --   --  2.06*  --  2.02*  --   --   --  1.97*   < > 1.75*   < > 1.67*   GFRESTIMATED 26*  --   --  27*  --  28*  --   --   --  29*   < > 33*   < > 35*   BEN 9.2  --   --  9.1  --  9.1  --   --   --  9.1   < > 8.7*   < > 8.8   MAG  --   --   --  2.1  --   --   --   --   --  2.2  --  2.4*  --  2.5*   PHOS  --   --   --  3.4  --   --   --   --   --  3.4  --  3.3  --  3.9   PROTTOTAL 6.3*  --   --  6.0*  --  6.2*  --   --   --  6.2*   < > 5.5*   < > 5.2*   ALBUMIN 3.5  --   --  3.4*  --  3.4*  --   --   --  3.5   < > 3.2*   < > 2.9*   BILITOTAL 1.2  --   --  1.1  --  1.1  --   --   --  1.1   < > 1.1   < > 1.1   ALKPHOS 53  --   --  49  --  49  --   --   --  51   < > 44   < > 40   AST 44  --   --  38  --  37  --   --   --  34   < > 29   < > 29   ALT 28  --   --  25  --  25  --   --   --  25   < > 22   < > 22    < > = values in this interval not displayed.     CBC  Recent Labs   Lab 10/02/24  0256 10/01/24  1709 10/01/24  1256 10/01/24  0943 10/01/24  0313 09/30/24 2032 09/30/24  1548   WBC 10.7 14.0*  --   --  14.2*  --  14.0*   RBC 2.35* 2.49*  --   --  2.59*  --  2.74*   HGB 7.5* 8.1* 8.1* 7.7* 8.2*   < > 8.8*   HCT 23.3* 24.3*  --   --  25.1*  --  25.9*   MCV 99 98  --   --  97  --  95   MCH 31.9 32.5  --   --  31.7  --  32.1   MCHC 32.2 33.3  --   --  32.7  --  34.0   RDW 20.3* 20.0*  --   --  19.3*  --  18.2*   * 108*  --   --  95*  --  79*    < > = values in this interval not displayed.      INR  Recent Labs   Lab 09/30/24  0256 09/29/24  2126 09/29/24  2026 09/28/24  0944   INR 1.40* 1.42* 1.35* 1.17*     Arterial Blood Gas  Recent Labs   Lab 10/02/24  0256 10/01/24  1709 10/01/24  1256 10/01/24  0313 09/30/24  1548   PH 7.48* 7.43  --  7.43 7.35   PCO2 47* 51*  --  49* 58*   PO2 77* 79*  --  64* 56*   HCO3 35* 34*  --  33* 32*   O2PER 65  65 65  65 65 65  65 60  60

## 2024-10-02 NOTE — PLAN OF CARE
ICU End of Shift Summary. See flowsheets for vital signs and detailed assessment.    Changes this shift: No acute events overnight. Neuros remain unchanged. RASS -1/-2. Sedated with fentanyl @ 150, PRN bolus available. Able to answer Y/N questions and follow simple commands. Afebrile. SR 70-80s. MAP goal > 65, norepi @ 0.04 and vaso @ 2. CMV 65% PEEP 5 - reavanced ETT with RT/MD twice this shift, now at 23 @ teeth and approved by provider. Bite block replaced. NPO, OG in place for meds and 350cc q4hr FWF. Rectal tube with minimal output. Miller in place. Bumex gtt @ 2. K replaced this shift x2. Family at bedside in evening.     Plan:  Plan for transitioning to comfort measures today per care coordination.      Plan of Care Reviewed With: patient    Overall Patient Progress: no changeOverall Patient Progress: no change    Outcome Evaluation: tentatuve plan to transition to comfort care today

## 2024-10-02 NOTE — DEATH PRONOUNCEMENT
MD DEATH PRONOUNCEMENT    Called to pronounce Noelle Gomez dead.    Physical Exam: Unresponsive to noxious stimuli, Spontaneous respirations absent, Breath sounds absent, Carotid pulse absent, Heart sounds absent, Pupillary light reflex absent, and Corneal blink reflex absent    Patient was pronounced dead at 2:57 PM, 2024.    Cause of death: Acute on chronic hypoxic respiratory failure    Principal Problem:    CTEPH (chronic thromboembolic pulmonary hypertension) (H)       Infectious disease present?: NO    Communicable disease present? (examples: HIV, chicken pox, TB, Ebola, CJD) :  NO    Multi-drug resistant organism present? (example: MRSA): NO    Please consider an autopsy if any of the following exist:  NO Unexpected or unexplained death during or following any dental, medical, or surgical diagnostic treatment procedures.   NO Death of mother at or up to seven days after delivery.     NO All  and pediatric deaths.     NO Death where the cause is sufficiently obscure to delay completion of the death certificate.   NO Deaths in which autopsy would confirm a suspected illness/condition that would affect surviving family members or recipients of transplanted organs.     The following deaths must be reported to the 's Office:  NO A death that may be due entirely or in part to any factors other than natural disease (recent surgery, recent trauma, suspected abuse/neglect).   NO A death that may be an accident, suicide, or homicide.     NO Any sudden, unexpected death in which there is no prior history of significant heart disease or any other condition associated with sudden death.   NO A death under suspicious, unusual, or unexpected circumstances.    NO Any death which is apparently due to natural causes but in which the  does not have a personal physician familiar with the patient s medical history, social, or environmental situation or the circumstances of the terminal event.    NO Any death apparently due to Sudden Infant Death Syndrome.     NO Deaths that occur during, in association with, or as consequences of a diagnostic, therapeutic, or anesthetic procedure.   NO Any death in which a fracture of a major bone has occurred within the past (6) six months.   NO A death of persons note seen by their physician within 120 days of demise.     NO Any death in which the  was an inmate of a public institution or was in the custody of Law Enforcement personnel.   NO  All unexpected deaths of children   NO Solid organ donors   NO Unidentified bodies   NO Deaths of persons whose bodies are to be cremated or otherwise disposed of so that the bodies will later be unavailable for examination;   NO Deaths unattended by a physician outside of a licensed healthcare facility or licensed residential hospice program   NO Deaths occurring within 24 hours of arrival to a health care facility if death is unexpected.    NO Deaths associated with the decedent s employment.   NO Deaths attributed to acts of terrorism.   NO Any death in which there is uncertainty as to whether it is a medical examiner s care should be discussed with the medical investigator.        Body disposition: Autopsy was discussed with family member:  Son by phone.  Permission for autopsy was declined.

## 2024-10-09 ENCOUNTER — TELEPHONE (OUTPATIENT)
Dept: CARDIOLOGY | Facility: CLINIC | Age: 59
End: 2024-10-09
Payer: COMMERCIAL

## 2024-10-09 NOTE — TELEPHONE ENCOUNTER
Health Call Center    Phone Message    May a detailed message be left on voicemail: yes     Reason for Call: Other: Jaimie called requesting to leave a message with Dr. Priest or Dr. Emery and requested that they fill out a cause of death form for Noelle so she can be cremated. Her  is on Friday. Please fill out form or reach out to Noelle with any questions. Thank you!     Action Taken: Other: Cardiology    Travel Screening: Not Applicable    Thank you!  [facility]       Date of Service:

## 2024-10-09 NOTE — TELEPHONE ENCOUNTER
Connect with Dr. Priest and he was able to sign the form.  I called Jaimie back and left a message with her answering service that this form as been completed. Ashley Byrne RN

## 2024-10-10 LAB
BACTERIA BRONCH: NORMAL
BACTERIA BRONCH: NORMAL

## 2024-10-24 LAB
BACTERIA BRONCH: NO GROWTH
BACTERIA BRONCH: NO GROWTH
BACTERIA SPEC CULT: NO GROWTH
BACTERIA SPEC CULT: NO GROWTH

## 2024-11-23 LAB
ACID FAST STAIN (ARUP): NORMAL

## 2025-03-30 NOTE — Clinical Note
Prepped: right neck. Prepped with: ChloraPrep. The patient was draped. .Pre-procedure site marking:Insertion site not predetermined normal for race

## (undated) DEVICE — Device

## (undated) DEVICE — KIT HAND CONTROL ACIST 014644 AR-P54

## (undated) DEVICE — GUIDEWIRE VASC 0.014INX180CM RUNTHROUGH 25-1011

## (undated) DEVICE — TUBING PRESSURE 30"

## (undated) DEVICE — PACK HEART LEFT CUSTOM

## (undated) DEVICE — MANIFOLD KIT ANGIO AUTOMATED 014613

## (undated) DEVICE — INTRO SHEATH 6FRX10CM PINNACLE RSS602

## (undated) DEVICE — INTRO SHEATH 7FRX10CM PINNACLE RSS702

## (undated) DEVICE — INTRO SHTH 7FR 12CM DIL GW LL HUB FSTCTH .038IN

## (undated) RX ORDER — PROPOFOL 10 MG/ML
INJECTION, EMULSION INTRAVENOUS
Status: DISPENSED
Start: 2024-01-01

## (undated) RX ORDER — FENTANYL CITRATE 50 UG/ML
INJECTION, SOLUTION INTRAMUSCULAR; INTRAVENOUS
Status: DISPENSED
Start: 2024-01-01

## (undated) RX ORDER — FENTANYL CITRATE-0.9 % NACL/PF 10 MCG/ML
PLASTIC BAG, INJECTION (ML) INTRAVENOUS
Status: DISPENSED
Start: 2024-01-01

## (undated) RX ORDER — EPINEPHRINE 0.1 MG/ML
INJECTION INTRAVENOUS
Status: DISPENSED
Start: 2024-01-01

## (undated) RX ORDER — LIDOCAINE HYDROCHLORIDE 10 MG/ML
INJECTION, SOLUTION INFILTRATION; PERINEURAL
Status: DISPENSED
Start: 2024-01-01

## (undated) RX ORDER — CALCIUM CHLORIDE 100 MG/ML
INJECTION INTRAVENOUS; INTRAVENTRICULAR
Status: DISPENSED
Start: 2024-01-01

## (undated) RX ORDER — LIDOCAINE HYDROCHLORIDE 10 MG/ML
INJECTION, SOLUTION EPIDURAL; INFILTRATION; INTRACAUDAL; PERINEURAL
Status: DISPENSED
Start: 2024-01-01

## (undated) RX ORDER — HEPARIN SODIUM 200 [USP'U]/100ML
INJECTION, SOLUTION INTRAVENOUS
Status: DISPENSED
Start: 2024-01-01